# Patient Record
Sex: FEMALE | Race: WHITE | NOT HISPANIC OR LATINO | Employment: OTHER | URBAN - METROPOLITAN AREA
[De-identification: names, ages, dates, MRNs, and addresses within clinical notes are randomized per-mention and may not be internally consistent; named-entity substitution may affect disease eponyms.]

---

## 2017-02-24 ENCOUNTER — ALLSCRIPTS OFFICE VISIT (OUTPATIENT)
Dept: OTHER | Facility: OTHER | Age: 82
End: 2017-02-24

## 2017-03-07 ENCOUNTER — ALLSCRIPTS OFFICE VISIT (OUTPATIENT)
Dept: OTHER | Facility: OTHER | Age: 82
End: 2017-03-07

## 2017-03-07 DIAGNOSIS — E55.9 VITAMIN D DEFICIENCY: ICD-10-CM

## 2017-03-07 DIAGNOSIS — E05.90 THYROTOXICOSIS WITHOUT THYROID STORM: ICD-10-CM

## 2017-03-07 DIAGNOSIS — M48.00 SPINAL STENOSIS: ICD-10-CM

## 2017-03-07 DIAGNOSIS — E11.9 TYPE 2 DIABETES MELLITUS WITHOUT COMPLICATIONS (HCC): ICD-10-CM

## 2017-03-07 DIAGNOSIS — I10 ESSENTIAL (PRIMARY) HYPERTENSION: ICD-10-CM

## 2017-03-07 DIAGNOSIS — E78.5 HYPERLIPIDEMIA: ICD-10-CM

## 2017-03-28 ENCOUNTER — APPOINTMENT (OUTPATIENT)
Dept: LAB | Facility: CLINIC | Age: 82
End: 2017-03-28
Payer: MEDICARE

## 2017-03-28 DIAGNOSIS — E05.90 THYROTOXICOSIS WITHOUT THYROID STORM: ICD-10-CM

## 2017-03-28 DIAGNOSIS — M48.00 SPINAL STENOSIS: ICD-10-CM

## 2017-03-28 DIAGNOSIS — E11.9 TYPE 2 DIABETES MELLITUS WITHOUT COMPLICATIONS (HCC): ICD-10-CM

## 2017-03-28 DIAGNOSIS — E55.9 VITAMIN D DEFICIENCY: ICD-10-CM

## 2017-03-28 DIAGNOSIS — E78.5 HYPERLIPIDEMIA: ICD-10-CM

## 2017-03-28 DIAGNOSIS — I10 ESSENTIAL (PRIMARY) HYPERTENSION: ICD-10-CM

## 2017-03-28 LAB
25(OH)D3 SERPL-MCNC: 33.8 NG/ML (ref 30–100)
ALBUMIN SERPL BCP-MCNC: 3.8 G/DL (ref 3.5–5)
ALP SERPL-CCNC: 106 U/L (ref 46–116)
ALT SERPL W P-5'-P-CCNC: 27 U/L (ref 12–78)
ANION GAP SERPL CALCULATED.3IONS-SCNC: 7 MMOL/L (ref 4–13)
AST SERPL W P-5'-P-CCNC: 20 U/L (ref 5–45)
BASOPHILS # BLD AUTO: 0.03 THOUSANDS/ΜL (ref 0–0.1)
BASOPHILS NFR BLD AUTO: 0 % (ref 0–1)
BILIRUB SERPL-MCNC: 0.74 MG/DL (ref 0.2–1)
BUN SERPL-MCNC: 13 MG/DL (ref 5–25)
CALCIUM SERPL-MCNC: 9 MG/DL (ref 8.3–10.1)
CHLORIDE SERPL-SCNC: 103 MMOL/L (ref 100–108)
CHOLEST SERPL-MCNC: 203 MG/DL (ref 50–200)
CO2 SERPL-SCNC: 30 MMOL/L (ref 21–32)
CREAT SERPL-MCNC: 1.01 MG/DL (ref 0.6–1.3)
EOSINOPHIL # BLD AUTO: 0.17 THOUSAND/ΜL (ref 0–0.61)
EOSINOPHIL NFR BLD AUTO: 3 % (ref 0–6)
ERYTHROCYTE [DISTWIDTH] IN BLOOD BY AUTOMATED COUNT: 14.5 % (ref 11.6–15.1)
EST. AVERAGE GLUCOSE BLD GHB EST-MCNC: 131 MG/DL
GFR SERPL CREATININE-BSD FRML MDRD: 52.1 ML/MIN/1.73SQ M
GLUCOSE P FAST SERPL-MCNC: 125 MG/DL (ref 65–99)
HBA1C MFR BLD: 6.2 % (ref 4.2–6.3)
HCT VFR BLD AUTO: 42.4 % (ref 34.8–46.1)
HDLC SERPL-MCNC: 45 MG/DL (ref 40–60)
HGB BLD-MCNC: 14.3 G/DL (ref 11.5–15.4)
LDLC SERPL CALC-MCNC: 126 MG/DL (ref 0–100)
LYMPHOCYTES # BLD AUTO: 2.67 THOUSANDS/ΜL (ref 0.6–4.47)
LYMPHOCYTES NFR BLD AUTO: 39 % (ref 14–44)
MCH RBC QN AUTO: 29.4 PG (ref 26.8–34.3)
MCHC RBC AUTO-ENTMCNC: 33.7 G/DL (ref 31.4–37.4)
MCV RBC AUTO: 87 FL (ref 82–98)
MONOCYTES # BLD AUTO: 0.63 THOUSAND/ΜL (ref 0.17–1.22)
MONOCYTES NFR BLD AUTO: 9 % (ref 4–12)
NEUTROPHILS # BLD AUTO: 3.35 THOUSANDS/ΜL (ref 1.85–7.62)
NEUTS SEG NFR BLD AUTO: 49 % (ref 43–75)
NRBC BLD AUTO-RTO: 0 /100 WBCS
PLATELET # BLD AUTO: 351 THOUSANDS/UL (ref 149–390)
PMV BLD AUTO: 10.4 FL (ref 8.9–12.7)
POTASSIUM SERPL-SCNC: 3.8 MMOL/L (ref 3.5–5.3)
PROT SERPL-MCNC: 7.2 G/DL (ref 6.4–8.2)
RBC # BLD AUTO: 4.86 MILLION/UL (ref 3.81–5.12)
SODIUM SERPL-SCNC: 140 MMOL/L (ref 136–145)
T4 FREE SERPL-MCNC: 1.23 NG/DL (ref 0.76–1.46)
TRIGL SERPL-MCNC: 162 MG/DL
TSH SERPL DL<=0.05 MIU/L-ACNC: 0.67 UIU/ML (ref 0.36–3.74)
VIT B12 SERPL-MCNC: 595 PG/ML (ref 100–900)
WBC # BLD AUTO: 6.87 THOUSAND/UL (ref 4.31–10.16)

## 2017-03-28 PROCEDURE — 83036 HEMOGLOBIN GLYCOSYLATED A1C: CPT

## 2017-03-28 PROCEDURE — 84439 ASSAY OF FREE THYROXINE: CPT

## 2017-03-28 PROCEDURE — 84443 ASSAY THYROID STIM HORMONE: CPT

## 2017-03-28 PROCEDURE — 85025 COMPLETE CBC W/AUTO DIFF WBC: CPT

## 2017-03-28 PROCEDURE — 80053 COMPREHEN METABOLIC PANEL: CPT

## 2017-03-28 PROCEDURE — 82607 VITAMIN B-12: CPT

## 2017-03-28 PROCEDURE — 80061 LIPID PANEL: CPT

## 2017-03-28 PROCEDURE — 36415 COLL VENOUS BLD VENIPUNCTURE: CPT

## 2017-03-28 PROCEDURE — 82306 VITAMIN D 25 HYDROXY: CPT

## 2017-03-29 ENCOUNTER — GENERIC CONVERSION - ENCOUNTER (OUTPATIENT)
Dept: OTHER | Facility: OTHER | Age: 82
End: 2017-03-29

## 2017-06-07 ENCOUNTER — APPOINTMENT (OUTPATIENT)
Dept: LAB | Facility: CLINIC | Age: 82
End: 2017-06-07
Payer: MEDICARE

## 2017-06-07 ENCOUNTER — TRANSCRIBE ORDERS (OUTPATIENT)
Dept: LAB | Facility: CLINIC | Age: 82
End: 2017-06-07

## 2017-06-07 DIAGNOSIS — I70.213 ATHEROSCLEROSIS OF NATIVE ARTERY OF BOTH LOWER EXTREMITIES WITH INTERMITTENT CLAUDICATION (HCC): ICD-10-CM

## 2017-06-07 DIAGNOSIS — N83.201 BILATERAL OVARIAN CYSTS: ICD-10-CM

## 2017-06-07 DIAGNOSIS — N83.202 BILATERAL OVARIAN CYSTS: ICD-10-CM

## 2017-06-07 DIAGNOSIS — E06.3 CHRONIC LYMPHOCYTIC THYROIDITIS: ICD-10-CM

## 2017-06-07 DIAGNOSIS — E11.8 TYPE 2 DIABETES MELLITUS WITH COMPLICATION, UNSPECIFIED LONG TERM INSULIN USE STATUS: ICD-10-CM

## 2017-06-07 DIAGNOSIS — E05.90 PRETIBIAL MYXEDEMA: ICD-10-CM

## 2017-06-07 DIAGNOSIS — E05.00 TOXIC DIFFUSE GOITER WITHOUT MENTION OF THYROTOXIC CRISIS OR STORM: Primary | ICD-10-CM

## 2017-06-07 LAB
ALBUMIN SERPL BCP-MCNC: 3.7 G/DL (ref 3.5–5)
ALP SERPL-CCNC: 95 U/L (ref 46–116)
ALT SERPL W P-5'-P-CCNC: 26 U/L (ref 12–78)
ANION GAP SERPL CALCULATED.3IONS-SCNC: 7 MMOL/L (ref 4–13)
AST SERPL W P-5'-P-CCNC: 20 U/L (ref 5–45)
BACTERIA UR QL AUTO: ABNORMAL /HPF
BASOPHILS # BLD AUTO: 0.01 THOUSANDS/ΜL (ref 0–0.1)
BASOPHILS NFR BLD AUTO: 0 % (ref 0–1)
BILIRUB SERPL-MCNC: 0.79 MG/DL (ref 0.2–1)
BILIRUB UR QL STRIP: NEGATIVE
BUN SERPL-MCNC: 11 MG/DL (ref 5–25)
CALCIUM SERPL-MCNC: 9.3 MG/DL (ref 8.3–10.1)
CHLORIDE SERPL-SCNC: 103 MMOL/L (ref 100–108)
CLARITY UR: ABNORMAL
CO2 SERPL-SCNC: 28 MMOL/L (ref 21–32)
COLOR UR: YELLOW
CREAT SERPL-MCNC: 0.91 MG/DL (ref 0.6–1.3)
CREAT UR-MCNC: <13 MG/DL
EOSINOPHIL # BLD AUTO: 0.06 THOUSAND/ΜL (ref 0–0.61)
EOSINOPHIL NFR BLD AUTO: 1 % (ref 0–6)
ERYTHROCYTE [DISTWIDTH] IN BLOOD BY AUTOMATED COUNT: 13.4 % (ref 11.6–15.1)
EST. AVERAGE GLUCOSE BLD GHB EST-MCNC: 126 MG/DL
GFR SERPL CREATININE-BSD FRML MDRD: 58.8 ML/MIN/1.73SQ M
GLUCOSE P FAST SERPL-MCNC: 114 MG/DL (ref 65–99)
GLUCOSE UR STRIP-MCNC: NEGATIVE MG/DL
HBA1C MFR BLD: 6 % (ref 4.2–6.3)
HCT VFR BLD AUTO: 41.5 % (ref 34.8–46.1)
HGB BLD-MCNC: 13.8 G/DL (ref 11.5–15.4)
HGB UR QL STRIP.AUTO: NEGATIVE
HYALINE CASTS #/AREA URNS LPF: ABNORMAL /LPF
KETONES UR STRIP-MCNC: NEGATIVE MG/DL
LEUKOCYTE ESTERASE UR QL STRIP: ABNORMAL
LYMPHOCYTES # BLD AUTO: 2.66 THOUSANDS/ΜL (ref 0.6–4.47)
LYMPHOCYTES NFR BLD AUTO: 38 % (ref 14–44)
MAGNESIUM SERPL-MCNC: 2.4 MG/DL (ref 1.6–2.6)
MCH RBC QN AUTO: 29.3 PG (ref 26.8–34.3)
MCHC RBC AUTO-ENTMCNC: 33.3 G/DL (ref 31.4–37.4)
MCV RBC AUTO: 88 FL (ref 82–98)
MICROALBUMIN UR-MCNC: 27.7 MG/L (ref 0–20)
MICROALBUMIN/CREAT 24H UR: >213 MG/G CREATININE (ref 0–30)
MONOCYTES # BLD AUTO: 0.88 THOUSAND/ΜL (ref 0.17–1.22)
MONOCYTES NFR BLD AUTO: 13 % (ref 4–12)
NEUTROPHILS # BLD AUTO: 3.34 THOUSANDS/ΜL (ref 1.85–7.62)
NEUTS SEG NFR BLD AUTO: 48 % (ref 43–75)
NITRITE UR QL STRIP: NEGATIVE
NON-SQ EPI CELLS URNS QL MICRO: ABNORMAL /HPF
NRBC BLD AUTO-RTO: 0 /100 WBCS
PH UR STRIP.AUTO: 6 [PH] (ref 4.5–8)
PHOSPHATE SERPL-MCNC: 3.1 MG/DL (ref 2.3–4.1)
PLATELET # BLD AUTO: 387 THOUSANDS/UL (ref 149–390)
PMV BLD AUTO: 10.3 FL (ref 8.9–12.7)
POTASSIUM SERPL-SCNC: 3.6 MMOL/L (ref 3.5–5.3)
PROT SERPL-MCNC: 7.2 G/DL (ref 6.4–8.2)
PROT UR STRIP-MCNC: NEGATIVE MG/DL
RBC # BLD AUTO: 4.71 MILLION/UL (ref 3.81–5.12)
RBC #/AREA URNS AUTO: ABNORMAL /HPF
SODIUM SERPL-SCNC: 138 MMOL/L (ref 136–145)
SP GR UR STRIP.AUTO: 1.01 (ref 1–1.03)
T4 FREE SERPL-MCNC: 1.81 NG/DL (ref 0.76–1.46)
TSH SERPL DL<=0.05 MIU/L-ACNC: 0.01 UIU/ML (ref 0.36–3.74)
UROBILINOGEN UR QL STRIP.AUTO: 0.2 E.U./DL
WBC # BLD AUTO: 6.96 THOUSAND/UL (ref 4.31–10.16)
WBC #/AREA URNS AUTO: ABNORMAL /HPF

## 2017-06-07 PROCEDURE — 82570 ASSAY OF URINE CREATININE: CPT

## 2017-06-07 PROCEDURE — 82043 UR ALBUMIN QUANTITATIVE: CPT

## 2017-06-07 PROCEDURE — 85025 COMPLETE CBC W/AUTO DIFF WBC: CPT

## 2017-06-07 PROCEDURE — 81001 URINALYSIS AUTO W/SCOPE: CPT

## 2017-06-07 PROCEDURE — 80053 COMPREHEN METABOLIC PANEL: CPT

## 2017-06-07 PROCEDURE — 84100 ASSAY OF PHOSPHORUS: CPT

## 2017-06-07 PROCEDURE — 84439 ASSAY OF FREE THYROXINE: CPT

## 2017-06-07 PROCEDURE — 83735 ASSAY OF MAGNESIUM: CPT

## 2017-06-07 PROCEDURE — 36415 COLL VENOUS BLD VENIPUNCTURE: CPT

## 2017-06-07 PROCEDURE — 84443 ASSAY THYROID STIM HORMONE: CPT

## 2017-06-07 PROCEDURE — 83036 HEMOGLOBIN GLYCOSYLATED A1C: CPT

## 2017-07-03 ENCOUNTER — TRANSCRIBE ORDERS (OUTPATIENT)
Dept: LAB | Facility: CLINIC | Age: 82
End: 2017-07-03

## 2017-07-03 ENCOUNTER — APPOINTMENT (OUTPATIENT)
Dept: LAB | Facility: CLINIC | Age: 82
End: 2017-07-03
Payer: MEDICARE

## 2017-07-03 DIAGNOSIS — Z00.00 ROUTINE GENERAL MEDICAL EXAMINATION AT A HEALTH CARE FACILITY: Primary | ICD-10-CM

## 2017-07-03 LAB
ALBUMIN SERPL BCP-MCNC: 4 G/DL (ref 3.5–5)
ALP SERPL-CCNC: 96 U/L (ref 46–116)
ALT SERPL W P-5'-P-CCNC: 26 U/L (ref 12–78)
ANION GAP SERPL CALCULATED.3IONS-SCNC: 6 MMOL/L (ref 4–13)
AST SERPL W P-5'-P-CCNC: 26 U/L (ref 5–45)
BILIRUB SERPL-MCNC: 0.68 MG/DL (ref 0.2–1)
BUN SERPL-MCNC: 16 MG/DL (ref 5–25)
CALCIUM SERPL-MCNC: 9.6 MG/DL (ref 8.3–10.1)
CEA SERPL-MCNC: <0.5 NG/ML (ref 0–3)
CHLORIDE SERPL-SCNC: 103 MMOL/L (ref 100–108)
CO2 SERPL-SCNC: 29 MMOL/L (ref 21–32)
CORTIS SERPL-MCNC: 19.6 UG/DL
CREAT SERPL-MCNC: 0.98 MG/DL (ref 0.6–1.3)
CREAT UR-MCNC: 66.6 MG/DL
ERYTHROCYTE [DISTWIDTH] IN BLOOD BY AUTOMATED COUNT: 13 % (ref 11.6–15.1)
EST. AVERAGE GLUCOSE BLD GHB EST-MCNC: 123 MG/DL
GFR SERPL CREATININE-BSD FRML MDRD: 53.9 ML/MIN/1.73SQ M
GLUCOSE P FAST SERPL-MCNC: 121 MG/DL (ref 65–99)
HBA1C MFR BLD: 5.9 % (ref 4.2–6.3)
HCT VFR BLD AUTO: 41.8 % (ref 34.8–46.1)
HGB BLD-MCNC: 14.1 G/DL (ref 11.5–15.4)
MAGNESIUM SERPL-MCNC: 2.4 MG/DL (ref 1.6–2.6)
MCH RBC QN AUTO: 29.7 PG (ref 26.8–34.3)
MCHC RBC AUTO-ENTMCNC: 33.7 G/DL (ref 31.4–37.4)
MCV RBC AUTO: 88 FL (ref 82–98)
MICROALBUMIN UR-MCNC: <5 MG/L (ref 0–20)
MICROALBUMIN/CREAT 24H UR: <8 MG/G CREATININE (ref 0–30)
PHOSPHATE SERPL-MCNC: 3.6 MG/DL (ref 2.3–4.1)
PLATELET # BLD AUTO: 364 THOUSANDS/UL (ref 149–390)
PMV BLD AUTO: 10.9 FL (ref 8.9–12.7)
POTASSIUM SERPL-SCNC: 4.4 MMOL/L (ref 3.5–5.3)
PROT SERPL-MCNC: 7.2 G/DL (ref 6.4–8.2)
RBC # BLD AUTO: 4.75 MILLION/UL (ref 3.81–5.12)
SODIUM SERPL-SCNC: 138 MMOL/L (ref 136–145)
T3FREE SERPL-MCNC: 3.78 PG/ML (ref 2.3–4.2)
T4 FREE SERPL-MCNC: 1.57 NG/DL (ref 0.76–1.46)
TSH SERPL DL<=0.05 MIU/L-ACNC: 0.01 UIU/ML (ref 0.36–3.74)
WBC # BLD AUTO: 6.22 THOUSAND/UL (ref 4.31–10.16)

## 2017-07-03 PROCEDURE — 83835 ASSAY OF METANEPHRINES: CPT

## 2017-07-03 PROCEDURE — 83735 ASSAY OF MAGNESIUM: CPT

## 2017-07-03 PROCEDURE — 84439 ASSAY OF FREE THYROXINE: CPT

## 2017-07-03 PROCEDURE — 84432 ASSAY OF THYROGLOBULIN: CPT

## 2017-07-03 PROCEDURE — 82024 ASSAY OF ACTH: CPT

## 2017-07-03 PROCEDURE — 82378 CARCINOEMBRYONIC ANTIGEN: CPT

## 2017-07-03 PROCEDURE — 84100 ASSAY OF PHOSPHORUS: CPT

## 2017-07-03 PROCEDURE — 84443 ASSAY THYROID STIM HORMONE: CPT

## 2017-07-03 PROCEDURE — 82308 ASSAY OF CALCITONIN: CPT

## 2017-07-03 PROCEDURE — 84481 FREE ASSAY (FT-3): CPT

## 2017-07-03 PROCEDURE — 86800 THYROGLOBULIN ANTIBODY: CPT

## 2017-07-03 PROCEDURE — 82533 TOTAL CORTISOL: CPT

## 2017-07-03 PROCEDURE — 82570 ASSAY OF URINE CREATININE: CPT

## 2017-07-03 PROCEDURE — 82043 UR ALBUMIN QUANTITATIVE: CPT

## 2017-07-03 PROCEDURE — 83036 HEMOGLOBIN GLYCOSYLATED A1C: CPT

## 2017-07-03 PROCEDURE — 36415 COLL VENOUS BLD VENIPUNCTURE: CPT

## 2017-07-03 PROCEDURE — 80053 COMPREHEN METABOLIC PANEL: CPT

## 2017-07-03 PROCEDURE — 85027 COMPLETE CBC AUTOMATED: CPT

## 2017-07-05 ENCOUNTER — TRANSCRIBE ORDERS (OUTPATIENT)
Dept: LAB | Facility: CLINIC | Age: 82
End: 2017-07-05

## 2017-07-05 ENCOUNTER — HOSPITAL ENCOUNTER (OUTPATIENT)
Dept: ULTRASOUND IMAGING | Facility: HOSPITAL | Age: 82
Discharge: HOME | End: 2017-07-05
Attending: SPECIALIST
Payer: MEDICARE

## 2017-07-05 ENCOUNTER — HOSPITAL ENCOUNTER (OUTPATIENT)
Dept: NON INVASIVE DIAGNOSTICS | Facility: CLINIC | Age: 82
Discharge: HOME | End: 2017-07-05
Attending: SPECIALIST
Payer: MEDICARE

## 2017-07-05 DIAGNOSIS — I70.213 ATHEROSCLEROSIS OF NATIVE ARTERY OF BOTH LOWER EXTREMITIES WITH INTERMITTENT CLAUDICATION (HCC): ICD-10-CM

## 2017-07-05 DIAGNOSIS — N83.202 BILATERAL OVARIAN CYSTS: ICD-10-CM

## 2017-07-05 DIAGNOSIS — N83.201 BILATERAL OVARIAN CYSTS: ICD-10-CM

## 2017-07-05 LAB — ACTH PLAS-MCNC: 60.2 PG/ML (ref 7.2–63.3)

## 2017-07-05 PROCEDURE — 93925 LOWER EXTREMITY STUDY: CPT

## 2017-07-05 PROCEDURE — 76856 US EXAM PELVIC COMPLETE: CPT

## 2017-07-05 PROCEDURE — 93923 UPR/LXTR ART STDY 3+ LVLS: CPT

## 2017-07-06 LAB — CALCIT SERPL-MCNC: <2 PG/ML (ref 0–5)

## 2017-07-07 LAB
METANEPH FREE SERPL-MCNC: 33 PG/ML (ref 0–62)
NORMETANEPHRINE SERPL-MCNC: 129 PG/ML (ref 0–145)

## 2017-07-11 ENCOUNTER — ALLSCRIPTS OFFICE VISIT (OUTPATIENT)
Dept: OTHER | Facility: OTHER | Age: 82
End: 2017-07-11

## 2017-07-12 LAB
THYROGLOB AB SERPL-ACNC: 445.4 IU/ML (ref 0–0.9)
THYROGLOB SERPL-MCNC: 13 NG/ML

## 2017-09-08 ENCOUNTER — TRANSCRIBE ORDERS (OUTPATIENT)
Dept: LAB | Facility: CLINIC | Age: 82
End: 2017-09-08

## 2017-09-08 ENCOUNTER — APPOINTMENT (OUTPATIENT)
Dept: LAB | Facility: CLINIC | Age: 82
End: 2017-09-08
Payer: MEDICARE

## 2017-09-08 DIAGNOSIS — Z00.00 ROUTINE GENERAL MEDICAL EXAMINATION AT A HEALTH CARE FACILITY: Primary | ICD-10-CM

## 2017-09-08 LAB
ALBUMIN SERPL BCP-MCNC: 3.6 G/DL (ref 3.5–5)
ALP SERPL-CCNC: 110 U/L (ref 46–116)
ALT SERPL W P-5'-P-CCNC: 24 U/L (ref 12–78)
ANION GAP SERPL CALCULATED.3IONS-SCNC: 5 MMOL/L (ref 4–13)
AST SERPL W P-5'-P-CCNC: 17 U/L (ref 5–45)
BACTERIA UR QL AUTO: NORMAL /HPF
BASOPHILS # BLD AUTO: 0.02 THOUSANDS/ΜL (ref 0–0.1)
BASOPHILS NFR BLD AUTO: 0 % (ref 0–1)
BILIRUB SERPL-MCNC: 0.57 MG/DL (ref 0.2–1)
BILIRUB UR QL STRIP: NEGATIVE
BUN SERPL-MCNC: 14 MG/DL (ref 5–25)
CALCIUM SERPL-MCNC: 8.9 MG/DL (ref 8.3–10.1)
CHLORIDE SERPL-SCNC: 106 MMOL/L (ref 100–108)
CLARITY UR: CLEAR
CO2 SERPL-SCNC: 29 MMOL/L (ref 21–32)
COLOR UR: YELLOW
CREAT SERPL-MCNC: 0.94 MG/DL (ref 0.6–1.3)
CREAT UR-MCNC: <13 MG/DL
EOSINOPHIL # BLD AUTO: 0.06 THOUSAND/ΜL (ref 0–0.61)
EOSINOPHIL NFR BLD AUTO: 1 % (ref 0–6)
ERYTHROCYTE [DISTWIDTH] IN BLOOD BY AUTOMATED COUNT: 14 % (ref 11.6–15.1)
EST. AVERAGE GLUCOSE BLD GHB EST-MCNC: 123 MG/DL
GFR SERPL CREATININE-BSD FRML MDRD: 55 ML/MIN/1.73SQ M
GLUCOSE P FAST SERPL-MCNC: 115 MG/DL (ref 65–99)
GLUCOSE UR STRIP-MCNC: NEGATIVE MG/DL
HBA1C MFR BLD: 5.9 % (ref 4.2–6.3)
HCT VFR BLD AUTO: 41.1 % (ref 34.8–46.1)
HGB BLD-MCNC: 13.8 G/DL (ref 11.5–15.4)
HGB UR QL STRIP.AUTO: NEGATIVE
KETONES UR STRIP-MCNC: NEGATIVE MG/DL
LEUKOCYTE ESTERASE UR QL STRIP: ABNORMAL
LYMPHOCYTES # BLD AUTO: 2.31 THOUSANDS/ΜL (ref 0.6–4.47)
LYMPHOCYTES NFR BLD AUTO: 34 % (ref 14–44)
MAGNESIUM SERPL-MCNC: 2.4 MG/DL (ref 1.6–2.6)
MCH RBC QN AUTO: 29 PG (ref 26.8–34.3)
MCHC RBC AUTO-ENTMCNC: 33.6 G/DL (ref 31.4–37.4)
MCV RBC AUTO: 86 FL (ref 82–98)
MICROALBUMIN UR-MCNC: <5 MG/L (ref 0–20)
MONOCYTES # BLD AUTO: 0.64 THOUSAND/ΜL (ref 0.17–1.22)
MONOCYTES NFR BLD AUTO: 10 % (ref 4–12)
NEUTROPHILS # BLD AUTO: 3.7 THOUSANDS/ΜL (ref 1.85–7.62)
NEUTS SEG NFR BLD AUTO: 55 % (ref 43–75)
NITRITE UR QL STRIP: NEGATIVE
NON-SQ EPI CELLS URNS QL MICRO: NORMAL /HPF
NRBC BLD AUTO-RTO: 0 /100 WBCS
PH UR STRIP.AUTO: 6.5 [PH] (ref 4.5–8)
PHOSPHATE SERPL-MCNC: 2.9 MG/DL (ref 2.3–4.1)
PLATELET # BLD AUTO: 362 THOUSANDS/UL (ref 149–390)
PMV BLD AUTO: 10.3 FL (ref 8.9–12.7)
POTASSIUM SERPL-SCNC: 4.3 MMOL/L (ref 3.5–5.3)
PROT SERPL-MCNC: 7.4 G/DL (ref 6.4–8.2)
PROT UR STRIP-MCNC: NEGATIVE MG/DL
RBC # BLD AUTO: 4.76 MILLION/UL (ref 3.81–5.12)
RBC #/AREA URNS AUTO: NORMAL /HPF
SODIUM SERPL-SCNC: 140 MMOL/L (ref 136–145)
SP GR UR STRIP.AUTO: 1 (ref 1–1.03)
T3FREE SERPL-MCNC: 2.66 PG/ML (ref 2.3–4.2)
T4 FREE SERPL-MCNC: 1.06 NG/DL (ref 0.76–1.46)
TSH SERPL DL<=0.05 MIU/L-ACNC: 0.35 UIU/ML (ref 0.36–3.74)
UROBILINOGEN UR QL STRIP.AUTO: 0.2 E.U./DL
WBC # BLD AUTO: 6.74 THOUSAND/UL (ref 4.31–10.16)
WBC #/AREA URNS AUTO: NORMAL /HPF

## 2017-09-08 PROCEDURE — 85025 COMPLETE CBC W/AUTO DIFF WBC: CPT

## 2017-09-08 PROCEDURE — 82043 UR ALBUMIN QUANTITATIVE: CPT

## 2017-09-08 PROCEDURE — 81001 URINALYSIS AUTO W/SCOPE: CPT

## 2017-09-08 PROCEDURE — 83036 HEMOGLOBIN GLYCOSYLATED A1C: CPT

## 2017-09-08 PROCEDURE — 84439 ASSAY OF FREE THYROXINE: CPT

## 2017-09-08 PROCEDURE — 84100 ASSAY OF PHOSPHORUS: CPT

## 2017-09-08 PROCEDURE — 83735 ASSAY OF MAGNESIUM: CPT

## 2017-09-08 PROCEDURE — 82570 ASSAY OF URINE CREATININE: CPT

## 2017-09-08 PROCEDURE — 36415 COLL VENOUS BLD VENIPUNCTURE: CPT

## 2017-09-08 PROCEDURE — 80053 COMPREHEN METABOLIC PANEL: CPT

## 2017-09-08 PROCEDURE — 84443 ASSAY THYROID STIM HORMONE: CPT

## 2017-09-08 PROCEDURE — 84481 FREE ASSAY (FT-3): CPT

## 2017-11-14 ENCOUNTER — ALLSCRIPTS OFFICE VISIT (OUTPATIENT)
Dept: OTHER | Facility: OTHER | Age: 82
End: 2017-11-14

## 2017-11-15 NOTE — PROGRESS NOTES
Assessment    1  Muscle cramps (729 82) (R25 2)   2  Hypertension (401 9) (I10)   3  Irritable bowel syndrome (564 1) (K58 9)   4  Type 2 diabetes mellitus (250 00) (E11 9)    Plan  Degeneration of intervertebral disc of lumbar region    · OxyCODONE HCl - 5 MG Oral Tablet  Hyperlipidemia    · (1) LIPID PANEL, FASTING; Status:Active; Requested for:01Mar2018;   Hypertension    · AmLODIPine Besylate 5 MG Oral Tablet (Norvasc); TAKE 1 TABLET BY MOUTH ONCE DAILY  Spinal stenosis    · From  Oxycodone-Acetaminophen 5-325 MG Oral Tablet TAKE 1 TABLET Every 8 hoursPRN To Oxycodone-Acetaminophen 5-325 MG Oral Tablet (Percocet) TAKE 1 TABLET Twice dailyPRN  Type 2 diabetes mellitus    · *VB - Foot Exam; Status:Complete - Retrospective By Protocol Authorization;   Done: [id number]  Vitamin D deficiency    · (1) VITAMIN D 25-HYDROXY; Status:Active; Requested OOJ:[id number]; Discussion/Summary  Discussion Summary:   Leg cramps  She has taking electrolyte supplements, quinine water every other day  Instructed to massage both feet with bottle several times a day  She will try taking lipoic acid and use a homeopathic cream Spinal stenosis  She has tried amitriptyline in the past, does not want to try other medication  Refilled Percocet which she takes sparingly  PDMP reviewed  Hx of Trochanteric bursitis, ITB syndrome, bilateral s/p steroid injection  Asthma  Doing well, no symptoms  She uses Flovent p r n , takes Singulair daily  HTN  Monitor BP at home, compliant with amlodipine  Hyperlipidemia  Lipids improved, on supplements only  Hyperthyroid  Medication recently adjusted  F/u with endo  IBS, GERD  On daily PPI and Bentyl  Anxiety  Stable, minimal use of lorazepam Vitamin D deficiency  HM  Declined flu vaccine, no further screening  up in 4 months or prn  Counseling Documentation With Imm: The patient was counseled regarding diagnostic results,-- instructions for management,-- risk factor reductions,-- impressions  Chief Complaint  Chief Complaint Chronic Condition [facility]: Patient is here today for follow up of chronic conditions described in HPI  History of Present Illness  HPI: Ms Serina Avitia complains of frequent cramps on her feet  can become severe, disrupted sleep  She reported usually starts by the top of her foot, sometimes radiating to her leg  She has tried multiple over-the-counter remedies  She is currently eating a banana daily and drinks quinine water every other day  She has bought lipoic acid and a new homeopathic cream which she will try  She has minimal symptoms during the day   has chronic low back pain, occasional pain in both thighs  She takes Percocet sparingly  She took amitriptyline in the past, which did not really help with the pain  Leg Cramps (Brief): The patient is being seen for an initial evaluation of an existing diagnosis of leg cramps  Symptoms:  nocturnal leg cramps, but-- no exertional leg cramps-- and-- no leg cramps at rest  The patient is currently experiencing symptoms  No associated symptoms are reported  Current treatment includes quinine, mineral supplements, potassium supplements and vitamin B12  By report, there is fair symptom control  Irritable Bowel Syndrome (Follow-Up): The patient is being seen for follow-up of irritable bowel syndrome  The patient reports doing well  The patient is currently asymptomatic  Medications:  the patient is adherent to her medication regimen  Hypertension (Follow-Up): The patient states she has been stable with her blood pressure control since the last visit  Symptoms: The patient is currently asymptomatic  Home monitoring: The patient is not checking blood pressure at home  Medications: the patient is adherent with her medication regimen  Disease Management: the patient is not doing well with her blood pressure goals  Diabetes Type II (Follow-Up):  The patient states she has been stable with her Type II Diabetes control since the last visit  Comorbid Illnesses: hypertension-- and-- hyperlipidemia  Symptoms:  Medications: the patient is adherent with her medication regimen  Review of Systems  Complete-Female:  Constitutional: not feeling tired  Eyes: no eyesight problems  ENT: no nasal discharge  Cardiovascular: no chest pain,-- no palpitations-- and-- no lower extremity edema  Respiratory: no shortness of breath-- and-- no cough  Gastrointestinal: no abdominal pain-- and-- no constipation  Genitourinary: no dysuria  Musculoskeletal: arthralgias-- and-- joint stiffness, but-- as noted in HPI-- and-- no limb pain  Integumentary: no rashes  Neurological: no dizziness,-- no limb weakness-- and-- no difficulty walking  Psychiatric: no sleep disturbances  no feelings of weakness      Active Problems  1  Anxiety (300 00) (F41 9)   2  Asthma (493 90) (J45 909)   3  Degeneration of intervertebral disc of lumbar region (722 52) (M51 36)   4  Diverticulosis (562 10) (K57 90)   5  Elevated serum alkaline phosphatase level (790 5) (R74 8)   6  Encounter for screening mammogram for malignant neoplasm of breast (V76 12) (Z12 31)   7  Esophageal reflux (530 81) (K21 9)   8  Fatty liver (571 8) (K76 0)   9  History of allergy (V15 09) (Z88 9)   10  Hyperlipidemia (272 4) (E78 5)   11  Hypertension (401 9) (I10)   12  Hyperthyroidism (242 90) (E05 90)   13  Iliotibial band tendinitis of left side (728 89) (M76 32)   14  Iliotibial band tendinitis of right side (728 89) (M76 31)   15  Insomnia (780 52) (G47 00)   16  Irritable bowel syndrome (564 1) (K58 9)   17  Need for influenza vaccination (V04 81) (Z23)   18  Need for pneumococcal vaccination (V03 82) (Z23)   19  Ovarian cyst (620 2) (N83 20)   20  Postherpetic neuralgia (053 19) (B02 29)   21  Pre-syncope (780 2) (R55)   22  Screening for osteoporosis (V82 81) (Z13 820)   23  Skin rash (782 1) (R21)   24  Spinal stenosis (724 00) (M48 00)   25   Spondylolisthesis, grade 1 (738 4) (M43 10) 26  Transient ischemic attack (435 9) (G45 9)   27  Type 2 diabetes mellitus (250 00) (E11 9)   28  Vertigo (780 4) (R42)   29  Vitamin D deficiency (268 9) (E55 9)    Past Medical History  1  History of Asymptomatic Bacteriuria (791 9)   2  History of Bronchitis, chronic obstructive, with exacerbation (491 21) (J44 1)   3  History of Chronic interstitial cystitis (595 1) (N30 10)   4  History of Community acquired pneumonia (5) (J18 9)   5  History of Dysuria (788 1) (R30 0)   6  History of Fungal dermatitis (111 9) (B36 9)   7  History Of 6  Previous Pregnancies (V61 5)   8  History of abdominal pain (V13 89) (Z87 898)   9  History of diverticulitis of colon (V12 79) (Z87 19)   10  History of orthostatic hypotension (V12 59) (Z86 79)   11  History of urinary frequency (V13 09) (Z87 898)   12  History of Impacted cerumen of right ear (380 4) (H61 21)   13  History of Lightheadedness (780 4) (R42)   14  History of Lower abdominal pain (789 09) (R10 30)   15  History of Otitis externa of right ear (380 10) (H60 91)   16  History of Previous Pregnancies Resulted In 4  Living Children   17  History of Pruritus (698 9) (L29 9)   18  History of Trochanteric bursitis of both hips (726 5) (M70 61,M70 62)   19  History of Urinary symptom or sign (788 99) (R39 9)   20  History of Urinary tract infection (599 0) (N39 0)   21  History of Urinary Tract Infection (V13 02)   22  History of Vaginal candidiasis (112 1) (B37 3)   23  History of Vaginal candidiasis (112 1) (B37 3)  Active Problems And Past Medical History Reviewed: The active problems and past medical history were reviewed and updated today  Surgical History  1  History of Cholecystectomy   2  History of Hysterectomy   3  History of Partial Colectomy - Sigmoid    Family History  Mother    1  Denied: Family history of Alcoholism and drug addiction in family   2  Denied: Family history of Anxiety and depression   3   Family history of Coronary Artery Disease (V17 49)  Father    4  Denied: Family history of Alcoholism and drug addiction in family   5  Denied: Family history of Anxiety and depression   6  Family history of Ivet Of 2 Motor [address] On [address] (Not Motorcycle)  Child    7  Denied: Family history of Alcoholism and drug addiction in family   6  Denied: Family history of Anxiety and depression  Sibling    9  Denied: Family history of Alcoholism and drug addiction in family   8  Denied: Family history of Anxiety and depression    Social History     · Marital History -    · Never A Smoker   · Never Drank Alcohol   · Occupation: Retired  Social History Reviewed: The social history was reviewed and is unchanged  Current Meds   1  Albuterol Sulfate (2 5 MG/3ML) 0 083% Inhalation Nebulization Solution; USE 1 UNIT DOSE EVERY 4-6 HOURS AS NEEDED FOR WHEEZING ; Therapy: 04Apr2016 to (Last Rx:04Apr2016)  Requested for: 04Apr2016 Ordered   2  AmLODIPine Besylate 5 MG Oral Tablet; TAKE 1 TABLET BY MOUTH ONCE DAILY; Therapy: 57DBF8903 to (Evaluate:07Jan2018)  Requested for: 14ODH5060; Last Rx:40Tdr9955 Ordered   3  Dicyclomine HCl - 10 MG Oral Capsule; TAKE 1 CAPSULE EVERY 8 HOURS DAILY; Therapy: 17NOQ6863 to (Evaluate:06Bic8890)  Requested for: 53Aba4941; Last Rx:24Ybt9695 Ordered   4  Flovent  MCG/ACT Inhalation Aerosol; INHALE 1 PUFF TWICE DAILY; Therapy: 11Iop6054 to (Evaluate:30Mar2017)  Requested for: 35Ufg1945; Last Rx:04Apr2016 Ordered   5  Lidocaine 5 % External Patch; APPLY 1 PATCH TO THE AFFECTED AREA AND LEAVE IN PLACE FOR 12 HOURS, THEN REMOVE AND LEAVE OFF FOR 12 HOURS; Therapy: 64VQF8632 to (Evaluate:30Mar2017)  Requested for: 47XNK5216; Last Rx:08Ibm5205 Ordered   6  LORazepam 0 5 MG Oral Tablet; TAKE 1 TABLET DAILY AS NEEDED; Therapy: 21GQV3190 to (Evaluate:24Oct2016); Last Rx:86Szj6105 Ordered   7  Meclizine HCl - 25 MG Oral Tablet; TAKE 1 TABLET 3 TIMES DAILY AS NEEDED;  Therapy: 16Scq6033 to (Evaluate:43Bfe7417)  Requested for: 29ZNA9936; Last Rx:02Oct2017 Ordered   8  Metanx 3-90 314-2-35 MG Oral Capsule; take 1 capsule daily; Therapy: 27IYT6495 to (Evaluate:01Apr2016); Last Rx:01Hnc9802 Ordered   9  MethIMAzole 5 MG Oral Tablet; TAKE 1 TABLET ONCE DAILY; Therapy: 81USB2660 to (Evaluate:01Nov2015); Last Rx:98Trs5142 Ordered   10  Montelukast Sodium 10 MG Oral Tablet; Take 1 tablet daily; Therapy: 91DLN2609 to (Evaluate:07Jan2018)  Requested for: 83OSY1158; Last Rx:94Yxv9208  Ordered   11  Omeprazole 20 MG Oral Capsule Delayed Release; TAKE 1 CAPSULE DAILY EVERY MORNING  BEFORE BREAKFAST  Requested for: 84YJU8445; Last Rx:06Jun2017 Ordered   12  OneTouch Ultra Blue In Vitro Strip; USE AS DIRECTED; Therapy: 57CSM5107 to (Rudy Gaucher)  Requested for: 03Apr2014; Last Rx:03Apr2014  Ordered   13  OxyCODONE HCl - 5 MG Oral Tablet; Take one tablet PO twice a day prn pain; Therapy: 55FDV3480 to (Evaluate:19Oct2017); Last Rx:06Auo9120 Ordered   14  Triamcinolone Acetonide 0 1 % External Cream; APPLY SPARINGLY AND RUB IN WELL TO  AFFECTED AREA(S) TWICE DAILY PRN  NOT FOR FACE, BREAST OR GROIN;  Therapy: 80EXR2421 to (Last Rx:16Mar2017)  Requested for: 96LAY4158 Ordered   15  Ventolin  (90 Base) MCG/ACT Inhalation Aerosol Solution; INHALE 2 PUFFS EVERY 4 HOURS  AS NEEDED FOR COUGH AND WHEEZE;  Therapy: 97Vam4669 to (Last [place])  Requested for: 71Mec7177 Ordered  Medication List Reviewed: The medication list was reviewed and updated today  Allergies  1  Aleve CAPS   2  Bactrim TABS   3  Gabapentin TABS   4  Halcion TABS   5  Lisinopril TABS   6  Statins   7   Zetia TABS    Vitals  Vital Signs    Recorded: 79SDS5175 11:41AM Recorded: 45LHM8642 11:21AM   Temperature  97 9 F   Heart Rate  78   Respiration  18   Systolic 276 328   Diastolic 60 62   Height  5 ft    Weight  137 lb 8 oz   BMI Calculated  26 85   BSA Calculated  1 59   O2 Saturation  98       Physical Exam  Socks and shoes removed, Right Foot Findings: normal foot  The toes were normal  Socks and Shoes removed, Left Foot Findings: normal foot  The toes were normal   Monofilament Testing: normal tactile sensation with monofilament testing throughout both feet  Tactile sensation in the right foot (see image for location): number 1 was normal,-- number 4 was normal,-- number 5 was normal,-- number 6 was normal,-- number 2 was normal,-- number 3 was normal,-- number 7 was normal,-- number 8 was normal,-- number 9 was normal-- and-- number 10 was normal  Tactile sensation in the left foot (see image for location): number 1 was normal,-- number 4 was normal,-- number 5 was normal,-- number 6 was normal,-- number 2 was normal,-- number 3 was normal,-- number 7 was normal,-- number 8 was normal,-- number 9 was normal-- and-- number 10 was normal    Vascular: Pulses: 2+ in the dorsalis pedis  Pulses: 2+ in the dorsalis pedis  Assign Risk Category: 0: No loss of protective sensation, no deformity  No present risk  Constitutional  General appearance: No acute distress, well appearing and well nourished  appears healthy,-- comfortable-- and-- clothing appropriate  Head and Face  Head and face: Normal    Eyes  Pupils and irises: Equal, round, reactive to light  Ears, Nose, Mouth, and Throat  External inspection of ears and nose: Normal    Otoscopic examination: Tympanic membranes translucent with normal light reflex  Canals patent without erythema  Neck  Neck: Supple, symmetric, trachea midline, no masses  Pulmonary  Respiratory effort: No increased work of breathing or signs of respiratory distress  Auscultation of lungs: Clear to auscultation  Cardiovascular  Auscultation of heart: Normal rate and rhythm, normal S1 and S2, no murmurs  Examination of extremities for edema and/or varicosities: Normal    Abdomen  Abdomen: Non-tender, no masses  Musculoskeletal  Gait and station: Normal    Skin  Skin and subcutaneous tissue: Normal without rashes or lesions  Neurologic  Cortical function: Normal mental status  Psychiatric  Orientation to person, place, and time: Normal    Mood and affect: Normal        Results/Data  *VB - Foot Exam 26NCY5862 11:23AM Peter Lieberman     Test Name Result Flag Reference   Cathie Plunkett 85ZCS3026       PHQ-2 Adult Depression Screening 81YIX5522 11:21AM User, Nciky     Test Name Result Flag Reference   PHQ-2 Adult Depression Score 0       Over the last two weeks, how often have you been bothered by any of the following problems? Little interest or pleasure in doing things: Not at all - 0 Feeling down, depressed, or hopeless: Not at all - 0   PHQ-2 Adult Depression Screening Negative       (1) CBC/PLT/DIFF 30GBN9160 10:50AM EPIC, Provider   Test ordered by: Lynn Minor     Test Name Result Flag Reference   WBC COUNT 6 74 Thousand/uL  4 31-10 16   RBC COUNT 4 76 Million/uL  3 81-5 12   HEMOGLOBIN 13 8 g/dL  11 5-15 4   HEMATOCRIT 41 1 %  34 8-46  1   MCV 86 fL  82-98   MCH 29 0 pg  26 8-34 3   MCHC 33 6 g/dL  31 4-37 4   RDW 14 0 %  11 6-15 1   MPV 10 3 fL  8 9-12 7   PLATELET COUNT 877 Thousands/uL  149-390   nRBC AUTOMATED 0 /100 WBCs     NEUTROPHILS RELATIVE PERCENT 55 %  43-75   LYMPHOCYTES RELATIVE PERCENT 34 %  14-44   MONOCYTES RELATIVE PERCENT 10 %  4-12   EOSINOPHILS RELATIVE PERCENT 1 %  0-6   BASOPHILS RELATIVE PERCENT 0 %  0-1   NEUTROPHILS ABSOLUTE COUNT 3 70 Thousands/? ??L  1 85-7 62   LYMPHOCYTES ABSOLUTE COUNT 2 31 Thousands/? ??L  0 60-4 47   MONOCYTES ABSOLUTE COUNT 0 64 Thousand/? ??L  0 17-1 22   EOSINOPHILS ABSOLUTE COUNT 0 06 Thousand/? ??L  0 00-0 61   BASOPHILS ABSOLUTE COUNT 0 02 Thousands/? ??L  0 00-0 10   This is a patient instruction: This test is non-fasting  Please drink two glasses of water morning of bloodwork  (1) HEMOGLOBIN A1C 91VZB5196 10:50AM EPIC, Provider   Test ordered by: Lynn Minor     Test Name Result Flag Reference   HEMOGLOBIN A1C 5 9 %  4 2-6 3   EST  AVG   GLUCOSE 123 mg/dl       (1) MAGNESIUM 85Fhx6250 10:50AM [facility], Provider   Test ordered by: Jak Stillwater     Test Name Result Flag Reference   MAGNESIUM 2 4 mg/dL  1 6-2 6     (1) PHOSPHORUS 25ZIN0074 10:50AM [facility], Provider   Test ordered by: Jak Stillwater     Test Name Result Flag Reference   PHOSPHORUS 2 9 mg/dL  2 3-4 1     (1) COMPREHENSIVE METABOLIC PANEL 79PTC3885 87:67XM EPIC, Provider   Test ordered by: Niecy Derek Name Result Flag Reference   SODIUM 140 mmol/L  136-145   POTASSIUM 4 3 mmol/L  3 5-5 3   CHLORIDE 106 mmol/L  100-108   CARBON DIOXIDE 29 mmol/L  21-32   ANION GAP (CALC) 5 mmol/L  4-13   BLOOD UREA NITROGEN 14 mg/dL  5-25   CREATININE 0 94 mg/dL  0 60-1 30   Standardized to IDMS reference method   CALCIUM 8 9 mg/dL  8 3-10 1   BILI, TOTAL 0 57 mg/dL  0 20-1 00   ALK PHOSPHATAS 110 U/L     ALT (SGPT) 24 U/L  12-78   Specimen collection should occur prior to Sulfasalazine and/or Sulfapyridine administration due to the potential for falsely depressed results  AST(SGOT) 17 U/L  5-45   Specimen collection should occur prior to Sulfasalazine administration due to the potential for falsely depressed results  ALBUMIN 3 6 g/dL  3 5-5 0   TOTAL PROTEIN 7 4 g/dL  6 4-8 2   eGFR 55 ml/min/1 73sq m       [facility] Disease Education Program recommendations are as follows: GFR calculation is accurate only with a steady state creatinine Chronic Kidney disease less than 60 ml/min/1 73 sq  meters Kidney failure less than 15 ml/min/1 73 sq  meters  GLUCOSE FASTING 115 mg/dL H 65-99   Specimen collection should occur prior to Sulfasalazine administration due to the potential for falsely depressed results  Specimen collection should occur prior to Sulfapyridine administration due to the potential for falsely elevated results       (1) TSH 72ALQ2659 10:50AM [facility], Provider   Test ordered by: Jak Stillwater     Test Name Result Flag Reference   TSH 0 347 uIU/mL L 0 358-3 740     This is a patient instruction: This test is non-fasting  Please drink two glasses of water morning of bloodwork  Patients undergoing fluorescein dye angiography may retain small amounts of fluorescein in the body for 48-72 hours post procedure  Samples containing fluorescein can produce falsely depressed TSH values  If the patient had this procedure,a specimen should be resubmitted post fluorescein clearance       The recommended reference ranges for TSH during pregnancy are as follows: First trimester 0 1 to 2 5 uIU/mL Second trimester  0 2 to 3 0 uIU/mL Third trimester 0 3 to 3 0 uIU/m     (1) MICROALBUMIN CREATININE RATIO, RANDOM URINE 74Zic0772 10:50AM EPIC, Provider   Test ordered by: Guzman Olson     Test Name Result Flag Reference   MICROALBUMIN/ CREAT R   0-30   Unable to calculate mg/g creatinine   Unable to calculate   MICROALBUMIN,URINE <5 0 mg/L  0 0-20 0   CREATININE URINE <13 0 mg/dL       Future Appointments    Date/Time Provider Specialty Site   03/13/2018 11:30 AM Prabhu Muhammad MD Internal Medicine [address] [facility] INTERNAL MED       Signatures   Electronically signed by : Carolyn Crawford MD; Nov 14 2017 12:50PM EST                       (Author)

## 2018-01-10 NOTE — RESULT NOTES
Verified Results  (1) VITAMIN D 25-HYDROXY 79Twz3777 10:46AM Renita Cardenas    Order Number: [id number]     Test Name Result Flag Reference   VIT D 25-HYDROX 44 1 ng/mL  30 0-100 0   This assay is a certified procedure of the CDC Vitamin D Standardization Certification Program (VDSCP)     Deficiency <20ng/ml   Insufficiency 20-30ng/ml   Sufficient  ng/ml     *Patients undergoing fluorescein dye angiography may retain small amounts of fluorescein in the body for 48-72 hours post procedure  Samples containing fluorescein can produce falsely elevated Vitamin D values  If the patient had this procedure, a specimen should be resubmitted post fluorescein clearance

## 2018-01-10 NOTE — PROGRESS NOTES
Assessment    1  Iliotibial band tendinitis of left side (728 89) (M76 32)   2  Iliotibial band tendinitis of right side (728 89) (M76 31)   3  Vitamin D deficiency (268 9) (E55 9)    Plan  Iliotibial band tendinitis of left side, Iliotibial band tendinitis of right side    · Meloxicam 7 5 MG Oral Tablet; TAKE 1 TABLET DAILY AS NEEDED  Iliotibial band tendinitis of left side, Iliotibial band tendinitis of right side, Trochanteric  bursitis of both hips, Vitamin D deficiency    · Follow-up visit in 6 weeks Evaluation and Treatment  Follow-up  Status: Hold For -  Scheduling  Requested for: 83JDF7486  Vitamin D deficiency    · (1) VITAMIN D 25-HYDROXY; Status:Active; Requested for:14Yty9566;     Discussion/Summary    Reduce the meloxicam (Mobic) to once a day with breakfast  Have blood drawn in about four weeks  Chief Complaint  LBP consult from Dr Yunier Herbert  7/15 see all HPIs for further subjective info  History of Present Illness  81 yo female with Hx of "spinal Stenosis" per Dr Jasmin Chavez, declined surgery, had LESIs some years ago  Seen by Dr Yunier Herbert 2013 and trochanteric bursitis injected  Currently having radiating pain right > left from buttock to posterolateral leg to ankle  No N/T/W but 'cramps" in calves  No associated incontinence  Has nocturia  Doesn't take steps due to JOSEPH  Sx aggravated by walking and relieved with sitting    6/24 started Vit D, still with cramps in legs  Medrol did help   has had PT in past but does no HEP    7/15 meloxicam helpful   some pain upon weightbearing in am then improves with ambulation  Has follow up endocrinology in September      Review of Systems    Constitutional: gaining weight (Thyroid med adjusted by Dr Bettina Thurston  Eyes: No complaints of eyesight problems, no red eyes  ENT: no loss of hearing, no nosebleeds, no sore throat  Cardiovascular: JOSEPH and lower extremity edema     Respiratory: JOSEPH with steps and shortness of breath, but no compliants of shortness of breath, no wheezing, no cough  Gastrointestinal: no complaints of abdominal pain, no constipation, no nausea or diarrhea, no vomiting, no bloody stools  Genitourinary: as noted in HPI  Musculoskeletal: as noted in HPI  Integumentary: no complaints of skin rash or lesion, no itching or dry skin, no skin wounds  Neurological: no complaints of headache, no confusion, no numbness or tingling, no dizziness  Endocrine: on thyroid supplement, but as noted in HPI  Psychiatric: No suicidal thoughts, no anxiety, no feelings of depression  Active Problems    1  Anxiety (300 00) (F41 9)   2  Asthma (493 90) (J45 909)   3  Degeneration of intervertebral disc of lumbar region (722 52) (M51 36)   4  Diverticulosis (562 10) (K57 90)   5  Elevated serum alkaline phosphatase level (790 5) (R74 8)   6  Encounter for screening mammogram for malignant neoplasm of breast (V76 12)   (Z12 31)   7  Esophageal reflux (530 81) (K21 9)   8  Fatty liver (571 8) (K76 0)   9  History of allergy (V15 09) (Z88 9)   10  Hyperlipidemia (272 4) (E78 5)   11  Hypertension (401 9) (I10)   12  Hyperthyroidism (242 90) (E05 90)   13  Iliotibial band tendinitis of left side (728 89) (M76 32)   14  Iliotibial band tendinitis of right side (728 89) (M76 31)   15  Insomnia (780 52) (G47 00)   16  Intolerance to cold (780 99) (R68 89)   17  Irritable bowel syndrome (564 1) (K58 9)   18  Myalgia (729 1) (M79 1)   19  Need for pneumococcal vaccination (V03 82) (Z23)   20  Orthostatic hypotension (458 0) (I95 1)   21  Ovarian cyst (620 2) (N83 20)   22  Peripheral vertigo (386 10) (H81 399)   23  Pre-syncope (780 2) (R55)   24  Spinal stenosis (724 00) (M48 00)   25  Spondylolisthesis, grade 1 (738 4) (M43 10)   26  Transient ischemic attack (435 9) (G45 9)   27  Trochanteric bursitis of both hips (726 5) (M70 61,M70 62)   28  Type 2 diabetes mellitus (250 00) (E11 9)   29  Vertigo (780 4) (R42)   30   Vitamin D deficiency (268 9) (E55 9)    Past Medical History    1  History of Asymptomatic Bacteriuria (791 9)   2  History of Bronchitis, chronic obstructive, with exacerbation (491 21) (J44 1)   3  History of Chronic interstitial cystitis (595 1) (N30 10)   4  History of Community acquired pneumonia (5) (J18 9)   5  History of Dysuria (788 1) (R30 0)   6  History Of 6  Previous Pregnancies (V61 5)   7  History of abdominal pain (V13 89) (Z87 898)   8  History of diverticulitis of colon (V12 79) (Z87 19)   9  History of Lightheadedness (780 4) (R42)   10  History of Lower abdominal pain (789 09) (R10 30)   11  History of Otitis externa of right ear (380 10) (H60 91)   12  History of Previous Pregnancies Resulted In 4  Living Children   13  History of Pruritus (698 9) (L29 9)   14  History of Skin rash (782 1) (R21)   15  History of Urinary symptom or sign (788 99) (R39 9)   16  History of Urinary tract infection (599 0) (N39 0)   17  History of Urinary Tract Infection (V13 02)   18  History of Vaginal candidiasis (112 1) (B37 3)   19  History of Vaginal candidiasis (112 1) (B37 3)    The active problems and past medical history were reviewed and updated today  Surgical History    1  History of Cholecystectomy   2  History of Hysterectomy   3  History of Partial Colectomy - Sigmoid    The surgical history was reviewed and updated today  Family History  Mother    1  Family history of Coronary Artery Disease (V17 49)  Father    2  Family history of Ivet Of 2 Motor Veh[employer] On [address] (Not Motorcycle)    The family history was reviewed and updated today  Social History    · Marital History -    · Never A Smoker   · Never Drank Alcohol   · Occupation: Retired  The social history was reviewed and is unchanged  Current Meds   1  Albuterol Sulfate (2 5 MG/3ML) 0 083% Inhalation Nebulization Solution; USE 1 UNIT   DOSE EVERY 4-6 HOURS AS NEEDED FOR WHEEZING ;    Therapy: 04Apr2016 to (Last Rx:04Apr2016)  Requested for: 10GON8064 Ordered   2  AmLODIPine Besylate 5 MG Oral Tablet; TAKE 1 TABLET BY MOUTH ONCE DAILY; Therapy: 36BFH5908 to (Evaluate:01Jan2017)  Requested for: 18ISI3219; Last   Rx:67Voz0980 Ordered   3  Dicyclomine HCl - 10 MG Oral Capsule; TAKE 1 CAPSULE EVERY 8 HOURS DAILY; Therapy: 19ZDG1278 to (Jennifer Reddybush)  Requested for: 25CZX4369; Last   Rx:09Mar2016 Ordered   4  Ergocalciferol 46366 UNIT Oral Capsule; TAKE 1 CAPSULE WEEKLY; Therapy: 54JDL9966 to (Last Rx:14Jun2016)  Requested for: 28OEX7693 Ordered   5  Flovent  MCG/ACT Inhalation Aerosol; INHALE 1 PUFF TWICE DAILY; Therapy: 04Apr2016 to (Evaluate:30Mar2017)  Requested for: 04Apr2016; Last   Rx:04Apr2016 Ordered   6  LORazepam 0 5 MG Oral Tablet; TAKE 1 TABLET DAILY AS NEEDED; Therapy: 42ZNL9164 to (Evaluate:14Jun2015); Last Rx:16Mar2015 Ordered   7  Meclizine HCl - 25 MG Oral Tablet; TAKE 1 TABLET 3 TIMES DAILY AS NEEDED; Therapy: 18Apr2013 to (Blenda Grills)  Requested for: 30ZKV7266; Last   Rx:05Jun2015 Ordered   8  Meloxicam 7 5 MG Oral Tablet; TAKE 1 TABLET TWICE DAILY WITH FOOD; Therapy: 66HIK7242 to (Evaluate:24Jul2016)  Requested for: 29HLO7430; Last   Rx:24Jun2016 Ordered   9  Metanx 3-90 314-2-35 MG Oral Capsule; take 1 capsule daily; Therapy: 85ZZD7407 to (Evaluate:01Apr2016); Last Rx:35Cfn8308 Ordered   10  Methimazole 5 MG Oral Tablet; TAKE 1 TABLET ONCE DAILY; Therapy: 41WLL8137 to (Evaluate:01Nov2015); Last Rx:70Sgs4342 Ordered   11  MethylPREDNISolone 4 MG Oral Tablet Therapy Pack; Take according to directions; Therapy: 95LIB4329 to (Last Rx:10Jun2016)  Requested for: 37BWS3909 Ordered   12  Montelukast Sodium 10 MG Oral Tablet; Take 1 tablet daily; Therapy: 02QUR7731 to (Jennifer Reyez)  Requested for: 68CZI4037; Last    Rx:09Mar2016 Ordered   13  Nystatin 021483 UNIT/GM External Cream; APPLY  AND RUB  IN A THIN FILM TO    AFFECTED AREAS TWICE DAILY  (AM AND PM);     Therapy: 01ALE4873 to (Serchristie Lepe)  Requested for: 94RNW8666; Last    Rx:09Mar2016 Ordered   14  Omeprazole 20 MG Oral Capsule Delayed Release; TAKE 1 CAPSULE DAILY EVERY    MORNING BEFORE BREAKFAST  Requested for: 40DMK9874; Last Rx:09Mar2016    Ordered   15  OneTouch Ultra Blue In Vitro Strip; USE AS DIRECTED; Therapy: 75IHZ8964 to (Kyree Gene)  Requested for: 03Apr2014; Last    Rx:03Apr2014 Ordered   16  Oxycodone-Acetaminophen 5-325 MG Oral Tablet; TAKE 1 TABLET EVERY 8 HOURS AS    NEEDED; Therapy: 51XYQ8735 to (Evaluate:67Hya6199); Last Rx:39Rfy4804 Ordered   17  Ventolin  (90 Base) MCG/ACT Inhalation Aerosol Solution; INHALE 2 PUFFS    EVERY 4 HOURS AS NEEDED FOR COUGH AND WHEEZE;    Therapy: 24Fhw1444 to (Last Rx:52Bhp3088)  Requested for: 34Ing4636 Ordered    The medication list was reviewed and updated today  Allergies    1  Aleve CAPS   2  Bactrim TABS   3  Halcion TABS   4  Lisinopril TABS   5  Statins   6  Zetia TABS    Vitals  Signs [Data Includes: Current Encounter]   Recorded: [id number] 09:50AM   Heart Rate: 66  Respiration: 16  Systolic: 711, LUE, Sitting  Diastolic: 64, LUE, Sitting  Height: 5 ft   Weight: 150 lb 8 oz  BMI Calculated: 29 39  BSA Calculated: 1 65    Physical Exam    Constitutional   General appearance: Abnormal   overweight  Lumbar/Sacral Spine examination demonstrates Lumbosacral Spine:   Evaluation of Muscle Stretch Reflexes on the right side demonstrates 2/4 Hamstring Reflex, 2/4 Knee Jerk Reflex, 2/4 Ankle Jerk Reflex, negative Gomez Test right upper extremity and negative right ankle clonus  Evaluation of Muscle Stretch Reflexes on the left side demonstrates negative left ankle clonus, but 2/4 Hamstring Reflex, 2/4 Knee Jerk Reflex, 2/4 Ankle Jerk Reflex and negative Gomez Test left upper extremity  Special Tests: negative Straight Leg Raise on right and negative Straight Leg Raise on left        Results/Data    Diagnostic Review Vit D is chronically low, no Endocrinology notes in [employer]  Future Appointments    Date/Time Provider Specialty Site   07/19/2016 10:40 AM SHELLY Steele   Cardiology [facility]ARDIOLOG[facility]   07/26/2016 11:30 AM Carleen Muhammad MD Internal Medicine [facility] INTERNAL MED     Signatures   Electronically signed by : Shandra Lockwood DO; Jul 15 2016 10:07AM EST                       (Author)

## 2018-01-11 NOTE — PROGRESS NOTES
Assessment    1  Degeneration of intervertebral disc of lumbar region (722 52) (M51 36)   2  History of Trochanteric bursitis of both hips (726 5) (M70 61,M70 62)   3  Vitamin D deficiency (268 9) (E55 9)    Plan  Iliotibial band tendinitis of left side, Iliotibial band tendinitis of right side, Vitamin D  deficiency    · Follow-up PRN Evaluation and Treatment  Follow-up  Status: Complete  Done:  75Ajs1437    Discussion/Summary    Try to take 1000 to 1200 mg of calcium a day (two Tums) and 1000 units of Vit D a day for bone and muscle health  Chief Complaint  LBP consult from Dr Osmani Vásquez  7/15 see all HPIs for further subjective info  History of Present Illness  79 yo female with Hx of "spinal Stenosis" per Dr Felecia Membreno, declined surgery, had LESIs some years ago  Seen by Dr Osmani Vásquez 2013 and trochanteric bursitis injected  Currently having radiating pain right > left from buttock to posterolateral leg to ankle  No N/T/W but 'cramps" in calves  No associated incontinence  Has nocturia  Doesn't take steps due to JOSEPH  Sx aggravated by walking and relieved with sitting    6/24 started Vit D, still with cramps in legs  Medrol did help   has had PT in past but does no HEP    7/15 meloxicam helpful   some pain upon weightbearing in am then improves with ambulation  Has follow up endocrinology in September 8/26 injections helpful stil with AM stiffness  Has Rx for DEXA, takes two OTC ibuprofens rarely before bingo  Review of Systems    Constitutional: No fever, no chills, feels well, no tiredness, no recent weight gain or loss  Eyes: No complaints of eyesight problems, no red eyes  Cardiovascular: No complaints of chest pain, no palpitations, no leg claudication or lower extremity edema  Respiratory: no compliants of shortness of breath, no wheezing, no cough  Gastrointestinal: no complaints of abdominal pain, no constipation, no nausea or diarrhea, no vomiting, no bloody stools     Genitourinary: recent UTI  Musculoskeletal: as noted in HPI  Neurological: no complaints of headache, no confusion, no numbness or tingling, no dizziness  Endocrine: No complaints of muscle weakness, no feelings of weakness, no frequent urination, no excessive thirst      ROS reviewed  Active Problems    1  Anxiety (300 00) (F41 9)   2  Asthma (493 90) (J45 909)   3  Degeneration of intervertebral disc of lumbar region (722 52) (M51 36)   4  Diverticulosis (562 10) (K57 90)   5  Elevated serum alkaline phosphatase level (790 5) (R74 8)   6  Encounter for screening mammogram for malignant neoplasm of breast (V76 12)   (Z12 31)   7  Esophageal reflux (530 81) (K21 9)   8  Fatty liver (571 8) (K76 0)   9  History of allergy (V15 09) (Z88 9)   10  Hyperlipidemia (272 4) (E78 5)   11  Hypertension (401 9) (I10)   12  Hyperthyroidism (242 90) (E05 90)   13  Iliotibial band tendinitis of left side (728 89) (M76 32)   14  Iliotibial band tendinitis of right side (728 89) (M76 31)   15  Insomnia (780 52) (G47 00)   16  Intolerance to cold (780 99) (R68 89)   17  Irritable bowel syndrome (564 1) (K58 9)   18  Myalgia (729 1) (M79 1)   19  Need for influenza vaccination (V04 81) (Z23)   20  Need for pneumococcal vaccination (V03 82) (Z23)   21  Orthostatic hypotension (458 0) (I95 1)   22  Ovarian cyst (620 2) (N83 20)   23  Peripheral vertigo (386 10) (H81 399)   24  Pre-syncope (780 2) (R55)   25  Screening for osteoporosis (V82 81) (Z13 820)   26  Spinal stenosis (724 00) (M48 00)   27  Spondylolisthesis, grade 1 (738 4) (M43 10)   28  Transient ischemic attack (435 9) (G45 9)   29  Type 2 diabetes mellitus (250 00) (E11 9)   30  Urinary frequency (788 41) (R35 0)   31  Vertigo (780 4) (R42)   32  Vitamin D deficiency (268 9) (E55 9)    Past Medical History    1  History of Asymptomatic Bacteriuria (791 9)   2  History of Bronchitis, chronic obstructive, with exacerbation (491 21) (J44 1)   3   History of Chronic interstitial cystitis (595 1) (N30 10)   4  History of Community acquired pneumonia (5) (J18 9)   5  History of Dysuria (788 1) (R30 0)   6  History Of 6  Previous Pregnancies (V61 5)   7  History of abdominal pain (V13 89) (Z87 898)   8  History of diverticulitis of colon (V12 79) (Z87 19)   9  History of Lightheadedness (780 4) (R42)   10  History of Lower abdominal pain (789 09) (R10 30)   11  History of Otitis externa of right ear (380 10) (H60 91)   12  History of Previous Pregnancies Resulted In 4  Living Children   13  History of Pruritus (698 9) (L29 9)   14  History of Skin rash (782 1) (R21)   15  History of Trochanteric bursitis of both hips (726 5) (M70 61,M70 62)   16  History of Urinary symptom or sign (788 99) (R39 9)   17  History of Urinary tract infection (599 0) (N39 0)   18  History of Urinary Tract Infection (V13 02)   19  History of Vaginal candidiasis (112 1) (B37 3)   20  History of Vaginal candidiasis (112 1) (B37 3)    Surgical History    1  History of Cholecystectomy   2  History of Hysterectomy   3  History of Partial Colectomy - Sigmoid    The surgical history was reviewed and updated today  Family History  Mother    1  Family history of Coronary Artery Disease (V17 49)  Father    2  Family history of Ivet Of 2 [address] On [address] (Not Motorcycle)    Social History    · Marital History -    · Never A Smoker   · Never Drank Alcohol   · Occupation: Retired    Current Meds   1  Albuterol Sulfate (2 5 MG/3ML) 0 083% Inhalation Nebulization Solution; USE 1 UNIT   DOSE EVERY 4-6 HOURS AS NEEDED FOR WHEEZING ; Therapy: 67Rwp5765 to (Last Rx:11Wde4503)  Requested for: 03Dom5801 Ordered   2  AmLODIPine Besylate 5 MG Oral Tablet; TAKE 1 TABLET BY MOUTH ONCE DAILY; Therapy: 08LYA7142 to (Evaluate:01Jan2017)  Requested for: 65IPS6692; Last   Rx:22Afq5714 Ordered   3  Ciprofloxacin HCl - 500 MG Oral Tablet; Take 1 tablet twice daily;    Therapy: 13BKW9555 to (Evaluate:08Nzs2542) Requested for: 93Ywz3119; Last   Rx:27Kzt6224 Ordered   4  Dicyclomine HCl - 10 MG Oral Capsule; TAKE 1 CAPSULE EVERY 8 HOURS DAILY; Therapy: 70PEN0508 to (Kenia Garcia)  Requested for: 87TEN3956; Last   Rx:09Mar2016 Ordered   5  Ergocalciferol 95919 UNIT Oral Capsule; TAKE 1 CAPSULE WEEKLY; Therapy: 71BFR5098 to (Last Rx:14Jun2016)  Requested for: 84CMB4947 Ordered   6  Flovent  MCG/ACT Inhalation Aerosol; INHALE 1 PUFF TWICE DAILY; Therapy: 04Apr2016 to (Evaluate:30Mar2017)  Requested for: 11Ibe9395; Last   Rx:04Apr2016 Ordered   7  LORazepam 0 5 MG Oral Tablet; TAKE 1 TABLET DAILY AS NEEDED; Therapy: 24WBD5879 to (Evaluate:24Oct2016); Last Rx:54Uxs6784 Ordered   8  Meclizine HCl - 25 MG Oral Tablet; TAKE 1 TABLET 3 TIMES DAILY AS NEEDED; Therapy: 18Apr2013 to (Randee Gutierrez)  Requested for: 80SOZ5432; Last   Rx:05Jun2015 Ordered   9  Meloxicam 7 5 MG Oral Tablet; TAKE 1 TABLET DAILY AS NEEDED; Therapy: 64QYA8878 to (Evaluate:14Aug2016)  Requested for: 43QFD2422; Last   Rx:39Apz8071 Ordered   10  Meloxicam 7 5 MG Oral Tablet; TAKE 1 TABLET TWICE DAILY WITH FOOD; Therapy: 62HVL7318 to (Evaluate:24Jul2016)  Requested for: 59VRR2564; Last    Rx:24Jun2016 Ordered   11  Metanx 3-90 314-2-35 MG Oral Capsule; take 1 capsule daily; Therapy: 87UWP4211 to (Evaluate:01Apr2016); Last Rx:27Xsj7696 Ordered   12  Methimazole 5 MG Oral Tablet; TAKE 1 TABLET ONCE DAILY; Therapy: 93KED4667 to (Evaluate:01Nov2015); Last Rx:60Ptk8569 Ordered   13  Montelukast Sodium 10 MG Oral Tablet; Take 1 tablet daily; Therapy: 61JBR1604 to (Kenia Garcia)  Requested for: 17YCO4779; Last    Rx:09Mar2016 Ordered   14  Nystatin 758513 UNIT/GM External Cream; APPLY  AND RUB  IN A THIN FILM TO    AFFECTED AREAS TWICE DAILY  (AM AND PM); Therapy: 47BMS5209 to (Kenia Garcia)  Requested for: 49WRT6751; Last    Rx:09Mar2016 Ordered   15   Omeprazole 20 MG Oral Capsule Delayed Release; TAKE 1 CAPSULE DAILY EVERY    MORNING BEFORE BREAKFAST  Requested for: 19MCM8619; Last Rx:09Mar2016    Ordered   12  OneTouch Ultra Blue In Vitro Strip; USE AS DIRECTED; Therapy: 67LXJ4340 to (Roshni Barkercarmen)  Requested for: 03Apr2014; Last    Rx:03Apr2014 Ordered   17  Oxycodone-Acetaminophen 5-325 MG Oral Tablet; TAKE 1 TABLET EVERY 8 HOURS AS    NEEDED; Therapy: 99YHP5381 to (Evaluate:91Vot8527); Last Rx:03Aug2015 Ordered   18  Ventolin  (90 Base) MCG/ACT Inhalation Aerosol Solution; INHALE 2 PUFFS    EVERY 4 HOURS AS NEEDED FOR COUGH AND WHEEZE;    Therapy: 44Bbx5628 to (Last Rx:03Aug2015)  Requested for: 03Aug2015 Ordered    The medication list was reviewed and updated today  Allergies    1  Aleve CAPS   2  Bactrim TABS   3  Halcion TABS   4  Lisinopril TABS   5  Statins   6  Zetia TABS    Vitals  Signs   Recorded: 19WUN7771 69:83XG   Systolic: 439  Diastolic: 68  Heart Rate: 80  Weight: 149 lb   BMI Calculated: 29 1  BSA Calculated: 1 65    Physical Exam    Constitutional   General appearance: Well developed, well nourished, alert, in no distress, non-toxic and no overt pain behavior  Lumbar/Sacral Spine examination demonstrates Lumbosacral Spine:   Evaluation of Muscle Stretch Reflexes on the right side demonstrates 1/4 Hamstring Reflex, 1/4 Knee Jerk Reflex and 1/4 Ankle Jerk Reflex, but negative Gomez Test right upper extremity and negative right ankle clonus  Evaluation of Muscle Stretch Reflexes on the left side demonstrates 1/4 Hamstring Reflex, 1/4 Knee Jerk Reflex, 1/4 Ankle Jerk Reflex and negative left ankle clonus, but negative Gomez Test left upper extremity  Special Tests: negative Straight Leg Raise on right and negative Straight Leg Raise on left  Results/Data    Diagnostic Review Vit D is 44 1  Provider Comments    She is not taking any Vit D or Ca supplements        Future Appointments    Date/Time Provider Specialty Site   11/30/2016 10:30 AM Joshua Vazquez Arminda Freitas MD Internal Medicine [facility] INTERNAL MED     Signatures   Electronically signed by : Shandra Lockwood DO; Aug 26 2016 10:00AM EST                       (Author)

## 2018-01-11 NOTE — RESULT NOTES
Verified Results  (1) CK (CPK) 41KOM0887 10:12AM [employer]    Order Number: [id number]  TW Order Number: [id number] Order Number: [id number]     Test Name Result Flag Reference   CK (CPK) 51 U/L       (1) C-REACTIVE PROTEIN 66GBC4171 10:12AM [employer]   TW Order Number: [id number]  TW Order Number: [id number] Order Number: [id number]     Test Name Result Flag Reference   C-REACT PROTEIN <3 0 mg/L  <3 0     (1) SED RATE 50YOV9352 10:12AM [employer]    Order Number: [id number]     Test Name Result Flag Reference   SED RATE 10 mm/hour  0-20     (1) VITAMIN D 25-HYDROXY 74IRD0586 10:12AM [employer]    Order Number: [id number]  TW Order Number: [id number]     Test Name Result Flag Reference   VIT D 25-HYDROX 19 1 ng/mL L 30 0-100 0

## 2018-01-12 VITALS
BODY MASS INDEX: 26.92 KG/M2 | DIASTOLIC BLOOD PRESSURE: 60 MMHG | RESPIRATION RATE: 18 BRPM | WEIGHT: 137.13 LBS | OXYGEN SATURATION: 97 % | HEIGHT: 60 IN | HEART RATE: 72 BPM | SYSTOLIC BLOOD PRESSURE: 118 MMHG | TEMPERATURE: 98 F

## 2018-01-12 VITALS
WEIGHT: 144.25 LBS | HEIGHT: 60 IN | BODY MASS INDEX: 28.32 KG/M2 | HEART RATE: 88 BPM | DIASTOLIC BLOOD PRESSURE: 66 MMHG | OXYGEN SATURATION: 96 % | RESPIRATION RATE: 18 BRPM | SYSTOLIC BLOOD PRESSURE: 138 MMHG

## 2018-01-12 NOTE — PROGRESS NOTES
Assessment    1  Muscle cramps (729 82) (R25 2)   2  Hypertension (401 9) (I10)   3  Irritable bowel syndrome (564 1) (K58 9)   4  Type 2 diabetes mellitus (250 00) (E11 9)   5  Encounter for preventive health examination (V70 0) (Z00 00)   6  Hyperthyroidism (242 90) (E05 90)    Plan  Degeneration of intervertebral disc of lumbar region    · OxyCODONE HCl - 5 MG Oral Tablet  Hyperlipidemia    · (1) LIPID PANEL, FASTING; Status:Active; Requested for:01Mar2018;   Hypertension    · AmLODIPine Besylate 5 MG Oral Tablet (Norvasc); TAKE 1 TABLET BY MOUTH  ONCE DAILY  Spinal stenosis    · From  Oxycodone-Acetaminophen 5-325 MG Oral Tablet TAKE 1 TABLET Every  8 hours PRN To Oxycodone-Acetaminophen 5-325 MG Oral Tablet (Percocet) TAKE 1  TABLET Twice daily PRN  Type 2 diabetes mellitus    · *VB - Foot Exam; Status:Complete - Retrospective By Protocol Authorization;   Done:  08PLW3989 11:23AM  Vitamin D deficiency    · (1) VITAMIN D 25-HYDROXY; Status:Active; Requested SBQ:[id number]; Discussion/Summary  Impression: Subsequent Annual Wellness Visit, with preventive exam as well as age and risk appropriate counseling completed  Cardiovascular screening and counseling: screening is current  Diabetes screening and counseling: screening is current  Colorectal cancer screening and counseling: the patient declines screening  Breast cancer screening and counseling: the patient declines screening  Cervical cancer screening and counseling: screening not indicated  Osteoporosis screening and counseling: the patient declines screening and counseling was given on obtaining adequate amounts of calcium and vitamin D on a daily basis  Abdominal aortic aneurysm screening and counseling: screening not indicated  Glaucoma screening and counseling: screening is current   Immunizations: the patient declines the influenza vaccination, the lifetime pneumococcal vaccine has been completed, Zostavax vaccination status is unknown, Td vaccination status is unknown and Tdap vaccination status is unknown  Advance Directive Planning: not complete, she was encouraged to follow-up with me to discuss her questions and/or decisions  Advice and education were given regarding nutrition (non-diabetic)  She was referred to none  Patient Discussion: follow-up visit needed in 4 mos  History of Present Illness  The patient is being seen for the subsequent annual wellness visit  Medicare Screening and Risk Factors   Hospitalizations: no previous hospitalizations  Medicare Screening Tests Risk Questions   Osteoporosis risk assessment: , female gender and over 48years of age  Drug and Alcohol Use: The patient has never smoked cigarettes  The patient reports never drinking alcohol  She has never used illicit drugs  Diet and Physical Activity: Current diet includes well balanced meals and 1/2 cup cups of coffee per day  She exercises infrequently  Mood Disorder and Cognitive Impairment Screening: She denies feeling down, depressed, or hopeless over the past two weeks  She denies feeling little interest or pleasure in doing things over the past two weeks  Cognitive impairment screening: denies difficulty learning/retaining new information, denies difficulty handling complex tasks, denies difficulty with reasoning, denies difficulty with spatial ability and orientation, denies difficulty with language and denies difficulty with behavior  Functional Ability/Level of Safety: Hearing is normal bilaterally  She denies hearing difficulties  She does not use a hearing aid  The patient is currently able to do activities of daily living without limitations, able to do instrumental activities of daily living without limitations, able to participate in social activities without limitations and able to drive without limitations   Activities of daily living details: does not need help using the phone, no transportation help needed, does not need help shopping, no meal preparation help needed, does not need help doing housework, does not need help doing laundry, does not need help managing medications and does not need help managing money  Fall risk factors: The patient fell 0 times in the past 12 months  Home safety risk factors:  no unfamiliar surroundings, no loose rugs, no poor household lighting, no uneven floors, no household clutter, grab bars in the bathroom and handrails on the stairs  Advance Directives: Advance directives: durable power of  for health care directives, but no living will and no advance directives  end of life decisions were reviewed with the patient  Co-Managers and Medical Equipment/Suppliers: See Patient Care Team      Patient Care Team    Care Team Member Role Specialty Office Number   Kin Bryant MD Specialist Cardiology [phone]   Rosanna Forrest Attending Internal Medicine [phone]   Baljeet Loya DO Specialist Physiatry [phone]   Matthew Davis MD  Endocrinology [phone]     Review of Systems    Constitutional: not feeling tired  Eyes: no eyesight problems  ENT: no nasal discharge  Cardiovascular: no chest pain, no palpitations and no lower extremity edema  Respiratory: no shortness of breath and no cough  Gastrointestinal: no abdominal pain and no constipation  Genitourinary: no dysuria  Musculoskeletal: arthralgias and joint stiffness, but as noted in HPI and no limb pain  Integumentary: no rashes  Neurological: no dizziness, no limb weakness and no difficulty walking  Psychiatric: no sleep disturbances  no feelings of weakness      Active Problems    1  Anxiety (300 00) (F41 9)   2  Asthma (493 90) (J68 909)   3  Degeneration of intervertebral disc of lumbar region (722 52) (M51 36)   4  Diverticulosis (562 10) (K57 90)   5  Elevated serum alkaline phosphatase level (790 5) (R74 8)   6   Encounter for screening mammogram for malignant neoplasm of breast (V76 12)   (Z12 31)   7  Esophageal reflux (530 81) (K21 9)   8  Fatty liver (571 8) (K76 0)   9  History of allergy (V15 09) (Z88 9)   10  Hyperlipidemia (272 4) (E78 5)   11  Hypertension (401 9) (I10)   12  Hyperthyroidism (242 90) (E05 90)   13  Iliotibial band tendinitis of left side (728 89) (M76 32)   14  Iliotibial band tendinitis of right side (728 89) (M76 31)   15  Insomnia (780 52) (G47 00)   16  Irritable bowel syndrome (564 1) (K58 9)   17  Muscle cramps (729 82) (R25 2)   18  Need for influenza vaccination (V04 81) (Z23)   19  Ovarian cyst (620 2) (N83 20)   20  Postherpetic neuralgia (053 19) (B02 29)   21  Pre-syncope (780 2) (R55)   22  Spinal stenosis (724 00) (M48 00)   23  Spondylolisthesis, grade 1 (738 4) (M43 10)   24  Transient ischemic attack (435 9) (G45 9)   25  Type 2 diabetes mellitus (250 00) (E11 9)   26  Vertigo (780 4) (R42)   27  Vitamin D deficiency (268 9) (E55 9)    Past Medical History    1  History of Chronic interstitial cystitis (595 1) (N30 10)   2  History of Community acquired pneumonia (5) (J18 9)   3  History Of 6  Previous Pregnancies (V61 5)   4  History of diverticulitis of colon (V12 79) (Z87 19)   5  History of orthostatic hypotension (V12 59) (Z86 79)   6  History of Impacted cerumen of right ear (380 4) (H61 21)   7  History of Previous Pregnancies Resulted In 4  Living Children   8  History of Trochanteric bursitis of both hips (726 5) (M70 61,M70 62)   9  History of Vaginal candidiasis (112 1) (B37 3)    The active problems and past medical history were reviewed and updated today  Surgical History    1  History of Cholecystectomy   2  History of Hysterectomy   3  History of Partial Colectomy - Sigmoid    Family History  Mother    1  Denied: Family history of Alcoholism and drug addiction in family   2  Denied: Family history of Anxiety and depression   3  Family history of Coronary Artery Disease (V17 49)  Father    4   Denied: Family history of Alcoholism and drug addiction in family   5  Denied: Family history of Anxiety and depression   6  Family history of Ivet Of 2 Motor Vehic On [address] (Not Motorcycle)  Child    7  Denied: Family history of Alcoholism and drug addiction in family   6  Denied: Family history of Anxiety and depression  Sibling    9  Denied: Family history of Alcoholism and drug addiction in family   8  Denied: Family history of Anxiety and depression    The family history was reviewed and updated today  Social History    · Marital History -    · Never A Smoker   · Never Drank Alcohol   · Occupation: Retired  The social history was reviewed and is unchanged  Current Meds   1  Albuterol Sulfate (2 5 MG/3ML) 0 083% Inhalation Nebulization Solution; USE 1 UNIT   DOSE EVERY 4-6 HOURS AS NEEDED FOR WHEEZING ; Therapy: 04Apr2016 to (Last Rx:04Apr2016)  Requested for: 04Apr2016 Ordered   2  AmLODIPine Besylate 5 MG Oral Tablet; TAKE 1 TABLET BY MOUTH ONCE DAILY; Therapy: 66VPS9751 to (Evaluate:07Jan2018)  Requested for: 79XIW8582; Last   Rx:23Ouv6001 Ordered   3  Dicyclomine HCl - 10 MG Oral Capsule; TAKE 1 CAPSULE EVERY 8 HOURS DAILY; Therapy: 17UHX1531 to (Evaluate:11Nle3568)  Requested for: 30Zkr5738; Last   Rx:38Idc7220 Ordered   4  Flovent  MCG/ACT Inhalation Aerosol; INHALE 1 PUFF TWICE DAILY; Therapy: 04Apr2016 to (Evaluate:30Mar2017)  Requested for: 04Apr2016; Last   Rx:04Apr2016 Ordered   5  Lidocaine 5 % External Patch; APPLY 1 PATCH TO THE AFFECTED AREA AND LEAVE IN   PLACE FOR 12 HOURS, THEN REMOVE AND LEAVE OFF FOR 12 HOURS; Therapy: 48GYW5917 to (Evaluate:30Mar2017)  Requested for: 64SOM0480; Last   Rx:78Vhe8572 Ordered   6  LORazepam 0 5 MG Oral Tablet; TAKE 1 TABLET DAILY AS NEEDED; Therapy: 92DYU0898 to (Evaluate:24Oct2016); Last Rx:71Wyi5633 Ordered   7  Meclizine HCl - 25 MG Oral Tablet; TAKE 1 TABLET 3 TIMES DAILY AS NEEDED;    Therapy: 18Apr2013 to (Evaluate:44Dce4410)  Requested for: 24ZKI6851; Last   Rx:02Oct2017 Ordered   8  Metanx 3-90 314-2-35 MG Oral Capsule; take 1 capsule daily; Therapy: 82DNS7998 to (Evaluate:01Apr2016); Last Rx:71Buj0529 Ordered   9  MethIMAzole 5 MG Oral Tablet; TAKE 1 TABLET ONCE DAILY; Therapy: 30OKK4552 to (Evaluate:01Nov2015); Last Rx:26Qos7392 Ordered   10  Montelukast Sodium 10 MG Oral Tablet; Take 1 tablet daily; Therapy: 64ZIJ2986 to (Evaluate:07Jan2018)  Requested for: 74UQI0970; Last    Rx:53Crn0662 Ordered   11  Omeprazole 20 MG Oral Capsule Delayed Release; TAKE 1 CAPSULE DAILY EVERY    MORNING BEFORE BREAKFAST  Requested for: 58OJX1613; Last Rx:61Tal2085    Ordered   12  OneTouch Ultra Blue In Vitro Strip; USE AS DIRECTED; Therapy: 51TWD1211 to (Joya Suazo)  Requested for: 03Apr2014; Last    Rx:03Apr2014 Ordered   13  OxyCODONE HCl - 5 MG Oral Tablet; Take one tablet PO twice a day prn pain; Therapy: 23XDV7849 to (Evaluate:19Oct2017); Last Rx:08Fph0116 Ordered   14  Triamcinolone Acetonide 0 1 % External Cream; APPLY SPARINGLY AND RUB IN WELL    TO AFFECTED AREA(S) TWICE DAILY PRN  NOT FOR FACE, BREAST OR GROIN;    Therapy: 83RDI8639 to (Last Rx:16Mar2017)  Requested for: 10LDS1436 Ordered   15  Ventolin  (90 Base) MCG/ACT Inhalation Aerosol Solution; INHALE 2 PUFFS    EVERY 4 HOURS AS NEEDED FOR COUGH AND WHEEZE;    Therapy: 68Vae3061 to (Last Rx:82Zxt1885)  Requested for: 12Ybt8759 Ordered    The medication list was reviewed and updated today  Allergies    1  Aleve CAPS   2  Bactrim TABS   3  Gabapentin TABS   4  Halcion TABS   5  Lisinopril TABS   6  Statins   7  Zetia TABS    Immunizations  Influenza --- Wyn Raw: 18-Oct-2002;  Arturo Balloon: 75-Imn-0706Ftev Rajiv: 26-Jul-2016   PCV --- Series1: 16-Mar-2015   PPSV --- Stacey Raw: 22-Feb-1999; Arturo Balloon: 23-Sep-2011     Vitals  Signs   Recorded: 89CNP3530 95:90HU   Systolic: 529  Diastolic: 60  Recorded: 28OTH0674 11:21AM   Temperature: 97 9 F  Heart Rate: 78  Respiration: 18  Systolic: 520  Diastolic: 62  Height: 5 ft   Weight: 137 lb 8 oz  BMI Calculated: 26 85  BSA Calculated: 1 59  O2 Saturation: 98    Results/Data  *VB - Foot Exam 82GZW5189 11:23AM Helenmary Laraer     Test Name Result Flag Reference   Nathan Garcia [id number]       PHQ-2 Adult Depression Screening 27OTU1192 11:21AM User, Ahs     Test Name Result Flag Reference   PHQ-2 Adult Depression Score 0     Over the last two weeks, how often have you been bothered by any of the following problems?   Little interest or pleasure in doing things: Not at all - 0  Feeling down, depressed, or hopeless: Not at all - 0   PHQ-2 Adult Depression Screening Negative         Future Appointments    Date/Time Provider Specialty Site   03/13/2018 11:30 AM Sg Muhammad MD Internal Medicine [address] [facility] INTERNAL MED     Signatures   Electronically signed by : Branden Chavez MD; Nov 14 2017 12:54PM EST                       (Author)

## 2018-01-12 NOTE — RESULT NOTES
Verified Results  (1) LIPID PANEL, FASTING 00IMQ2624 10:16AM Tima Wynn    Order Number: [id number]     Test Name Result Flag Reference   CHOLESTEROL 203 mg/dL H    HDL,DIRECT 45 mg/dL  40-60   Specimen collection should occur prior to Metamizole administration due to the potential for falsely depressed results  LDL CHOLESTEROL CALCULATED 126 mg/dL H 0-100   - Patient Instructions: This is a fasting blood test  Water,black tea or black  coffee only after 9:00pm the night before test   Drink 2 glasses of water the morning of test     - Patient Instructions: This is a fasting blood test  Water,black tea or black  coffee only after 9:00pm the night before test Drink 2 glasses of water the morning of test - Patient Instructions: This bloodwork is non-fasting  Please drink two glasses of   water morning of bloodwork  Triglyceride:         Normal              <150 mg/dl       Borderline High    150-199 mg/dl       High               200-499 mg/dl       Very High          >499 mg/dl  Cholesterol:         Desirable        <200 mg/dl      Borderline High  200-239 mg/dl      High             >239 mg/dl  HDL Cholesterol:        High    >59 mg/dL      Low     <41 mg/dL  LDL CALCULATED:    This screening LDL is a calculated result  It does not have the accuracy of the Direct Measured LDL in the monitoring of patients with hyperlipidemia and/or statin therapy  Direct Measure LDL (DKK544) must be ordered separately in these patients  TRIGLYCERIDES 162 mg/dL H <=150   Specimen collection should occur prior to N-Acetylcysteine or Metamizole administration due to the potential for falsely depressed results       (1) COMPREHENSIVE METABOLIC PANEL 59JYX3730 11:32CW Nany Fitzgerald Order Number: [id number]     Test Name Result Flag Reference   SODIUM 140 mmol/L  136-145   POTASSIUM 3 8 mmol/L  3 5-5 3   CHLORIDE 103 mmol/L  100-108   CARBON DIOXIDE 30 mmol/L  21-32   ANION GAP (CALC) 7 mmol/L  4-13   BLOOD UREA NITROGEN 13 mg/dL  5-25   CREATININE 1 01 mg/dL  0 60-1 30   Standardized to IDMS reference method   CALCIUM 9 0 mg/dL  8 3-10 1   BILI, TOTAL 0 74 mg/dL  0 20-1 00   ALK PHOSPHATAS 106 U/L     ALT (SGPT) 27 U/L  12-78   AST(SGOT) 20 U/L  5-45   ALBUMIN 3 8 g/dL  3 5-5 0   TOTAL PROTEIN 7 2 g/dL  6 4-8 2   eGFR Non-African American 52 1 ml/min/1 73sq m     - Patient Instructions: This is a fasting blood test  Water,black tea or black  coffee only after 9:00pm the night before test Drink 2 glasses of water the morning of test - Patient Instructions: This bloodwork is non-fasting  Please drink two glasses of   water morning of bloodwork  National Kidney Disease Education Program recommendations are as follows:  GFR calculation is accurate only with a steady state creatinine  Chronic Kidney disease less than 60 ml/min/1 73 sq  meters  Kidney failure less than 15 ml/min/1 73 sq  meters  GLUCOSE FASTING 125 mg/dL H 65-99     (1) CBC/PLT/DIFF 17ZMD6125 10:16AM Mounaubaldoia Radha    Order Number: [id number]     Test Name Result Flag Reference   WBC COUNT 6 87 Thousand/uL  4 31-10 16   RBC COUNT 4 86 Million/uL  3 81-5 12   HEMOGLOBIN 14 3 g/dL  11 5-15 4   HEMATOCRIT 42 4 %  34 8-46  1   MCV 87 fL  82-98   MCH 29 4 pg  26 8-34 3   MCHC 33 7 g/dL  31 4-37 4   RDW 14 5 %  11 6-15 1   MPV 10 4 fL  8 9-12 7   PLATELET COUNT 999 Thousands/uL  149-390   nRBC AUTOMATED 0 /100 WBCs     NEUTROPHILS RELATIVE PERCENT 49 %  43-75   LYMPHOCYTES RELATIVE PERCENT 39 %  14-44   MONOCYTES RELATIVE PERCENT 9 %  4-12   EOSINOPHILS RELATIVE PERCENT 3 %  0-6   BASOPHILS RELATIVE PERCENT 0 %  0-1   NEUTROPHILS ABSOLUTE COUNT 3 35 Thousands/? ??L  1 85-7 62   LYMPHOCYTES ABSOLUTE COUNT 2 67 Thousands/? ??L  0 60-4 47   MONOCYTES ABSOLUTE COUNT 0 63 Thousand/? ??L  0 17-1 22   EOSINOPHILS ABSOLUTE COUNT 0 17 Thousand/? ??L  0 00-0 61   BASOPHILS ABSOLUTE COUNT 0 03 Thousands/? ??L  0 00-0 10   - Patient Instructions: This bloodwork is non-fasting  Please drink two glasses of water morning of bloodwork  - Patient Instructions: This bloodwork is non-fasting  Please drink two glasses of water morning of bloodwork  (1) T4, FREE 35FYR6915 10:16AM [email or web address]    Order Number: [id number]     Test Name Result Flag Reference   T4,FREE 1 23 ng/dL  0 76-1 46   - Patient Instructions: This is a fasting blood test  Water,black tea or black  coffee only after 9:00pm the night before test Drink 2 glasses of water the morning of test - Patient Instructions: This bloodwork is non-fasting  Please drink two glasses of   water morning of bloodwork  (1) TSH 52XCA1289 10:16AM [email or web address]    Order Number: [id number]     Test Name Result Flag Reference   TSH 0 668 uIU/mL  0 358-3 740   - Patient Instructions: This bloodwork is non-fasting  Please drink two glasses of water morning of bloodwork  - Patient Instructions: This is a fasting blood test  Water,black tea or black  coffee only after 9:00pm the night before test Drink 2 glasses of water the morning of test - Patient Instructions: This bloodwork is non-fasting  Please drink two glasses of   water morning of bloodwork  Patients undergoing fluorescein dye angiography may retain small amounts of fluorescein in the body for 48-72 hours post procedure  Samples containing fluorescein can produce falsely depressed TSH values  If the patient had this procedure,a specimen should be resubmitted post fluorescein clearance  The recommended reference ranges for TSH during pregnancy are as follows:  First trimester 0 1 to 2 5 uIU/mL  Second trimester  0 2 to 3 0 uIU/mL  Third trimester 0 3 to 3 0 uIU/m     (1) HEMOGLOBIN A1C 28Mar2017 10:16AM [email or web address]    Order Number: [id number]     Test Name Result Flag Reference   HEMOGLOBIN A1C 6 2 %  4 2-6 3   EST  AVG   GLUCOSE 131 mg/dl       (1) VITAMIN D 25-HYDROXY 33HBU2959 10:16AM Krishna Cherry Order Number: [id number]     Test Name Result Flag Reference   VIT D 25-HYDROX 33 8 ng/mL  30 0-100 0   This assay is a certified procedure of the CDC Vitamin D Standardization Certification Program (VDSCP)     Deficiency <20ng/ml   Insufficiency 20-30ng/ml   Sufficient  ng/ml     *Patients undergoing fluorescein dye angiography may retain small amounts of fluorescein in the body for 48-72 hours post procedure  Samples containing fluorescein can produce falsely elevated Vitamin D values  If the patient had this procedure, a specimen should be resubmitted post fluorescein clearance  (1) VITAMIN B12 28Mar2017 10:16AM Krishna Cherry Order Number: [id number]     Test Name Result Flag Reference   VITAMIN B12 595 pg/mL  100-900   - Patient Instructions: This is a fasting blood test  Water,black tea or black  coffee only after 9:00pm the night before test Drink 2 glasses of water the morning of test - Patient Instructions: This bloodwork is non-fasting  Please drink two glasses of   water morning of bloodwork

## 2018-01-13 NOTE — RESULT NOTES
Verified Results  (1) COMPREHENSIVE METABOLIC PANEL 94NEI0190 90:05TX Rayo Muhammad 56 Kidney Disease Education Program recommendations are as follows:  GFR calculation is accurate only with a steady state creatinine  Chronic Kidney disease less than 60 ml/min/1 73 sq  meters  Kidney failure less than 15 ml/min/1 73 sq  meters  Test Name Result Flag Reference   GLUCOSE,RANDM 148 mg/dL H    SODIUM 141 mmol/L  136-145   POTASSIUM 4 2 mmol/L  3 5-5 3   CHLORIDE 104 mmol/L  100-108   CARBON DIOXIDE 28 mmol/L  21-32   ANION GAP (CALC) 9 mmol/L  4-13   BLOOD UREA NITROGEN 12 mg/dL  5-25   CREATININE 1 15 mg/dL  0 60-1 30   Standardized to IDMS reference method   CALCIUM 8 6 mg/dL  8 3-10 1   BILI, TOTAL 0 52 mg/dL  0 20-1 00   ALK PHOSPHATAS 128 U/L H    ALT (SGPT) 28 U/L  12-78   AST(SGOT) 18 U/L  5-45   ALBUMIN 3 7 g/dL  3 5-5 0   TOTAL PROTEIN 7 1 g/dL  6 4-8 2   eGFR Non-African American 45 0 ml/min/1 73sq m       (1) LIPID PANEL, FASTING 82Bfa5018 10:19AM Maikel Miller   Triglyceride:         Normal              <150 mg/dl       Borderline High    150-199 mg/dl       High               200-499 mg/dl       Very High          >499 mg/dl  Cholesterol:         Desirable        <200 mg/dl      Borderline High  200-239 mg/dl      High             >239 mg/dl  HDL Cholesterol:        High    >59 mg/dL      Low     <41 mg/dL     Test Name Result Flag Reference   CHOLESTEROL 245 mg/dL H    HDL,DIRECT 47 mg/dL  40-60   LDL CHOLESTEROL CALCULATED 158 mg/dL H 0-100   TRIGLYCERIDES 201 mg/dL H <=150     (1) CBC/PLT/DIFF 61Gjd3384 10:19AM Maikel Miller     Test Name Result Flag Reference   WBC COUNT 6 64 Thousand/uL  4 31-10 16   RBC COUNT 4 68 Million/uL  3 81-5 12   HEMOGLOBIN 13 8 g/dL  11 5-15 4   HEMATOCRIT 41 4 %  34 8-46  1   MCV 89 fL  82-98   MCH 29 5 pg  26 8-34 3   MCHC 33 3 g/dL  31 4-37 4   RDW 14 1 %  11 6-15 1   MPV 9 8 fL  8 9-12 7   PLATELET COUNT 116 Thousands/uL 149-390   nRBC AUTOMATED 0 /100 WBCs     NEUTROPHILS RELATIVE PERCENT 55 %  43-75   LYMPHOCYTES RELATIVE PERCENT 34 %  14-44   MONOCYTES RELATIVE PERCENT 9 %  4-12   EOSINOPHILS RELATIVE PERCENT 1 %  0-6   BASOPHILS RELATIVE PERCENT 1 %  0-1   NEUTROPHILS ABSOLUTE COUNT 3 66 Thousands/µL  1 85-7 62   LYMPHOCYTES ABSOLUTE COUNT 2 28 Thousands/µL  0 60-4 47   MONOCYTES ABSOLUTE COUNT 0 60 Thousand/µL  0 17-1 22   EOSINOPHILS ABSOLUTE COUNT 0 05 Thousand/µL  0 00-0 61   BASOPHILS ABSOLUTE COUNT 0 03 Thousands/µL  0 00-0 10

## 2018-01-14 VITALS
WEIGHT: 137.5 LBS | HEIGHT: 60 IN | BODY MASS INDEX: 27 KG/M2 | TEMPERATURE: 97.9 F | RESPIRATION RATE: 18 BRPM | HEART RATE: 78 BPM | OXYGEN SATURATION: 98 % | DIASTOLIC BLOOD PRESSURE: 60 MMHG | SYSTOLIC BLOOD PRESSURE: 148 MMHG

## 2018-01-14 VITALS
WEIGHT: 142.38 LBS | DIASTOLIC BLOOD PRESSURE: 60 MMHG | BODY MASS INDEX: 27.95 KG/M2 | HEART RATE: 76 BPM | HEIGHT: 60 IN | SYSTOLIC BLOOD PRESSURE: 138 MMHG | RESPIRATION RATE: 18 BRPM | OXYGEN SATURATION: 97 %

## 2018-01-15 NOTE — MISCELLANEOUS
Message  s/w patient, advised Rx Vit D for level of 19 1   not having radicular Sx at this time only muscle ache   see orders       Plan  Myalgia, Vitamin D deficiency    · Ergocalciferol 33232 UNIT Oral Capsule; TAKE 1 CAPSULE WEEKLY    Signatures   Electronically signed by : Heidy Landa DO; Jun 14 2016  4:47PM EST                       (Author)

## 2018-01-16 NOTE — PROGRESS NOTES
Assessment    1  Trochanteric bursitis of both hips (726 5) (M70 61,M70 62)    Plan  Myalgia, Vitamin D deficiency    · (1) VITAMIN D 25-HYDROXY; Status:Active; Requested for:23Atk3397;   Trochanteric bursitis of both hips    · Joint Bursa Aspiration &/or Injection Major; Status:Complete;   Done: 90DCN7250 03:29PM    Discussion/Summary    Check blood work in August will call with results  Chief Complaint  LBP consult from Dr Wendy Trimble  7/15 see all HPIs for further subjective info  History of Present Illness  81 yo female with Hx of "spinal Stenosis" per Dr Evon Garcia, declined surgery, had LESIs some years ago  Seen by Dr Wendy Trimble 2013 and trochanteric bursitis injected  Currently having radiating pain right > left from buttock to posterolateral leg to ankle  No N/T/W but 'cramps" in calves  No associated incontinence  Has nocturia  Doesn't take steps due to JOSEPH  Sx aggravated by walking and relieved with sitting    6/24 started Vit D, still with cramps in legs  Medrol did help   has had PT in past but does no HEP    7/15 meloxicam helpful   some pain upon weightbearing in am then improves with ambulation  Has follow up endocrinology in September 7/28 called for hip injections today   increased bilateral lateral hip pain with WB  Also needs blood work Rx (?)      Review of Systems    Gastrointestinal: no complaints of abdominal pain, no constipation, no nausea or diarrhea, no vomiting, no bloody stools  Genitourinary: nocturia  Musculoskeletal: as noted in HPI  Active Problems    1  Anxiety (300 00) (F41 9)   2  Asthma (493 90) (J45 909)   3  Degeneration of intervertebral disc of lumbar region (722 52) (M51 36)   4  Diverticulosis (562 10) (K57 90)   5  Elevated serum alkaline phosphatase level (790 5) (R74 8)   6  Encounter for screening mammogram for malignant neoplasm of breast (V76 12)   (Z12 31)   7  Esophageal reflux (530 81) (K21 9)   8  Fatty liver (571 8) (K76 0)   9   History of allergy (V15 09) (Z88 9)   10  Hyperlipidemia (272 4) (E78 5)   11  Hypertension (401 9) (I10)   12  Hyperthyroidism (242 90) (E05 90)   13  Iliotibial band tendinitis of left side (728 89) (M76 32)   14  Iliotibial band tendinitis of right side (728 89) (M76 31)   15  Insomnia (780 52) (G47 00)   16  Intolerance to cold (780 99) (R68 89)   17  Irritable bowel syndrome (564 1) (K58 9)   18  Myalgia (729 1) (M79 1)   19  Need for influenza vaccination (V04 81) (Z23)   20  Need for pneumococcal vaccination (V03 82) (Z23)   21  Orthostatic hypotension (458 0) (I95 1)   22  Ovarian cyst (620 2) (N83 20)   23  Peripheral vertigo (386 10) (H81 399)   24  Pre-syncope (780 2) (R55)   25  Screening for osteoporosis (V82 81) (Z13 820)   26  Spinal stenosis (724 00) (M48 00)   27  Spondylolisthesis, grade 1 (738 4) (M43 10)   28  Transient ischemic attack (435 9) (G45 9)   29  Trochanteric bursitis of both hips (726 5) (M70 61,M70 62)   30  Type 2 diabetes mellitus (250 00) (E11 9)   31  Vertigo (780 4) (R42)   32  Vitamin D deficiency (268 9) (E55 9)    Past Medical History    1  History of Asymptomatic Bacteriuria (791 9)   2  History of Bronchitis, chronic obstructive, with exacerbation (491 21) (J44 1)   3  History of Chronic interstitial cystitis (595 1) (N30 10)   4  History of Community acquired pneumonia (5) (J18 9)   5  History of Dysuria (788 1) (R30 0)   6  History Of 6  Previous Pregnancies (V61 5)   7  History of abdominal pain (V13 89) (Z87 898)   8  History of diverticulitis of colon (V12 79) (Z87 19)   9  History of Lightheadedness (780 4) (R42)   10  History of Lower abdominal pain (789 09) (R10 30)   11  History of Otitis externa of right ear (380 10) (H60 91)   12  History of Previous Pregnancies Resulted In 4  Living Children   13  History of Pruritus (698 9) (L29 9)   14  History of Skin rash (782 1) (R21)   15  History of Urinary symptom or sign (788 99) (R39 9)   16   History of Urinary tract infection (599 0) (N39 0)   17  History of Urinary Tract Infection (V13 02)   18  History of Vaginal candidiasis (112 1) (B37 3)   19  History of Vaginal candidiasis (112 1) (B37 3)    The active problems and past medical history were reviewed and updated today  Surgical History    1  History of Cholecystectomy   2  History of Hysterectomy   3  History of Partial Colectomy - Sigmoid    Family History  Mother    1  Family history of Coronary Artery Disease (V17 49)  Father    2  Family history of Ivet Of 2 Motor Veh[employer] On [address] (Not Motorcycle)    Social History    · Marital History -    · Never A Smoker   · Never Drank Alcohol   · Occupation: Retired    Current Meds   1  Albuterol Sulfate (2 5 MG/3ML) 0 083% Inhalation Nebulization Solution; USE 1 UNIT   DOSE EVERY 4-6 HOURS AS NEEDED FOR WHEEZING ; Therapy: 04Apr2016 to (Last Rx:04Apr2016)  Requested for: 04Apr2016 Ordered   2  AmLODIPine Besylate 5 MG Oral Tablet; TAKE 1 TABLET BY MOUTH ONCE DAILY; Therapy: 94GUS1163 to (Evaluate:01Jan2017)  Requested for: 39ZYL8951; Last   Rx:88Uee2515 Ordered   3  Dicyclomine HCl - 10 MG Oral Capsule; TAKE 1 CAPSULE EVERY 8 HOURS DAILY; Therapy: 83QUR1870 to (Amie Eddy)  Requested for: 53MHZ8305; Last   Rx:09Mar2016 Ordered   4  Ergocalciferol 43308 UNIT Oral Capsule; TAKE 1 CAPSULE WEEKLY; Therapy: 19BXC7545 to (Last Rx:14Jun2016)  Requested for: 36LBE6676 Ordered   5  Flovent  MCG/ACT Inhalation Aerosol; INHALE 1 PUFF TWICE DAILY; Therapy: 04Apr2016 to (Evaluate:30Mar2017)  Requested for: 71Mnq3311; Last   Rx:04Apr2016 Ordered   6  LORazepam 0 5 MG Oral Tablet; TAKE 1 TABLET DAILY AS NEEDED; Therapy: 31JGL9564 to (Evaluate:24Oct2016); Last Rx:93Elc1139 Ordered   7  Meclizine HCl - 25 MG Oral Tablet; TAKE 1 TABLET 3 TIMES DAILY AS NEEDED; Therapy: 18Apr2013 to (Elijah Moran)  Requested for: 25NUC2464; Last   Rx:05Jun2015 Ordered   8   Meloxicam 7 5 MG Oral Tablet; TAKE 1 TABLET DAILY AS NEEDED; Therapy: 02DUF2710 to (Evaluate:99Fxv0265)  Requested for: 87AGR0510; Last   Rx:69Uli0209 Ordered   9  Meloxicam 7 5 MG Oral Tablet; TAKE 1 TABLET TWICE DAILY WITH FOOD; Therapy: 60GXO7606 to (Evaluate:10Omn6537)  Requested for: 86FZJ6004; Last   Rx:24Jun2016 Ordered   10  Metanx 3-90 314-2-35 MG Oral Capsule; take 1 capsule daily; Therapy: 92PND1872 to (Evaluate:01Apr2016); Last Rx:94Wuo2370 Ordered   11  Methimazole 5 MG Oral Tablet; TAKE 1 TABLET ONCE DAILY; Therapy: 01LNZ1320 to (Evaluate:01Nov2015); Last Rx:81Hwc0733 Ordered   12  Montelukast Sodium 10 MG Oral Tablet; Take 1 tablet daily; Therapy: 96CZZ6568 to (Jacki Baker)  Requested for: 16PRX2354; Last    Rx:09Mar2016 Ordered   13  Nystatin 774538 UNIT/GM External Cream; APPLY  AND RUB  IN A THIN FILM TO    AFFECTED AREAS TWICE DAILY  (AM AND PM); Therapy: 74DHF3998 to (Jacki Baker)  Requested for: 31GEA5975; Last    Rx:09Mar2016 Ordered   14  Omeprazole 20 MG Oral Capsule Delayed Release; TAKE 1 CAPSULE DAILY EVERY    MORNING BEFORE BREAKFAST  Requested for: 54IJE0881; Last Rx:09Mar2016    Ordered   15  OneTouch Ultra Blue In Vitro Strip; USE AS DIRECTED; Therapy: 23OUB0504 to (Aleja Hudson)  Requested for: 03Apr2014; Last    Rx:03Apr2014 Ordered   16  Oxycodone-Acetaminophen 5-325 MG Oral Tablet; TAKE 1 TABLET EVERY 8 HOURS AS    NEEDED; Therapy: 63UMD7752 to (Evaluate:88Yot8318); Last Rx:03Aug2015 Ordered   17  Ventolin  (90 Base) MCG/ACT Inhalation Aerosol Solution; INHALE 2 PUFFS    EVERY 4 HOURS AS NEEDED FOR COUGH AND WHEEZE;    Therapy: 85Ngq4604 to (Last Rx:22Eog5525)  Requested for: 78Xos1989 Ordered    The medication list was reviewed and updated today  Allergies    1  Aleve CAPS   2  Bactrim TABS   3  Halcion TABS   4  Lisinopril TABS   5  Statins   6   Zetia TABS    Vitals  Signs   Recorded: 37QJA3827 20:02TG   Systolic: 677, LUE, Sitting  Diastolic: 78, LUE, Sitting  Heart Rate: 72  Respiration: 16  Height: 5 ft   Weight: 150 lb 6 oz  BMI Calculated: 29 37  BSA Calculated: 1 65    Physical Exam    Constitutional   General appearance: Abnormal   uncomfortable and independent n ambulation and transfers  Lumbar/Sacral Spine examination demonstrates Lumbosacral Spine:  Procedure    Procedure:   Anesthesia: The wound was anesthetized with 3 ml of bupivacaine 0 25% without epinephrine  Procedure Note:   Level: trochanteric bursae  Epidural Needle: 1 5 in 27 gauge  Technique   Dressing:  an antiobiotic ointment was applied and a sterile dressing was placed  Patient Status:  the patient tolerated the procedure well  Complications:  there were no complications  Bilateral GTB injections with total 4 c 0 25 % bupivicaine and 40 mg depo medrol  Indication: pain and PPT bursal pain  Risks were discussed with the patient  Prior to the start of the procedure a time out was taken and the identity of the patient was confirmed via name and date of birth with the patient's family member  The correct site and the procedure to be performed were confirmed and the site marked as appropriate  The correct side was confirmed  The positioning of the patient was verified  The availability of the correct equipment was verified  Verbal consent review she has had same procedure in the past    Post-Procedure:   Patient Status: the patient tolerated the procedure well        Future Appointments    Date/Time Provider Specialty Site   08/26/2016 09:40 AM Ayden Gonzales DO [place] [address]   11/30/2016 10:30 AM Carleen Muhammad MD Internal Medicine [facility] INTERNAL MED     Signatures   Electronically signed by : Shandra Lockwood DO; Jul 28 2016  3:31PM EST                       (Author)

## 2018-01-18 NOTE — PROGRESS NOTES
Assessment   1  Iliotibial band tendinitis of left side (728 89) (M76 32)  2  Iliotibial band tendinitis of right side (728 89) (M76 31)  3  Vitamin D deficiency (268 9) (E55 9)    Plan  Iliotibial band tendinitis of left side, Iliotibial band tendinitis of right side, Vitamin D  deficiency    · Start: Meloxicam 7 5 MG Oral Tablet; TAKE 1 TABLET TWICE DAILY WITH FOOD  Vitamin D deficiency    · Follow-up visit in 3 weeks Evaluation and Treatment  Follow-up  Status: Hold For -  Scheduling  Requested for: 24Jun2016    Discussion/Summary    Start anti-inflammatory medicine  Follow up with Dr Christian Barrett, give his office a call today  Chief Complaint  LBP consult from Dr Brittany Echevarria  History of Present Illness  81 yo female with Hx of "spinal Stenosis" per Dr Sergio Rene, declined surgery, had LESIs some years ago  Seen by Dr Brittany Echevarria 2013 and trochanteric bursitis injected  Currently having radiating pain right > left from buttock to posterolateral leg to ankle  No N/T/W but 'cramps" in calves  No associated incontinence  Has nocturia  Doesn't take steps due to JOSEPH  Sx aggravated by walking and relieved with sitting    6/24 started Vit D, still with cramps in legs  Medrol did help   has had PT in past but does no HEP  Review of Systems    Constitutional: gaining weight (Thyroid med adjusted by Dr Christian Barrett  Eyes: No complaints of eyesight problems, no red eyes  ENT: no loss of hearing, no nosebleeds, no sore throat  Cardiovascular: JOSEPH and lower extremity edema  Respiratory: JOSEPH with steps and shortness of breath, but no compliants of shortness of breath, no wheezing, no cough  Gastrointestinal: no complaints of abdominal pain, no constipation, no nausea or diarrhea, no vomiting, no bloody stools  Genitourinary: as noted in HPI  Musculoskeletal: as noted in HPI  Integumentary: no complaints of skin rash or lesion, no itching or dry skin, no skin wounds     Neurological: no complaints of headache, no confusion, no numbness or tingling, no dizziness  Endocrine: as noted in HPI  Psychiatric: No suicidal thoughts, no anxiety, no feelings of depression  Active Problems   1  Anxiety (300 00) (F41 9)  2  Asthma (493 90) (J45 909)  3  Degeneration of intervertebral disc of lumbar region (722 52) (M51 36)  4  Diverticulosis (562 10) (K57 90)  5  Elevated serum alkaline phosphatase level (790 5) (R74 8)  6  Encounter for screening mammogram for malignant neoplasm of breast (V76 12)   (Z12 31)  7  Esophageal reflux (530 81) (K21 9)  8  Fatty liver (571 8) (K76 0)  9  History of allergy (V15 09) (Z88 9)  10  Hyperlipidemia (272 4) (E78 5)  11  Hypertension (401 9) (I10)  12  Hyperthyroidism (242 90) (E05 90)  13  Iliotibial band tendinitis of left side (728 89) (M76 32)  14  Iliotibial band tendinitis of right side (728 89) (M76 31)  15  Insomnia (780 52) (G47 00)  16  Intolerance to cold (780 99) (R68 89)  17  Irritable bowel syndrome (564 1) (K58 9)  18  Myalgia (729 1) (M79 1)  19  Need for pneumococcal vaccination (V03 82) (Z23)  20  Orthostatic hypotension (458 0) (I95 1)  21  Ovarian cyst (620 2) (N83 20)  22  Peripheral vertigo (386 10) (H81 399)  23  Pre-syncope (780 2) (R55)  24  Spinal stenosis (724 00) (M48 00)  25  Spondylolisthesis, grade 1 (738 4) (M43 10)  26  Transient ischemic attack (435 9) (G45 9)  27  Trochanteric bursitis of both hips (726 5) (M70 61,M70 62)  28  Type 2 diabetes mellitus (250 00) (E11 9)  29  Vertigo (780 4) (R42)  30  Vitamin D deficiency (268 9) (E55 9)    Past Medical History   1  History of Asymptomatic Bacteriuria (791 9)  2  History of Bronchitis, chronic obstructive, with exacerbation (491 21) (J44 1)  3  History of Chronic interstitial cystitis (595 1) (N30 10)  4  History of Community acquired pneumonia (5) (J18 9)  5  History of Dysuria (788 1) (R30 0)  6  History Of 6  Previous Pregnancies (V61 5)  7  History of abdominal pain (V13 89) (Z87 898)  8   History of diverticulitis of colon (V12 79) (Z87 19)  9  History of Lightheadedness (780 4) (R42)  10  History of Lower abdominal pain (789 09) (R10 30)  11  History of Otitis externa of right ear (380 10) (H60 91)  12  History of Previous Pregnancies Resulted In 4  Living Children  13  History of Pruritus (698 9) (L29 9)  14  History of Skin rash (782 1) (R21)  15  History of Urinary symptom or sign (788 99) (R39 9)  16  History of Urinary tract infection (599 0) (N39 0)  17  History of Urinary Tract Infection (V13 02)  18  History of Vaginal candidiasis (112 1) (B37 3)  19  History of Vaginal candidiasis (112 1) (B37 3)    The active problems and past medical history were reviewed and updated today  Surgical History   1  History of Cholecystectomy  2  History of Hysterectomy  3  History of Partial Colectomy - Sigmoid    The surgical history was reviewed and updated today  Family History  Mother   1  Family history of Coronary Artery Disease (V17 49)  Father   2  Family history of Ivet Of 2 [address] On [address] (Not Motorcycle)    The family history was reviewed and updated today  Social History    · Marital History -    · Never A Smoker   · Never Drank Alcohol   · Occupation: Retired  The social history was reviewed and is unchanged  Current Meds  1  Albuterol Sulfate (2 5 MG/3ML) 0 083% Inhalation Nebulization Solution; USE 1 UNIT   DOSE EVERY 4-6 HOURS AS NEEDED FOR WHEEZING ; Therapy: 04Apr2016 to (Last Rx:04Apr2016)  Requested for: 04Apr2016 Ordered  2  AmLODIPine Besylate 5 MG Oral Tablet (Norvasc); TAKE 1 TABLET BY MOUTH ONCE   DAILY; Therapy: 22KZL1843 to (Evaluate:10Zlc0298)  Requested for: 48TDT8595; Last   Rx:71Uob7439 Ordered  3  Dicyclomine HCl - 10 MG Oral Capsule (Bentyl); TAKE 1 CAPSULE EVERY 8 HOURS   DAILY; Therapy: 83ITO1354 to (Johnathon Obrien)  Requested for: 29IHX1124; Last   Rx:09Mar2016 Ordered  4   Ergocalciferol 93120 UNIT Oral Capsule; TAKE 1 CAPSULE WEEKLY; Therapy: 94QXJ1943 to (Last Rx:14Jun2016)  Requested for: 68UEI4895 Ordered  5  Flovent  MCG/ACT Inhalation Aerosol; INHALE 1 PUFF TWICE DAILY; Therapy: 04Apr2016 to (Evaluate:30Mar2017)  Requested for: 04Apr2016; Last   Rx:04Apr2016 Ordered  6  LORazepam 0 5 MG Oral Tablet; TAKE 1 TABLET DAILY AS NEEDED; Therapy: 93TTY2188 to (Evaluate:14Jun2015); Last Rx:16Mar2015 Ordered  7  Meclizine HCl - 25 MG Oral Tablet; TAKE 1 TABLET 3 TIMES DAILY AS NEEDED; Therapy: 18Apr2013 to (David Sterling)  Requested for: 00JGB6386; Last   Rx:05Jun2015 Ordered  8  Metanx 3-90 314-2-35 MG Oral Capsule; take 1 capsule daily; Therapy: 67GCN2817 to (Evaluate:01Apr2016); Last Rx:41Gec7178 Ordered  9  Methimazole 5 MG Oral Tablet (Tapazole); TAKE 1 TABLET ONCE DAILY; Therapy: 62FJK3205 to (Evaluate:01Nov2015); Last Rx:73Dom1694 Ordered  10  MethylPREDNISolone 4 MG Oral Tablet Therapy Pack; Take according to directions; Therapy: 24CPD5776 to (Last Rx:10Jun2016)  Requested for: 61EHB9079 Ordered  11  Montelukast Sodium 10 MG Oral Tablet; Take 1 tablet daily; Therapy: 45CZP7113 to (Venancio Elliott)  Requested for: 48ZTJ8595; Last    Rx:09Mar2016 Ordered  12  Nystatin 217080 UNIT/GM External Cream; APPLY  AND RUB  IN A THIN FILM TO    AFFECTED AREAS TWICE DAILY  (AM AND PM); Therapy: 24ANQ8904 to (Venancio Elliott)  Requested for: 49CIN0957; Last    Rx:09Mar2016 Ordered  13  Omeprazole 20 MG Oral Capsule Delayed Release; TAKE 1 CAPSULE DAILY EVERY    MORNING BEFORE BREAKFAST  Requested for: 69HQQ6084; Last Rx:09Mar2016    Ordered  14  OneTouch Ultra Blue In Vitro Strip; USE AS DIRECTED; Therapy: 69JRB0119 to (Lizeth Evans)  Requested for: 03Apr2014; Last    Rx:03Apr2014 Ordered  15  Oxycodone-Acetaminophen 5-325 MG Oral Tablet (Percocet); TAKE 1 TABLET EVERY 8    HOURS AS NEEDED; Therapy: 77GDR0235 to (Evaluate:02Sep2015); Last Rx:03Aug2015 Ordered  16   Ventolin  (90 Base) MCG/ACT Inhalation Aerosol Solution; INHALE 2 PUFFS    EVERY 4 HOURS AS NEEDED FOR COUGH AND WHEEZE;    Therapy: 94Spp2606 to (Last [facility])  Requested for: 14Yax0676 Ordered    The medication list was reviewed and updated today  Allergies   1  Aleve CAPS  2  Bactrim TABS  3  Halcion TABS  4  Lisinopril TABS  5  Statins  6  Zetia TABS    Vitals  Signs [Data Includes: Current Encounter]   Recorded: 00QLU9120 09:10AM   Heart Rate: 80  Systolic: 333  Diastolic: 62  Height: 5 ft   Weight: 149 lb   BMI Calculated: 29 1  BSA Calculated: 1 64    Physical Exam    Constitutional   General appearance: Well developed, well nourished, alert, in no distress, non-toxic and no overt pain behavior  Lumbar/Sacral Spine examination demonstrates Lumbosacral Spine:   ROM Lumbosacral Spine: marked TTP in both ITBs  Evaluation of Muscle Stretch Reflexes on the right side demonstrates 1/4 Hamstring Reflex, 1/4 Knee Jerk Reflex and 1/4 Ankle Jerk Reflex, but negative Gomez Test right upper extremity and negative right ankle clonus  Evaluation of Muscle Stretch Reflexes on the left side demonstrates 1/4 Hamstring Reflex, 1/4 Knee Jerk Reflex, 1/4 Ankle Jerk Reflex and negative left ankle clonus, but negative Gomez Test left upper extremity  Special Tests: negative Straight Leg Raise on right and negative Straight Leg Raise on left  Results/Data    Diagnostic Review TSH 6 93  VitD 19 0  Provider Comments    Pt is NOT euthyroid which can contribute to soft tissue pain as does Vit D deficiency  Future Appointments    Date/Time Provider Specialty Site   06/30/2016 10:00 AM SHELLY John Aas  Cardiology [facility] CARDIOLOGY [facility]   07/26/2016 11:30 AM Siria Muhammad MD Internal Medicine [facility] INTERNAL MED   07/14/2016 08:50 AM SHELLY Roper   Orthopedic Surgery [facility]     Signatures   Electronically signed by : Kentrell Cordon DO; Jun 24 2016  9:32AM EST                       (Author)    Electronically signed by : Vee Hampton DO; Jun 24 2016  9:36AM EST                       (Author)    Electronically signed by : Vee Hampton DO; Jun 24 2016  9:37AM EST                       (Author)

## 2018-01-23 NOTE — CONSULTS
Assessment   1  Vitamin D deficiency (268 9) (E55 9)  2  Degeneration of intervertebral disc of lumbar region (722 52) (M51 36)  3  Spondylolisthesis, grade 1 (738 4) (M43 10)  4  Iliotibial band tendinitis of right side (728 89) (M76 31)  5  Iliotibial band tendinitis of left side (728 89) (M76 32)  6  Trochanteric bursitis of both hips (726 5) (M70 61,M70 62)    Plan  Iliotibial band tendinitis of left side, Iliotibial band tendinitis of right side,  Spondylolisthesis, grade 1, Trochanteric bursitis of both hips    · Start: MethylPREDNISolone 4 MG Oral Tablet Therapy Pack; Take according to directions  Spondylolisthesis, grade 1    · (1) CK (CPK); Status:Active; Requested ZCE:[id number];    · (1) C-REACTIVE PROTEIN; Status:Active; Requested BJM:[id number];    · (1) SED RATE; Status:Active; Requested PGD:[id number];    · Follow-up visit in 2 weeks Evaluation and Treatment  Follow-up  Status: Hold For -  Scheduling  Requested for: 10Jun2016  Spondylolisthesis, grade 1, Vitamin D deficiency    · (1) VITAMIN D 25-HYDROXY; Status:Active; Requested [id number]:[id number];     Discussion/Summary  Impression:  low back pain1  ,  spondylolisthesis1  and  signs today of hip tendonitis and bursitis1  1   The diagnostic plan includes  Blood tests1  1   Medication changes are as documented in orders1  Patient discussion:  discussed with the patient1  ,  discussed with the patient's family1  1         1 Amended By: Liliam Lozano; Roly 10 2016 9:40 AM EST    Chief Complaint  LBP consult from Dr Dallin Rosas  History of Present Illness  79 yo female with Hx of "spinal Stenosis" per Dr Kay Oro, declined surgery, had ESIs some years ago  Seen by Dr Dallin Rosas 2013 and trochanteric bursitis injected  Currently having radiating pain right > left from buttock to posterolateral leg to ankle  No N/T/W but 'cramps" in calves  No associated incontinence  Has nocturia  Doesn't take steps due to JOSEPH   Sx aggravated by walking and relieved with sitting  Review of Systems    Constitutional: gaining weight (Thyroid med adjusted by Dr Susy Selby  Eyes: No complaints of eyesight problems, no red eyes  ENT: no loss of hearing, no nosebleeds, no sore throat  Cardiovascular: JOSEPH and lower extremity edema  Respiratory: JOSEPH with steps and shortness of breath, but no compliants of shortness of breath, no wheezing, no cough  Gastrointestinal: no complaints of abdominal pain, no constipation, no nausea or diarrhea, no vomiting, no bloody stools  Genitourinary: as noted in HPI  Musculoskeletal: as noted in HPI  Integumentary: no complaints of skin rash or lesion, no itching or dry skin, no skin wounds  Neurological: no complaints of headache, no confusion, no numbness or tingling, no dizziness  Endocrine: as noted in HPI  Psychiatric: No suicidal thoughts, no anxiety, no feelings of depression  Active Problems   1  Anxiety (300 00) (F41 9)  2  Asthma (493 90) (J45 909)  3  Diverticulosis (562 10) (K57 90)  4  Elevated serum alkaline phosphatase level (790 5) (R74 8)  5  Encounter for screening mammogram for malignant neoplasm of breast (V76 12)   (Z12 31)  6  Esophageal reflux (530 81) (K21 9)  7  Fatty liver (571 8) (K76 0)  8  History of allergy (V15 09) (Z88 9)  9  Hyperlipidemia (272 4) (E78 5)  10  Hypertension (401 9) (I10)  11  Hyperthyroidism (242 90) (E05 90)  12  Insomnia (780 52) (G47 00)  13  Intolerance to cold (780 99) (R68 89)  14  Irritable bowel syndrome (564 1) (K58 9)  15  Need for pneumococcal vaccination (V03 82) (Z23)  16  Orthostatic hypotension (458 0) (I95 1)  17  Ovarian cyst (620 2) (N83 20)  18  Peripheral vertigo (386 10) (H81 399)  19  Pre-syncope (780 2) (R55)  20  Spinal stenosis (724 00) (M48 00)  21  Transient ischemic attack (435 9) (G45 9)  22  Type 2 diabetes mellitus (250 00) (E11 9)  23  Vertigo (780 4) (R42)  24  Vitamin D deficiency (268 9) (E55 9)    Past Medical History   1   History of Asymptomatic Bacteriuria (791 9)  2  History of Bronchitis, chronic obstructive, with exacerbation (491 21) (J44 1)  3  History of Chronic interstitial cystitis (595 1) (N30 10)  4  History of Community acquired pneumonia (5) (J18 9)  5  History of Dysuria (788 1) (R30 0)  6  History Of 6  Previous Pregnancies (V61 5)  7  History of abdominal pain (V13 89) (Z87 898)  8  History of diverticulitis of colon (V12 79) (Z87 19)  9  History of Lightheadedness (780 4) (R42)  10  History of Lower abdominal pain (789 09) (R10 30)  11  History of Otitis externa of right ear (380 10) (H60 91)  12  History of Previous Pregnancies Resulted In 4  Living Children  13  History of Pruritus (698 9) (L29 9)  14  History of Skin rash (782 1) (R21)  15  History of Urinary symptom or sign (788 99) (R39 9)  16  History of Urinary tract infection (599 0) (N39 0)  17  History of Urinary Tract Infection (V13 02)  18  History of Vaginal candidiasis (112 1) (B37 3)  19  History of Vaginal candidiasis (112 1) (B37 3)    The active problems and past medical history were reviewed and updated today  Surgical History   1  History of Cholecystectomy  2  History of Hysterectomy  3  History of Partial Colectomy - Sigmoid    The surgical history was reviewed and updated today  Family History  Mother   1  Family history of Coronary Artery Disease (V17 49)  Father   2  Family history of Ivet Of 2 Motor Vehic On [address] [facility] [address] (Not Motorcycle)    The family history was reviewed and updated today  Social History    · Marital History -    · Never A Smoker   · Never Drank Alcohol   · Occupation: Retired  The social history was reviewed and is unchanged  Current Meds  1  Albuterol Sulfate (2 5 MG/3ML) 0 083% Inhalation Nebulization Solution; USE 1 UNIT   DOSE EVERY 4-6 HOURS AS NEEDED FOR WHEEZING ; Therapy: 04Apr2016 to (Last Rx:04Apr2016)  Requested for: 04Apr2016 Ordered  2   AmLODIPine Besylate 5 MG Oral Tablet (Norvasc); TAKE 1 TABLET BY MOUTH ONCE   DAILY; Therapy: 66MFO7712 to (Evaluate:56Ylc8819)  Requested for: 90QYJ8753; Last   Rx:25Aye5532 Ordered  3  Dicyclomine HCl - 10 MG Oral Capsule (Bentyl); TAKE 1 CAPSULE EVERY 8 HOURS   DAILY; Therapy: 27JYH4508 to (Adrian Cobia)  Requested for: 55YYX8130; Last   Rx:09Mar2016 Ordered  4  Flovent  MCG/ACT Inhalation Aerosol; INHALE 1 PUFF TWICE DAILY; Therapy: 55Vsh6130 to (Evaluate:30Mar2017)  Requested for: 04Apr2016; Last   Rx:04Apr2016 Ordered  5  LORazepam 0 5 MG Oral Tablet; TAKE 1 TABLET DAILY AS NEEDED; Therapy: 88HDW7082 to (Evaluate:14Jun2015); Last Rx:16Mar2015 Ordered  6  Meclizine HCl - 25 MG Oral Tablet; TAKE 1 TABLET 3 TIMES DAILY AS NEEDED; Therapy: 18Zrj4328 to (Toño Elder)  Requested for: 39WHJ7119; Last   Rx:05Jun2015 Ordered  7  Metanx 3-90 314-2-35 MG Oral Capsule; take 1 capsule daily; Therapy: 93KDC8891 to (Evaluate:01Apr2016); Last Rx:88Qqi8433 Ordered  8  Methimazole 5 MG Oral Tablet (Tapazole); TAKE 1 TABLET ONCE DAILY; Therapy: 81PBS9444 to (Evaluate:01Nov2015); Last Rx:99Uzq2343 Ordered  9  Montelukast Sodium 10 MG Oral Tablet; Take 1 tablet daily; Therapy: 59BPS9677 to (Adrian Cobia)  Requested for: 41AZV4647; Last   Rx:09Mar2016 Ordered  10  Nystatin 169005 UNIT/GM External Cream; APPLY  AND RUB  IN A THIN FILM TO    AFFECTED AREAS TWICE DAILY  (AM AND PM); Therapy: 85XCY7505 to (Adrian Cobia)  Requested for: 35CHW8277; Last    Rx:09Mar2016 Ordered  11  Omeprazole 20 MG Oral Capsule Delayed Release; TAKE 1 CAPSULE DAILY EVERY    MORNING BEFORE BREAKFAST  Requested for: 12TFJ2065; Last Rx:09Mar2016    Ordered  12  OneTouch Ultra Blue In Vitro Strip; USE AS DIRECTED; Therapy: 23KTD8025 to (Shefali Choudhury)  Requested for: 03Apr2014; Last    Rx:03Apr2014 Ordered  13  Oxycodone-Acetaminophen 5-325 MG Oral Tablet (Percocet); TAKE 1 TABLET EVERY 8    HOURS AS NEEDED;     Therapy: 41EED2740 to (Evaluate:42Lmw0790); Last Rx:38Kgu1389 Ordered  14  Ventolin  (90 Base) MCG/ACT Inhalation Aerosol Solution; INHALE 2 PUFFS    EVERY 4 HOURS AS NEEDED FOR COUGH AND WHEEZE;    Therapy: 07Zbp7747 to (Last Rx:31Hwb0827)  Requested for: 91Eki6442 Ordered    Allergies   1  Aleve CAPS  2  Bactrim TABS  3  Halcion TABS  4  Lisinopril TABS  5  Statins  6  Zetia TABS    Vitals  Signs [Data Includes: Current Encounter]   Recorded: 10Jun2016 09:00AM   Heart Rate: 66  Systolic: 905  Diastolic: 60  Weight: 109 lb     Physical Exam  Ulises's Neg bilat, very tight hip flexors and HS, ++ TTP both GTB and entire ITB length, SLR negative  Constitutional - General appearance: Abnormal  overweight and No acute distress  Musculoskeletal - 1   Gait and station: Abnormal1   Gait evaluation demonstrated1  antalgia on the right1   Digits and nails: Abnormal  Examination of the extremities revealed Heberden's nodes DIPs  Cardiovascular - Examination of extremities for edema and/or varicosities: Normal    Neurologic - Cranial nerves: Normal  Reflexes: Abnormal  Deep tendon reflexes: 1+ right biceps, 1+ left biceps, 1+ right triceps, 1+ left triceps, 1+ right brachioradialis, 1+ left brachioradialis, 1+ right patella, 1+ left patella, 1+ right ankle jerk, 0 left ankle jerk and Gomez's neg  no ankle clonus on the right and no ankle clonus on the left  Sensation: Normal  Lower extremity peripheral neuro exam: Normal  Neuromuscular strength examination findings: able to stand on toes and heels but painful  Psychiatric - Orientation to person, place, and time: Normal  Mood and affect: Normal    Eyes   Pupils and irises: Normal         1 Amended By: Andie Score; Roly 10 2016 9:44 AM EST    Results/Data  I personally reviewed the films/images/results in the office today  My interpretation follows  X-ray Review Grade 1 anterolisthesis of L-4/5, DDD at L-3/4  Hips unremarkable  MRI Review None in system old studies at open unit     Diagnostic Review TSH 6 18 in April, A1C elevated  Vit D 25 0 10/15, 19 2 3/15  Provider Comments    Pt very averse to any PTx despite discussion it is indicated  Future Appointments    Date/Time Provider Specialty Site   06/30/2016 10:00 AM SHELLY Schwartz  Cardiology [facility] CARDIOLOG[facility]   07/26/2016 11:30 AM Audrey Muhammad MD Internal Medicine [facility] INTERNAL MED   07/14/2016 08:50 AM SHELLY Morton   Orthopedic Surgery [facility]     Signatures   Electronically signed by : Kathrene Prader, DO; Roly 10 2016  9:39AM EST                       (Author)    Electronically signed by : Kathrene Prader, DO; Roly 10 2016  9:40AM EST                       (Author)    Electronically signed by : Kathrene Prader, DO; Roly 10 2016  9:44AM EST                       (Author)

## 2018-02-19 ENCOUNTER — TRANSCRIBE ORDERS (OUTPATIENT)
Dept: LAB | Facility: CLINIC | Age: 83
End: 2018-02-19

## 2018-02-19 ENCOUNTER — APPOINTMENT (OUTPATIENT)
Dept: LAB | Facility: CLINIC | Age: 83
End: 2018-02-19
Payer: MEDICARE

## 2018-02-19 DIAGNOSIS — E55.9 VITAMIN D DEFICIENCY: ICD-10-CM

## 2018-02-19 DIAGNOSIS — E08.00 DIABETES MELLITUS DUE TO UNDERLYING CONDITION WITH HYPEROSMOLARITY WITHOUT COMA, WITHOUT LONG-TERM CURRENT USE OF INSULIN (HCC): ICD-10-CM

## 2018-02-19 DIAGNOSIS — E06.3 CHRONIC LYMPHOCYTIC THYROIDITIS: ICD-10-CM

## 2018-02-19 DIAGNOSIS — E05.90 PRETIBIAL MYXEDEMA: ICD-10-CM

## 2018-02-19 DIAGNOSIS — E78.5 HYPERLIPIDEMIA: ICD-10-CM

## 2018-02-19 DIAGNOSIS — E05.00 BASEDOW'S DISEASE: ICD-10-CM

## 2018-02-19 DIAGNOSIS — E05.00 BASEDOW'S DISEASE: Primary | ICD-10-CM

## 2018-02-19 LAB
25(OH)D3 SERPL-MCNC: 33.9 NG/ML (ref 30–100)
ALBUMIN SERPL BCP-MCNC: 3.8 G/DL (ref 3.5–5)
ALP SERPL-CCNC: 116 U/L (ref 46–116)
ALT SERPL W P-5'-P-CCNC: 24 U/L (ref 12–78)
ANION GAP SERPL CALCULATED.3IONS-SCNC: 6 MMOL/L (ref 4–13)
AST SERPL W P-5'-P-CCNC: 20 U/L (ref 5–45)
BASOPHILS # BLD AUTO: 0.06 THOUSANDS/ΜL (ref 0–0.1)
BASOPHILS NFR BLD AUTO: 1 % (ref 0–1)
BILIRUB SERPL-MCNC: 0.79 MG/DL (ref 0.2–1)
BUN SERPL-MCNC: 15 MG/DL (ref 5–25)
CALCIUM SERPL-MCNC: 9.1 MG/DL (ref 8.3–10.1)
CHLORIDE SERPL-SCNC: 102 MMOL/L (ref 100–108)
CHOLEST SERPL-MCNC: 227 MG/DL (ref 50–200)
CO2 SERPL-SCNC: 31 MMOL/L (ref 21–32)
CREAT SERPL-MCNC: 1.1 MG/DL (ref 0.6–1.3)
EOSINOPHIL # BLD AUTO: 0.11 THOUSAND/ΜL (ref 0–0.61)
EOSINOPHIL NFR BLD AUTO: 2 % (ref 0–6)
ERYTHROCYTE [DISTWIDTH] IN BLOOD BY AUTOMATED COUNT: 14 % (ref 11.6–15.1)
EST. AVERAGE GLUCOSE BLD GHB EST-MCNC: 123 MG/DL
GFR SERPL CREATININE-BSD FRML MDRD: 46 ML/MIN/1.73SQ M
GLUCOSE P FAST SERPL-MCNC: 134 MG/DL (ref 65–99)
HBA1C MFR BLD: 5.9 % (ref 4.2–6.3)
HCT VFR BLD AUTO: 41.2 % (ref 34.8–46.1)
HDLC SERPL-MCNC: 52 MG/DL (ref 40–60)
HGB BLD-MCNC: 14 G/DL (ref 11.5–15.4)
LDLC SERPL CALC-MCNC: 137 MG/DL (ref 0–100)
LYMPHOCYTES # BLD AUTO: 2.29 THOUSANDS/ΜL (ref 0.6–4.47)
LYMPHOCYTES NFR BLD AUTO: 33 % (ref 14–44)
MAGNESIUM SERPL-MCNC: 2.2 MG/DL (ref 1.6–2.6)
MCH RBC QN AUTO: 29.8 PG (ref 26.8–34.3)
MCHC RBC AUTO-ENTMCNC: 34 G/DL (ref 31.4–37.4)
MCV RBC AUTO: 88 FL (ref 82–98)
MONOCYTES # BLD AUTO: 0.77 THOUSAND/ΜL (ref 0.17–1.22)
MONOCYTES NFR BLD AUTO: 11 % (ref 4–12)
NEUTROPHILS # BLD AUTO: 3.71 THOUSANDS/ΜL (ref 1.85–7.62)
NEUTS SEG NFR BLD AUTO: 53 % (ref 43–75)
NRBC BLD AUTO-RTO: 0 /100 WBCS
PHOSPHATE SERPL-MCNC: 3 MG/DL (ref 2.3–4.1)
PLATELET # BLD AUTO: 364 THOUSANDS/UL (ref 149–390)
PMV BLD AUTO: 10.2 FL (ref 8.9–12.7)
POTASSIUM SERPL-SCNC: 4.2 MMOL/L (ref 3.5–5.3)
PROT SERPL-MCNC: 7.6 G/DL (ref 6.4–8.2)
RBC # BLD AUTO: 4.7 MILLION/UL (ref 3.81–5.12)
SODIUM SERPL-SCNC: 139 MMOL/L (ref 136–145)
T4 FREE SERPL-MCNC: 0.8 NG/DL (ref 0.76–1.46)
TRIGL SERPL-MCNC: 192 MG/DL
TSH SERPL DL<=0.05 MIU/L-ACNC: 9.74 UIU/ML (ref 0.36–3.74)
WBC # BLD AUTO: 6.96 THOUSAND/UL (ref 4.31–10.16)

## 2018-02-19 PROCEDURE — 83735 ASSAY OF MAGNESIUM: CPT

## 2018-02-19 PROCEDURE — 83036 HEMOGLOBIN GLYCOSYLATED A1C: CPT

## 2018-02-19 PROCEDURE — 80061 LIPID PANEL: CPT

## 2018-02-19 PROCEDURE — 36415 COLL VENOUS BLD VENIPUNCTURE: CPT

## 2018-02-19 PROCEDURE — 82306 VITAMIN D 25 HYDROXY: CPT

## 2018-02-19 PROCEDURE — 85025 COMPLETE CBC W/AUTO DIFF WBC: CPT

## 2018-02-19 PROCEDURE — 84443 ASSAY THYROID STIM HORMONE: CPT

## 2018-02-19 PROCEDURE — 84100 ASSAY OF PHOSPHORUS: CPT

## 2018-02-19 PROCEDURE — 80053 COMPREHEN METABOLIC PANEL: CPT

## 2018-02-19 PROCEDURE — 84439 ASSAY OF FREE THYROXINE: CPT

## 2018-03-01 DIAGNOSIS — E78.5 HYPERLIPIDEMIA: ICD-10-CM

## 2018-03-01 DIAGNOSIS — E55.9 VITAMIN D DEFICIENCY: ICD-10-CM

## 2018-03-10 PROBLEM — B02.29 POSTHERPETIC NEURALGIA: Status: ACTIVE | Noted: 2017-03-07

## 2018-03-13 ENCOUNTER — OFFICE VISIT (OUTPATIENT)
Dept: INTERNAL MEDICINE CLINIC | Facility: CLINIC | Age: 83
End: 2018-03-13
Payer: MEDICARE

## 2018-03-13 VITALS
BODY MASS INDEX: 26.9 KG/M2 | WEIGHT: 137 LBS | HEART RATE: 81 BPM | HEIGHT: 60 IN | OXYGEN SATURATION: 98 % | DIASTOLIC BLOOD PRESSURE: 64 MMHG | SYSTOLIC BLOOD PRESSURE: 132 MMHG | TEMPERATURE: 98.1 F | RESPIRATION RATE: 18 BRPM

## 2018-03-13 DIAGNOSIS — K21.9 GASTROESOPHAGEAL REFLUX DISEASE, ESOPHAGITIS PRESENCE NOT SPECIFIED: Primary | ICD-10-CM

## 2018-03-13 DIAGNOSIS — Z71.9 HEALTH COUNSELING: ICD-10-CM

## 2018-03-13 DIAGNOSIS — N18.30 CKD (CHRONIC KIDNEY DISEASE), STAGE III (HCC): ICD-10-CM

## 2018-03-13 DIAGNOSIS — E05.90 HYPERTHYROIDISM: ICD-10-CM

## 2018-03-13 DIAGNOSIS — M51.36 DEGENERATION OF INTERVERTEBRAL DISC OF LUMBAR REGION: ICD-10-CM

## 2018-03-13 DIAGNOSIS — F41.9 ANXIETY: ICD-10-CM

## 2018-03-13 DIAGNOSIS — J45.20 MILD INTERMITTENT ASTHMA WITHOUT COMPLICATION: ICD-10-CM

## 2018-03-13 DIAGNOSIS — E78.49 OTHER HYPERLIPIDEMIA: ICD-10-CM

## 2018-03-13 PROBLEM — R25.2 MUSCLE CRAMPS: Status: ACTIVE | Noted: 2017-11-14

## 2018-03-13 PROCEDURE — 99214 OFFICE O/P EST MOD 30 MIN: CPT | Performed by: INTERNAL MEDICINE

## 2018-03-13 RX ORDER — LORAZEPAM 0.5 MG/1
TABLET ORAL
Qty: 30 TABLET | Refills: 0 | Status: SHIPPED | OUTPATIENT
Start: 2018-03-13 | End: 2018-10-12 | Stop reason: SDUPTHER

## 2018-03-13 RX ORDER — FLUTICASONE PROPIONATE 110 UG/1
1 AEROSOL, METERED RESPIRATORY (INHALATION) 2 TIMES DAILY
COMMUNITY
Start: 2016-04-04 | End: 2020-09-01 | Stop reason: ALTCHOICE

## 2018-03-13 RX ORDER — VITAMIN B COMPLEX
1 CAPSULE ORAL DAILY
COMMUNITY

## 2018-03-13 RX ORDER — OMEPRAZOLE 20 MG/1
20 CAPSULE, DELAYED RELEASE ORAL
Refills: 1 | COMMUNITY
Start: 2018-01-05 | End: 2018-07-16 | Stop reason: SDUPTHER

## 2018-03-13 RX ORDER — LORAZEPAM 0.5 MG/1
1 TABLET ORAL DAILY PRN
COMMUNITY
Start: 2013-11-22 | End: 2018-03-13 | Stop reason: SDUPTHER

## 2018-03-13 RX ORDER — CHOLECALCIFEROL (VITAMIN D3) 1250 MCG
50000 CAPSULE ORAL WEEKLY
COMMUNITY
End: 2019-02-18 | Stop reason: ALTCHOICE

## 2018-03-13 NOTE — PROGRESS NOTES
Assessment/Plan:    Hyperthyroidism  Follow up with Endo as scheduled later this month  On half tablet of methimazole daily  Asthma  Instructed to use albuterol inhaler as needed  If using this daily, recommend to start using Flovent daily  On daily Singulair  Degeneration of intervertebral disc of lumbar region  Instructed to take Tylenol twice a day regularly, she takes half tablet of oxycodone as needed for severe pain, usually at night  Discussed treatment options, she is interested in epidural injections; however, reluctant to do an MRI due to claustrophobia  Explained there is open MRI available in the area  CKD (chronic kidney disease), stage III  Instructed to increase daily water intake  Repeat BMP in a few weeks  Hyperlipidemia  Lipids worse, watch diet  Muscle cramps  Suspect symptoms mainly due to spinal stenosis  Hypertension  BP stable on amlodipine  Esophageal reflux  On daily PPI, Bentyl as needed  Diagnoses and all orders for this visit:    Gastroesophageal reflux disease, esophagitis presence not specified    Mild intermittent asthma without complication    Hyperthyroidism    Degeneration of intervertebral disc of lumbar region  -     Ambulatory referral to Pain Management; Future    Other hyperlipidemia    CKD (chronic kidney disease), stage III  -     Basic metabolic panel; Future    Anxiety  -     LORazepam (ATIVAN) 0 5 mg tablet; Take 1/2 tablet to 1 tablet PO daily PRN    Other orders  -     Discontinue: LORazepam (ATIVAN) 0 5 mg tablet; Take 1 tablet by mouth daily as needed  -     omeprazole (PriLOSEC) 20 mg delayed release capsule; Take 20 mg by mouth daily before breakfast  -     b complex vitamins capsule; Take 1 capsule by mouth daily  -     Cholecalciferol (VITAMIN D3) 20930 units CAPS; Take 50,000 Units by mouth once a week  -     fluticasone (FLOVENT HFA) 110 MCG/ACT inhaler;  Inhale 1 puff 2 (two) times a day          Subjective:      Patient ID: Yumiko Pearce is a 80 y o  female  Ms  [place] mainly complains of back and bilateral leg pain  She reports pain gradually worse the past few months  She is limited which she can do, does not like to take stairs due to increased pain  Pain mostly on both thighs, she continues to experience bilateral leg cramps intermittently and at night  She takes Tylenol as needed, takes half a tablet of oxycodone for severe pain only  She does not sleep well at night due to pain and discomfort  She received injections for tendinitis in both hips, wondering if she should do this again  She has been using her rescue inhaler a few times a week  She has Flovent at home which she does not use daily  Denies any wheezing or nocturnal symptoms  She denies any chest pain, palpitations or tremors  She rarely takes lorazepam for anxiety  The following portions of the patient's history were reviewed and updated as appropriate: allergies, current medications, past medical history, past social history and problem list     Review of Systems   Constitutional: Negative for appetite change and fatigue  HENT: Negative for congestion, ear pain and postnasal drip  Eyes: Negative for visual disturbance  Respiratory: Negative for cough and shortness of breath  Cardiovascular: Negative for chest pain and leg swelling  Gastrointestinal: Negative for abdominal pain, constipation and diarrhea  Genitourinary: Negative for dysuria, frequency and urgency  Musculoskeletal: Positive for arthralgias, back pain and gait problem  Negative for joint swelling and myalgias  Skin: Negative for rash and wound  Neurological: Negative for dizziness, weakness, numbness and headaches  Hematological: Does not bruise/bleed easily  Psychiatric/Behavioral: Positive for sleep disturbance  Negative for confusion  The patient is not nervous/anxious            Objective:      /64   Pulse 81   Temp 98 1 °F (36 7 °C)   Resp 18 Ht 5' (1 524 m)   Wt 62 1 kg (137 lb)   SpO2 98%   BMI 26 76 kg/m²          Physical Exam   Constitutional: She is oriented to person, place, and time  She appears well-developed and well-nourished  HENT:   Head: Normocephalic and atraumatic  Nose: Nose normal    Eyes: Conjunctivae are normal  Pupils are equal, round, and reactive to light  Neck: Neck supple  Cardiovascular: Normal rate, regular rhythm and normal heart sounds  No edema  Pulmonary/Chest: Effort normal and breath sounds normal  She has no wheezes  She has no rales  Abdominal: Soft  Bowel sounds are normal    Musculoskeletal:        Lumbar back: She exhibits spasm  She exhibits no tenderness  MMT 4/5 both LE   Neurological: She is alert and oriented to person, place, and time  Skin: Skin is warm  No rash noted  Psychiatric: She has a normal mood and affect  Her behavior is normal    Nursing note and vitals reviewed

## 2018-03-13 NOTE — ASSESSMENT & PLAN NOTE
Instructed to take Tylenol twice a day regularly, she takes half tablet of oxycodone as needed for severe pain, usually at night  Discussed treatment options, she is interested in epidural injections; however, reluctant to do an MRI due to claustrophobia  Explained there is open MRI available in the area

## 2018-03-13 NOTE — ASSESSMENT & PLAN NOTE
Instructed to use albuterol inhaler as needed  If using this daily, recommend to start using Flovent daily  On daily Singulair

## 2018-05-09 DIAGNOSIS — I10 ESSENTIAL HYPERTENSION: Primary | ICD-10-CM

## 2018-05-09 DIAGNOSIS — K58.9 IRRITABLE BOWEL SYNDROME, UNSPECIFIED TYPE: ICD-10-CM

## 2018-05-09 RX ORDER — AMLODIPINE BESYLATE 5 MG/1
5 TABLET ORAL DAILY
Qty: 90 TABLET | Refills: 0 | Status: SHIPPED | OUTPATIENT
Start: 2018-05-09 | End: 2018-08-17 | Stop reason: SDUPTHER

## 2018-05-09 RX ORDER — DICYCLOMINE HYDROCHLORIDE 10 MG/1
10 CAPSULE ORAL 3 TIMES DAILY PRN
Qty: 180 CAPSULE | Refills: 0 | Status: SHIPPED | OUTPATIENT
Start: 2018-05-09 | End: 2018-07-16 | Stop reason: SDUPTHER

## 2018-06-22 ENCOUNTER — APPOINTMENT (OUTPATIENT)
Dept: LAB | Facility: CLINIC | Age: 83
End: 2018-06-22
Payer: MEDICARE

## 2018-06-22 ENCOUNTER — TRANSCRIBE ORDERS (OUTPATIENT)
Dept: LAB | Facility: CLINIC | Age: 83
End: 2018-06-22

## 2018-06-22 DIAGNOSIS — E06.3 CHRONIC LYMPHOCYTIC THYROIDITIS: ICD-10-CM

## 2018-06-22 DIAGNOSIS — E13.9 DIABETES MELLITUS OF OTHER TYPE WITHOUT COMPLICATION, UNSPECIFIED WHETHER LONG TERM INSULIN USE (HCC): ICD-10-CM

## 2018-06-22 DIAGNOSIS — E05.90 PRETIBIAL MYXEDEMA: Primary | ICD-10-CM

## 2018-06-22 DIAGNOSIS — E05.00 BASEDOW'S DISEASE: ICD-10-CM

## 2018-06-22 LAB
ALBUMIN SERPL BCP-MCNC: 3.9 G/DL (ref 3.5–5)
ALP SERPL-CCNC: 105 U/L (ref 46–116)
ALT SERPL W P-5'-P-CCNC: 26 U/L (ref 12–78)
ANION GAP SERPL CALCULATED.3IONS-SCNC: 5 MMOL/L (ref 4–13)
AST SERPL W P-5'-P-CCNC: 20 U/L (ref 5–45)
BASOPHILS # BLD AUTO: 0.05 THOUSANDS/ΜL (ref 0–0.1)
BASOPHILS NFR BLD AUTO: 1 % (ref 0–1)
BILIRUB SERPL-MCNC: 0.66 MG/DL (ref 0.2–1)
BUN SERPL-MCNC: 12 MG/DL (ref 5–25)
CALCIUM SERPL-MCNC: 9 MG/DL (ref 8.3–10.1)
CHLORIDE SERPL-SCNC: 103 MMOL/L (ref 100–108)
CO2 SERPL-SCNC: 32 MMOL/L (ref 21–32)
CREAT SERPL-MCNC: 1.05 MG/DL (ref 0.6–1.3)
EOSINOPHIL # BLD AUTO: 0.08 THOUSAND/ΜL (ref 0–0.61)
EOSINOPHIL NFR BLD AUTO: 1 % (ref 0–6)
ERYTHROCYTE [DISTWIDTH] IN BLOOD BY AUTOMATED COUNT: 13.2 % (ref 11.6–15.1)
EST. AVERAGE GLUCOSE BLD GHB EST-MCNC: 126 MG/DL
GFR SERPL CREATININE-BSD FRML MDRD: 48 ML/MIN/1.73SQ M
GLUCOSE P FAST SERPL-MCNC: 111 MG/DL (ref 65–99)
HBA1C MFR BLD: 6 % (ref 4.2–6.3)
HCT VFR BLD AUTO: 42.9 % (ref 34.8–46.1)
HGB BLD-MCNC: 13.7 G/DL (ref 11.5–15.4)
IMM GRANULOCYTES # BLD AUTO: 0.01 THOUSAND/UL (ref 0–0.2)
IMM GRANULOCYTES NFR BLD AUTO: 0 % (ref 0–2)
LYMPHOCYTES # BLD AUTO: 2.48 THOUSANDS/ΜL (ref 0.6–4.47)
LYMPHOCYTES NFR BLD AUTO: 37 % (ref 14–44)
MAGNESIUM SERPL-MCNC: 2.2 MG/DL (ref 1.6–2.6)
MCH RBC QN AUTO: 29.5 PG (ref 26.8–34.3)
MCHC RBC AUTO-ENTMCNC: 31.9 G/DL (ref 31.4–37.4)
MCV RBC AUTO: 92 FL (ref 82–98)
MONOCYTES # BLD AUTO: 0.71 THOUSAND/ΜL (ref 0.17–1.22)
MONOCYTES NFR BLD AUTO: 11 % (ref 4–12)
NEUTROPHILS # BLD AUTO: 3.35 THOUSANDS/ΜL (ref 1.85–7.62)
NEUTS SEG NFR BLD AUTO: 50 % (ref 43–75)
NRBC BLD AUTO-RTO: 0 /100 WBCS
PHOSPHATE SERPL-MCNC: 3.5 MG/DL (ref 2.3–4.1)
PLATELET # BLD AUTO: 361 THOUSANDS/UL (ref 149–390)
PMV BLD AUTO: 10.1 FL (ref 8.9–12.7)
POTASSIUM SERPL-SCNC: 4.4 MMOL/L (ref 3.5–5.3)
PROT SERPL-MCNC: 7.6 G/DL (ref 6.4–8.2)
RBC # BLD AUTO: 4.65 MILLION/UL (ref 3.81–5.12)
SODIUM SERPL-SCNC: 140 MMOL/L (ref 136–145)
T4 FREE SERPL-MCNC: 0.98 NG/DL (ref 0.76–1.46)
TSH SERPL DL<=0.05 MIU/L-ACNC: 4.3 UIU/ML (ref 0.36–3.74)
WBC # BLD AUTO: 6.68 THOUSAND/UL (ref 4.31–10.16)

## 2018-06-22 PROCEDURE — 83036 HEMOGLOBIN GLYCOSYLATED A1C: CPT

## 2018-06-22 PROCEDURE — 80053 COMPREHEN METABOLIC PANEL: CPT

## 2018-06-22 PROCEDURE — 85025 COMPLETE CBC W/AUTO DIFF WBC: CPT

## 2018-06-22 PROCEDURE — 83735 ASSAY OF MAGNESIUM: CPT

## 2018-06-22 PROCEDURE — 84443 ASSAY THYROID STIM HORMONE: CPT

## 2018-06-22 PROCEDURE — 84439 ASSAY OF FREE THYROXINE: CPT

## 2018-06-22 PROCEDURE — 84100 ASSAY OF PHOSPHORUS: CPT

## 2018-06-22 PROCEDURE — 36415 COLL VENOUS BLD VENIPUNCTURE: CPT

## 2018-06-26 ENCOUNTER — APPOINTMENT (EMERGENCY)
Dept: CT IMAGING | Facility: HOSPITAL | Age: 83
End: 2018-06-26
Payer: MEDICARE

## 2018-06-26 ENCOUNTER — HOSPITAL ENCOUNTER (EMERGENCY)
Facility: HOSPITAL | Age: 83
Discharge: HOME | End: 2018-06-26
Attending: EMERGENCY MEDICINE | Admitting: EMERGENCY MEDICINE
Payer: MEDICARE

## 2018-06-26 VITALS
SYSTOLIC BLOOD PRESSURE: 173 MMHG | HEART RATE: 62 BPM | RESPIRATION RATE: 18 BRPM | TEMPERATURE: 98.2 F | OXYGEN SATURATION: 97 % | DIASTOLIC BLOOD PRESSURE: 74 MMHG

## 2018-06-26 DIAGNOSIS — K58.0 IRRITABLE BOWEL SYNDROME WITH DIARRHEA: Primary | ICD-10-CM

## 2018-06-26 DIAGNOSIS — R10.31 RIGHT LOWER QUADRANT ABDOMINAL PAIN: ICD-10-CM

## 2018-06-26 LAB
ALBUMIN SERPL BCP-MCNC: 4 G/DL (ref 3.5–5)
ALP SERPL-CCNC: 114 U/L (ref 46–116)
ALT SERPL W P-5'-P-CCNC: 32 U/L (ref 12–78)
ANION GAP SERPL CALCULATED.3IONS-SCNC: 11 MMOL/L (ref 4–13)
AST SERPL W P-5'-P-CCNC: 29 U/L (ref 5–45)
BACTERIA UR QL AUTO: ABNORMAL /HPF
BASOPHILS # BLD AUTO: 0.03 THOUSANDS/ΜL (ref 0–0.1)
BASOPHILS NFR BLD AUTO: 0 % (ref 0–1)
BILIRUB SERPL-MCNC: 0.6 MG/DL (ref 0.2–1)
BILIRUB UR QL STRIP: NEGATIVE
BUN SERPL-MCNC: 16 MG/DL (ref 5–25)
CALCIUM SERPL-MCNC: 9.5 MG/DL (ref 8.3–10.1)
CHLORIDE SERPL-SCNC: 101 MMOL/L (ref 100–108)
CLARITY UR: CLEAR
CO2 SERPL-SCNC: 27 MMOL/L (ref 21–32)
COLOR UR: YELLOW
COLOR, POC: YELLOW
CREAT SERPL-MCNC: 1.04 MG/DL (ref 0.6–1.3)
EOSINOPHIL # BLD AUTO: 0.04 THOUSAND/ΜL (ref 0–0.61)
EOSINOPHIL NFR BLD AUTO: 1 % (ref 0–6)
ERYTHROCYTE [DISTWIDTH] IN BLOOD BY AUTOMATED COUNT: 13.6 % (ref 11.6–15.1)
GFR SERPL CREATININE-BSD FRML MDRD: 49 ML/MIN/1.73SQ M
GLUCOSE SERPL-MCNC: 129 MG/DL (ref 65–140)
GLUCOSE UR STRIP-MCNC: NEGATIVE MG/DL
HCT VFR BLD AUTO: 42.3 % (ref 34.8–46.1)
HGB BLD-MCNC: 14 G/DL (ref 11.5–15.4)
HGB UR QL STRIP.AUTO: NEGATIVE
KETONES UR STRIP-MCNC: NEGATIVE MG/DL
LACTATE SERPL-SCNC: 1.8 MMOL/L (ref 0.5–2)
LEUKOCYTE ESTERASE UR QL STRIP: ABNORMAL
LYMPHOCYTES # BLD AUTO: 2.1 THOUSANDS/ΜL (ref 0.6–4.47)
LYMPHOCYTES NFR BLD AUTO: 29 % (ref 14–44)
MCH RBC QN AUTO: 29.9 PG (ref 26.8–34.3)
MCHC RBC AUTO-ENTMCNC: 33.1 G/DL (ref 31.4–37.4)
MCV RBC AUTO: 90 FL (ref 82–98)
MONOCYTES # BLD AUTO: 0.66 THOUSAND/ΜL (ref 0.17–1.22)
MONOCYTES NFR BLD AUTO: 9 % (ref 4–12)
NEUTROPHILS # BLD AUTO: 4.4 THOUSANDS/ΜL (ref 1.85–7.62)
NEUTS SEG NFR BLD AUTO: 61 % (ref 43–75)
NITRITE UR QL STRIP: NEGATIVE
NON-SQ EPI CELLS URNS QL MICRO: ABNORMAL /HPF
PH UR STRIP.AUTO: 7.5 [PH] (ref 4.5–8)
PLATELET # BLD AUTO: 350 THOUSANDS/UL (ref 149–390)
PMV BLD AUTO: 9.9 FL (ref 8.9–12.7)
POTASSIUM SERPL-SCNC: 4.4 MMOL/L (ref 3.5–5.3)
PROT SERPL-MCNC: 7.7 G/DL (ref 6.4–8.2)
PROT UR STRIP-MCNC: NEGATIVE MG/DL
RBC # BLD AUTO: 4.68 MILLION/UL (ref 3.81–5.12)
RBC #/AREA URNS AUTO: ABNORMAL /HPF
SODIUM SERPL-SCNC: 139 MMOL/L (ref 136–145)
SP GR UR STRIP.AUTO: 1.01 (ref 1–1.03)
UROBILINOGEN UR QL STRIP.AUTO: 0.2 E.U./DL
WBC # BLD AUTO: 7.23 THOUSAND/UL (ref 4.31–10.16)
WBC #/AREA URNS AUTO: ABNORMAL /HPF

## 2018-06-26 PROCEDURE — 81001 URINALYSIS AUTO W/SCOPE: CPT

## 2018-06-26 PROCEDURE — 85025 COMPLETE CBC W/AUTO DIFF WBC: CPT | Performed by: EMERGENCY MEDICINE

## 2018-06-26 PROCEDURE — 96374 THER/PROPH/DIAG INJ IV PUSH: CPT

## 2018-06-26 PROCEDURE — 96361 HYDRATE IV INFUSION ADD-ON: CPT

## 2018-06-26 PROCEDURE — 83605 ASSAY OF LACTIC ACID: CPT | Performed by: EMERGENCY MEDICINE

## 2018-06-26 PROCEDURE — 99284 EMERGENCY DEPT VISIT MOD MDM: CPT

## 2018-06-26 PROCEDURE — 74177 CT ABD & PELVIS W/CONTRAST: CPT

## 2018-06-26 PROCEDURE — 36415 COLL VENOUS BLD VENIPUNCTURE: CPT | Performed by: EMERGENCY MEDICINE

## 2018-06-26 PROCEDURE — 80053 COMPREHEN METABOLIC PANEL: CPT | Performed by: EMERGENCY MEDICINE

## 2018-06-26 RX ORDER — DICYCLOMINE HCL 20 MG
20 TABLET ORAL 2 TIMES DAILY
Qty: 20 TABLET | Refills: 0 | Status: SHIPPED | OUTPATIENT
Start: 2018-06-26 | End: 2018-07-16 | Stop reason: SDUPTHER

## 2018-06-26 RX ORDER — ONDANSETRON 2 MG/ML
4 INJECTION INTRAMUSCULAR; INTRAVENOUS ONCE
Status: COMPLETED | OUTPATIENT
Start: 2018-06-26 | End: 2018-06-26

## 2018-06-26 RX ADMIN — IOHEXOL 100 ML: 350 INJECTION, SOLUTION INTRAVENOUS at 10:54

## 2018-06-26 RX ADMIN — ONDANSETRON 4 MG: 2 INJECTION INTRAMUSCULAR; INTRAVENOUS at 10:16

## 2018-06-26 RX ADMIN — SODIUM CHLORIDE 1000 ML: 0.9 INJECTION, SOLUTION INTRAVENOUS at 10:15

## 2018-06-26 NOTE — ED PROVIDER NOTES
History  Chief Complaint   Patient presents with    Abdominal Pain     Low abdominal pain x 1 week, nausea this am   Denies urinary symptoms  Reports bilateral leg pain for months  54-year-old female presents with chief complaint of lower abdominal pain starting in her right lower quadrant radiating across the suprapubic region  Patient reports the pain has been present for 1 week but has progressively worsened especially over last 2 days  This morning patient also had some nausea and lightheadedness  Patient's past medical history significant for diverticulitis requiring colon resection (by Dr Ivone Mcneill) in the remote past   No fevers but patient does report some chills  Patient has nausea but no vomiting or diarrhea  No recent travel or unusual foods  Patient has additional past medical history significant for non-insulin-dependent diabetes, hypertension and dyslipidemia  History provided by:  Patient   used: No    Abdominal Pain   Pain location:  RLQ  Pain quality: aching and cramping    Pain radiates to:  Suprapubic region  Pain severity:  Moderate  Onset quality:  Gradual  Duration:  1 week  Timing:  Constant  Progression:  Worsening  Chronicity:  New  Context: not diet changes, not previous surgeries, not recent illness, not sick contacts, not suspicious food intake and not trauma    Relieved by:  Nothing  Worsened by:  Nothing  Ineffective treatments:  None tried  Associated symptoms: chills and nausea    Associated symptoms: no chest pain, no constipation, no diarrhea, no dysuria, no fatigue, no fever, no shortness of breath and no vomiting    Risk factors: being elderly        Prior to Admission Medications   Prescriptions Last Dose Informant Patient Reported? Taking?    Albuterol Sulfate (VENTOLIN HFA IN)  Self Yes Yes   Sig: Inhale daily as needed   Cholecalciferol (VITAMIN D3) 27924 units CAPS  Self Yes Yes   Sig: Take 50,000 Units by mouth once a week   LORazepam (ATIVAN) 0 5 mg tablet  Self No Yes   Sig: Take 1/2 tablet to 1 tablet PO daily PRN   MECLIZINE HCL PO  Self Yes Yes   Sig: Take 25 mg by mouth 3 (three) times a day   amLODIPine (NORVASC) 5 mg tablet  Self No Yes   Sig: Take 1 tablet (5 mg total) by mouth daily   b complex vitamins capsule  Self Yes Yes   Sig: Take 1 capsule by mouth daily   dicyclomine (BENTYL) 10 mg capsule  Self No Yes   Sig: Take 1 capsule (10 mg total) by mouth 3 (three) times a day as needed (cramping)   fluticasone (FLOVENT HFA) 110 MCG/ACT inhaler  Self Yes Yes   Sig: Inhale 1 puff 2 (two) times a day   methimazole (TAPAZOLE) 5 mg tablet  Self Yes Yes   Sig: Take 5 mg by mouth 3 (three) times a day   montelukast (SINGULAIR) 10 mg tablet  Self Yes Yes   Sig: Take 10 mg by mouth daily at bedtime   omeprazole (PriLOSEC) 20 mg delayed release capsule  Self Yes Yes   Sig: Take 20 mg by mouth daily before breakfast   oxyCODONE-acetaminophen (PERCOCET) 5-325 mg per tablet  Self Yes Yes   Sig: Take 1 tablet by mouth every 4 (four) hours as needed for moderate pain      Facility-Administered Medications: None       Past Medical History:   Diagnosis Date    Asthma     Chronic interstitial cystitis     Community acquired pneumonia     last assessed 10/4/13    Diabetes mellitus ([facility] [address] )     Disease of thyroid gland     Diverticulitis     GERD (gastroesophageal reflux disease)     Hyperlipidemia     Hypertension        Past Surgical History:   Procedure Laterality Date    CHOLECYSTECTOMY      COLON SURGERY      partial colectomy - sigmoid, diverticulitis    EYE SURGERY      HYSTERECTOMY      ovaries remain       Family History   Problem Relation Age of Onset    Coronary artery disease Mother     Other Father         MVA    Alcohol abuse Neg Hx     Depression Neg Hx     Drug abuse Neg Hx     Substance Abuse Neg Hx      I have reviewed and agree with the history as documented      Social History   Substance Use Topics    Smoking status: Never Smoker    Smokeless tobacco: Never Used    Alcohol use No        Review of Systems   Constitutional: Positive for chills  Negative for diaphoresis, fatigue and fever  Respiratory: Negative for shortness of breath  Cardiovascular: Negative for chest pain and palpitations  Gastrointestinal: Positive for abdominal pain and nausea  Negative for constipation, diarrhea and vomiting  Genitourinary: Positive for frequency  Negative for dysuria  Skin: Negative for rash  All other systems reviewed and are negative  Physical Exam  Physical Exam   Constitutional: She is oriented to person, place, and time  She appears well-developed and well-nourished  She appears distressed (mild)  HENT:   Head: Normocephalic and atraumatic  Eyes: EOM are normal  Pupils are equal, round, and reactive to light  Neck: Normal range of motion  No JVD present  Cardiovascular: Normal rate, regular rhythm, normal heart sounds and intact distal pulses  Exam reveals no gallop and no friction rub  No murmur heard  Pulmonary/Chest: Effort normal and breath sounds normal  No respiratory distress  She has no wheezes  She has no rales  She exhibits no tenderness  Abdominal: Soft  She exhibits no distension and no mass  There is tenderness (rlq and suprapubic region)  There is no guarding  Musculoskeletal: Normal range of motion  She exhibits no tenderness  Neurological: She is alert and oriented to person, place, and time  Skin: Skin is warm and dry  Psychiatric: She has a normal mood and affect  Her behavior is normal  Judgment and thought content normal    Nursing note and vitals reviewed        Vital Signs  ED Triage Vitals [06/26/18 0958]   Temperature Pulse Respirations Blood Pressure SpO2   98 2 °F (36 8 °C) 67 20 159/70 98 %      Temp Source Heart Rate Source Patient Position - Orthostatic VS BP Location FiO2 (%)   Oral Monitor -- Right arm --      Pain Score       6           Vitals:    06/26/18 8822 06/26/18 1130   BP: 159/70 (!) 173/74   Pulse: 67 62       Visual Acuity      ED Medications  Medications   sodium chloride 0 9 % bolus 1,000 mL (1,000 mL Intravenous New Bag 6/26/18 1015)   ondansetron (ZOFRAN) injection 4 mg (4 mg Intravenous Given 6/26/18 1016)   iohexol (OMNIPAQUE) 350 MG/ML injection (MULTI-DOSE) 100 mL (100 mL Intravenous Given 6/26/18 1054)       Diagnostic Studies  Results Reviewed     Procedure Component Value Units Date/Time    Urine Microscopic [[id number]]  (Abnormal) Collected:  06/26/18 1129    Lab Status:  Final result Specimen:  Urine from Urine, Clean Catch Updated:  06/26/18 1144     RBC, UA None Seen /hpf      WBC, UA 0-1 (A) /hpf      Epithelial Cells Occasional /hpf      Bacteria, UA None Seen /hpf     POCT urinalysis dipstick [[id number]]  (Normal) Resulted:  06/26/18 1125    Lab Status:  Final result Specimen:  Urine Updated:  06/26/18 1126     Color, UA yellow    ED Urine Macroscopic [[id number]]  (Abnormal) Collected:  06/26/18 1129    Lab Status:  Final result Specimen:  Urine Updated:  06/26/18 1124     Color, UA Yellow     Clarity, UA Clear     pH, UA 7 5     Leukocytes, UA Trace (A)     Nitrite, UA Negative     Protein, UA Negative mg/dl      Glucose, UA Negative mg/dl      Ketones, UA Negative mg/dl      Urobilinogen, UA 0 2 E U /dl      Bilirubin, UA Negative     Blood, UA Negative     Specific Gravity, UA 1 010    Narrative:       CLINITEK RESULT    Lactic acid, plasma [[id number]]  (Normal) Collected:  06/26/18 1014    Lab Status:  Final result Specimen:  Blood from Arm, Right Updated:  06/26/18 1042     LACTIC ACID 1 8 mmol/L     Narrative:         Result may be elevated if tourniquet was used during collection      Comprehensive metabolic panel [[id number]] Collected:  06/26/18 1014    Lab Status:  Final result Specimen:  Blood from Arm, Right Updated:  06/26/18 1041     Sodium 139 mmol/L      Potassium 4 4 mmol/L      Chloride 101 mmol/L      CO2 27 mmol/L      Anion Gap 11 mmol/L      BUN 16 mg/dL      Creatinine 1 04 mg/dL      Glucose 129 mg/dL      Calcium 9 5 mg/dL      AST 29 U/L      ALT 32 U/L      Alkaline Phosphatase 114 U/L      Total Protein 7 7 g/dL      Albumin 4 0 g/dL      Total Bilirubin 0 60 mg/dL      eGFR 49 ml/min/1 73sq m     Narrative:         National Kidney Disease Education Program recommendations are as follows:  GFR calculation is accurate only with a steady state creatinine  Chronic Kidney disease less than 60 ml/min/1 73 sq  meters  Kidney failure less than 15 ml/min/1 73 sq  meters  CBC and differential [[id number]]  (Normal) Collected:  06/26/18 1014    Lab Status:  Final result Specimen:  Blood from Arm, Right Updated:  06/26/18 1026     WBC 7 23 Thousand/uL      RBC 4 68 Million/uL      Hemoglobin 14 0 g/dL      Hematocrit 42 3 %      MCV 90 fL      MCH 29 9 pg      MCHC 33 1 g/dL      RDW 13 6 %      MPV 9 9 fL      Platelets 313 Thousands/uL      Neutrophils Relative 61 %      Lymphocytes Relative 29 %      Monocytes Relative 9 %      Eosinophils Relative 1 %      Basophils Relative 0 %      Neutrophils Absolute 4 40 Thousands/µL      Lymphocytes Absolute 2 10 Thousands/µL      Monocytes Absolute 0 66 Thousand/µL      Eosinophils Absolute 0 04 Thousand/µL      Basophils Absolute 0 03 Thousands/µL                  CT abdomen pelvis with contrast   Final Result by Frank Mckoy MD (06/26 1129)         1  No acute abnormality within the abdomen or pelvis  2   Stable left adnexal cystic lesion without significant change dating back to 3/7/2008              Workstation performed: [id number]                    Procedures  Procedures       Phone Contacts  ED Phone Contact    ED Course                               MDM  Number of Diagnoses or Management Options  Irritable bowel syndrome with diarrhea: new and requires workup  Right lower quadrant abdominal pain: new and requires workup  Diagnosis management comments: Background: 80 y o  female presents with chief complaint of lower abdominal pain  Differential: appendicitis, diverticulitis, mesenteric adenitis, ovarian cyst, cystitis, pyelonephritis, ureterolithiasis, constipation, colitis, gastric ulcer, mesenteric ischemia, perforated viscous, non specific abdominal pain    Plan: cbc, cmp, lactic acid, urinalysis, ct scan, symptom control          Amount and/or Complexity of Data Reviewed  Clinical lab tests: ordered and reviewed  Tests in the radiology section of CPT®: ordered and reviewed    Risk of Complications, Morbidity, and/or Mortality  Presenting problems: high  Diagnostic procedures: high  Management options: high    Patient Progress  Patient progress: stable    CritCare Time    Disposition  Final diagnoses:   Irritable bowel syndrome with diarrhea - acute suspected   Right lower quadrant abdominal pain     Time reflects when diagnosis was documented in both MDM as applicable and the Disposition within this note     Time User Action Codes Description Comment    6/26/2018 12:05 PM Haroon RODRIGUEZ Add [K58 0] Irritable bowel syndrome with diarrhea     6/26/2018 12:05 PM Haroon RODRIGUEZ Modify [K58 0] Irritable bowel syndrome with diarrhea acute suspected    6/26/2018 12:05 PM Haroon RODRIGUEZ Add [R10 31] Right lower quadrant abdominal pain       ED Disposition     ED Disposition Condition Comment    Discharge  [facility] discharge to home/self care      Condition at discharge: Good        Follow-up Information     Follow up With Specialties Details Why Contact Info    Agustin Sweet MD Colon and Rectal Surgery Schedule an appointment as soon as possible for a visit As needed [address] [place] [id number]  [phone]            Patient's Medications   Discharge Prescriptions    DICYCLOMINE (BENTYL) 20 MG TABLET    Take 1 tablet (20 mg total) by mouth 2 (two) times a day       Start Date: 6/26/2018 End Date: --       Order Dose: 20 mg       Quantity: 20 tablet    Refills: 0     No discharge procedures on file      ED Provider  Electronically Signed by           Teto Perera MD  06/26/18 9443

## 2018-06-26 NOTE — DISCHARGE INSTRUCTIONS
Abdominal Pain   WHAT YOU NEED TO KNOW:   Abdominal pain can be dull, achy, or sharp  You may have pain in one area of your abdomen, or in your entire abdomen  Your pain may be caused by a condition such as constipation, food sensitivity or poisoning, infection, or a blockage  Abdominal pain can also be from a hernia, appendicitis, or an ulcer  Liver, gallbladder, or kidney conditions can also cause abdominal pain  The cause of your abdominal pain may be unknown  DISCHARGE INSTRUCTIONS:   Return to the emergency department if:   · You have new chest pain or shortness of breath  · You have pulsing pain in your upper abdomen or lower back that suddenly becomes constant  · Your pain is in the right lower abdominal area and worsens with movement  · You have a fever over 100 4°F (38°C) or shaking chills  · You are vomiting and cannot keep food or liquids down  · Your pain does not improve or gets worse over the next 8 to 12 hours  · You see blood in your vomit or bowel movements, or they look black and tarry  · Your skin or the whites of your eyes turn yellow  · You are a woman and have a large amount of vaginal bleeding that is not your monthly period  Contact your healthcare provider if:   · You have pain in your lower back  · You are a man and have pain in your testicles  · You have pain when you urinate  · You have questions or concerns about your condition or care  Follow up with your healthcare provider within 24 hours or as directed:  Write down your questions so you remember to ask them during your visits  Medicines:   · Medicines  may be given to calm your stomach and prevent vomiting or to decrease pain  Ask how to take pain medicine safely  · Take your medicine as directed  Contact your healthcare provider if you think your medicine is not helping or if you have side effects  Tell him of her if you are allergic to any medicine   Keep a list of the medicines, vitamins, and herbs you take  Include the amounts, and when and why you take them  Bring the list or the pill bottles to follow-up visits  Carry your medicine list with you in case of an emergency  © 2017 [employer]  Information is for End User's use only and may not be sold, redistributed or otherwise used for commercial purposes  All illustrations and images included in CareNotes® are the copyrighted property of [employer] M [employer]  or [employer]  The above information is an  only  It is not intended as medical advice for individual conditions or treatments  Talk to your doctor, nurse or pharmacist before following any medical regimen to see if it is safe and effective for you  Irritable Bowel Syndrome   WHAT YOU SHOULD KNOW:   Irritable bowel syndrome, or IBS, is a condition that prevents food from moving through your intestines normally  The food may move through too slowly or too quickly  This causes bloating, increased gas, constipation, or diarrhea  CARE AGREEMENT:   You have the right to help plan your care  Learn about your health condition and how it may be treated  Discuss treatment options with your caregivers to decide what care you want to receive  You always have the right to refuse treatment  RISKS:   Without treatment, IBS can interfere with work, personal relationships, and your daily activities  At times, you may feel discouraged or depressed  You can develop hemorrhoids if you strain during a bowel movement  Severe diarrhea can cause dehydration, which may be life-threatening  WHILE YOU ARE HERE:   Informed consent  is a legal document that explains the tests, treatments, or procedures that you may need  Informed consent means you understand what will be done and can make decisions about what you want  You give your permission when you sign the consent form  You can have someone sign this form for you if you are not able to sign it   You have the right to understand your medical care in words you know  Before you sign the consent form, understand the risks and benefits of what will be done  Make sure all your questions are answered  Rest:  You may need to rest in bed  You may be allowed out of bed once you are feeling better  If you are not allowed out of bed, you can still exercise your legs in bed  Do this by lifting one leg off the bed and drawing big circles with your toes  Then do it with the other leg  Call your healthcare provider before getting up  If you ever feel weak or dizzy, sit or lie down right away  Intake and output:  Caregivers will keep track of the amount of liquid you are getting  They also may need to know how much you are urinating  Ask how much liquid you should drink each day  Ask caregivers if they need to measure or collect your urine  Medicines:   · Diarrhea medicine: This medicine is given to decrease the amount of diarrhea you are having  Some of these medicines coat the intestine and make bowel movements less watery  Other medicines work by slowing down how fast the intestines move food through  · Muscle relaxers: This medicine decreases abdominal pain and muscle spasms  · Laxatives: This medicine helps treat constipation by moving food and liquids out of your stomach faster  · Stool softeners: This medicine softens bowel movements to prevent straining  Tests:   · Blood tests: You may need blood taken to give caregivers information about how your body is working  The blood may be taken from your hand, arm, or IV  · CT scan: This is also called a CAT scan  An x-ray machine uses a computer to take pictures of your abdomen  Healthcare providers check for problems and abnormal changes  You may be given dye in your IV to help your healthcare providers see the images better  Tell the healthcare provider if you are allergic to shellfish or iodine  You may also be allergic to the dye       · Barium enema: A barium enema is an x-ray of the colon  A tube is put into your anus, and a liquid called barium is put through the tube  Barium is used so that caregivers can see your colon better on the x-ray film  · Colonoscopy or sigmoidoscopy:  A tube with a light on the end will be put into your anus, and then moved forward into your intestine  A sigmoidoscopy looks at the lower part of your intestine  A colonoscopy looks at your entire intestine  © 2014 [address] [employer] is for End User's use only and may not be sold, redistributed or otherwise used for commercial purposes  All illustrations and images included in CareNotes® are the copyrighted property of A D A M , Inc  or Jaun Harris  The above information is an  only  It is not intended as medical advice for individual conditions or treatments  Talk to your doctor, nurse or pharmacist before following any medical regimen to see if it is safe and effective for you

## 2018-06-26 NOTE — ED NOTES
Call bell within reach, bed low position     [facility]  06/26/18 1002

## 2018-06-26 NOTE — ED NOTES
Patient aware she is awaiting test results, call bell within reach, bed low position     Early Emilee, [address]  06/26/18 2153

## 2018-06-26 NOTE — ED NOTES
Denies nausea at present, IVF infusing well, call bell within reach, patient provided additional blankets     Quentin Anaya RN  06/26/18 2546

## 2018-06-28 ENCOUNTER — TELEPHONE (OUTPATIENT)
Dept: INTERNAL MEDICINE CLINIC | Facility: CLINIC | Age: 83
End: 2018-06-28

## 2018-06-28 NOTE — TELEPHONE ENCOUNTER
Ask how abdomen is, was at the ER recently  Can keep appt in 2 weeks with me or schedule something sooner if she wants

## 2018-06-29 NOTE — TELEPHONE ENCOUNTER
Can take 1/2 tablet of oxycodone at night only, as discussed at last visit  Can do MRI also, let me know if you change your mind

## 2018-06-29 NOTE — TELEPHONE ENCOUNTER
Will try taking 1/2 tab of Oxy and see how she feels  Pt is claustrophobic, so prefers to be put to sleep or have another means of staying calm before MRI

## 2018-06-29 NOTE — TELEPHONE ENCOUNTER
Abdomen is fine  Heating pad all night for lower back  Pain goes from lower back to L buttocks and down L thigh  10/10 rating all night  Has not taken any meds for it - does not want to take any meds

## 2018-07-05 DIAGNOSIS — J45.30 MILD PERSISTENT ASTHMA WITHOUT COMPLICATION: Primary | ICD-10-CM

## 2018-07-05 RX ORDER — MONTELUKAST SODIUM 10 MG/1
10 TABLET ORAL
Qty: 90 TABLET | Refills: 1 | Status: SHIPPED | OUTPATIENT
Start: 2018-07-05 | End: 2018-07-09 | Stop reason: SDUPTHER

## 2018-07-09 DIAGNOSIS — J45.30 MILD PERSISTENT ASTHMA WITHOUT COMPLICATION: ICD-10-CM

## 2018-07-09 RX ORDER — MONTELUKAST SODIUM 10 MG/1
10 TABLET ORAL
Qty: 90 TABLET | Refills: 0 | Status: SHIPPED | OUTPATIENT
Start: 2018-07-09 | End: 2019-08-06 | Stop reason: SDUPTHER

## 2018-07-16 ENCOUNTER — OFFICE VISIT (OUTPATIENT)
Dept: INTERNAL MEDICINE CLINIC | Facility: CLINIC | Age: 83
End: 2018-07-16
Payer: MEDICARE

## 2018-07-16 VITALS
WEIGHT: 138 LBS | RESPIRATION RATE: 18 BRPM | BODY MASS INDEX: 27.09 KG/M2 | HEIGHT: 60 IN | SYSTOLIC BLOOD PRESSURE: 134 MMHG | HEART RATE: 68 BPM | TEMPERATURE: 97.7 F | DIASTOLIC BLOOD PRESSURE: 76 MMHG | OXYGEN SATURATION: 97 %

## 2018-07-16 DIAGNOSIS — N18.30 CKD (CHRONIC KIDNEY DISEASE), STAGE III (HCC): ICD-10-CM

## 2018-07-16 DIAGNOSIS — K58.0 IRRITABLE BOWEL SYNDROME WITH DIARRHEA: ICD-10-CM

## 2018-07-16 DIAGNOSIS — M51.36 DEGENERATION OF INTERVERTEBRAL DISC OF LUMBAR REGION: Primary | ICD-10-CM

## 2018-07-16 DIAGNOSIS — R25.2 MUSCLE CRAMPS: ICD-10-CM

## 2018-07-16 DIAGNOSIS — R42 VERTIGO: ICD-10-CM

## 2018-07-16 DIAGNOSIS — K58.9 IRRITABLE BOWEL SYNDROME, UNSPECIFIED TYPE: ICD-10-CM

## 2018-07-16 DIAGNOSIS — E05.90 HYPERTHYROIDISM: ICD-10-CM

## 2018-07-16 DIAGNOSIS — K21.9 GASTROESOPHAGEAL REFLUX DISEASE, ESOPHAGITIS PRESENCE NOT SPECIFIED: ICD-10-CM

## 2018-07-16 PROBLEM — B02.29 POSTHERPETIC NEURALGIA: Status: RESOLVED | Noted: 2017-03-07 | Resolved: 2018-07-16

## 2018-07-16 PROBLEM — M65.30 TRIGGER FINGER OF RIGHT HAND: Status: ACTIVE | Noted: 2018-07-16

## 2018-07-16 PROCEDURE — 99214 OFFICE O/P EST MOD 30 MIN: CPT | Performed by: INTERNAL MEDICINE

## 2018-07-16 RX ORDER — DICYCLOMINE HCL 20 MG
20 TABLET ORAL 2 TIMES DAILY
Qty: 20 TABLET | Refills: 0 | Status: CANCELLED | OUTPATIENT
Start: 2018-07-16

## 2018-07-16 RX ORDER — DICYCLOMINE HYDROCHLORIDE 10 MG/1
10 CAPSULE ORAL 3 TIMES DAILY PRN
Qty: 90 CAPSULE | Refills: 0 | Status: SHIPPED | OUTPATIENT
Start: 2018-07-16 | End: 2018-11-21 | Stop reason: SDUPTHER

## 2018-07-16 RX ORDER — OMEPRAZOLE 20 MG/1
20 CAPSULE, DELAYED RELEASE ORAL
Qty: 90 CAPSULE | Refills: 1 | Status: SHIPPED | OUTPATIENT
Start: 2018-07-16 | End: 2019-01-28 | Stop reason: SDUPTHER

## 2018-07-16 RX ORDER — MECLIZINE HYDROCHLORIDE 25 MG/1
25 TABLET ORAL 3 TIMES DAILY PRN
Qty: 30 TABLET | Refills: 0 | Status: SHIPPED | OUTPATIENT
Start: 2018-07-16 | End: 2018-08-17 | Stop reason: SDUPTHER

## 2018-07-16 RX ORDER — OXYCODONE HYDROCHLORIDE AND ACETAMINOPHEN 5; 325 MG/1; MG/1
1 TABLET ORAL 2 TIMES DAILY PRN
Qty: 60 TABLET | Refills: 0 | Status: SHIPPED | OUTPATIENT
Start: 2018-07-16 | End: 2018-11-21 | Stop reason: SDUPTHER

## 2018-07-16 NOTE — ASSESSMENT & PLAN NOTE
Persistent pain, encouraged to take Tylenol regularly  Offered to give diazepam prior to MRI, she will think about this  Recommend Cymbalta or Lyrica to help with pain, she tried amitriptyline and gabapentin in the past with no relief  PDMP reviewed, takes Percocet for severe pain only

## 2018-07-16 NOTE — PATIENT INSTRUCTIONS
Consider back MRI, can give Valium to help with anxiety  Can try Cymbalta or Lyrica for back pain  Use petroleum jelly if with vaginal dryness/itching

## 2018-07-16 NOTE — PROGRESS NOTES
Assessment/Plan:    Degeneration of intervertebral disc of lumbar region  Persistent pain, encouraged to take Tylenol regularly  Offered to give diazepam prior to MRI, she will think about this  Recommend Cymbalta or Lyrica to help with pain, she tried amitriptyline and gabapentin in the past with no relief  PDMP reviewed, takes Percocet for severe pain only  CKD (chronic kidney disease), stage III  Stable renal function  Avoid NSAIDs  Mild intermittent asthma without complication  No recent symptoms, not using any inhalers  Irritable bowel syndrome  Stable, alternating diarrhea and constipation  Takes dicyclomine as needed  Hyperthyroidism  Followed by Endo  She has occasional pretibial swelling, on 1/2 tablet of methimazole once a week  Essential hypertension  BP controlled on amlodipine  Esophageal reflux  On daily PPI  Hyperlipidemia  Not on medication  Trigger finger of right hand  Declined ortho referral at this time  Diagnoses and all orders for this visit:    Degeneration of intervertebral disc of lumbar region  -     oxyCODONE-acetaminophen (PERCOCET) 5-325 mg per tablet; Take 1 tablet by mouth 2 (two) times a day as needed for moderate pain Max Daily Amount: 2 tablets    Irritable bowel syndrome with diarrhea    CKD (chronic kidney disease), stage III  -     Basic metabolic panel; Future    Hyperthyroidism    Muscle cramps    Vertigo  -     meclizine (ANTIVERT) 25 mg tablet; Take 1 tablet (25 mg total) by mouth 3 (three) times a day as needed for dizziness    Gastroesophageal reflux disease, esophagitis presence not specified  -     omeprazole (PriLOSEC) 20 mg delayed release capsule; Take 1 capsule (20 mg total) by mouth daily before breakfast    Irritable bowel syndrome, unspecified type  -     dicyclomine (BENTYL) 10 mg capsule;  Take 1 capsule (10 mg total) by mouth 3 (three) times a day as needed (cramping)    Other orders  -     Cancel: dicyclomine (BENTYL) 20 mg tablet; Take 1 tablet (20 mg total) by mouth 2 (two) times a day      Follow up in 6 months or as needed  Subjective:      Patient ID: Michael Merino is a 80 y o  female  Ms Surya Wade complains of frequent low back pain  She has frequent bilateral leg pain as well  Pain occurs daily, with sometimes disrupt sleep  She would take half a tablet of Percocet as needed for severe pain  She tried taking Tylenol, afraid to take too much, has not been taking this regularly  She continues to experience intermittent muscle spasms in both legs  She reports that her calves would swell up sometimes, no swelling in the ankles or feet  She has tried amitriptyline gabapentin in the past, recalls that it did not really help her pain  She reports that she was at the ER few weeks ago for abdominal pain  CT scan was normal, symptoms has since resolved  She reports intermittent diarrhea after meals which would last for a few days followed by a few days of constipation  She complains of frequent hand pain  She reports that certain fingers of her right hand would lock sometimes, has difficulty opening the mouth  She also requests for nystatin powder for itching in her genital region  Denies any rash  The following portions of the patient's history were reviewed and updated as appropriate: allergies, current medications, past medical history, past social history and problem list     Review of Systems   Constitutional: Negative for appetite change and fatigue  HENT: Negative for congestion  Eyes: Negative for visual disturbance  Respiratory: Negative for cough, shortness of breath and wheezing  Cardiovascular: Negative for chest pain and leg swelling  Gastrointestinal: Negative for abdominal pain, constipation and diarrhea  Genitourinary: Negative for dysuria, frequency and urgency  Musculoskeletal: Positive for arthralgias and back pain  Negative for myalgias  Skin: Negative for rash and wound  Neurological: Negative for dizziness, numbness and headaches  Hematological: Does not bruise/bleed easily  Psychiatric/Behavioral: Negative for confusion  The patient is not nervous/anxious  Objective:      /76   Pulse 68   Temp 97 7 °F (36 5 °C)   Resp 18   Ht 5' (1 524 m)   Wt 62 6 kg (138 lb)   SpO2 97%   BMI 26 95 kg/m²          Physical Exam   Constitutional: She is oriented to person, place, and time  She appears well-developed and well-nourished  HENT:   Head: Normocephalic and atraumatic  Nose: Nose normal    Eyes: Conjunctivae are normal  Pupils are equal, round, and reactive to light  Neck: Neck supple  Cardiovascular: Normal rate, regular rhythm and normal heart sounds  No edema  Pulmonary/Chest: Effort normal and breath sounds normal  She has no wheezes  She has no rales  Abdominal: Soft  Bowel sounds are normal    Musculoskeletal:        Lumbar back: She exhibits spasm  She exhibits no tenderness and no pain  Right hand: Normal  She exhibits no deformity  Neurological: She is alert and oriented to person, place, and time  Skin: Skin is warm  No rash noted  Psychiatric: She has a normal mood and affect  Her behavior is normal    Nursing note and vitals reviewed  Lab results reviewed with patient

## 2018-08-17 DIAGNOSIS — R42 VERTIGO: ICD-10-CM

## 2018-08-17 DIAGNOSIS — I10 ESSENTIAL HYPERTENSION: ICD-10-CM

## 2018-08-17 RX ORDER — MECLIZINE HYDROCHLORIDE 25 MG/1
25 TABLET ORAL 3 TIMES DAILY PRN
Qty: 30 TABLET | Refills: 0 | Status: SHIPPED | OUTPATIENT
Start: 2018-08-17 | End: 2018-09-10 | Stop reason: SDUPTHER

## 2018-08-17 RX ORDER — AMLODIPINE BESYLATE 5 MG/1
5 TABLET ORAL DAILY
Qty: 90 TABLET | Refills: 1 | Status: SHIPPED | OUTPATIENT
Start: 2018-08-17 | End: 2018-11-21 | Stop reason: SDUPTHER

## 2018-09-10 DIAGNOSIS — R42 VERTIGO: ICD-10-CM

## 2018-09-10 RX ORDER — MECLIZINE HYDROCHLORIDE 25 MG/1
25 TABLET ORAL 3 TIMES DAILY PRN
Qty: 90 TABLET | Refills: 0 | Status: SHIPPED | OUTPATIENT
Start: 2018-09-10 | End: 2018-10-22 | Stop reason: SDUPTHER

## 2018-09-18 ENCOUNTER — APPOINTMENT (OUTPATIENT)
Dept: LAB | Facility: CLINIC | Age: 83
End: 2018-09-18
Payer: MEDICARE

## 2018-09-18 ENCOUNTER — TRANSCRIBE ORDERS (OUTPATIENT)
Dept: LAB | Facility: CLINIC | Age: 83
End: 2018-09-18

## 2018-09-18 DIAGNOSIS — E05.90 PRETIBIAL MYXEDEMA: ICD-10-CM

## 2018-09-18 DIAGNOSIS — E05.00 BASEDOW'S DISEASE: Primary | ICD-10-CM

## 2018-09-18 LAB
ALBUMIN SERPL BCP-MCNC: 3.8 G/DL (ref 3.5–5)
ALP SERPL-CCNC: 96 U/L (ref 46–116)
ALT SERPL W P-5'-P-CCNC: 24 U/L (ref 12–78)
ANION GAP SERPL CALCULATED.3IONS-SCNC: 6 MMOL/L (ref 4–13)
AST SERPL W P-5'-P-CCNC: 21 U/L (ref 5–45)
BASOPHILS # BLD AUTO: 0.03 THOUSANDS/ΜL (ref 0–0.1)
BASOPHILS NFR BLD AUTO: 1 % (ref 0–1)
BILIRUB SERPL-MCNC: 0.84 MG/DL (ref 0.2–1)
BUN SERPL-MCNC: 14 MG/DL (ref 5–25)
CALCIUM SERPL-MCNC: 9.3 MG/DL (ref 8.3–10.1)
CHLORIDE SERPL-SCNC: 104 MMOL/L (ref 100–108)
CO2 SERPL-SCNC: 28 MMOL/L (ref 21–32)
CREAT SERPL-MCNC: 1.05 MG/DL (ref 0.6–1.3)
EOSINOPHIL # BLD AUTO: 0.05 THOUSAND/ΜL (ref 0–0.61)
EOSINOPHIL NFR BLD AUTO: 1 % (ref 0–6)
ERYTHROCYTE [DISTWIDTH] IN BLOOD BY AUTOMATED COUNT: 13.5 % (ref 11.6–15.1)
GFR SERPL CREATININE-BSD FRML MDRD: 48 ML/MIN/1.73SQ M
GLUCOSE P FAST SERPL-MCNC: 117 MG/DL (ref 65–99)
HCT VFR BLD AUTO: 42.2 % (ref 34.8–46.1)
HGB BLD-MCNC: 13.6 G/DL (ref 11.5–15.4)
IMM GRANULOCYTES # BLD AUTO: 0.01 THOUSAND/UL (ref 0–0.2)
IMM GRANULOCYTES NFR BLD AUTO: 0 % (ref 0–2)
LYMPHOCYTES # BLD AUTO: 2.49 THOUSANDS/ΜL (ref 0.6–4.47)
LYMPHOCYTES NFR BLD AUTO: 40 % (ref 14–44)
MCH RBC QN AUTO: 29.6 PG (ref 26.8–34.3)
MCHC RBC AUTO-ENTMCNC: 32.2 G/DL (ref 31.4–37.4)
MCV RBC AUTO: 92 FL (ref 82–98)
MONOCYTES # BLD AUTO: 0.62 THOUSAND/ΜL (ref 0.17–1.22)
MONOCYTES NFR BLD AUTO: 10 % (ref 4–12)
NEUTROPHILS # BLD AUTO: 3.03 THOUSANDS/ΜL (ref 1.85–7.62)
NEUTS SEG NFR BLD AUTO: 48 % (ref 43–75)
NRBC BLD AUTO-RTO: 0 /100 WBCS
PLATELET # BLD AUTO: 351 THOUSANDS/UL (ref 149–390)
PMV BLD AUTO: 10.1 FL (ref 8.9–12.7)
POTASSIUM SERPL-SCNC: 4.3 MMOL/L (ref 3.5–5.3)
PROT SERPL-MCNC: 7.9 G/DL (ref 6.4–8.2)
RBC # BLD AUTO: 4.6 MILLION/UL (ref 3.81–5.12)
SODIUM SERPL-SCNC: 138 MMOL/L (ref 136–145)
T4 FREE SERPL-MCNC: 1.06 NG/DL (ref 0.76–1.46)
TSH SERPL DL<=0.05 MIU/L-ACNC: 1.9 UIU/ML (ref 0.36–3.74)
WBC # BLD AUTO: 6.23 THOUSAND/UL (ref 4.31–10.16)

## 2018-09-18 PROCEDURE — 84439 ASSAY OF FREE THYROXINE: CPT

## 2018-09-18 PROCEDURE — 84443 ASSAY THYROID STIM HORMONE: CPT

## 2018-09-18 PROCEDURE — 36415 COLL VENOUS BLD VENIPUNCTURE: CPT

## 2018-09-18 PROCEDURE — 80053 COMPREHEN METABOLIC PANEL: CPT

## 2018-09-18 PROCEDURE — 85025 COMPLETE CBC W/AUTO DIFF WBC: CPT

## 2018-09-25 ENCOUNTER — TRANSCRIBE ORDERS (OUTPATIENT)
Dept: ADMINISTRATIVE | Facility: HOSPITAL | Age: 83
End: 2018-09-25

## 2018-09-25 DIAGNOSIS — M79.604 PAIN IN BOTH LOWER EXTREMITIES: Primary | ICD-10-CM

## 2018-09-25 DIAGNOSIS — M79.605 PAIN IN BOTH LOWER EXTREMITIES: Primary | ICD-10-CM

## 2018-10-12 DIAGNOSIS — K21.9 GASTROESOPHAGEAL REFLUX DISEASE, ESOPHAGITIS PRESENCE NOT SPECIFIED: ICD-10-CM

## 2018-10-12 DIAGNOSIS — F41.9 ANXIETY: ICD-10-CM

## 2018-10-12 RX ORDER — LORAZEPAM 0.5 MG/1
TABLET ORAL
Qty: 15 TABLET | Refills: 0 | Status: SHIPPED | OUTPATIENT
Start: 2018-10-12 | End: 2018-11-21 | Stop reason: SDUPTHER

## 2018-10-22 DIAGNOSIS — R42 VERTIGO: ICD-10-CM

## 2018-10-22 RX ORDER — MECLIZINE HYDROCHLORIDE 25 MG/1
25 TABLET ORAL 3 TIMES DAILY PRN
Qty: 90 TABLET | Refills: 0 | Status: SHIPPED | OUTPATIENT
Start: 2018-10-22 | End: 2019-01-28 | Stop reason: SDUPTHER

## 2018-10-25 ENCOUNTER — HOSPITAL ENCOUNTER (OUTPATIENT)
Dept: NEUROLOGY | Facility: AMBULATORY SURGERY CENTER | Age: 83
Discharge: HOME | End: 2018-10-25
Payer: MEDICARE

## 2018-10-25 DIAGNOSIS — M79.604 PAIN IN BOTH LOWER EXTREMITIES: ICD-10-CM

## 2018-10-25 DIAGNOSIS — M79.605 PAIN IN BOTH LOWER EXTREMITIES: ICD-10-CM

## 2018-10-25 PROCEDURE — 95886 MUSC TEST DONE W/N TEST COMP: CPT | Performed by: PSYCHIATRY & NEUROLOGY

## 2018-10-25 PROCEDURE — 95910 NRV CNDJ TEST 7-8 STUDIES: CPT | Performed by: PSYCHIATRY & NEUROLOGY

## 2018-11-05 ENCOUNTER — APPOINTMENT (OUTPATIENT)
Dept: LAB | Facility: CLINIC | Age: 83
End: 2018-11-05
Payer: MEDICARE

## 2018-11-05 DIAGNOSIS — N18.30 CKD (CHRONIC KIDNEY DISEASE), STAGE III (HCC): ICD-10-CM

## 2018-11-05 LAB
ANION GAP SERPL CALCULATED.3IONS-SCNC: 7 MMOL/L (ref 4–13)
BUN SERPL-MCNC: 14 MG/DL (ref 5–25)
CALCIUM SERPL-MCNC: 9.3 MG/DL (ref 8.3–10.1)
CHLORIDE SERPL-SCNC: 102 MMOL/L (ref 100–108)
CO2 SERPL-SCNC: 27 MMOL/L (ref 21–32)
CREAT SERPL-MCNC: 1.15 MG/DL (ref 0.6–1.3)
GFR SERPL CREATININE-BSD FRML MDRD: 43 ML/MIN/1.73SQ M
GLUCOSE P FAST SERPL-MCNC: 123 MG/DL (ref 65–99)
POTASSIUM SERPL-SCNC: 3.9 MMOL/L (ref 3.5–5.3)
SODIUM SERPL-SCNC: 136 MMOL/L (ref 136–145)

## 2018-11-05 PROCEDURE — 36415 COLL VENOUS BLD VENIPUNCTURE: CPT

## 2018-11-05 PROCEDURE — 80048 BASIC METABOLIC PNL TOTAL CA: CPT

## 2018-11-08 ENCOUNTER — TELEPHONE (OUTPATIENT)
Dept: INTERNAL MEDICINE CLINIC | Facility: CLINIC | Age: 83
End: 2018-11-08

## 2018-11-08 NOTE — TELEPHONE ENCOUNTER
----- Message from Delilah Hayes MD sent at 11/8/2018  8:07 AM EST -----  Labs ok, kidney function stable

## 2018-11-21 ENCOUNTER — OFFICE VISIT (OUTPATIENT)
Dept: INTERNAL MEDICINE CLINIC | Facility: CLINIC | Age: 83
End: 2018-11-21
Payer: MEDICARE

## 2018-11-21 VITALS
BODY MASS INDEX: 28.07 KG/M2 | HEART RATE: 66 BPM | DIASTOLIC BLOOD PRESSURE: 68 MMHG | TEMPERATURE: 98 F | RESPIRATION RATE: 18 BRPM | WEIGHT: 143 LBS | OXYGEN SATURATION: 97 % | SYSTOLIC BLOOD PRESSURE: 122 MMHG | HEIGHT: 60 IN

## 2018-11-21 DIAGNOSIS — M51.36 DEGENERATION OF INTERVERTEBRAL DISC OF LUMBAR REGION: ICD-10-CM

## 2018-11-21 DIAGNOSIS — J45.20 MILD INTERMITTENT ASTHMA WITHOUT COMPLICATION: ICD-10-CM

## 2018-11-21 DIAGNOSIS — N18.30 CKD (CHRONIC KIDNEY DISEASE), STAGE III (HCC): Primary | ICD-10-CM

## 2018-11-21 DIAGNOSIS — K58.9 IRRITABLE BOWEL SYNDROME, UNSPECIFIED TYPE: ICD-10-CM

## 2018-11-21 DIAGNOSIS — Z00.00 HEALTH MAINTENANCE EXAMINATION: ICD-10-CM

## 2018-11-21 DIAGNOSIS — F41.9 ANXIETY: ICD-10-CM

## 2018-11-21 DIAGNOSIS — E05.90 HYPERTHYROIDISM: ICD-10-CM

## 2018-11-21 DIAGNOSIS — I10 ESSENTIAL HYPERTENSION: ICD-10-CM

## 2018-11-21 DIAGNOSIS — R42 VERTIGO: ICD-10-CM

## 2018-11-21 PROCEDURE — 99214 OFFICE O/P EST MOD 30 MIN: CPT | Performed by: INTERNAL MEDICINE

## 2018-11-21 PROCEDURE — G0439 PPPS, SUBSEQ VISIT: HCPCS | Performed by: INTERNAL MEDICINE

## 2018-11-21 RX ORDER — LORAZEPAM 0.5 MG/1
TABLET ORAL
Qty: 30 TABLET | Refills: 0 | Status: SHIPPED | OUTPATIENT
Start: 2018-11-21 | End: 2019-05-01 | Stop reason: SDUPTHER

## 2018-11-21 RX ORDER — DICYCLOMINE HYDROCHLORIDE 10 MG/1
10 CAPSULE ORAL 3 TIMES DAILY PRN
Qty: 90 CAPSULE | Refills: 5 | Status: SHIPPED | OUTPATIENT
Start: 2018-11-21 | End: 2019-07-28 | Stop reason: SDUPTHER

## 2018-11-21 RX ORDER — AMLODIPINE BESYLATE 5 MG/1
5 TABLET ORAL DAILY
Qty: 90 TABLET | Refills: 1 | Status: SHIPPED | OUTPATIENT
Start: 2018-11-21 | End: 2019-08-08 | Stop reason: SDUPTHER

## 2018-11-21 RX ORDER — OXYCODONE HYDROCHLORIDE AND ACETAMINOPHEN 5; 325 MG/1; MG/1
1 TABLET ORAL 2 TIMES DAILY PRN
Qty: 60 TABLET | Refills: 0 | Status: SHIPPED | OUTPATIENT
Start: 2018-11-21 | End: 2019-01-28 | Stop reason: SDUPTHER

## 2018-11-21 RX ORDER — DULOXETIN HYDROCHLORIDE 30 MG/1
30 CAPSULE, DELAYED RELEASE ORAL DAILY
Qty: 30 CAPSULE | Refills: 1 | Status: SHIPPED | OUTPATIENT
Start: 2018-11-21 | End: 2019-02-18 | Stop reason: ALTCHOICE

## 2018-11-21 NOTE — PROGRESS NOTES
Assessment/Plan:    Degeneration of intervertebral disc of lumbar region  Reviewed EMG with patient, consistent with radiculopathy  She is willing to try Cymbalta, see pain and spine  PDMP reviewed, takes Percocet for severe pain only  She is reluctant to undergo an MRI, has had epidural injections in the past     Anxiety  Takes lorazepam at night, able to take 1/2 tablet only  Essential hypertension  BP stable on amlodipine  CKD (chronic kidney disease), stage III  Stable  Irritable bowel syndrome  Instructed to take fiber supplements regularly  Takes dicyclomine tid  Hyperthyroidism  Followed by Endo  On methimazole 1/2 tab weekly  Vertigo  Instructed to take meclizine daily or as needed only  Mild intermittent asthma without complication  Stable, uses rescue inhaler a few times a week only  On Singulair  Esophageal reflux  On daily PPI  Diagnoses and all orders for this visit:    CKD (chronic kidney disease), stage III (HCC)    Anxiety  -     LORazepam (ATIVAN) 0 5 mg tablet; Take 1/2 tablet to 1 tablet PO daily PRN    Degeneration of intervertebral disc of lumbar region  -     oxyCODONE-acetaminophen (PERCOCET) 5-325 mg per tablet; Take 1 tablet by mouth 2 (two) times a day as needed for moderate pain Earliest Fill Date: 11/21/18 Max Daily Amount: 2 tablets  -     DULoxetine (CYMBALTA) 30 mg delayed release capsule; Take 1 capsule (30 mg total) by mouth daily    Irritable bowel syndrome, unspecified type  -     dicyclomine (BENTYL) 10 mg capsule; Take 1 capsule (10 mg total) by mouth 3 (three) times a day as needed (cramping)    Essential hypertension  -     amLODIPine (NORVASC) 5 mg tablet; Take 1 tablet (5 mg total) by mouth daily    Mild intermittent asthma without complication    Hyperthyroidism    Vertigo    Health maintenance examination  Comments:  Declined flu vaccine  No further screening  Follow up in 3 months or as needed        Subjective:      Patient ID: Christian Serratoak Kita Doss is a 80 y o  female  Ms Romy Strong complains of bilateral thigh pain  Pain is frequent, occasionally radiating down both the legs  She also experiences frequent muscle cramps  She has occasional low back pain  She has had back injections several years ago  She had an EMG job at about a month ago, was not told of the results yet  She recalls trying gabapentin, amitriptyline and Lyrica in the past   Either did not work or cost excessive drowsiness  She is unsure if she would like to do back injections again, reluctant to undergo an MRI  She reports muscle cramps last night was severe  She uses her inhaler once in a while, few times a week at a time  She experiences constipation or loose stools  She takes dicyclomine 3 times a day regularly  She tried taking a fiber supplement which caused too much diarrhea  She does not take this regularly  Appetite is good, denies any blood or mucus in the stool  She continues to take meclizine twice a day for her vertigo  She complains that the pharmacy only gave her for 15 tablets of lorazepam last month  She has been taking half tablet at a time, sometimes every other day  The following portions of the patient's history were reviewed and updated as appropriate: allergies, current medications, past medical history, past social history and problem list     Review of Systems   Constitutional: Negative for appetite change and fatigue  HENT: Negative for congestion  Eyes: Negative for visual disturbance  Respiratory: Negative for cough and shortness of breath  Cardiovascular: Negative for chest pain and leg swelling  Gastrointestinal: Negative for abdominal pain, constipation and diarrhea  Genitourinary: Negative for difficulty urinating, dysuria, frequency and urgency  Musculoskeletal: Positive for arthralgias and back pain  Negative for myalgias  Skin: Negative for rash and wound  Neurological: Positive for weakness and numbness  Negative for dizziness and headaches  Hematological: Does not bruise/bleed easily  Psychiatric/Behavioral: Negative for confusion  The patient is not nervous/anxious  Objective:      /68   Pulse 66   Temp 98 °F (36 7 °C)   Resp 18   Ht 5' (1 524 m)   Wt 64 9 kg (143 lb)   SpO2 97%   BMI 27 93 kg/m²          Physical Exam   Constitutional: She is oriented to person, place, and time  She appears well-developed and well-nourished  HENT:   Head: Normocephalic and atraumatic  Nose: Nose normal    Eyes: Pupils are equal, round, and reactive to light  Conjunctivae are normal    Neck: Neck supple  Cardiovascular: Normal rate, regular rhythm and normal heart sounds  No edema  Pulmonary/Chest: Effort normal and breath sounds normal  She has no wheezes  She has no rales  Abdominal: Soft  Bowel sounds are normal    Musculoskeletal:        Lumbar back: She exhibits no pain and no spasm  Slightly stooped posture   Neurological: She is alert and oriented to person, place, and time  Skin: Skin is warm  No rash noted  Psychiatric: She has a normal mood and affect  Her behavior is normal    Nursing note and vitals reviewed  Lab results and EMG reviewed with patient

## 2018-11-21 NOTE — ASSESSMENT & PLAN NOTE
Reviewed EMG with patient, consistent with radiculopathy  She is willing to try Cymbalta, see pain and spine  PDMP reviewed, takes Percocet for severe pain only    She is reluctant to undergo an MRI, has had epidural injections in the past

## 2018-11-21 NOTE — PROGRESS NOTES
Assessment and Plan:    Problem List Items Addressed This Visit        Digestive    Irritable bowel syndrome     Instructed to take fiber supplements regularly  Takes dicyclomine tid  Relevant Medications    dicyclomine (BENTYL) 10 mg capsule       Endocrine    Hyperthyroidism     Followed by Endo  On methimazole 1/2 tab weekly  Respiratory    Mild intermittent asthma without complication     Stable, uses rescue inhaler a few times a week only  On Singulair  Cardiovascular and Mediastinum    Essential hypertension     BP stable on amlodipine  Relevant Medications    amLODIPine (NORVASC) 5 mg tablet       Musculoskeletal and Integument    Degeneration of intervertebral disc of lumbar region     Reviewed EMG with patient, consistent with radiculopathy  She is willing to try Cymbalta, see pain and spine  PDMP reviewed, takes Percocet for severe pain only  She is reluctant to undergo an MRI, has had epidural injections in the past          Relevant Medications    oxyCODONE-acetaminophen (PERCOCET) 5-325 mg per tablet    DULoxetine (CYMBALTA) 30 mg delayed release capsule       Genitourinary    CKD (chronic kidney disease), stage III ([facility] [address] ) - Primary     Stable  Other    Anxiety     Takes lorazepam at night, able to take 1/2 tablet only  Relevant Medications    LORazepam (ATIVAN) 0 5 mg tablet    Vertigo     Instructed to take meclizine daily or as needed only  Other Visit Diagnoses     Health maintenance examination        Declined flu vaccine  No further screening  Health Maintenance Due   Topic Date Due    DM Eye Exam  07/07/1941    DTaP,Tdap,and Td Vaccines (1 - Tdap) 07/07/1952    Fall Risk  07/07/1996    Urinary Incontinence Screening  07/07/1996    URINE MICROALBUMIN  09/08/2018    Diabetic Foot Exam  11/14/2018         HPI:  Anusha Higuera is a 80 y o  female here for her Subsequent Wellness Visit      Patient Active Problem List Diagnosis    Anxiety    Mild intermittent asthma without complication    Degeneration of intervertebral disc of lumbar region    Diverticulosis    Elevated serum alkaline phosphatase level    Esophageal reflux    Fatty liver    Hyperlipidemia    Essential hypertension    Hyperthyroidism    Insomnia    Irritable bowel syndrome    Pre-syncope    Spinal stenosis    Spondylolisthesis, grade 1    Transient ischemic attack    Type 2 diabetes mellitus (HCC)    Vertigo    Vitamin D deficiency    Muscle cramps    Iliotibial band tendinitis of left side    Iliotibial band tendinitis of right side    Ovarian cyst    CKD (chronic kidney disease), stage III (HCC)    Trigger finger of right hand    Pain in both lower extremities    Radiculopathy, lumbar region     Past Medical History:   Diagnosis Date    Asthma     Chronic interstitial cystitis     Community acquired pneumonia     last assessed 10/4/13    Diabetes mellitus ([facility] [address] )     Disease of thyroid gland     Diverticulitis     GERD (gastroesophageal reflux disease)     Hyperlipidemia     Hypertension      Past Surgical History:   Procedure Laterality Date    CHOLECYSTECTOMY      COLON SURGERY      partial colectomy - sigmoid, diverticulitis    EYE SURGERY      HYSTERECTOMY      ovaries remain     Family History   Problem Relation Age of Onset    Coronary artery disease Mother     Other Father         MVA    Alcohol abuse Neg Hx     Depression Neg Hx     Drug abuse Neg Hx     Substance Abuse Neg Hx      History   Smoking Status    Never Smoker   Smokeless Tobacco    Never Used     History   Alcohol Use No      History   Drug Use No       Current Outpatient Prescriptions   Medication Sig Dispense Refill    Albuterol Sulfate (VENTOLIN HFA IN) Inhale daily as needed      amLODIPine (NORVASC) 5 mg tablet Take 1 tablet (5 mg total) by mouth daily 90 tablet 1    b complex vitamins capsule Take 1 capsule by mouth daily      Cholecalciferol (VITAMIN D3) 62513 units CAPS Take 50,000 Units by mouth once a week      dicyclomine (BENTYL) 10 mg capsule Take 1 capsule (10 mg total) by mouth 3 (three) times a day as needed (cramping) 90 capsule 5    fluticasone (FLOVENT HFA) 110 MCG/ACT inhaler Inhale 1 puff 2 (two) times a day      LORazepam (ATIVAN) 0 5 mg tablet Take 1/2 tablet to 1 tablet PO daily PRN 30 tablet 0    meclizine (ANTIVERT) 25 mg tablet Take 1 tablet (25 mg total) by mouth 3 (three) times a day as needed for dizziness 90 tablet 0    methimazole (TAPAZOLE) 5 mg tablet Take 5 mg by mouth 3 (three) times a day      montelukast (SINGULAIR) 10 mg tablet Take 1 tablet (10 mg total) by mouth daily at bedtime 90 tablet 0    omeprazole (PriLOSEC) 20 mg delayed release capsule Take 1 capsule (20 mg total) by mouth daily before breakfast 90 capsule 1    oxyCODONE-acetaminophen (PERCOCET) 5-325 mg per tablet Take 1 tablet by mouth 2 (two) times a day as needed for moderate pain Earliest Fill Date: 11/21/18 Max Daily Amount: 2 tablets 60 tablet 0    DULoxetine (CYMBALTA) 30 mg delayed release capsule Take 1 capsule (30 mg total) by mouth daily 30 capsule 1     No current facility-administered medications for this visit        Allergies   Allergen Reactions    Bactrim [Sulfamethoxazole-Trimethoprim]     Ezetimibe     Gabapentin     Lisinopril     Naproxen Nausea Only    Statins     Triazolam Other (See Comments)     confusion     Immunization History   Administered Date(s) Administered    H1N1, All Formulations 10/18/2002, 12/07/2006    Influenza Quadrivalent Preservative Free 3 years and older IM 07/26/2016    Pneumococcal Conjugate 13-Valent 03/16/2015    Pneumococcal Polysaccharide PPV23 02/22/1999, 09/23/2011       Patient Care Team:  Rick Muhammad MD as PCP - MD Juan F Palomo MD Eddie Albert, MD    Medicare Screening Tests and Risk Assessments:  Last Medicare Wellness visit information reviewed, patient interviewed and updates made to the record today  Health Risk Assessment:  Patient rates overall health as good  Patient feels that their physical health rating is Same  Eyesight was rated as Same  Hearing was rated as Same  Patient feels that their emotional and mental health rating is Same  Pain experienced by patient in the last 7 days has been A lot  Patient's pain rating has been 6/10  Patient states that she has experienced no weight loss or gain in last 6 months  Emotional/Mental Health:  Patient has been feeling nervous/anxious  PHQ-9 Depression Screening:    Frequency of the following problems over the past two weeks:      1  Little interest or pleasure in doing things: 0 - not at all      2  Feeling down, depressed, or hopeless: 0 - not at all  PHQ-2 Score: 0          Broken Bones/Falls: Fall Risk Assessment:    In the past year, patient has experienced: No history of falling in past year          Bladder/Bowel:  Patient has leaked urine accidently in the last six months  Patient reports no loss of bowel control  Immunizations:  Patient has not had a flu vaccination within the last year  Patient has received a pneumonia shot  Patient has not received a shingles shot  Patient has not received tetanus/diphtheria shot  Home Safety:  Patient does not have trouble with stairs inside or outside of their home  Patient currently reports that there are no safety hazards present in home, working smoke alarms, no working carbon monoxide detectors  Preventative Screenings:   No breast cancer screening performed, no colon cancer screen completed, no cholesterol screen completed, glaucoma eye exam completed,     Nutrition:  Current diet: Regular and No Added Salt with servings of the following:    Medications:  Patient is currently taking over-the-counter supplements  Patient is able to manage medications      Lifestyle Choices:  Patient reports no tobacco use  Patient has not smoked or used tobacco in the past   Patient reports no alcohol use  Patient drives a vehicle  Patient wears seat belt  Activities of Daily Living:  Can get out of bed by his or her self, able to dress self, able to make own meals, able to do own shopping, able to bathe self, can do own laundry/housekeeping, can manage own money, pay bills and track expenses    Previous Hospitalizations:  No hospitalization or ED visit in past 12 months        Advanced Directives:  Patient has decided on a power of   Patient has spoken to designated power of   Patient has completed advanced directive  Preventative Screening/Counseling:      Cardiovascular:      General: Screening Current          Diabetes:      General: Screening Current          Colorectal Cancer:      General: Screening Not Indicated          Breast Cancer:      General: Screening Not Indicated          Cervical Cancer:      General: Screening Not Indicated          Osteoporosis:      General: Patient Declines      Counseling: Regular Weightbearing Exercise          AAA:      General: Screening Not Indicated          Glaucoma:      General: Screening Current          HIV:      General: Screening Not Indicated          Hepatitis C:      General: Screening Not Indicated        Advanced Directives:   Patient has living will for healthcare, End of life assessment reviewed with patient  Immunizations:      Influenza: Patient Declines      Pneumococcal: Lifetime Vaccine Completed      Zostavax: Vaccine Status Unknown      TD: Vaccine Status Unknown      TDAP: Vaccine Status Unknown      Other Preventative Counseling (Non-Medicare): Fall Prevention and Nutrition Counseling      Referrals:  Referral(s) to: Pain Management      Review of Systems   Constitutional: Negative for appetite change and fatigue  HENT: Negative for congestion  Eyes: Negative for visual disturbance     Respiratory: Negative for cough and shortness of breath  Cardiovascular: Negative for chest pain and leg swelling  Gastrointestinal: Negative for abdominal pain, constipation and diarrhea  Genitourinary: Negative for difficulty urinating, dysuria, frequency and urgency  Musculoskeletal: Positive for arthralgias and back pain  Negative for myalgias  Skin: Negative for rash and wound  Neurological: Positive for weakness and numbness  Negative for dizziness and headaches  Hematological: Does not bruise/bleed easily  Psychiatric/Behavioral: Negative for confusion  The patient is not nervous/anxious            Objective:      /68   Pulse 66   Temp 98 °F (36 7 °C)   Resp 18   Ht 5' (1 524 m)   Wt 64 9 kg (143 lb)   SpO2 97%   BMI 27 93 kg/m²

## 2019-01-28 DIAGNOSIS — R42 VERTIGO: ICD-10-CM

## 2019-01-28 DIAGNOSIS — M51.36 DEGENERATION OF INTERVERTEBRAL DISC OF LUMBAR REGION: ICD-10-CM

## 2019-01-28 DIAGNOSIS — K21.9 GASTROESOPHAGEAL REFLUX DISEASE, ESOPHAGITIS PRESENCE NOT SPECIFIED: ICD-10-CM

## 2019-01-28 RX ORDER — MECLIZINE HYDROCHLORIDE 25 MG/1
25 TABLET ORAL 3 TIMES DAILY PRN
Qty: 90 TABLET | Refills: 0 | Status: SHIPPED | OUTPATIENT
Start: 2019-01-28 | End: 2019-03-28 | Stop reason: SDUPTHER

## 2019-01-28 RX ORDER — OMEPRAZOLE 20 MG/1
20 CAPSULE, DELAYED RELEASE ORAL
Qty: 90 CAPSULE | Refills: 1 | Status: SHIPPED | OUTPATIENT
Start: 2019-01-28 | End: 2019-05-01 | Stop reason: SDUPTHER

## 2019-01-28 RX ORDER — OXYCODONE HYDROCHLORIDE AND ACETAMINOPHEN 5; 325 MG/1; MG/1
1 TABLET ORAL 2 TIMES DAILY PRN
Qty: 60 TABLET | Refills: 0 | Status: SHIPPED | OUTPATIENT
Start: 2019-01-28 | End: 2019-05-01 | Stop reason: SDUPTHER

## 2019-02-12 PROBLEM — I12.9 TYPE 2 DIABETES MELLITUS WITH STAGE 3 CHRONIC KIDNEY DISEASE AND HYPERTENSION (HCC): Status: ACTIVE | Noted: 2019-02-12

## 2019-02-12 PROBLEM — N18.30 TYPE 2 DIABETES MELLITUS WITH STAGE 3 CHRONIC KIDNEY DISEASE AND HYPERTENSION (HCC): Status: ACTIVE | Noted: 2019-02-12

## 2019-02-12 PROBLEM — E11.22 TYPE 2 DIABETES MELLITUS WITH STAGE 3 CHRONIC KIDNEY DISEASE AND HYPERTENSION (HCC): Status: ACTIVE | Noted: 2019-02-12

## 2019-02-18 ENCOUNTER — OFFICE VISIT (OUTPATIENT)
Dept: INTERNAL MEDICINE CLINIC | Facility: CLINIC | Age: 84
End: 2019-02-18
Payer: MEDICARE

## 2019-02-18 VITALS
BODY MASS INDEX: 27.8 KG/M2 | HEART RATE: 74 BPM | RESPIRATION RATE: 18 BRPM | DIASTOLIC BLOOD PRESSURE: 72 MMHG | OXYGEN SATURATION: 96 % | HEIGHT: 60 IN | SYSTOLIC BLOOD PRESSURE: 128 MMHG | TEMPERATURE: 97 F | WEIGHT: 141.6 LBS

## 2019-02-18 DIAGNOSIS — M79.89 LEFT LEG SWELLING: ICD-10-CM

## 2019-02-18 DIAGNOSIS — I10 ESSENTIAL HYPERTENSION: ICD-10-CM

## 2019-02-18 DIAGNOSIS — M51.36 DEGENERATION OF INTERVERTEBRAL DISC OF LUMBAR REGION: Primary | ICD-10-CM

## 2019-02-18 DIAGNOSIS — N18.30 CKD (CHRONIC KIDNEY DISEASE), STAGE III (HCC): ICD-10-CM

## 2019-02-18 DIAGNOSIS — E11.22 TYPE 2 DIABETES MELLITUS WITH STAGE 3 CHRONIC KIDNEY DISEASE AND HYPERTENSION (HCC): ICD-10-CM

## 2019-02-18 DIAGNOSIS — G47.09 OTHER INSOMNIA: ICD-10-CM

## 2019-02-18 DIAGNOSIS — E05.90 HYPERTHYROIDISM: ICD-10-CM

## 2019-02-18 DIAGNOSIS — J45.20 MILD INTERMITTENT ASTHMA WITHOUT COMPLICATION: ICD-10-CM

## 2019-02-18 DIAGNOSIS — R41.3 MEMORY LOSS: ICD-10-CM

## 2019-02-18 DIAGNOSIS — Z79.899 ENCOUNTER FOR LONG-TERM CURRENT USE OF MEDICATION: ICD-10-CM

## 2019-02-18 DIAGNOSIS — I12.9 TYPE 2 DIABETES MELLITUS WITH STAGE 3 CHRONIC KIDNEY DISEASE AND HYPERTENSION (HCC): ICD-10-CM

## 2019-02-18 DIAGNOSIS — I73.9 PAD (PERIPHERAL ARTERY DISEASE) (HCC): ICD-10-CM

## 2019-02-18 DIAGNOSIS — K21.9 GASTROESOPHAGEAL REFLUX DISEASE, ESOPHAGITIS PRESENCE NOT SPECIFIED: ICD-10-CM

## 2019-02-18 DIAGNOSIS — N18.30 TYPE 2 DIABETES MELLITUS WITH STAGE 3 CHRONIC KIDNEY DISEASE AND HYPERTENSION (HCC): ICD-10-CM

## 2019-02-18 DIAGNOSIS — E55.9 VITAMIN D DEFICIENCY: ICD-10-CM

## 2019-02-18 PROCEDURE — 99214 OFFICE O/P EST MOD 30 MIN: CPT | Performed by: INTERNAL MEDICINE

## 2019-02-18 NOTE — ASSESSMENT & PLAN NOTE
Stopped Cymbalta due to side effects, not taking Tylenol every morning  May try lidocaine ointment prn, instructed to do ROM exercises

## 2019-02-18 NOTE — PROGRESS NOTES
Assessment/Plan:    Degeneration of intervertebral disc of lumbar region  Stopped Cymbalta due to side effects, not taking Tylenol every morning  May try lidocaine ointment prn, instructed to do ROM exercises  PAD (peripheral artery disease) ([facility])  Atrophy R leg more prominent, recheck ultrasound  Encouraged to walk daily  Memory loss  Suspect [facility]CI, check [facility] [place] [facility] next visit  Essential hypertension  BP stable on amlodipine  Mild intermittent asthma without complication  Not using any inhalers  May take Singulair every other day  Esophageal reflux  Takes PPI daily  Insomnia  Takes lorazepam qHS, prn     CKD (chronic kidney disease), stage III  No NSAIDs  Hyperthyroidism  Takes methimazole 1/2 tablet weekly  Diagnoses and all orders for this visit:    Degeneration of intervertebral disc of lumbar region    Hyperthyroidism  -     TSH, 3rd generation  -     T4, free    Essential hypertension  -     CBC and differential  -     Comprehensive metabolic panel    Left leg swelling  -     VAS lower limb venous duplex study, unilateral/limited; Future    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 ([facility])  -     Comprehensive metabolic panel  -     Hemoglobin A1C    PAD (peripheral artery disease) ([facility])  -     VAS lower limb arterial duplex, complete bilateral; Future    Vitamin D deficiency  -     Vitamin D 25 hydroxy    Encounter for long-term current use of medication  -     Vitamin B12    Memory loss    Mild intermittent asthma without complication    Gastroesophageal reflux disease, esophagitis presence not specified    Other insomnia    CKD (chronic kidney disease), stage III ([address] )      Follow up in 3 months or as needed  Subjective:      Patient ID: Alka Garner is a 80 y o  female  Ms Joe Merrill complains of frequent bilateral leg pain  She reports that her leg has not felt the same since the EMG study  She feels her right leg is weaker and smaller    She experiences intermittent pain on the right side of her leg  She experiences frequent low back pain, has been taking Tylenol 2 tablets every morning  She would take an additional one in the evening to help her sleep at night  She reports intermittent left leg swelling, feels it is sore and more swollen than the right  She denies any redness or pain  She also complains of memory issues  She feels she is forgetting things, has a difficult time remembering certain names or objects  She continues to drive, lives independently  She has not gotten lost or has had any auto accidents  She drives during the day and only to familiar places  She saw her Endo recently, takes 1/2 tablet of Tapazole once a week  The following portions of the patient's history were reviewed and updated as appropriate: allergies, current medications, past medical history, past social history and problem list     Review of Systems   Constitutional: Negative for appetite change and fatigue  Eyes: Negative for visual disturbance  Respiratory: Negative for cough and shortness of breath  Cardiovascular: Negative for chest pain and leg swelling  Gastrointestinal: Negative for abdominal pain, constipation and diarrhea  Genitourinary: Negative for dysuria and frequency  Musculoskeletal: Positive for back pain and gait problem  Negative for arthralgias and myalgias  Skin: Negative for rash and wound  Neurological: Positive for weakness  Negative for dizziness, syncope, numbness and headaches  Hematological: Does not bruise/bleed easily  Psychiatric/Behavioral: Positive for sleep disturbance  Negative for behavioral problems, confusion and decreased concentration  The patient is not nervous/anxious            Objective:      /72   Pulse 74   Temp (!) 97 °F (36 1 °C)   Resp 18   Ht 5' (1 524 m)   Wt 64 2 kg (141 lb 9 6 oz)   SpO2 96%   BMI 27 65 kg/m²          Physical Exam   Constitutional: She is oriented to person, place, and time  She appears well-developed and well-nourished  HENT:   Head: Normocephalic and atraumatic  Nose: Nose normal    Eyes: Pupils are equal, round, and reactive to light  Conjunctivae are normal    Neck: Neck supple  Cardiovascular: Normal rate, regular rhythm and normal heart sounds  Mild left leg swelling, no erythema, tenderness  Pulmonary/Chest: Effort normal and breath sounds normal  She has no wheezes  She has no rales  Abdominal: Soft  Bowel sounds are normal    Musculoskeletal: She exhibits no edema  Right knee: Normal         Left knee: Normal         Right ankle: Normal         Left ankle: Normal         Thoracic back: She exhibits spasm  She exhibits no pain  Lumbar back: She exhibits spasm  She exhibits no tenderness and no pain  Neurological: She is alert and oriented to person, place, and time  She displays atrophy  No cranial nerve deficit  Atrophy of right TA   Skin: Skin is warm  No rash noted  Psychiatric: She has a normal mood and affect  Her behavior is normal  Her mood appears not anxious  She does not exhibit a depressed mood  Nursing note and vitals reviewed  Lab &/or imaging results reviewed with patient

## 2019-02-18 NOTE — PATIENT INSTRUCTIONS
Use lidocaine ointment 1 to 2 times a day as needed  May take Tylenol (acetaminophen) 500 mg, take 2 tablets up to 3 times a day as needed only

## 2019-02-27 ENCOUNTER — APPOINTMENT (OUTPATIENT)
Dept: LAB | Facility: CLINIC | Age: 84
End: 2019-02-27
Payer: MEDICARE

## 2019-02-27 LAB
25(OH)D3 SERPL-MCNC: 43.5 NG/ML (ref 30–100)
ALBUMIN SERPL BCP-MCNC: 4.1 G/DL (ref 3.5–5)
ALP SERPL-CCNC: 96 U/L (ref 46–116)
ALT SERPL W P-5'-P-CCNC: 29 U/L (ref 12–78)
ANION GAP SERPL CALCULATED.3IONS-SCNC: 6 MMOL/L (ref 4–13)
AST SERPL W P-5'-P-CCNC: 33 U/L (ref 5–45)
BASOPHILS # BLD AUTO: 0.04 THOUSANDS/ΜL (ref 0–0.1)
BASOPHILS NFR BLD AUTO: 1 % (ref 0–1)
BILIRUB SERPL-MCNC: 0.74 MG/DL (ref 0.2–1)
BUN SERPL-MCNC: 16 MG/DL (ref 5–25)
CALCIUM SERPL-MCNC: 9.8 MG/DL (ref 8.3–10.1)
CHLORIDE SERPL-SCNC: 100 MMOL/L (ref 100–108)
CO2 SERPL-SCNC: 29 MMOL/L (ref 21–32)
CREAT SERPL-MCNC: 1.15 MG/DL (ref 0.6–1.3)
EOSINOPHIL # BLD AUTO: 0.06 THOUSAND/ΜL (ref 0–0.61)
EOSINOPHIL NFR BLD AUTO: 1 % (ref 0–6)
ERYTHROCYTE [DISTWIDTH] IN BLOOD BY AUTOMATED COUNT: 13.5 % (ref 11.6–15.1)
EST. AVERAGE GLUCOSE BLD GHB EST-MCNC: 117 MG/DL
GFR SERPL CREATININE-BSD FRML MDRD: 43 ML/MIN/1.73SQ M
GLUCOSE P FAST SERPL-MCNC: 121 MG/DL (ref 65–99)
HBA1C MFR BLD: 5.7 % (ref 4.2–6.3)
HCT VFR BLD AUTO: 45.3 % (ref 34.8–46.1)
HGB BLD-MCNC: 14.2 G/DL (ref 11.5–15.4)
IMM GRANULOCYTES # BLD AUTO: 0 THOUSAND/UL (ref 0–0.2)
IMM GRANULOCYTES NFR BLD AUTO: 0 % (ref 0–2)
LYMPHOCYTES # BLD AUTO: 2.44 THOUSANDS/ΜL (ref 0.6–4.47)
LYMPHOCYTES NFR BLD AUTO: 37 % (ref 14–44)
MCH RBC QN AUTO: 29.6 PG (ref 26.8–34.3)
MCHC RBC AUTO-ENTMCNC: 31.3 G/DL (ref 31.4–37.4)
MCV RBC AUTO: 94 FL (ref 82–98)
MONOCYTES # BLD AUTO: 0.65 THOUSAND/ΜL (ref 0.17–1.22)
MONOCYTES NFR BLD AUTO: 10 % (ref 4–12)
NEUTROPHILS # BLD AUTO: 3.36 THOUSANDS/ΜL (ref 1.85–7.62)
NEUTS SEG NFR BLD AUTO: 51 % (ref 43–75)
NRBC BLD AUTO-RTO: 0 /100 WBCS
PLATELET # BLD AUTO: 337 THOUSANDS/UL (ref 149–390)
PMV BLD AUTO: 10.1 FL (ref 8.9–12.7)
POTASSIUM SERPL-SCNC: 4.6 MMOL/L (ref 3.5–5.3)
PROT SERPL-MCNC: 7.9 G/DL (ref 6.4–8.2)
RBC # BLD AUTO: 4.8 MILLION/UL (ref 3.81–5.12)
SODIUM SERPL-SCNC: 135 MMOL/L (ref 136–145)
T4 FREE SERPL-MCNC: 0.99 NG/DL (ref 0.76–1.46)
TSH SERPL DL<=0.05 MIU/L-ACNC: 4 UIU/ML (ref 0.36–3.74)
VIT B12 SERPL-MCNC: 611 PG/ML (ref 100–900)
WBC # BLD AUTO: 6.55 THOUSAND/UL (ref 4.31–10.16)

## 2019-02-27 PROCEDURE — 82306 VITAMIN D 25 HYDROXY: CPT | Performed by: INTERNAL MEDICINE

## 2019-02-27 PROCEDURE — 84443 ASSAY THYROID STIM HORMONE: CPT | Performed by: INTERNAL MEDICINE

## 2019-02-27 PROCEDURE — 82607 VITAMIN B-12: CPT | Performed by: INTERNAL MEDICINE

## 2019-02-27 PROCEDURE — 80053 COMPREHEN METABOLIC PANEL: CPT | Performed by: INTERNAL MEDICINE

## 2019-02-27 PROCEDURE — 36415 COLL VENOUS BLD VENIPUNCTURE: CPT | Performed by: INTERNAL MEDICINE

## 2019-02-27 PROCEDURE — 83036 HEMOGLOBIN GLYCOSYLATED A1C: CPT | Performed by: INTERNAL MEDICINE

## 2019-02-27 PROCEDURE — 84439 ASSAY OF FREE THYROXINE: CPT | Performed by: INTERNAL MEDICINE

## 2019-02-27 PROCEDURE — 85025 COMPLETE CBC W/AUTO DIFF WBC: CPT | Performed by: INTERNAL MEDICINE

## 2019-03-04 ENCOUNTER — TELEPHONE (OUTPATIENT)
Dept: INTERNAL MEDICINE CLINIC | Facility: CLINIC | Age: 84
End: 2019-03-04

## 2019-03-04 NOTE — TELEPHONE ENCOUNTER
Patient notified      Patient is currently taking methimazole 5mg 1/2 tablet every Friday prescribed by Dr Mancini

## 2019-03-04 NOTE — TELEPHONE ENCOUNTER
Thyroid test a bit abnormal, if feeling more tired, constipated etc, we can start medication  Let me know    Kidney function stable  rest of labs ok

## 2019-03-05 ENCOUNTER — HOSPITAL ENCOUNTER (OUTPATIENT)
Dept: NON INVASIVE DIAGNOSTICS | Facility: CLINIC | Age: 84
Discharge: HOME | End: 2019-03-05
Payer: MEDICARE

## 2019-03-05 ENCOUNTER — TELEPHONE (OUTPATIENT)
Dept: INTERNAL MEDICINE CLINIC | Facility: CLINIC | Age: 84
End: 2019-03-05

## 2019-03-05 DIAGNOSIS — I73.9 PAD (PERIPHERAL ARTERY DISEASE) (HCC): ICD-10-CM

## 2019-03-05 DIAGNOSIS — M79.89 LEFT LEG SWELLING: ICD-10-CM

## 2019-03-05 PROCEDURE — 93922 UPR/L XTREMITY ART 2 LEVELS: CPT | Performed by: SURGERY

## 2019-03-05 PROCEDURE — 93971 EXTREMITY STUDY: CPT

## 2019-03-05 PROCEDURE — 93971 EXTREMITY STUDY: CPT | Performed by: SURGERY

## 2019-03-05 PROCEDURE — 93925 LOWER EXTREMITY STUDY: CPT | Performed by: SURGERY

## 2019-03-05 PROCEDURE — 93925 LOWER EXTREMITY STUDY: CPT

## 2019-03-05 PROCEDURE — 93923 UPR/LXTR ART STDY 3+ LVLS: CPT

## 2019-03-06 NOTE — TELEPHONE ENCOUNTER
Ultrasound did not show any clot in the legs  It did show a new blockage in one of the small arteries in the left leg  Rest of ultrasound is normal    Recommend to repeat ultrasound in 1 year  I can refer you to vascular if you'd like

## 2019-03-07 ENCOUNTER — TELEPHONE (OUTPATIENT)
Dept: INTERNAL MEDICINE CLINIC | Facility: CLINIC | Age: 84
End: 2019-03-07

## 2019-03-07 DIAGNOSIS — I73.9 PAD (PERIPHERAL ARTERY DISEASE) (HCC): Primary | ICD-10-CM

## 2019-03-07 NOTE — TELEPHONE ENCOUNTER
PT  CHANGED HER MIND ABOUT SEEING A VASCULAR DOCTOR  CAN YOU PUT IN A REFERRAL AND WE CAN CALL HER WITH THE NAME AND NUMBER

## 2019-03-15 NOTE — PROGRESS NOTES
Assessment/Plan:    79 y/o F with HTN, HLD, DM II, CKD III, and spinal stenosis, seen for evaluation of PAD  Atherosclerosis 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 (HCC)  -LEAD results- mild diffuse disease BLE, L PT occlusion, normal ABIs/GTP, R DEVONTE 1 07/126/95 and L DEVONTE 1 03/81/90  -Remains with palpable DP pulses bilaterally  -Experiencing near constant pain to BLE and pain to B feet as soon as bearing weight, as well as intermittent cramping to legs at night  Not consistent with arterial etiology, possibly related to her spinal stenosis  -In regards to her mild PAD I have initiated her on baby aspirin therapy  -Encouraged regular physical activity with walking  -She will enter our routine surveillance monitoring program with annual LEAD  -Followup in 1 year    Spinal stenosis  -Her near constant BLE pain is not consistent with symptomatic PAD, especially in the setting of palpable DP pulses  -Her symptoms are likely related to her advanced spinal stenosis  -Patient states she has declined back surgery several times  -Recommended further followup with PCP in regards to direction of management     CKD (chronic kidney disease), stage III  -Continue to avoid nephrotoxic agents  -Should they be needed in the future would proceed with caution to any endovascular intervention  -Management per PCP       Diagnoses and all orders for this visit:    Atherosclerosis 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 [address] )  -     Ambulatory referral to Vascular Surgery    Spinal stenosis of lumbosacral region    CKD (chronic kidney disease), stage III ([facility] [address] )          Subjective:      Patient ID: Esmer Guo is a 80 y o  female  Patient referred by PCP for evaluation of PAD  [place] done 3/5/19    Patient reports a several year history of constant BLE pain that has worsened over the past year  She states pain is always present as an aching  She states that as soon as she bears weight on her legs she develops worsening pain to the plantar aspect of both feet  She says that at night she gets cramping in her feet intermittently  She reports pain to the right proximal lateral calf when ambulating  She states that she has a bad back and has declined back surgery multiple times by several different physicians  She says that she would rather just deal with the pain  She has no tissue loss  LEAD w/ mild disease  She is a non-smoker  Pt is new to our practice and was referred by Dr Dangelo Bazna MD for review of the Pt's LEV/LEV of 3/5/19  Pt has bilateral leg swelling with pain  Pt has leg pain almost all day  Pt get night cramping in her legs  Pt denies any numbness or tingling  Pt is not currently taking any blood thinners  The following portions of the patient's history were reviewed and updated as appropriate: allergies, current medications, past family history, past medical history, past social history, past surgical history and problem list     Review of Systems   Constitutional: Positive for chills, diaphoresis and fatigue  HENT: Negative  Eyes: Negative  Respiratory: Negative  Cardiovascular: Positive for leg swelling  Pain and cramping   Gastrointestinal: Positive for abdominal pain  Endocrine: Negative  Genitourinary: Negative  Musculoskeletal: Positive for back pain  Skin: Negative  Allergic/Immunologic: Negative  Neurological: Negative  Hematological: Negative  Psychiatric/Behavioral: Negative  Objective:       Physical Exam   Constitutional: She is oriented to person, place, and time  No distress  HENT:   Head: Normocephalic and atraumatic  Eyes: No scleral icterus  Neck: Neck supple  No JVD present     No carotid bruits   Cardiovascular: Normal rate and regular rhythm  Pulses:       Femoral pulses are 2+ on the right side, and 2+ on the left side  Popliteal pulses are 2+ on the right side, and 2+ on the left side  Dorsalis pedis pulses are 2+ on the right side, and 2+ on the left side  Posterior tibial pulses are 2+ on the right side, and 0 on the left side  Pulmonary/Chest: Effort normal and breath sounds normal    Abdominal: Soft  Bowel sounds are normal  She exhibits no distension  There is no tenderness  Musculoskeletal: She exhibits no edema  Neurological: She is alert and oriented to person, place, and time  Skin: Skin is warm and dry  Psychiatric: She has a normal mood and affect  I have reviewed and made appropriate changes to the review of systems input by the medical assistant      Vitals:    03/18/19 1046   BP: 128/68   BP Location: Left arm   Patient Position: Sitting   Cuff Size: Adult   Pulse: 72   Resp: 18   Temp: (!) 96 6 °F (35 9 °C)   TempSrc: Tympanic   Weight: 64 kg (141 lb)   Height: 5' (1 524 m)       Patient Active Problem List   Diagnosis    Anxiety    Mild intermittent asthma without complication    Degeneration of intervertebral disc of lumbar region    Diverticulosis    Elevated serum alkaline phosphatase level    Esophageal reflux    Fatty liver    Hyperlipidemia    Essential hypertension    Hyperthyroidism    Insomnia    Irritable bowel syndrome    Pre-syncope    Spinal stenosis    Spondylolisthesis, grade 1    Transient ischemic attack    Type 2 diabetes mellitus (HCC)    Vertigo    Vitamin D deficiency    Muscle cramps    Iliotibial band tendinitis of left side    Iliotibial band tendinitis of right side    Ovarian cyst    CKD (chronic kidney disease), stage III (HCC)    Trigger finger of right hand    Pain in both lower extremities    Radiculopathy, lumbar region    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 ([facility] [address] )    PAD (peripheral artery disease) ([facility] [address] )    Memory loss    Atherosclerosis 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 (HCC)       Past Surgical History:   Procedure Laterality Date    CHOLECYSTECTOMY      COLON SURGERY      partial colectomy - sigmoid, diverticulitis    EYE SURGERY      HYSTERECTOMY      ovaries remain       Family History   Problem Relation Age of Onset    Coronary artery disease Mother     Other Father         MVA    Alcohol abuse Neg Hx     Depression Neg Hx     Drug abuse Neg Hx     Substance Abuse Neg Hx        Social History     Socioeconomic History    Marital status:       Spouse name: Not on file    Number of children: 3    Years of education: Not on file    Highest education level: Not on file   Occupational History     Comment: retired   Social Needs    Financial resource strain: Not on file    Food insecurity:     Worry: Not on file     Inability: Not on file   [employer] needs:     Medical: Not on file     Non-medical: Not on file   Tobacco Use    Smoking status: Never Smoker    Smokeless tobacco: Never Used   Substance and Sexual Activity    Alcohol use: No    Drug use: No    Sexual activity: Not on file   Lifestyle    Physical activity:     Days per week: Not on file     Minutes per session: Not on file    Stress: Not on file   Relationships    Social connections:     Talks on phone: Not on file     Gets together: Not on file     Attends L[employer] service: Not on file     Active member of club or organization: Not on file     Attends meetings of clubs or organizations: Not on file     Relationship status: Not on file    Intimate partner violence:     Fear of current or ex partner: Not on file     Emotionally abused: Not on file     Physically abused: Not on file     Forced sexual activity: Not on file   Other Topics Concern    Not on file   Social History Narrative    Lives alone    Daughter in the area       Allergies Allergen Reactions    Bactrim [Sulfamethoxazole-Trimethoprim]     Ezetimibe     Gabapentin     Lisinopril     Naproxen Nausea Only    Statins     Triazolam Other (See Comments)     confusion         Current Outpatient Medications:     Albuterol Sulfate (VENTOLIN HFA IN), Inhale daily as needed, Disp: , Rfl:     amLODIPine (NORVASC) 5 mg tablet, Take 1 tablet (5 mg total) by mouth daily, Disp: 90 tablet, Rfl: 1    b complex vitamins capsule, Take 1 capsule by mouth daily, Disp: , Rfl:     dicyclomine (BENTYL) 10 mg capsule, Take 1 capsule (10 mg total) by mouth 3 (three) times a day as needed (cramping), Disp: 90 capsule, Rfl: 5    fluticasone (FLOVENT HFA) 110 MCG/ACT inhaler, Inhale 1 puff 2 (two) times a day, Disp: , Rfl:     LORazepam (ATIVAN) 0 5 mg tablet, Take 1/2 tablet to 1 tablet PO daily PRN, Disp: 30 tablet, Rfl: 0    meclizine (ANTIVERT) 25 mg tablet, Take 1 tablet (25 mg total) by mouth 3 (three) times a day as needed for dizziness, Disp: 90 tablet, Rfl: 0    methimazole (TAPAZOLE) 5 mg tablet, Take 5 mg by mouth 3 (three) times a day, Disp: , Rfl:     montelukast (SINGULAIR) 10 mg tablet, Take 1 tablet (10 mg total) by mouth daily at bedtime, Disp: 90 tablet, Rfl: 0    omeprazole (PriLOSEC) 20 mg delayed release capsule, Take 1 capsule (20 mg total) by mouth daily before breakfast, Disp: 90 capsule, Rfl: 1    oxyCODONE-acetaminophen (PERCOCET) 5-325 mg per tablet, Take 1 tablet by mouth 2 (two) times a day as needed for moderate pain Max Daily Amount: 2 tablets, Disp: 60 tablet, Rfl: 0

## 2019-03-18 ENCOUNTER — CONSULT (OUTPATIENT)
Dept: VASCULAR SURGERY | Facility: CLINIC | Age: 84
End: 2019-03-18
Payer: MEDICARE

## 2019-03-18 VITALS
TEMPERATURE: 96.6 F | HEART RATE: 72 BPM | SYSTOLIC BLOOD PRESSURE: 128 MMHG | WEIGHT: 141 LBS | DIASTOLIC BLOOD PRESSURE: 68 MMHG | HEIGHT: 60 IN | RESPIRATION RATE: 18 BRPM | BODY MASS INDEX: 27.68 KG/M2

## 2019-03-18 DIAGNOSIS — I73.9 PAD (PERIPHERAL ARTERY DISEASE) (HCC): ICD-10-CM

## 2019-03-18 DIAGNOSIS — M48.07 SPINAL STENOSIS OF LUMBOSACRAL REGION: ICD-10-CM

## 2019-03-18 DIAGNOSIS — E78.2 MIXED HYPERLIPIDEMIA: ICD-10-CM

## 2019-03-18 DIAGNOSIS — N18.30 CKD (CHRONIC KIDNEY DISEASE), STAGE III (HCC): ICD-10-CM

## 2019-03-18 DIAGNOSIS — I70.213 ATHEROSCLEROSIS OF NATIVE ARTERY OF BOTH LOWER EXTREMITIES WITH INTERMITTENT CLAUDICATION (HCC): Primary | ICD-10-CM

## 2019-03-18 PROCEDURE — 99204 OFFICE O/P NEW MOD 45 MIN: CPT | Performed by: NURSE PRACTITIONER

## 2019-03-18 NOTE — ASSESSMENT & PLAN NOTE
-Her near constant BLE pain is not consistent with symptomatic PAD, especially in the setting of palpable DP pulses  -Her symptoms are likely related to her advanced spinal stenosis  -Patient states she has declined back surgery several times  -Recommended further followup with PCP in regards to direction of management

## 2019-03-18 NOTE — ASSESSMENT & PLAN NOTE
-Continue to avoid nephrotoxic agents  -Should they be needed in the future would proceed with caution to any endovascular intervention  -Management per PCP

## 2019-03-18 NOTE — ASSESSMENT & PLAN NOTE
-LEAD results- mild diffuse disease BLE, L PT occlusion, normal ABIs/GTP, R DEVONTE 1 07/126/95 and L DEVONTE 1 03/81/90  -Remains with palpable DP pulses bilaterally  -Experiencing near constant pain to BLE and pain to B feet as soon as bearing weight, as well as intermittent cramping to legs at night    Not consistent with arterial etiology, possibly related to her spinal stenosis  -In regards to her mild PAD I have initiated her on baby aspirin therapy  -Encouraged regular physical activity with walking  -She will enter our routine surveillance monitoring program with annual LEAD  -Followup in 1 year

## 2019-03-18 NOTE — PATIENT INSTRUCTIONS
Mild peripheral arterial disease  -we reviewed the results of your recent arterial duplex  You have mild peripheral arterial disease  There is a blockage to your left posterior tibial artery (this is a small artery below the knee), however all the other main arteries in that leg are wide open  The pressures in your feet and throughout your legs are within the normal range despite this 1 artery that is narrowed/occluded  -you have peripheral arterial disease, but it is not the cause of your near constant bilateral lower extremity pain  -to help prevent progression of arterial disease I recommend starting an 81 mg baby aspirin once per day    Take this with your largest meal of the day  -we will monitor for any disease progression with a repeat arterial duplex in 1 year  -if you have any significant change in symptoms or any questions/concerns prior notify the office

## 2019-03-27 ENCOUNTER — APPOINTMENT (OUTPATIENT)
Dept: LAB | Facility: CLINIC | Age: 84
End: 2019-03-27
Payer: MEDICARE

## 2019-03-27 ENCOUNTER — TRANSCRIBE ORDERS (OUTPATIENT)
Dept: LAB | Facility: CLINIC | Age: 84
End: 2019-03-27

## 2019-03-27 DIAGNOSIS — N39.0 URINARY TRACT INFECTION WITHOUT HEMATURIA, SITE UNSPECIFIED: Primary | ICD-10-CM

## 2019-03-27 DIAGNOSIS — E05.90 PRETIBIAL MYXEDEMA: ICD-10-CM

## 2019-03-27 DIAGNOSIS — E05.00 BASEDOW'S DISEASE: ICD-10-CM

## 2019-03-27 DIAGNOSIS — E11.8 TYPE 2 DIABETES MELLITUS WITH COMPLICATION, UNSPECIFIED WHETHER LONG TERM INSULIN USE: ICD-10-CM

## 2019-03-27 DIAGNOSIS — N39.0 URINARY TRACT INFECTION WITHOUT HEMATURIA, SITE UNSPECIFIED: ICD-10-CM

## 2019-03-27 LAB
ANION GAP SERPL CALCULATED.3IONS-SCNC: 10 MMOL/L (ref 4–13)
BUN SERPL-MCNC: 17 MG/DL (ref 5–25)
CALCIUM SERPL-MCNC: 9.4 MG/DL (ref 8.3–10.1)
CHLORIDE SERPL-SCNC: 101 MMOL/L (ref 100–108)
CO2 SERPL-SCNC: 27 MMOL/L (ref 21–32)
CREAT SERPL-MCNC: 1.34 MG/DL (ref 0.6–1.3)
GFR SERPL CREATININE-BSD FRML MDRD: 36 ML/MIN/1.73SQ M
GLUCOSE SERPL-MCNC: 142 MG/DL (ref 65–140)
POTASSIUM SERPL-SCNC: 3.7 MMOL/L (ref 3.5–5.3)
SODIUM SERPL-SCNC: 138 MMOL/L (ref 136–145)
T4 FREE SERPL-MCNC: 1.03 NG/DL (ref 0.76–1.46)
TSH SERPL DL<=0.05 MIU/L-ACNC: 2.15 UIU/ML (ref 0.36–3.74)

## 2019-03-27 PROCEDURE — 84439 ASSAY OF FREE THYROXINE: CPT

## 2019-03-27 PROCEDURE — 36415 COLL VENOUS BLD VENIPUNCTURE: CPT

## 2019-03-27 PROCEDURE — 84443 ASSAY THYROID STIM HORMONE: CPT

## 2019-03-27 PROCEDURE — 80048 BASIC METABOLIC PNL TOTAL CA: CPT

## 2019-03-28 DIAGNOSIS — R42 VERTIGO: ICD-10-CM

## 2019-03-28 RX ORDER — MECLIZINE HYDROCHLORIDE 25 MG/1
25 TABLET ORAL 3 TIMES DAILY PRN
Qty: 90 TABLET | Refills: 0 | Status: SHIPPED | OUTPATIENT
Start: 2019-03-28 | End: 2019-05-01 | Stop reason: SDUPTHER

## 2019-05-01 DIAGNOSIS — K21.9 GASTROESOPHAGEAL REFLUX DISEASE, ESOPHAGITIS PRESENCE NOT SPECIFIED: ICD-10-CM

## 2019-05-01 DIAGNOSIS — R42 VERTIGO: ICD-10-CM

## 2019-05-01 DIAGNOSIS — M51.36 DEGENERATION OF INTERVERTEBRAL DISC OF LUMBAR REGION: ICD-10-CM

## 2019-05-01 DIAGNOSIS — F41.9 ANXIETY: ICD-10-CM

## 2019-05-01 RX ORDER — OMEPRAZOLE 20 MG/1
20 CAPSULE, DELAYED RELEASE ORAL
Qty: 90 CAPSULE | Refills: 1 | Status: SHIPPED | OUTPATIENT
Start: 2019-05-01 | End: 2019-09-30 | Stop reason: SDUPTHER

## 2019-05-01 RX ORDER — OXYCODONE HYDROCHLORIDE AND ACETAMINOPHEN 5; 325 MG/1; MG/1
1 TABLET ORAL 2 TIMES DAILY PRN
Qty: 60 TABLET | Refills: 0 | Status: SHIPPED | OUTPATIENT
Start: 2019-05-01 | End: 2019-06-21 | Stop reason: SDUPTHER

## 2019-05-01 RX ORDER — MECLIZINE HYDROCHLORIDE 25 MG/1
25 TABLET ORAL 3 TIMES DAILY PRN
Qty: 90 TABLET | Refills: 0 | Status: SHIPPED | OUTPATIENT
Start: 2019-05-01 | End: 2019-06-21 | Stop reason: SDUPTHER

## 2019-05-01 RX ORDER — LORAZEPAM 0.5 MG/1
TABLET ORAL
Qty: 30 TABLET | Refills: 0 | Status: SHIPPED | OUTPATIENT
Start: 2019-05-01 | End: 2019-05-02 | Stop reason: SDUPTHER

## 2019-05-02 DIAGNOSIS — F41.9 ANXIETY: ICD-10-CM

## 2019-05-02 RX ORDER — LORAZEPAM 0.5 MG/1
TABLET ORAL
Qty: 30 TABLET | Refills: 0 | Status: SHIPPED | OUTPATIENT
Start: 2019-05-02 | End: 2019-08-06 | Stop reason: SDUPTHER

## 2019-05-28 ENCOUNTER — OFFICE VISIT (OUTPATIENT)
Dept: INTERNAL MEDICINE CLINIC | Facility: CLINIC | Age: 84
End: 2019-05-28
Payer: MEDICARE

## 2019-05-28 VITALS
SYSTOLIC BLOOD PRESSURE: 118 MMHG | TEMPERATURE: 97.1 F | BODY MASS INDEX: 27.68 KG/M2 | RESPIRATION RATE: 18 BRPM | HEART RATE: 61 BPM | WEIGHT: 141 LBS | OXYGEN SATURATION: 97 % | HEIGHT: 60 IN | DIASTOLIC BLOOD PRESSURE: 66 MMHG

## 2019-05-28 DIAGNOSIS — I70.213 ATHEROSCLEROSIS OF NATIVE ARTERY OF BOTH LOWER EXTREMITIES WITH INTERMITTENT CLAUDICATION (HCC): ICD-10-CM

## 2019-05-28 DIAGNOSIS — N18.30 CKD (CHRONIC KIDNEY DISEASE), STAGE III (HCC): ICD-10-CM

## 2019-05-28 DIAGNOSIS — R41.3 MEMORY LOSS: ICD-10-CM

## 2019-05-28 DIAGNOSIS — R25.2 MUSCLE CRAMPS: ICD-10-CM

## 2019-05-28 DIAGNOSIS — I73.9 PAD (PERIPHERAL ARTERY DISEASE) (HCC): ICD-10-CM

## 2019-05-28 DIAGNOSIS — I10 ESSENTIAL HYPERTENSION: Primary | ICD-10-CM

## 2019-05-28 DIAGNOSIS — M51.36 DEGENERATION OF INTERVERTEBRAL DISC OF LUMBAR REGION: ICD-10-CM

## 2019-05-28 DIAGNOSIS — E05.90 HYPERTHYROIDISM: ICD-10-CM

## 2019-05-28 PROCEDURE — 99214 OFFICE O/P EST MOD 30 MIN: CPT | Performed by: INTERNAL MEDICINE

## 2019-05-28 RX ORDER — TIZANIDINE 2 MG/1
1 TABLET ORAL 2 TIMES DAILY
Qty: 30 TABLET | Refills: 0 | Status: SHIPPED | OUTPATIENT
Start: 2019-05-28 | End: 2019-09-30 | Stop reason: ALTCHOICE

## 2019-05-28 RX ORDER — TIZANIDINE 2 MG/1
4 TABLET ORAL 2 TIMES DAILY PRN
Qty: 60 TABLET | Refills: 0 | Status: SHIPPED | OUTPATIENT
Start: 2019-05-28 | End: 2019-05-28 | Stop reason: SDUPTHER

## 2019-05-28 RX ORDER — METHIMAZOLE 5 MG/1
TABLET ORAL
Qty: 30 TABLET | Refills: 0
Start: 2019-05-28 | End: 2022-03-30

## 2019-06-21 DIAGNOSIS — R42 VERTIGO: ICD-10-CM

## 2019-06-21 DIAGNOSIS — M51.36 DEGENERATION OF INTERVERTEBRAL DISC OF LUMBAR REGION: ICD-10-CM

## 2019-06-21 RX ORDER — MECLIZINE HYDROCHLORIDE 25 MG/1
25 TABLET ORAL 3 TIMES DAILY PRN
Qty: 90 TABLET | Refills: 0 | Status: SHIPPED | OUTPATIENT
Start: 2019-06-21 | End: 2019-08-21 | Stop reason: SDUPTHER

## 2019-06-21 RX ORDER — OXYCODONE HYDROCHLORIDE AND ACETAMINOPHEN 5; 325 MG/1; MG/1
1 TABLET ORAL 2 TIMES DAILY PRN
Qty: 60 TABLET | Refills: 0 | Status: SHIPPED | OUTPATIENT
Start: 2019-06-21 | End: 2019-08-26 | Stop reason: SDUPTHER

## 2019-07-14 ENCOUNTER — HOSPITAL ENCOUNTER (EMERGENCY)
Facility: HOSPITAL | Age: 84
Discharge: HOME | End: 2019-07-14
Attending: EMERGENCY MEDICINE | Admitting: EMERGENCY MEDICINE
Payer: MEDICARE

## 2019-07-14 VITALS
BODY MASS INDEX: 27.9 KG/M2 | RESPIRATION RATE: 16 BRPM | OXYGEN SATURATION: 97 % | HEART RATE: 73 BPM | SYSTOLIC BLOOD PRESSURE: 143 MMHG | TEMPERATURE: 98.3 F | WEIGHT: 142.86 LBS | DIASTOLIC BLOOD PRESSURE: 65 MMHG
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
## 2019-07-14 DIAGNOSIS — T78.40XA ALLERGIC REACTION, INITIAL ENCOUNTER: Primary | ICD-10-CM

## 2019-07-14 DIAGNOSIS — S00.86XA INSECT BITE OF FACE, INITIAL ENCOUNTER: ICD-10-CM

## 2019-07-14 PROCEDURE — 99283 EMERGENCY DEPT VISIT LOW MDM: CPT

## 2019-07-14 PROCEDURE — 99283 EMERGENCY DEPT VISIT LOW MDM: CPT | Performed by: EMERGENCY MEDICINE

## 2019-07-14 RX ORDER — HYDROXYZINE HYDROCHLORIDE 25 MG/1
25 TABLET, FILM COATED ORAL ONCE
Status: COMPLETED | OUTPATIENT
Start: 2019-07-14 | End: 2019-07-14

## 2019-07-14 RX ORDER — HYDROXYZINE HYDROCHLORIDE 25 MG/1
25 TABLET, FILM COATED ORAL EVERY 6 HOURS PRN
Qty: 15 TABLET | Refills: 0 | Status: SHIPPED | OUTPATIENT
Start: 2019-07-14 | End: 2020-02-20 | Stop reason: ALTCHOICE

## 2019-07-14 RX ADMIN — HYDROXYZINE HYDROCHLORIDE 25 MG: 25 TABLET ORAL at 14:39

## 2019-07-14 NOTE — ED PROVIDER NOTES
History  Chief Complaint   Patient presents with    Facial Swelling     woke up with facial swelling this am, possible insect bite hive noted on left cheek, denies any resp difficulty     80year-old female presents with facial erythema, pruritus and mild swelling associated with a small circular pruritic papules over her left cheek  Patient states she went sleep last night in her normal state of health, normal appearance and sensation of her face  , woke up this morning and noticed some generalized erythema, mainly over far head, cheeks, there was what appeared to be a small insect bite over her left cheek  , sparing perioral and akash orbital areas  Patient states associated with a large amount of pruritus I want scratch my face off  No associated pain  , no other modifying or alleviating factors has tried 1 pill a Benadryl around 7:00 a m  This morning which helped slightly but worsened about 2 hours ago when the Benadryl started wearing off , no difficulty swallowing  No oral involvement  , eating and drinking normally  No fevers  History provided by:  Patient and relative      Prior to Admission Medications   Prescriptions Last Dose Informant Patient Reported? Taking?    Albuterol Sulfate (VENTOLIN HFA IN)  Self Yes No   Sig: Inhale daily as needed   LORazepam (ATIVAN) 0 5 mg tablet  Self No No   Sig: Take 1/2 tablet to 1 tablet PO daily PRN   amLODIPine (NORVASC) 5 mg tablet  Self No No   Sig: Take 1 tablet (5 mg total) by mouth daily   b complex vitamins capsule  Self Yes No   Sig: Take 1 capsule by mouth daily   dicyclomine (BENTYL) 10 mg capsule  Self No No   Sig: Take 1 capsule (10 mg total) by mouth 3 (three) times a day as needed (cramping)   fluticasone (FLOVENT HFA) 110 MCG/ACT inhaler  Self Yes No   Sig: Inhale 1 puff 2 (two) times a day   meclizine (ANTIVERT) 25 mg tablet   No No   Sig: Take 1 tablet (25 mg total) by mouth 3 (three) times a day as needed for dizziness   methimazole (TAPAZOLE) 5 mg tablet   No No   Si/2 tab daily   montelukast (SINGULAIR) 10 mg tablet  Self No No   Sig: Take 1 tablet (10 mg total) by mouth daily at bedtime   omeprazole (PriLOSEC) 20 mg delayed release capsule  Self No No   Sig: Take 1 capsule (20 mg total) by mouth daily before breakfast   oxyCODONE-acetaminophen (PERCOCET) 5-325 mg per tablet   No No   Sig: Take 1 tablet by mouth 2 (two) times a day as needed for moderate painMax Daily Amount: 2 tablets   tiZANidine (ZANAFLEX) 2 mg tablet   No No   Sig: Take 0 5 tablets (1 mg total) by mouth 2 (two) times a day      Facility-Administered Medications: None       Past Medical History:   Diagnosis Date    Asthma     Chronic interstitial cystitis     Community acquired pneumonia     last assessed 10/4/13    Diabetes mellitus ([facility] [address] )     Disease of thyroid gland     Diverticulitis     GERD (gastroesophageal reflux disease)     Hyperlipidemia     Hypertension        Past Surgical History:   Procedure Laterality Date    CHOLECYSTECTOMY      COLON SURGERY      partial colectomy - sigmoid, diverticulitis    EYE SURGERY      HYSTERECTOMY      ovaries remain       Family History   Problem Relation Age of Onset    Coronary artery disease Mother     Other Father         MVA    Alcohol abuse Neg Hx     Depression Neg Hx     Drug abuse Neg Hx     Substance Abuse Neg Hx     Mental illness Neg Hx      I have reviewed and agree with the history as documented  Social History     Tobacco Use    Smoking status: Never Smoker    Smokeless tobacco: Never Used   Substance Use Topics    Alcohol use: No    Drug use: No        Review of Systems   Constitutional: Negative for fever  HENT: Positive for facial swelling  Negative for congestion, dental problem, ear discharge, ear pain, rhinorrhea, sinus pressure, trouble swallowing and voice change  Respiratory: Negative for chest tightness and shortness of breath  Cardiovascular: Negative for chest pain     Skin: Negative for rash  Neurological: Negative for dizziness, light-headedness and headaches  Physical Exam  Physical Exam   Constitutional: She appears well-developed  HENT:   Head: Atraumatic  Eyes: Conjunctivae are normal  Right eye exhibits no discharge  Left eye exhibits no discharge  No scleral icterus  Neck: Neck supple  No tracheal deviation present  Pulmonary/Chest: Effort normal  No stridor  No respiratory distress  Musculoskeletal: She exhibits no deformity  Neurological: She is alert  She exhibits normal muscle tone  Coordination normal    Skin: Skin is warm and dry  No rash noted  There is erythema  No pallor  Small papule over left cheek, appears to be insect/mosquito bite, erythema surrounding this area also on contralateral side as well as over chin , there is areas in between where her skin color is normal, it is slightly warm but not tender, patient reports increased pruritus with palpation but not pain  No evidence of abscess  , no oral involvement   Psychiatric: She has a normal mood and affect  Vitals reviewed        Vital Signs  ED Triage Vitals [07/14/19 1420]   Temperature Pulse Respirations Blood Pressure SpO2   98 3 °F (36 8 °C) 73 16 143/65 97 %      Temp Source Heart Rate Source Patient Position - Orthostatic VS BP Location FiO2 (%)   Oral Monitor Sitting Right arm --      Pain Score       No Pain           Vitals:    07/14/19 1420   BP: 143/65   Pulse: 73   Patient Position - Orthostatic VS: Sitting         Visual Acuity      ED Medications  Medications   hydrOXYzine HCL (ATARAX) tablet 25 mg (25 mg Oral Given 7/14/19 1439)       Diagnostic Studies  Results Reviewed     None                 No orders to display              Procedures  Procedures       ED Course                               MDM  Number of Diagnoses or Management Options  Allergic reaction, initial encounter: new and requires workup  Insect bite of face, initial encounter:   Diagnosis management comments: Patient appears evidence tick bite to the left cheek  , has a generalized erythematous reaction surrounding this, no oral involvement no difficulty swelling no evidence of anaphylaxis, we discussed treatment options  , prednisone verses antihistamines  , due to age and history of type 2 diabetes I would want to avoid prednisone, will treat with prescription Atarax       Amount and/or Complexity of Data Reviewed  Decide to obtain previous medical records or to obtain history from someone other than the patient: yes  Obtain history from someone other than the patient: yes  Review and summarize past medical records: yes        Disposition  Final diagnoses: Allergic reaction, initial encounter   Insect bite of face, initial encounter     Time reflects when diagnosis was documented in both MDM as applicable and the Disposition within this note     Time User Action Codes Description Comment    7/14/2019  2:28 PM Maryland Schwab Add [T78 [id number]] Allergic reaction, initial encounter     7/14/2019  2:57 PM Garrison Lion N Robert Hilliard,  U26  XXXA] Insect bite of face, initial encounter       ED Disposition     ED Disposition Condition Date/Time Comment    Discharge Stable Sun Jul 14, 2019  2:27 PM [address] [place] [address] discharge to home/self care              Follow-up Information     Follow up With Specialties Details Why Contact Info    Maribel Muse MD Internal Medicine Go to  If symptoms worsen [address] [place] [address],[address]  [place] [address]  [phone]            Discharge Medication List as of 7/14/2019  2:29 PM      START taking these medications    Details   hydrOXYzine HCL (ATARAX) 25 mg tablet Take 1 tablet (25 mg total) by mouth every 6 (six) hours as needed for itching, Starting Sun 7/14/2019, Print         CONTINUE these medications which have NOT CHANGED    Details   Albuterol Sulfate (VENTOLIN HFA IN) Inhale daily as needed, Historical Med      amLODIPine (NORVASC) 5 mg tablet Take 1 tablet (5 mg total) by mouth daily, Starting Wed 11/21/2018, Normal      b complex vitamins capsule Take 1 capsule by mouth daily, Historical Med      dicyclomine (BENTYL) 10 mg capsule Take 1 capsule (10 mg total) by mouth 3 (three) times a day as needed (cramping), Starting Wed 11/21/2018, Normal      fluticasone (FLOVENT HFA) 110 MCG/ACT inhaler Inhale 1 puff 2 (two) times a day, Starting Mon 4/4/2016, Historical Med      LORazepam (ATIVAN) 0 5 mg tablet Take 1/2 tablet to 1 tablet PO daily PRN, Normal      meclizine (ANTIVERT) 25 mg tablet Take 1 tablet (25 mg total) by mouth 3 (three) times a day as needed for dizziness, Starting Fri 6/21/2019, Normal      methimazole (TAPAZOLE) 5 mg tablet 1/2 tab daily, No Print      montelukast (SINGULAIR) 10 mg tablet Take 1 tablet (10 mg total) by mouth daily at bedtime, Starting Mon 7/9/2018, Normal      omeprazole (PriLOSEC) 20 mg delayed release capsule Take 1 capsule (20 mg total) by mouth daily before breakfast, Starting Wed 5/1/2019, Normal      oxyCODONE-acetaminophen (PERCOCET) 5-325 mg per tablet Take 1 tablet by mouth 2 (two) times a day as needed for moderate painMax Daily Amount: 2 tablets, Starting Fri 6/21/2019, Normal      tiZANidine (ZANAFLEX) 2 mg tablet Take 0 5 tablets (1 mg total) by mouth 2 (two) times a day, Starting Tue 5/28/2019, Normal           No discharge procedures on file      ED Provider  Electronically Signed by           Silvestre Vizcaino DO  07/14/19 6739

## 2019-07-15 ENCOUNTER — TELEPHONE (OUTPATIENT)
Dept: INTERNAL MEDICINE CLINIC | Facility: CLINIC | Age: 84
End: 2019-07-15

## 2019-07-15 DIAGNOSIS — S00.86XA INSECT BITE OF OTHER PART OF HEAD, INITIAL ENCOUNTER: Primary | ICD-10-CM
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
## 2019-07-15 RX ORDER — PREDNISONE 10 MG/1
TABLET ORAL
Qty: 20 TABLET | Refills: 0 | Status: SHIPPED | OUTPATIENT
Start: 2019-07-15 | End: 2019-09-05

## 2019-07-15 NOTE — TELEPHONE ENCOUNTER
Pt  Was in the ER yesterday for a bug bite on her face-cheek  Doctor gave her Hydroxyzine for the itching, but its not helping  Pt  Wants to know if you can giver her prednisone or something else for the swelling and itching  Pt  Doesn't know what kind of bug bit her  Pt  Is diabetic but doesn't take insulin

## 2019-07-15 NOTE — TELEPHONE ENCOUNTER
Patient states in the morning is worse, red and swollen  Not getting bigger  Yes very itchy, medications not helping  Has only tried benadryl as well as rx she was given at ER

## 2019-07-15 NOTE — TELEPHONE ENCOUNTER
Is the swelling or redness on your face worse? Is it getting bigger? Or is it just very itchy? Have you tried any creams?

## 2019-07-15 NOTE — TELEPHONE ENCOUNTER
Prednisone sent to the pharmacy  If redness and swelling worse or spreading, call ASAP to be evaluated  Need to make sure it does not get infected  You may continue Benadryl or any antihistamine with the prednisone

## 2019-07-28 DIAGNOSIS — K58.9 IRRITABLE BOWEL SYNDROME, UNSPECIFIED TYPE: ICD-10-CM

## 2019-07-29 RX ORDER — DICYCLOMINE HYDROCHLORIDE 10 MG/1
10 CAPSULE ORAL 3 TIMES DAILY PRN
Qty: 270 CAPSULE | Refills: 1 | Status: SHIPPED | OUTPATIENT
Start: 2019-07-29 | End: 2020-01-24

## 2019-08-06 DIAGNOSIS — F41.9 ANXIETY: ICD-10-CM

## 2019-08-06 DIAGNOSIS — J45.30 MILD PERSISTENT ASTHMA WITHOUT COMPLICATION: ICD-10-CM

## 2019-08-06 RX ORDER — MONTELUKAST SODIUM 10 MG/1
10 TABLET ORAL
Qty: 90 TABLET | Refills: 1 | Status: SHIPPED | OUTPATIENT
Start: 2019-08-06 | End: 2020-01-27 | Stop reason: SDUPTHER

## 2019-08-06 RX ORDER — LORAZEPAM 0.5 MG/1
TABLET ORAL
Qty: 30 TABLET | Refills: 0 | Status: SHIPPED | OUTPATIENT
Start: 2019-08-06 | End: 2020-01-06 | Stop reason: SDUPTHER

## 2019-08-08 DIAGNOSIS — I10 ESSENTIAL HYPERTENSION: ICD-10-CM

## 2019-08-08 RX ORDER — AMLODIPINE BESYLATE 5 MG/1
TABLET ORAL
Qty: 90 TABLET | Refills: 1 | Status: SHIPPED | OUTPATIENT
Start: 2019-08-08 | End: 2020-02-10

## 2019-08-21 DIAGNOSIS — R42 VERTIGO: ICD-10-CM

## 2019-08-21 RX ORDER — MECLIZINE HYDROCHLORIDE 25 MG/1
25 TABLET ORAL 3 TIMES DAILY PRN
Qty: 90 TABLET | Refills: 0 | Status: SHIPPED | OUTPATIENT
Start: 2019-08-21 | End: 2019-09-30 | Stop reason: SDUPTHER

## 2019-08-26 DIAGNOSIS — M51.36 DEGENERATION OF INTERVERTEBRAL DISC OF LUMBAR REGION: ICD-10-CM

## 2019-08-26 RX ORDER — OXYCODONE HYDROCHLORIDE AND ACETAMINOPHEN 5; 325 MG/1; MG/1
1 TABLET ORAL 2 TIMES DAILY PRN
Qty: 60 TABLET | Refills: 0 | Status: SHIPPED | OUTPATIENT
Start: 2019-08-26 | End: 2019-11-25 | Stop reason: SDUPTHER

## 2019-09-05 ENCOUNTER — APPOINTMENT (EMERGENCY)
Dept: CT IMAGING | Facility: HOSPITAL | Age: 84
End: 2019-09-05
Payer: COMMERCIAL

## 2019-09-05 ENCOUNTER — HOSPITAL ENCOUNTER (EMERGENCY)
Facility: HOSPITAL | Age: 84
Discharge: HOME | End: 2019-09-05
Attending: EMERGENCY MEDICINE | Admitting: EMERGENCY MEDICINE
Payer: COMMERCIAL

## 2019-09-05 ENCOUNTER — APPOINTMENT (EMERGENCY)
Dept: RADIOLOGY | Facility: HOSPITAL | Age: 84
End: 2019-09-05
Payer: COMMERCIAL

## 2019-09-05 VITALS
BODY MASS INDEX: 26.97 KG/M2 | WEIGHT: 137.35 LBS | OXYGEN SATURATION: 96 % | TEMPERATURE: 98 F | DIASTOLIC BLOOD PRESSURE: 87 MMHG | HEART RATE: 79 BPM | HEIGHT: 60 IN | RESPIRATION RATE: 16 BRPM | SYSTOLIC BLOOD PRESSURE: 187 MMHG

## 2019-09-05 DIAGNOSIS — S61.210A LACERATION OF RIGHT INDEX FINGER WITHOUT FOREIGN BODY WITHOUT DAMAGE TO NAIL, INITIAL ENCOUNTER: ICD-10-CM

## 2019-09-05 DIAGNOSIS — S02.401A CLOSED FRACTURE OF MAXILLARY SINUS, INITIAL ENCOUNTER (HCC): ICD-10-CM

## 2019-09-05 DIAGNOSIS — S09.90XA INJURY OF HEAD, INITIAL ENCOUNTER: ICD-10-CM

## 2019-09-05 DIAGNOSIS — S41.111A LACERATION OF RIGHT UPPER EXTREMITY, INITIAL ENCOUNTER: ICD-10-CM
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
## 2019-09-05 PROCEDURE — 99284 EMERGENCY DEPT VISIT MOD MDM: CPT | Performed by: EMERGENCY MEDICINE

## 2019-09-05 PROCEDURE — 72170 X-RAY EXAM OF PELVIS: CPT

## 2019-09-05 PROCEDURE — 72125 CT NECK SPINE W/O DYE: CPT

## 2019-09-05 PROCEDURE — 90471 IMMUNIZATION ADMIN: CPT

## 2019-09-05 PROCEDURE — 70450 CT HEAD/BRAIN W/O DYE: CPT

## 2019-09-05 PROCEDURE — 70486 CT MAXILLOFACIAL W/O DYE: CPT

## 2019-09-05 PROCEDURE — 71045 X-RAY EXAM CHEST 1 VIEW: CPT

## 2019-09-05 PROCEDURE — 99285 EMERGENCY DEPT VISIT HI MDM: CPT

## 2019-09-05 PROCEDURE — 90715 TDAP VACCINE 7 YRS/> IM: CPT | Performed by: EMERGENCY MEDICINE

## 2019-09-05 PROCEDURE — 12004 RPR S/N/AX/GEN/TRK7.6-12.5CM: CPT | Performed by: EMERGENCY MEDICINE

## 2019-09-05 PROCEDURE — 73130 X-RAY EXAM OF HAND: CPT

## 2019-09-05 RX ORDER — LIDOCAINE HYDROCHLORIDE AND EPINEPHRINE 10; 10 MG/ML; UG/ML
5 INJECTION, SOLUTION INFILTRATION; PERINEURAL ONCE
Status: COMPLETED | OUTPATIENT
Start: 2019-09-05 | End: 2019-09-05

## 2019-09-05 RX ORDER — TETRACAINE HYDROCHLORIDE 5 MG/ML
2 SOLUTION OPHTHALMIC ONCE
Status: COMPLETED | OUTPATIENT
Start: 2019-09-05 | End: 2019-09-05

## 2019-09-05 RX ORDER — LIDOCAINE HYDROCHLORIDE 10 MG/ML
5 INJECTION, SOLUTION EPIDURAL; INFILTRATION; INTRACAUDAL; PERINEURAL ONCE
Status: COMPLETED | OUTPATIENT
Start: 2019-09-05 | End: 2019-09-05

## 2019-09-05 RX ADMIN — LIDOCAINE HYDROCHLORIDE,EPINEPHRINE BITARTRATE 5 ML: 10; .01 INJECTION, SOLUTION INFILTRATION; PERINEURAL at 14:00

## 2019-09-05 RX ADMIN — TETANUS TOXOID, REDUCED DIPHTHERIA TOXOID AND ACELLULAR PERTUSSIS VACCINE, ADSORBED 0.5 ML: 5; 2.5; 8; 8; 2.5 SUSPENSION INTRAMUSCULAR at 12:46

## 2019-09-05 RX ADMIN — TETRACAINE HYDROCHLORIDE 2 DROP: 5 SOLUTION OPHTHALMIC at 15:25

## 2019-09-05 RX ADMIN — FLUORESCEIN SODIUM 1 STRIP: 1 STRIP OPHTHALMIC at 15:25

## 2019-09-05 RX ADMIN — LIDOCAINE HYDROCHLORIDE 5 ML: 10 INJECTION, SOLUTION EPIDURAL; INFILTRATION; INTRACAUDAL; PERINEURAL at 15:20
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
## 2019-09-05 NOTE — ED NOTES
Patient transported to [address] [place] [address], [address] [place] [address]  09/05/19 5456

## 2019-09-09 ENCOUNTER — OFFICE VISIT (OUTPATIENT)
Dept: INTERNAL MEDICINE CLINIC | Facility: CLINIC | Age: 84
End: 2019-09-09
Payer: MEDICARE

## 2019-09-09 VITALS
TEMPERATURE: 97.7 F | WEIGHT: 132.6 LBS | HEIGHT: 60 IN | OXYGEN SATURATION: 96 % | RESPIRATION RATE: 18 BRPM | SYSTOLIC BLOOD PRESSURE: 124 MMHG | BODY MASS INDEX: 26.03 KG/M2 | HEART RATE: 74 BPM | DIASTOLIC BLOOD PRESSURE: 76 MMHG

## 2019-09-09 DIAGNOSIS — E11.22 TYPE 2 DIABETES MELLITUS WITH STAGE 3 CHRONIC KIDNEY DISEASE AND HYPERTENSION (HCC): ICD-10-CM

## 2019-09-09 DIAGNOSIS — I12.9 TYPE 2 DIABETES MELLITUS WITH STAGE 3 CHRONIC KIDNEY DISEASE AND HYPERTENSION (HCC): ICD-10-CM

## 2019-09-09 DIAGNOSIS — S41.101D OPEN WOUND OF RIGHT UPPER EXTREMITY, SUBSEQUENT ENCOUNTER: ICD-10-CM

## 2019-09-09 DIAGNOSIS — R22.0 MASS OF RIGHT SUBMANDIBULAR REGION: ICD-10-CM

## 2019-09-09 DIAGNOSIS — E04.1 THYROID NODULE: ICD-10-CM
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
## 2019-09-09 DIAGNOSIS — N18.30 TYPE 2 DIABETES MELLITUS WITH STAGE 3 CHRONIC KIDNEY DISEASE AND HYPERTENSION (HCC): ICD-10-CM

## 2019-09-09 DIAGNOSIS — S02.40DS: ICD-10-CM

## 2019-09-09 LAB — SL AMB POCT HEMOGLOBIN AIC: 5.6 (ref ?–6.5)

## 2019-09-09 PROCEDURE — 83036 HEMOGLOBIN GLYCOSYLATED A1C: CPT | Performed by: INTERNAL MEDICINE

## 2019-09-09 PROCEDURE — 99214 OFFICE O/P EST MOD 30 MIN: CPT | Performed by: INTERNAL MEDICINE

## 2019-09-09 NOTE — ASSESSMENT & PLAN NOTE
Mild tenderness on area  Went to [facility] earlier today  Declined neurosurg evaluation  Monitor eye symptoms
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
## 2019-09-20 ENCOUNTER — OFFICE VISIT (OUTPATIENT)
Dept: INTERNAL MEDICINE CLINIC | Facility: CLINIC | Age: 84
End: 2019-09-20
Payer: MEDICARE

## 2019-09-20 VITALS
OXYGEN SATURATION: 97 % | WEIGHT: 135.4 LBS | TEMPERATURE: 97.4 F | HEIGHT: 60 IN | HEART RATE: 72 BPM | BODY MASS INDEX: 26.58 KG/M2 | RESPIRATION RATE: 18 BRPM | DIASTOLIC BLOOD PRESSURE: 76 MMHG | SYSTOLIC BLOOD PRESSURE: 124 MMHG

## 2019-09-20 DIAGNOSIS — S02.40DS: ICD-10-CM

## 2019-09-20 DIAGNOSIS — S61.210S LACERATION OF RIGHT INDEX FINGER WITHOUT FOREIGN BODY WITHOUT DAMAGE TO NAIL, SEQUELA: ICD-10-CM

## 2019-09-20 DIAGNOSIS — S41.111S ARM LACERATION, RIGHT, SEQUELA: ICD-10-CM

## 2019-09-20 DIAGNOSIS — Z48.02 VISIT FOR SUTURE REMOVAL: Primary | ICD-10-CM

## 2019-09-20 PROCEDURE — 99213 OFFICE O/P EST LOW 20 MIN: CPT | Performed by: NURSE PRACTITIONER
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
## 2019-09-30 ENCOUNTER — OFFICE VISIT (OUTPATIENT)
Dept: INTERNAL MEDICINE CLINIC | Facility: CLINIC | Age: 84
End: 2019-09-30
Payer: MEDICARE

## 2019-09-30 ENCOUNTER — APPOINTMENT (OUTPATIENT)
Dept: LAB | Facility: CLINIC | Age: 84
End: 2019-09-30
Payer: MEDICARE

## 2019-09-30 VITALS
WEIGHT: 132 LBS | RESPIRATION RATE: 16 BRPM | HEIGHT: 60 IN | HEART RATE: 86 BPM | OXYGEN SATURATION: 98 % | BODY MASS INDEX: 25.91 KG/M2 | SYSTOLIC BLOOD PRESSURE: 130 MMHG | TEMPERATURE: 99.1 F | DIASTOLIC BLOOD PRESSURE: 68 MMHG

## 2019-09-30 DIAGNOSIS — N18.30 TYPE 2 DIABETES MELLITUS WITH STAGE 3 CHRONIC KIDNEY DISEASE AND HYPERTENSION (HCC): ICD-10-CM

## 2019-09-30 DIAGNOSIS — E11.22 TYPE 2 DIABETES MELLITUS WITH STAGE 3 CHRONIC KIDNEY DISEASE AND HYPERTENSION (HCC): ICD-10-CM

## 2019-09-30 DIAGNOSIS — E05.90 HYPERTHYROIDISM: ICD-10-CM

## 2019-09-30 DIAGNOSIS — N18.30 CKD (CHRONIC KIDNEY DISEASE), STAGE III (HCC): Primary | ICD-10-CM

## 2019-09-30 DIAGNOSIS — I12.9 TYPE 2 DIABETES MELLITUS WITH STAGE 3 CHRONIC KIDNEY DISEASE AND HYPERTENSION (HCC): ICD-10-CM

## 2019-09-30 DIAGNOSIS — M51.36 DEGENERATION OF INTERVERTEBRAL DISC OF LUMBAR REGION: ICD-10-CM

## 2019-09-30 DIAGNOSIS — M79.642 LEFT HAND PAIN: ICD-10-CM

## 2019-09-30 DIAGNOSIS — K21.9 GASTROESOPHAGEAL REFLUX DISEASE, ESOPHAGITIS PRESENCE NOT SPECIFIED: ICD-10-CM

## 2019-09-30 DIAGNOSIS — R42 VERTIGO: ICD-10-CM

## 2019-09-30 DIAGNOSIS — N18.30 CKD (CHRONIC KIDNEY DISEASE), STAGE III (HCC): ICD-10-CM

## 2019-09-30 DIAGNOSIS — S02.40DS: ICD-10-CM

## 2019-09-30 DIAGNOSIS — R93.89 ABNORMAL FINDING ON IMAGING: ICD-10-CM

## 2019-09-30 LAB
ALBUMIN SERPL BCP-MCNC: 3.8 G/DL (ref 3.5–5)
ALP SERPL-CCNC: 123 U/L (ref 46–116)
ALT SERPL W P-5'-P-CCNC: 16 U/L (ref 12–78)
ANION GAP SERPL CALCULATED.3IONS-SCNC: 9 MMOL/L (ref 4–13)
AST SERPL W P-5'-P-CCNC: 17 U/L (ref 5–45)
BASOPHILS # BLD AUTO: 0.03 THOUSANDS/ΜL (ref 0–0.1)
BASOPHILS NFR BLD AUTO: 1 % (ref 0–1)
BILIRUB SERPL-MCNC: 0.5 MG/DL (ref 0.2–1)
BUN SERPL-MCNC: 9 MG/DL (ref 5–25)
CALCIUM SERPL-MCNC: 8.8 MG/DL (ref 8.3–10.1)
CHLORIDE SERPL-SCNC: 103 MMOL/L (ref 100–108)
CO2 SERPL-SCNC: 29 MMOL/L (ref 21–32)
CREAT SERPL-MCNC: 1.23 MG/DL (ref 0.6–1.3)
EOSINOPHIL # BLD AUTO: 0.01 THOUSAND/ΜL (ref 0–0.61)
EOSINOPHIL NFR BLD AUTO: 0 % (ref 0–6)
ERYTHROCYTE [DISTWIDTH] IN BLOOD BY AUTOMATED COUNT: 13.2 % (ref 11.6–15.1)
GFR SERPL CREATININE-BSD FRML MDRD: 39 ML/MIN/1.73SQ M
GLUCOSE SERPL-MCNC: 155 MG/DL (ref 65–140)
HCT VFR BLD AUTO: 40.8 % (ref 34.8–46.1)
HGB BLD-MCNC: 13.2 G/DL (ref 11.5–15.4)
IMM GRANULOCYTES # BLD AUTO: 0.01 THOUSAND/UL (ref 0–0.2)
IMM GRANULOCYTES NFR BLD AUTO: 0 % (ref 0–2)
LYMPHOCYTES # BLD AUTO: 1.29 THOUSANDS/ΜL (ref 0.6–4.47)
LYMPHOCYTES NFR BLD AUTO: 21 % (ref 14–44)
MCH RBC QN AUTO: 30.1 PG (ref 26.8–34.3)
MCHC RBC AUTO-ENTMCNC: 32.4 G/DL (ref 31.4–37.4)
MCV RBC AUTO: 93 FL (ref 82–98)
MONOCYTES # BLD AUTO: 0.62 THOUSAND/ΜL (ref 0.17–1.22)
MONOCYTES NFR BLD AUTO: 10 % (ref 4–12)
NEUTROPHILS # BLD AUTO: 4.31 THOUSANDS/ΜL (ref 1.85–7.62)
NEUTS SEG NFR BLD AUTO: 68 % (ref 43–75)
NRBC BLD AUTO-RTO: 0 /100 WBCS
PLATELET # BLD AUTO: 382 THOUSANDS/UL (ref 149–390)
PMV BLD AUTO: 9.6 FL (ref 8.9–12.7)
POTASSIUM SERPL-SCNC: 3.6 MMOL/L (ref 3.5–5.3)
PROT SERPL-MCNC: 7.6 G/DL (ref 6.4–8.2)
RBC # BLD AUTO: 4.38 MILLION/UL (ref 3.81–5.12)
SODIUM SERPL-SCNC: 141 MMOL/L (ref 136–145)
WBC # BLD AUTO: 6.27 THOUSAND/UL (ref 4.31–10.16)

## 2019-09-30 PROCEDURE — 99214 OFFICE O/P EST MOD 30 MIN: CPT | Performed by: INTERNAL MEDICINE

## 2019-09-30 PROCEDURE — 80053 COMPREHEN METABOLIC PANEL: CPT

## 2019-09-30 PROCEDURE — 85025 COMPLETE CBC W/AUTO DIFF WBC: CPT | Performed by: INTERNAL MEDICINE

## 2019-09-30 PROCEDURE — 36415 COLL VENOUS BLD VENIPUNCTURE: CPT | Performed by: INTERNAL MEDICINE

## 2019-09-30 RX ORDER — MECLIZINE HYDROCHLORIDE 25 MG/1
25 TABLET ORAL 3 TIMES DAILY PRN
Qty: 90 TABLET | Refills: 0 | Status: SHIPPED | OUTPATIENT
Start: 2019-09-30 | End: 2019-11-25 | Stop reason: SDUPTHER

## 2019-09-30 RX ORDER — OMEPRAZOLE 20 MG/1
20 CAPSULE, DELAYED RELEASE ORAL
Qty: 90 CAPSULE | Refills: 1 | Status: SHIPPED | OUTPATIENT
Start: 2019-09-30 | End: 2020-04-13

## 2019-09-30 NOTE — PATIENT INSTRUCTIONS
Drink more fluids during the daily  Limit fluids in the evening and at bedtime  Start Metamucil or similar fiber products, take at least one a day  Take Bentyl (dicyclomine) as needed only  Do hand exercises daily
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
## 2019-09-30 NOTE — ASSESSMENT & PLAN NOTE
Lab Results   Component Value Date    HGBA1C 5 6 09/09/2019     A1c within normal  BP stable, on amlodipine  Repeat labs, renal function has been stable

## 2019-10-04 ENCOUNTER — HOSPITAL ENCOUNTER (OUTPATIENT)
Dept: ULTRASOUND IMAGING | Facility: HOSPITAL | Age: 84
Discharge: HOME | End: 2019-10-04
Payer: MEDICARE

## 2019-10-04 ENCOUNTER — TELEPHONE (OUTPATIENT)
Dept: INTERNAL MEDICINE CLINIC | Facility: CLINIC | Age: 84
End: 2019-10-04

## 2019-10-04 ENCOUNTER — HOSPITAL ENCOUNTER (OUTPATIENT)
Dept: ULTRASOUND IMAGING | Facility: HOSPITAL | Age: 84
Discharge: HOME | End: 2019-10-04

## 2019-10-04 DIAGNOSIS — E04.1 THYROID NODULE: Primary | ICD-10-CM

## 2019-10-04 DIAGNOSIS — R93.89 ABNORMAL FINDING ON IMAGING: ICD-10-CM

## 2019-10-04 PROCEDURE — 76536 US EXAM OF HEAD AND NECK: CPT

## 2019-11-13 ENCOUNTER — HOSPITAL ENCOUNTER (OUTPATIENT)
Dept: RADIOLOGY | Facility: HOSPITAL | Age: 84
Discharge: HOME | End: 2019-11-13
Payer: MEDICARE

## 2019-11-13 ENCOUNTER — TRANSCRIBE ORDERS (OUTPATIENT)
Dept: RADIOLOGY | Facility: HOSPITAL | Age: 84
End: 2019-11-13

## 2019-11-13 DIAGNOSIS — E04.1 THYROID NODULE: ICD-10-CM

## 2019-11-13 PROCEDURE — 10005 FNA BX W/US GDN 1ST LES: CPT

## 2019-11-13 PROCEDURE — 88173 CYTOPATH EVAL FNA REPORT: CPT | Performed by: PATHOLOGY

## 2019-11-13 RX ORDER — LIDOCAINE HYDROCHLORIDE 10 MG/ML
2 INJECTION, SOLUTION EPIDURAL; INFILTRATION; INTRACAUDAL; PERINEURAL ONCE
Status: COMPLETED | OUTPATIENT
Start: 2019-11-13 | End: 2019-11-13

## 2019-11-13 RX ADMIN — LIDOCAINE HYDROCHLORIDE 2 ML: 10 INJECTION, SOLUTION EPIDURAL; INFILTRATION; INTRACAUDAL; PERINEURAL at 09:38

## 2019-11-14 ENCOUNTER — TELEPHONE (OUTPATIENT)
Dept: INTERNAL MEDICINE CLINIC | Facility: CLINIC | Age: 84
End: 2019-11-14

## 2019-11-25 DIAGNOSIS — M51.36 DEGENERATION OF INTERVERTEBRAL DISC OF LUMBAR REGION: ICD-10-CM

## 2019-11-25 DIAGNOSIS — R42 VERTIGO: ICD-10-CM

## 2019-11-25 RX ORDER — MECLIZINE HYDROCHLORIDE 25 MG/1
25 TABLET ORAL 3 TIMES DAILY PRN
Qty: 90 TABLET | Refills: 0 | Status: SHIPPED | OUTPATIENT
Start: 2019-11-25 | End: 2020-01-06 | Stop reason: SDUPTHER

## 2019-11-25 RX ORDER — OXYCODONE HYDROCHLORIDE AND ACETAMINOPHEN 5; 325 MG/1; MG/1
1 TABLET ORAL 2 TIMES DAILY PRN
Qty: 60 TABLET | Refills: 0 | Status: SHIPPED | OUTPATIENT
Start: 2019-11-25 | End: 2020-01-06 | Stop reason: SDUPTHER

## 2020-01-06 DIAGNOSIS — M51.36 DEGENERATION OF INTERVERTEBRAL DISC OF LUMBAR REGION: ICD-10-CM

## 2020-01-06 DIAGNOSIS — R42 VERTIGO: ICD-10-CM

## 2020-01-06 DIAGNOSIS — F41.9 ANXIETY: ICD-10-CM

## 2020-01-06 RX ORDER — LORAZEPAM 0.5 MG/1
TABLET ORAL
Qty: 30 TABLET | Refills: 0 | Status: SHIPPED | OUTPATIENT
Start: 2020-01-06 | End: 2020-04-13 | Stop reason: SDUPTHER

## 2020-01-06 RX ORDER — OXYCODONE HYDROCHLORIDE AND ACETAMINOPHEN 5; 325 MG/1; MG/1
1 TABLET ORAL 2 TIMES DAILY PRN
Qty: 60 TABLET | Refills: 0 | Status: SHIPPED | OUTPATIENT
Start: 2020-01-06 | End: 2020-02-28 | Stop reason: SDUPTHER

## 2020-01-06 RX ORDER — MECLIZINE HYDROCHLORIDE 25 MG/1
25 TABLET ORAL 3 TIMES DAILY PRN
Qty: 90 TABLET | Refills: 0 | Status: SHIPPED | OUTPATIENT
Start: 2020-01-06 | End: 2020-02-28 | Stop reason: SDUPTHER

## 2020-01-20 ENCOUNTER — TELEPHONE (OUTPATIENT)
Dept: INTERNAL MEDICINE CLINIC | Facility: CLINIC | Age: 85
End: 2020-01-20

## 2020-01-20 NOTE — TELEPHONE ENCOUNTER
May need physical therapy  You can take Tylenol 3 times a day and try to do some stretching exercises at home
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
## 2020-01-20 NOTE — TELEPHONE ENCOUNTER
Patient states the pain is in the right shoulder,elbow, down to hand- worst at night  No swelling, yes has weakness  No numbness/tingling, but has a burning pain  Can not move overhead, only with using her other hand and even then its hard due to pain

## 2020-01-20 NOTE — TELEPHONE ENCOUNTER
Do you have pain in your right shoulder, elbow, wrist or hand? Do you have any swelling? Any hand/arm weakness, numbness or tingling? Are you able to move your shoulder overhead?

## 2020-01-20 NOTE — TELEPHONE ENCOUNTER
Patient notified  Would like to know what else she can do for this until her apt?  She has been taking Tylenol and every 2-3 days has been taking 1/2 of oxycodone but nothing is really helping with the pain

## 2020-01-24 DIAGNOSIS — K58.9 IRRITABLE BOWEL SYNDROME, UNSPECIFIED TYPE: ICD-10-CM

## 2020-01-24 RX ORDER — DICYCLOMINE HYDROCHLORIDE 10 MG/1
10 CAPSULE ORAL 3 TIMES DAILY PRN
Qty: 270 CAPSULE | Refills: 0 | Status: SHIPPED | OUTPATIENT
Start: 2020-01-24 | End: 2020-02-20 | Stop reason: ALTCHOICE

## 2020-01-27 ENCOUNTER — TRANSCRIBE ORDERS (OUTPATIENT)
Dept: LAB | Facility: CLINIC | Age: 85
End: 2020-01-27

## 2020-01-27 ENCOUNTER — OFFICE VISIT (OUTPATIENT)
Dept: INTERNAL MEDICINE CLINIC | Facility: CLINIC | Age: 85
End: 2020-01-27
Payer: MEDICARE

## 2020-01-27 ENCOUNTER — TELEPHONE (OUTPATIENT)
Dept: INTERNAL MEDICINE CLINIC | Facility: CLINIC | Age: 85
End: 2020-01-27

## 2020-01-27 ENCOUNTER — APPOINTMENT (OUTPATIENT)
Dept: LAB | Facility: CLINIC | Age: 85
End: 2020-01-27
Payer: MEDICARE

## 2020-01-27 VITALS
WEIGHT: 129.8 LBS | HEIGHT: 60 IN | RESPIRATION RATE: 18 BRPM | SYSTOLIC BLOOD PRESSURE: 128 MMHG | TEMPERATURE: 98.3 F | OXYGEN SATURATION: 98 % | DIASTOLIC BLOOD PRESSURE: 70 MMHG | HEART RATE: 80 BPM | BODY MASS INDEX: 25.48 KG/M2

## 2020-01-27 DIAGNOSIS — Z79.899 ENCOUNTER FOR LONG-TERM CURRENT USE OF MEDICATION: ICD-10-CM

## 2020-01-27 DIAGNOSIS — M25.511 ACUTE PAIN OF RIGHT SHOULDER: ICD-10-CM

## 2020-01-27 DIAGNOSIS — R22.0 MASS OF RIGHT SUBMANDIBULAR REGION: ICD-10-CM

## 2020-01-27 DIAGNOSIS — R39.9 URINARY SYMPTOM OR SIGN: Primary | ICD-10-CM

## 2020-01-27 DIAGNOSIS — I12.9 TYPE 2 DIABETES MELLITUS WITH STAGE 3 CHRONIC KIDNEY DISEASE AND HYPERTENSION (HCC): ICD-10-CM

## 2020-01-27 DIAGNOSIS — E55.9 VITAMIN D DEFICIENCY: ICD-10-CM

## 2020-01-27 DIAGNOSIS — E05.90 HYPERTHYROIDISM: ICD-10-CM

## 2020-01-27 DIAGNOSIS — I10 ESSENTIAL HYPERTENSION: ICD-10-CM

## 2020-01-27 DIAGNOSIS — E78.2 MIXED HYPERLIPIDEMIA: ICD-10-CM

## 2020-01-27 DIAGNOSIS — J45.30 MILD PERSISTENT ASTHMA WITHOUT COMPLICATION: ICD-10-CM

## 2020-01-27 DIAGNOSIS — J45.20 MILD INTERMITTENT ASTHMA WITHOUT COMPLICATION: ICD-10-CM

## 2020-01-27 DIAGNOSIS — N18.30 CKD (CHRONIC KIDNEY DISEASE), STAGE III (HCC): ICD-10-CM

## 2020-01-27 DIAGNOSIS — N18.30 TYPE 2 DIABETES MELLITUS WITH STAGE 3 CHRONIC KIDNEY DISEASE AND HYPERTENSION (HCC): ICD-10-CM

## 2020-01-27 DIAGNOSIS — M51.36 DEGENERATION OF INTERVERTEBRAL DISC OF LUMBAR REGION: ICD-10-CM

## 2020-01-27 DIAGNOSIS — E11.22 TYPE 2 DIABETES MELLITUS WITH STAGE 3 CHRONIC KIDNEY DISEASE AND HYPERTENSION (HCC): ICD-10-CM

## 2020-01-27 DIAGNOSIS — Z00.00 HEALTH MAINTENANCE EXAMINATION: ICD-10-CM

## 2020-01-27 PROBLEM — S02.40DS: Status: RESOLVED | Noted: 2019-09-09 | Resolved: 2020-01-27

## 2020-01-27 LAB
25(OH)D3 SERPL-MCNC: 30.3 NG/ML (ref 30–100)
ALBUMIN SERPL BCP-MCNC: 4.3 G/DL (ref 3.5–5)
ALP SERPL-CCNC: 100 U/L (ref 46–116)
ALT SERPL W P-5'-P-CCNC: 23 U/L (ref 12–78)
ANION GAP SERPL CALCULATED.3IONS-SCNC: 10 MMOL/L (ref 4–13)
AST SERPL W P-5'-P-CCNC: 16 U/L (ref 5–45)
BASOPHILS # BLD AUTO: 0.03 THOUSANDS/ΜL (ref 0–0.1)
BASOPHILS NFR BLD AUTO: 1 % (ref 0–1)
BILIRUB SERPL-MCNC: 0.66 MG/DL (ref 0.2–1)
BUN SERPL-MCNC: 12 MG/DL (ref 5–25)
CALCIUM SERPL-MCNC: 9.3 MG/DL (ref 8.3–10.1)
CHLORIDE SERPL-SCNC: 102 MMOL/L (ref 100–108)
CHOLEST SERPL-MCNC: 232 MG/DL (ref 50–200)
CO2 SERPL-SCNC: 30 MMOL/L (ref 21–32)
CREAT SERPL-MCNC: 0.89 MG/DL (ref 0.6–1.3)
CREAT UR-MCNC: 99.9 MG/DL
EOSINOPHIL # BLD AUTO: 0.01 THOUSAND/ΜL (ref 0–0.61)
EOSINOPHIL NFR BLD AUTO: 0 % (ref 0–6)
ERYTHROCYTE [DISTWIDTH] IN BLOOD BY AUTOMATED COUNT: 14.2 % (ref 11.6–15.1)
EST. AVERAGE GLUCOSE BLD GHB EST-MCNC: 128 MG/DL
GFR SERPL CREATININE-BSD FRML MDRD: 58 ML/MIN/1.73SQ M
GLUCOSE P FAST SERPL-MCNC: 117 MG/DL (ref 65–99)
HBA1C MFR BLD: 6.1 % (ref 4.2–6.3)
HCT VFR BLD AUTO: 43 % (ref 34.8–46.1)
HDLC SERPL-MCNC: 57 MG/DL
HGB BLD-MCNC: 14 G/DL (ref 11.5–15.4)
IMM GRANULOCYTES # BLD AUTO: 0.01 THOUSAND/UL (ref 0–0.2)
IMM GRANULOCYTES NFR BLD AUTO: 0 % (ref 0–2)
LDLC SERPL CALC-MCNC: 146 MG/DL (ref 0–100)
LYMPHOCYTES # BLD AUTO: 1.95 THOUSANDS/ΜL (ref 0.6–4.47)
LYMPHOCYTES NFR BLD AUTO: 30 % (ref 14–44)
MCH RBC QN AUTO: 29.9 PG (ref 26.8–34.3)
MCHC RBC AUTO-ENTMCNC: 32.6 G/DL (ref 31.4–37.4)
MCV RBC AUTO: 92 FL (ref 82–98)
MICROALBUMIN UR-MCNC: 15.4 MG/L (ref 0–20)
MICROALBUMIN/CREAT 24H UR: 15 MG/G CREATININE (ref 0–30)
MONOCYTES # BLD AUTO: 0.6 THOUSAND/ΜL (ref 0.17–1.22)
MONOCYTES NFR BLD AUTO: 9 % (ref 4–12)
NEUTROPHILS # BLD AUTO: 3.82 THOUSANDS/ΜL (ref 1.85–7.62)
NEUTS SEG NFR BLD AUTO: 60 % (ref 43–75)
NONHDLC SERPL-MCNC: 175 MG/DL
NRBC BLD AUTO-RTO: 0 /100 WBCS
PLATELET # BLD AUTO: 371 THOUSANDS/UL (ref 149–390)
PMV BLD AUTO: 9.5 FL (ref 8.9–12.7)
POTASSIUM SERPL-SCNC: 3.5 MMOL/L (ref 3.5–5.3)
PROT SERPL-MCNC: 8 G/DL (ref 6.4–8.2)
RBC # BLD AUTO: 4.69 MILLION/UL (ref 3.81–5.12)
SL AMB  POCT GLUCOSE, UA: NORMAL
SL AMB LEUKOCYTE ESTERASE,UA: 125
SL AMB POCT BILIRUBIN,UA: NORMAL
SL AMB POCT BLOOD,UA: NORMAL
SL AMB POCT CLARITY,UA: NORMAL
SL AMB POCT COLOR,UA: YELLOW
SL AMB POCT KETONES,UA: NORMAL
SL AMB POCT NITRITE,UA: NORMAL
SL AMB POCT PH,UA: 6
SL AMB POCT SPECIFIC GRAVITY,UA: 1.01
SL AMB POCT URINE PROTEIN: NORMAL
SL AMB POCT UROBILINOGEN: 0.2
SODIUM SERPL-SCNC: 142 MMOL/L (ref 136–145)
T4 FREE SERPL-MCNC: 0.89 NG/DL (ref 0.76–1.46)
TRIGL SERPL-MCNC: 145 MG/DL
TSH SERPL DL<=0.05 MIU/L-ACNC: 8.19 UIU/ML (ref 0.36–3.74)
VIT B12 SERPL-MCNC: 713 PG/ML (ref 100–900)
WBC # BLD AUTO: 6.42 THOUSAND/UL (ref 4.31–10.16)

## 2020-01-27 PROCEDURE — 80061 LIPID PANEL: CPT | Performed by: INTERNAL MEDICINE

## 2020-01-27 PROCEDURE — 82043 UR ALBUMIN QUANTITATIVE: CPT | Performed by: INTERNAL MEDICINE

## 2020-01-27 PROCEDURE — 82607 VITAMIN B-12: CPT | Performed by: INTERNAL MEDICINE

## 2020-01-27 PROCEDURE — 81002 URINALYSIS NONAUTO W/O SCOPE: CPT | Performed by: INTERNAL MEDICINE

## 2020-01-27 PROCEDURE — 82306 VITAMIN D 25 HYDROXY: CPT | Performed by: INTERNAL MEDICINE

## 2020-01-27 PROCEDURE — 83036 HEMOGLOBIN GLYCOSYLATED A1C: CPT | Performed by: INTERNAL MEDICINE

## 2020-01-27 PROCEDURE — 85025 COMPLETE CBC W/AUTO DIFF WBC: CPT | Performed by: INTERNAL MEDICINE

## 2020-01-27 PROCEDURE — 36415 COLL VENOUS BLD VENIPUNCTURE: CPT | Performed by: INTERNAL MEDICINE

## 2020-01-27 PROCEDURE — 84439 ASSAY OF FREE THYROXINE: CPT | Performed by: INTERNAL MEDICINE

## 2020-01-27 PROCEDURE — 1123F ACP DISCUSS/DSCN MKR DOCD: CPT | Performed by: INTERNAL MEDICINE

## 2020-01-27 PROCEDURE — 80053 COMPREHEN METABOLIC PANEL: CPT | Performed by: INTERNAL MEDICINE

## 2020-01-27 PROCEDURE — G0438 PPPS, INITIAL VISIT: HCPCS | Performed by: INTERNAL MEDICINE

## 2020-01-27 PROCEDURE — 99214 OFFICE O/P EST MOD 30 MIN: CPT | Performed by: INTERNAL MEDICINE

## 2020-01-27 PROCEDURE — 84443 ASSAY THYROID STIM HORMONE: CPT | Performed by: INTERNAL MEDICINE

## 2020-01-27 PROCEDURE — 82570 ASSAY OF URINE CREATININE: CPT | Performed by: INTERNAL MEDICINE

## 2020-01-27 RX ORDER — CEPHALEXIN 500 MG/1
500 CAPSULE ORAL EVERY 12 HOURS SCHEDULED
Qty: 10 CAPSULE | Refills: 0 | Status: SHIPPED | OUTPATIENT
Start: 2020-01-27 | End: 2020-02-01

## 2020-01-27 RX ORDER — MONTELUKAST SODIUM 10 MG/1
10 TABLET ORAL
Qty: 90 TABLET | Refills: 1 | Status: SHIPPED | OUTPATIENT
Start: 2020-01-27 | End: 2020-05-01 | Stop reason: SDUPTHER

## 2020-01-27 NOTE — TELEPHONE ENCOUNTER
Lab results: Your TSH is high, please call your endocrinologist for an appointment  Your kidney function is better, continue taking Tylenol for pain  Cholesterol is slightly worse, sugars also worse      Rest of labs ok

## 2020-01-27 NOTE — ASSESSMENT & PLAN NOTE
Suspect adhesive capsulitis  Recommend to start ROM exercises, continue Tylenol  No NSAIDs due to CKD  She would prefer to see Ortho first before starting physical therapy

## 2020-01-27 NOTE — PROGRESS NOTES
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 )    Lab Results   Component Value Date    HGBA1C 5 6 09/09/2019     Not on medication  Hyperthyroidism  On methimazole, per Endo  PAD (peripheral artery disease) ([facility])  Ultrasound scheduled  Mild intermittent asthma without complication  No recent symptoms, has not used inhalers  Takes Singulair daily  Essential hypertension  BP controlled on amlodipine  Hyperlipidemia  Not on statin  CKD (chronic kidney disease), stage III  Repeat labs today  Not taking NSAIDs  Consider stopping PPI  Insomnia  Takes lorazepam qHS prn only  Esophageal reflux  Takes PPI daily  Diagnoses and all orders for this visit:    Urinary symptom or sign  Comments:  (+) UTI, start antibiotics  Orders:  -     POCT urine dip  -     cephalexin (KEFLEX) 500 mg capsule; Take 1 capsule (500 mg total) by mouth every 12 (twelve) hours for 5 days    Mild persistent asthma without complication  -     montelukast (SINGULAIR) 10 mg tablet; Take 1 tablet (10 mg total) by mouth daily at bedtime    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 ([facility])  -     Microalbumin / creatinine urine ratio  -     Comprehensive metabolic panel  -     Hemoglobin A1C    Mass of right submandibular region  -     US head neck soft tissue;  Future    Acute pain of right shoulder  -     XR shoulder 2+ vw right; Future  -     Ambulatory referral to Orthopedic Surgery; Future    Essential hypertension  -     CBC and differential  -     Comprehensive metabolic panel    CKD (chronic kidney disease), stage III (HCC)    Mixed hyperlipidemia  -     Lipid panel    Hyperthyroidism  -     Cancel: TSH, 3rd generation with Free T4 reflex  -     TSH, 3rd generation  -     T4, free    Encounter for long-term current use of medication  -     Vitamin B12    Vitamin D deficiency  -     Vitamin D 25 hydroxy    Mild intermittent asthma without complication    Degeneration of intervertebral disc of lumbar region    Health maintenance examination  Comments:  Declines further screening  Follow up in 3 months or as needed  Subjective:      Patient ID: Moody Goodwin is a 80 y o  female  Ms Lara Dale complains of right shoulder and arm pain  Pain started a few weeks ago, worse the past few days  She has a difficult time lifting it up, has trouble falling asleep at night due to pain  She complains of pain in her right shoulder, right arm and also on her right elbow  She is right handed  She has not applied any new medication  She ready takes Tylenol twice a day for her back  She denies any falls or injury  Denies any swelling  She denies any hand weakness or numbness, feels her handwriting is affected  She feels her left shoulder is getting worse also, but not as bad  She continues to experience frequent low back pain  She is reluctant to undergo an MRI and see pain management  She takes lorazepam occasionally to help her sleep at night  She feels she has been taking this more often due to her shoulder pain  She takes 2 extra-strength Tylenol every night regularly  The following portions of the patient's history were reviewed and updated as appropriate: allergies, current medications, past medical history, past social history and problem list     Review of Systems   Constitutional: Negative for appetite change and fatigue  HENT: Negative for congestion and ear pain  Eyes: Negative for visual disturbance  Respiratory: Negative for cough and shortness of breath  Cardiovascular: Negative for chest pain and leg swelling  Gastrointestinal: Negative for abdominal pain, constipation and diarrhea  Genitourinary: Positive for dysuria  Negative for frequency and urgency  Musculoskeletal: Positive for arthralgias and back pain  Negative for joint swelling and myalgias  Skin: Negative for rash and wound  Neurological: Negative for dizziness and headaches  Hematological: Does not bruise/bleed easily  Psychiatric/Behavioral: Positive for sleep disturbance  Negative for confusion  The patient is not nervous/anxious  Objective:      /70   Pulse 80   Temp 98 3 °F (36 8 °C)   Resp 18   Ht 5' (1 524 m)   Wt 58 9 kg (129 lb 12 8 oz)   SpO2 98%   BMI 25 35 kg/m²          Physical Exam   Constitutional: She is oriented to person, place, and time  She appears well-developed and well-nourished  HENT:   Head: Normocephalic and atraumatic  Eyes: Pupils are equal, round, and reactive to light  Cardiovascular: Normal rate, regular rhythm and normal heart sounds  Pulmonary/Chest: Effort normal and breath sounds normal  She has no wheezes  Abdominal: Soft  Bowel sounds are normal    Musculoskeletal: She exhibits no edema  Right shoulder: She exhibits decreased range of motion, tenderness and pain  Right elbow: Tenderness found  Lateral epicondyle tenderness noted  (+) tenderness R AC joint   Lymphadenopathy:     She has no cervical adenopathy  Neurological: She is alert and oriented to person, place, and time  Skin: Skin is warm  No rash noted  Psychiatric: She has a normal mood and affect  Her behavior is normal    Nursing note and vitals reviewed  Labs & imaging results reviewed with patient

## 2020-01-27 NOTE — PROGRESS NOTES
Assessment and Plan:     Problem List Items Addressed This Visit        Endocrine    Hyperthyroidism     On methimazole, per Endo  Relevant Orders    TSH, 3rd generation    T4, free    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 ([facility] )       Lab Results   Component Value Date    HGBA1C 5 6 09/09/2019     Not on medication  Relevant Orders    Microalbumin / creatinine urine ratio    Comprehensive metabolic panel    Hemoglobin A1C       Respiratory    Mild intermittent asthma without complication     No recent symptoms, has not used inhalers  Takes Singulair daily  Relevant Medications    montelukast (SINGULAIR) 10 mg tablet       Cardiovascular and Mediastinum    Essential hypertension     BP controlled on amlodipine  Relevant Orders    CBC and differential (Completed)    Comprehensive metabolic panel       Musculoskeletal and Integument    Degeneration of intervertebral disc of lumbar region     Stable, will think about MRI  Takes Tylenol 1000 mg bid, Percocet for severe pain  Genitourinary    CKD (chronic kidney disease), stage III ([facility] )     Repeat labs today  Not taking NSAIDs  Consider stopping PPI  Other    Acute pain of right shoulder     Suspect adhesive capsulitis  Recommend to start ROM exercises, continue Tylenol  No NSAIDs due to CKD  She would prefer to see Ortho first before starting physical therapy  Relevant Orders    XR shoulder 2+ vw right    Ambulatory referral to Orthopedic Surgery    Hyperlipidemia     Not on statin  Relevant Orders    Lipid panel    Mass of right submandibular region     Repeat ultrasound  Relevant Orders    US head neck soft tissue    Vitamin D deficiency    Relevant Orders    Vitamin D 25 hydroxy      Other Visit Diagnoses     Urinary symptom or sign    -  Primary    (+) UTI, start antibiotics      Relevant Medications    cephalexin (KEFLEX) 500 mg capsule Other Relevant Orders    POCT urine dip (Completed)    Mild persistent asthma without complication        Relevant Medications    montelukast (SINGULAIR) 10 mg tablet    Encounter for long-term current use of medication        Relevant Orders    Vitamin B12    Health maintenance examination        Declines further screening  Preventive health issues were discussed with patient, and age appropriate screening tests were ordered as noted in patient's After Visit Summary  Personalized health advice and appropriate referrals for health education or preventive services given if needed, as noted in patient's After Visit Summary       History of Present Illness:     Patient presents for Medicare Annual Wellness visit    Patient Care Team:  Arlene Amato MD as PCP - General  MD Yady Hidalgo DO Oliver Napoleon, MD Connye Perna, SILKE Jimenez (Vascular Surgery)     Problem List:     Patient Active Problem List   Diagnosis    Anxiety    Mild intermittent asthma without complication    Degeneration of intervertebral disc of lumbar region    Diverticulosis    Elevated serum alkaline phosphatase level    Esophageal reflux    Fatty liver    Hyperlipidemia    Essential hypertension    Hyperthyroidism    Insomnia    Irritable bowel syndrome    Spinal stenosis    Spondylolisthesis, grade 1    Transient ischemic attack    Type 2 diabetes mellitus ([address] )    Vitamin D deficiency    Muscle cramps    Ovarian cyst    CKD (chronic kidney disease), stage III ([address] )    Trigger finger of right hand    Pain in both lower extremities    Radiculopathy, lumbar region    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 ([address]    PAD (peripheral artery disease) ([address]    Memory loss    Atherosclerosis 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 ([address] )    Thyroid nodule    Mass of right submandibular region   Tarah Obrien Abnormal finding on imaging    Acute pain of right shoulder      Past Medical and Surgical History:     Past Medical History:   Diagnosis Date    Asthma     Chronic interstitial cystitis     Community acquired pneumonia     last assessed 10/4/13    Diabetes mellitus ([address] )     Disease of thyroid gland     Diverticulitis     GERD (gastroesophageal reflux disease)     Hyperlipidemia     Hypertension      Past Surgical History:   Procedure Laterality Date    CHOLECYSTECTOMY      COLON SURGERY      partial colectomy - sigmoid, diverticulitis    EYE SURGERY      HYSTERECTOMY      ovaries remain    US GUIDED THYROID BIOPSY  11/13/2019      Family History:     Family History   Problem Relation Age of Onset    Coronary artery disease Mother     Other Father         MVA    Alcohol abuse Neg Hx     Depression Neg Hx     Drug abuse Neg Hx     Substance Abuse Neg Hx     Mental illness Neg Hx       Social History:     Social History     Socioeconomic History    Marital status:       Spouse name: None    Number of children: 4    Years of education: None    Highest education level: None   Occupational History     Comment: retired   Social Needs    Financial resource strain: None    Food insecurity:     Worry: None     Inability: None    Transportation needs:     Medical: None     Non-medical: None   Tobacco Use    Smoking status: Never Smoker    Smokeless tobacco: Never Used   Substance and Sexual Activity    Alcohol use: No    Drug use: No    Sexual activity: None   Lifestyle    Physical activity:     Days per week: None     Minutes per session: None    Stress: None   Relationships    Social connections:     Talks on phone: None     Gets together: None     Attends [employer] service: None     Active member of club or organization: None     Attends meetings of clubs or organizations: None     Relationship status: None    Intimate partner violence:     Fear of current or ex partner: None Emotionally abused: None     Physically abused: None     Forced sexual activity: None   Other Topics Concern    None   Social History Narrative    Lives alone    Daughter in the area       Medications and Allergies:     Current Outpatient Medications   Medication Sig Dispense Refill    Albuterol Sulfate (VENTOLIN HFA IN) Inhale daily as needed      amLODIPine (NORVASC) 5 mg tablet TAKE 1 TABLET BY MOUTH EVERY DAY 90 tablet 1    b complex vitamins capsule Take 1 capsule by mouth daily      Cholecalciferol (VITAMIN D PO) Take 1 tablet by mouth daily      dicyclomine (BENTYL) 10 mg capsule TAKE 1 CAPSULE (10 MG TOTAL) BY MOUTH 3 (THREE) TIMES A DAY AS NEEDED (CRAMPING) 270 capsule 0    fluticasone (FLOVENT HFA) 110 MCG/ACT inhaler Inhale 1 puff 2 (two) times a day      hydrOXYzine HCL (ATARAX) 25 mg tablet Take 1 tablet (25 mg total) by mouth every 6 (six) hours as needed for itching 15 tablet 0    LORazepam (ATIVAN) 0 5 mg tablet Take 1/2 tablet to 1 tablet PO daily PRN 30 tablet 0    meclizine (ANTIVERT) 25 mg tablet Take 1 tablet (25 mg total) by mouth 3 (three) times a day as needed for dizziness 90 tablet 0    methimazole (TAPAZOLE) 5 mg tablet 1/2 tab daily 30 tablet 0    montelukast (SINGULAIR) 10 mg tablet Take 1 tablet (10 mg total) by mouth daily at bedtime 90 tablet 1    omeprazole (PriLOSEC) 20 mg delayed release capsule Take 1 capsule (20 mg total) by mouth daily before breakfast 90 capsule 1    oxyCODONE-acetaminophen (PERCOCET) 5-325 mg per tablet Take 1 tablet by mouth 2 (two) times a day as needed for moderate painMax Daily Amount: 2 tablets 60 tablet 0    cephalexin (KEFLEX) 500 mg capsule Take 1 capsule (500 mg total) by mouth every 12 (twelve) hours for 5 days 10 capsule 0     No current facility-administered medications for this visit        Allergies   Allergen Reactions    Bactrim [Sulfamethoxazole-Trimethoprim]     Ezetimibe     Gabapentin     Lisinopril     Naproxen Nausea Only    Statins     Triazolam Other (See Comments)     confusion      Immunizations:     Immunization History   Administered Date(s) Administered    H1N1, All Formulations 10/18/2002, 12/07/2006    Influenza Quadrivalent Preservative Free 3 years and older IM 07/26/2016    Pneumococcal Conjugate 13-Valent 03/16/2015    Pneumococcal Polysaccharide PPV23 02/22/1999, 09/23/2011    Tdap 09/05/2019      Health Maintenance: There are no preventive care reminders to display for this patient  Topic Date Due    Hepatitis B Vaccine (1 of 3 - Risk 3-dose series) 07/07/1950      Medicare Health Risk Assessment:     /70   Pulse 80   Temp 98 3 °F (36 8 °C)   Resp 18   Ht 5' (1 524 m)   Wt 58 9 kg (129 lb 12 8 oz)   SpO2 98%   BMI 25 35 kg/m²      Meliza Pay is here for her Subsequent Wellness visit  Last Medicare Wellness visit information reviewed, patient interviewed and updates made to the record today  Health Risk Assessment:   Patient rates overall health as good  Patient feels that their physical health rating is same  Eyesight was rated as same  Hearing was rated as same  Patient feels that their emotional and mental health rating is same  Pain experienced in the last 7 days has been a lot  Patient's pain rating has been 8/10  Patient states that she has experienced no weight loss or gain in last 6 months  Depression Screening:   PHQ-2 Score: 0      Fall Risk Screening: In the past year, patient has experienced: no history of falling in past year      Urinary Incontinence Screening:   Patient has not leaked urine accidently in the last six months  Home Safety:  Patient does not have trouble with stairs inside or outside of their home  Patient has working smoke alarms and has working carbon monoxide detector  Home safety hazards include: none  Nutrition:   Current diet is Regular  Medications:   Patient is currently taking over-the-counter supplements   OTC medications include: see medication list  Patient is able to manage medications  Activities of Daily Living (ADLs)/Instrumental Activities of Daily Living (IADLs):   Walk and transfer into and out of bed and chair?: Yes  Dress and groom yourself?: Yes    Bathe or shower yourself?: Yes    Feed yourself? Yes  Do your laundry/housekeeping?: Yes  Manage your money, pay your bills and track your expenses?: Yes  Make your own meals?: Yes    Do your own shopping?: Yes    Previous Hospitalizations:   Any hospitalizations or ED visits within the last 12 months?: No      Advance Care Planning:   Living will: No    Durable POA for healthcare: No    Advanced directive: No    End of Life Decisions reviewed with patient: Yes      Cognitive Screening:   Provider or family/friend/caregiver concerned regarding cognition?: No    PREVENTIVE SCREENINGS      Cardiovascular Screening:    General: History Lipid Disorder    Due for: Lipid Panel      Diabetes Screening:     General: History Diabetes and Screening Current      Colorectal Cancer Screening:     General: Screening Not Indicated      Breast Cancer Screening:     General: Patient Declines      Cervical Cancer Screening:    General: Screening Not Indicated      Osteoporosis Screening:    General: Patient Declines      Abdominal Aortic Aneurysm (AAA) Screening:        General: Screening Not Indicated      Lung Cancer Screening:     General: Screening Not Indicated      Hepatitis C Screening:    General: Screening Not Indicated    Other Counseling Topics:   Regular weightbearing exercise         Donna Kruse MD

## 2020-01-29 ENCOUNTER — HOSPITAL ENCOUNTER (OUTPATIENT)
Dept: ULTRASOUND IMAGING | Facility: HOSPITAL | Age: 85
Discharge: HOME | End: 2020-01-29
Payer: MEDICARE

## 2020-01-29 ENCOUNTER — HOSPITAL ENCOUNTER (OUTPATIENT)
Dept: RADIOLOGY | Facility: HOSPITAL | Age: 85
Discharge: HOME | End: 2020-01-29
Payer: MEDICARE

## 2020-01-29 DIAGNOSIS — R22.0 MASS OF RIGHT SUBMANDIBULAR REGION: ICD-10-CM

## 2020-01-29 DIAGNOSIS — M25.511 ACUTE PAIN OF RIGHT SHOULDER: ICD-10-CM

## 2020-01-29 PROCEDURE — 76536 US EXAM OF HEAD AND NECK: CPT

## 2020-01-29 PROCEDURE — 73030 X-RAY EXAM OF SHOULDER: CPT

## 2020-02-03 ENCOUNTER — TELEPHONE (OUTPATIENT)
Dept: INTERNAL MEDICINE CLINIC | Facility: CLINIC | Age: 85
End: 2020-02-03

## 2020-02-03 DIAGNOSIS — R22.0 MASS OF RIGHT SUBMANDIBULAR REGION: Primary | ICD-10-CM

## 2020-02-03 NOTE — TELEPHONE ENCOUNTER
Neck ultrasound showed slightly enlarged lymph node (right jaw area)  I would like you to see ENT to determine if this needs to be biopsied    Ok to send referral?    Shoulder x-ray was normal

## 2020-02-05 ENCOUNTER — CONSULT (OUTPATIENT)
Dept: OBGYN CLINIC | Facility: CLINIC | Age: 85
End: 2020-02-05
Payer: MEDICARE

## 2020-02-05 VITALS
WEIGHT: 129 LBS | BODY MASS INDEX: 25.32 KG/M2 | SYSTOLIC BLOOD PRESSURE: 149 MMHG | HEART RATE: 80 BPM | DIASTOLIC BLOOD PRESSURE: 66 MMHG | HEIGHT: 60 IN

## 2020-02-05 DIAGNOSIS — M25.511 CHRONIC RIGHT SHOULDER PAIN: Primary | ICD-10-CM

## 2020-02-05 DIAGNOSIS — G89.29 CHRONIC RIGHT SHOULDER PAIN: Primary | ICD-10-CM

## 2020-02-05 DIAGNOSIS — M67.911 ROTATOR CUFF DYSFUNCTION, RIGHT: ICD-10-CM

## 2020-02-05 PROCEDURE — 20610 DRAIN/INJ JOINT/BURSA W/O US: CPT | Performed by: ORTHOPAEDIC SURGERY

## 2020-02-05 PROCEDURE — 99203 OFFICE O/P NEW LOW 30 MIN: CPT | Performed by: ORTHOPAEDIC SURGERY

## 2020-02-05 PROCEDURE — 3044F HG A1C LEVEL LT 7.0%: CPT | Performed by: ORTHOPAEDIC SURGERY

## 2020-02-05 RX ORDER — METHYLPREDNISOLONE ACETATE 40 MG/ML
1 INJECTION, SUSPENSION INTRA-ARTICULAR; INTRALESIONAL; INTRAMUSCULAR; SOFT TISSUE
Status: COMPLETED | OUTPATIENT
Start: 2020-02-05 | End: 2020-02-05

## 2020-02-05 RX ORDER — LIDOCAINE HYDROCHLORIDE 10 MG/ML
4 INJECTION, SOLUTION INFILTRATION; PERINEURAL
Status: COMPLETED | OUTPATIENT
Start: 2020-02-05 | End: 2020-02-05

## 2020-02-05 RX ADMIN — LIDOCAINE HYDROCHLORIDE 4 ML: 10 INJECTION, SOLUTION INFILTRATION; PERINEURAL at 12:14

## 2020-02-05 RX ADMIN — METHYLPREDNISOLONE ACETATE 1 ML: 40 INJECTION, SUSPENSION INTRA-ARTICULAR; INTRALESIONAL; INTRAMUSCULAR; SOFT TISSUE at 12:14
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
## 2020-02-10 DIAGNOSIS — I10 ESSENTIAL HYPERTENSION: ICD-10-CM

## 2020-02-10 RX ORDER — AMLODIPINE BESYLATE 5 MG/1
TABLET ORAL
Qty: 90 TABLET | Refills: 1 | Status: SHIPPED | OUTPATIENT
Start: 2020-02-10 | End: 2020-08-17 | Stop reason: SDUPTHER

## 2020-02-21 ENCOUNTER — TRANSCRIBE ORDERS (OUTPATIENT)
Dept: ADMINISTRATIVE | Facility: HOSPITAL | Age: 85
End: 2020-02-21

## 2020-02-21 DIAGNOSIS — R59.0 SUBMANDIBULAR LYMPHADENOPATHY: Primary | ICD-10-CM

## 2020-02-28 DIAGNOSIS — M51.36 DEGENERATION OF INTERVERTEBRAL DISC OF LUMBAR REGION: ICD-10-CM

## 2020-02-28 DIAGNOSIS — R42 VERTIGO: ICD-10-CM

## 2020-02-28 RX ORDER — OXYCODONE HYDROCHLORIDE AND ACETAMINOPHEN 5; 325 MG/1; MG/1
1 TABLET ORAL 2 TIMES DAILY PRN
Qty: 60 TABLET | Refills: 0 | Status: SHIPPED | OUTPATIENT
Start: 2020-02-28 | End: 2020-04-13 | Stop reason: SDUPTHER

## 2020-02-28 RX ORDER — MECLIZINE HYDROCHLORIDE 25 MG/1
25 TABLET ORAL 3 TIMES DAILY PRN
Qty: 90 TABLET | Refills: 0 | Status: SHIPPED | OUTPATIENT
Start: 2020-02-28 | End: 2020-04-13 | Stop reason: SDUPTHER

## 2020-03-11 ENCOUNTER — HOSPITAL ENCOUNTER (OUTPATIENT)
Dept: RADIOLOGY | Facility: HOSPITAL | Age: 85
Discharge: HOME | End: 2020-03-11
Payer: MEDICARE

## 2020-03-11 DIAGNOSIS — R59.0 SUBMANDIBULAR LYMPHADENOPATHY: ICD-10-CM

## 2020-03-11 PROCEDURE — 88173 CYTOPATH EVAL FNA REPORT: CPT | Performed by: PATHOLOGY

## 2020-03-11 PROCEDURE — 88172 CYTP DX EVAL FNA 1ST EA SITE: CPT | Performed by: PATHOLOGY

## 2020-03-11 PROCEDURE — 10005 FNA BX W/US GDN 1ST LES: CPT

## 2020-03-11 RX ORDER — LIDOCAINE HYDROCHLORIDE 10 MG/ML
3 INJECTION, SOLUTION EPIDURAL; INFILTRATION; INTRACAUDAL; PERINEURAL ONCE
Status: COMPLETED | OUTPATIENT
Start: 2020-03-11 | End: 2020-03-11

## 2020-03-11 RX ADMIN — LIDOCAINE HYDROCHLORIDE 3 ML: 10 INJECTION, SOLUTION EPIDURAL; INFILTRATION; INTRACAUDAL; PERINEURAL at 10:24

## 2020-03-12 LAB — SCAN RESULT: NORMAL

## 2020-03-17 ENCOUNTER — OFFICE VISIT (OUTPATIENT)
Dept: OBGYN CLINIC | Facility: CLINIC | Age: 85
End: 2020-03-17
Payer: MEDICARE

## 2020-03-17 VITALS
HEART RATE: 71 BPM | SYSTOLIC BLOOD PRESSURE: 145 MMHG | DIASTOLIC BLOOD PRESSURE: 67 MMHG | BODY MASS INDEX: 25.13 KG/M2 | HEIGHT: 60 IN | WEIGHT: 128 LBS

## 2020-03-17 DIAGNOSIS — M75.101 ROTATOR CUFF SYNDROME OF RIGHT SHOULDER: Primary | ICD-10-CM

## 2020-03-17 PROCEDURE — 1160F RVW MEDS BY RX/DR IN RCRD: CPT | Performed by: ORTHOPAEDIC SURGERY

## 2020-03-17 PROCEDURE — 3077F SYST BP >= 140 MM HG: CPT | Performed by: ORTHOPAEDIC SURGERY

## 2020-03-17 PROCEDURE — 3008F BODY MASS INDEX DOCD: CPT | Performed by: ORTHOPAEDIC SURGERY

## 2020-03-17 PROCEDURE — 4040F PNEUMOC VAC/ADMIN/RCVD: CPT | Performed by: ORTHOPAEDIC SURGERY

## 2020-03-17 PROCEDURE — 3066F NEPHROPATHY DOC TX: CPT | Performed by: ORTHOPAEDIC SURGERY

## 2020-03-17 PROCEDURE — 3044F HG A1C LEVEL LT 7.0%: CPT | Performed by: ORTHOPAEDIC SURGERY

## 2020-03-17 PROCEDURE — 20610 DRAIN/INJ JOINT/BURSA W/O US: CPT | Performed by: ORTHOPAEDIC SURGERY

## 2020-03-17 PROCEDURE — 1036F TOBACCO NON-USER: CPT | Performed by: ORTHOPAEDIC SURGERY

## 2020-03-17 PROCEDURE — 3078F DIAST BP <80 MM HG: CPT | Performed by: ORTHOPAEDIC SURGERY

## 2020-03-17 PROCEDURE — 99213 OFFICE O/P EST LOW 20 MIN: CPT | Performed by: ORTHOPAEDIC SURGERY

## 2020-03-17 RX ORDER — METHYLPREDNISOLONE ACETATE 40 MG/ML
1 INJECTION, SUSPENSION INTRA-ARTICULAR; INTRALESIONAL; INTRAMUSCULAR; SOFT TISSUE
Status: COMPLETED | OUTPATIENT
Start: 2020-03-17 | End: 2020-03-17

## 2020-03-17 RX ORDER — LIDOCAINE HYDROCHLORIDE 10 MG/ML
4 INJECTION, SOLUTION INFILTRATION; PERINEURAL
Status: COMPLETED | OUTPATIENT
Start: 2020-03-17 | End: 2020-03-17

## 2020-03-17 RX ADMIN — METHYLPREDNISOLONE ACETATE 1 ML: 40 INJECTION, SUSPENSION INTRA-ARTICULAR; INTRALESIONAL; INTRAMUSCULAR; SOFT TISSUE at 11:37

## 2020-03-17 RX ADMIN — LIDOCAINE HYDROCHLORIDE 4 ML: 10 INJECTION, SOLUTION INFILTRATION; PERINEURAL at 11:37

## 2020-03-17 NOTE — PROGRESS NOTES
Patient Name:  Katja Castro  MRN:  [id number]    Assessment & Plan     Right shoulder rotator cuff tendinitis/bursitis  1  Steroid injection performed today into the right shoulder subacromial space  2  Activities as tolerated with modification to avoid pain  3  Follow-up as needed  Briefly discussed referral to physical therapy if pain persist     Subjective     80year-old female returns to the office today for follow-up regarding her right shoulder  She was initially seen on 2/5/20  At that time she received a steroid injection into the right shoulder subacromial space  She noted significant relief in the right shoulder up until a week or so ago  She notes return of her pain  She states the pain is localized to the lateral aspect of the shoulder with radiation distally to the elbow  She feels her pain is returned due to overdoing it  She denies any injury or trauma  Pain is worse with increased activities and lifting  She denies any weakness or instability  No numbness or tingling  No fevers or chills  General ROS:  Negative for fever or chills  Neurological ROS:  Negative for numbness or tingling  Objective     /67   Pulse 71   Ht 5' (1 524 m)   Wt 58 1 kg (128 lb)   BMI 25 00 kg/m²     Right shoulder:  No gross deformity  Tenderness to palpation lateral shoulder  No tenderness to palpation biceps insertion site and AC joint  Passive range of motion includes 150° of forward flexion, 90° of external rotation-abduction, 50° of internal rotation-abduction  Pain noted with terminal external rotation-abduction  Positive Celaya impingement test   Pain but no weakness with empty can and speed's test   Sensation intact axillary, median, ulnar and radial nerves  2+ radial pulse  Appropriate mood and affect      Large joint arthrocentesis: R subacromial bursa  Date/Time: 3/17/2020 11:37 AM  Procedure Details  Location: shoulder - R subacromial bursa  Needle size: 22 G  Ultrasound guidance: no  Approach: lateral  Medications administered: 4 mL lidocaine 1 %; 1 mL methylPREDNISolone acetate 40 mg/mL    Patient tolerance: patient tolerated the procedure well with no immediate complications  Dressing:  Sterile dressing applied            Social History     Tobacco Use    Smoking status: Never Smoker    Smokeless tobacco: Never Used   Substance Use Topics    Alcohol use: No    Drug use: No       Scribe Attestation    I,:   Lia Deleon PA-C am acting as a scribe while in the presence of the attending physician :        I,:   Diony Oliva MD personally performed the services described in this documentation    as scribed in my presence :

## 2020-04-13 DIAGNOSIS — M51.36 DEGENERATION OF INTERVERTEBRAL DISC OF LUMBAR REGION: ICD-10-CM

## 2020-04-13 DIAGNOSIS — F41.9 ANXIETY: ICD-10-CM

## 2020-04-13 DIAGNOSIS — R42 VERTIGO: ICD-10-CM

## 2020-04-13 DIAGNOSIS — K21.9 GASTROESOPHAGEAL REFLUX DISEASE, ESOPHAGITIS PRESENCE NOT SPECIFIED: ICD-10-CM

## 2020-04-13 RX ORDER — OXYCODONE HYDROCHLORIDE AND ACETAMINOPHEN 5; 325 MG/1; MG/1
1 TABLET ORAL 2 TIMES DAILY PRN
Qty: 60 TABLET | Refills: 0 | Status: SHIPPED | OUTPATIENT
Start: 2020-04-13 | End: 2020-07-16 | Stop reason: SDUPTHER

## 2020-04-13 RX ORDER — LORAZEPAM 0.5 MG/1
TABLET ORAL
Qty: 30 TABLET | Refills: 0 | Status: SHIPPED | OUTPATIENT
Start: 2020-04-13 | End: 2020-07-16 | Stop reason: SDUPTHER

## 2020-04-13 RX ORDER — MECLIZINE HYDROCHLORIDE 25 MG/1
25 TABLET ORAL 3 TIMES DAILY PRN
Qty: 90 TABLET | Refills: 0 | Status: SHIPPED | OUTPATIENT
Start: 2020-04-13 | End: 2020-06-03

## 2020-04-13 RX ORDER — OMEPRAZOLE 20 MG/1
20 CAPSULE, DELAYED RELEASE ORAL
Qty: 90 CAPSULE | Refills: 1 | Status: SHIPPED | OUTPATIENT
Start: 2020-04-13 | End: 2020-07-16 | Stop reason: SDUPTHER

## 2020-05-01 ENCOUNTER — TELEMEDICINE (OUTPATIENT)
Dept: INTERNAL MEDICINE CLINIC | Facility: CLINIC | Age: 85
End: 2020-05-01
Payer: MEDICARE

## 2020-05-01 VITALS
SYSTOLIC BLOOD PRESSURE: 154 MMHG | DIASTOLIC BLOOD PRESSURE: 66 MMHG | BODY MASS INDEX: 25.13 KG/M2 | WEIGHT: 128 LBS | HEIGHT: 60 IN

## 2020-05-01 DIAGNOSIS — N18.30 CKD (CHRONIC KIDNEY DISEASE), STAGE III (HCC): ICD-10-CM

## 2020-05-01 DIAGNOSIS — F41.9 ANXIETY: ICD-10-CM

## 2020-05-01 DIAGNOSIS — E05.90 HYPERTHYROIDISM: ICD-10-CM

## 2020-05-01 DIAGNOSIS — I10 ESSENTIAL HYPERTENSION: ICD-10-CM

## 2020-05-01 DIAGNOSIS — R06.02 SHORTNESS OF BREATH: Primary | ICD-10-CM

## 2020-05-01 DIAGNOSIS — J45.20 MILD INTERMITTENT ASTHMA WITHOUT COMPLICATION: ICD-10-CM

## 2020-05-01 DIAGNOSIS — E11.22 TYPE 2 DIABETES MELLITUS WITH STAGE 3 CHRONIC KIDNEY DISEASE AND HYPERTENSION (HCC): ICD-10-CM

## 2020-05-01 DIAGNOSIS — J45.30 MILD PERSISTENT ASTHMA WITHOUT COMPLICATION: ICD-10-CM

## 2020-05-01 DIAGNOSIS — K21.9 GASTROESOPHAGEAL REFLUX DISEASE, ESOPHAGITIS PRESENCE NOT SPECIFIED: ICD-10-CM

## 2020-05-01 DIAGNOSIS — M67.911 ROTATOR CUFF DISORDER, RIGHT: ICD-10-CM

## 2020-05-01 DIAGNOSIS — I73.9 PAD (PERIPHERAL ARTERY DISEASE) (HCC): ICD-10-CM

## 2020-05-01 DIAGNOSIS — M51.36 DEGENERATION OF INTERVERTEBRAL DISC OF LUMBAR REGION: ICD-10-CM

## 2020-05-01 DIAGNOSIS — R22.0 MASS OF RIGHT SUBMANDIBULAR REGION: ICD-10-CM

## 2020-05-01 DIAGNOSIS — I12.9 TYPE 2 DIABETES MELLITUS WITH STAGE 3 CHRONIC KIDNEY DISEASE AND HYPERTENSION (HCC): ICD-10-CM

## 2020-05-01 DIAGNOSIS — N18.30 TYPE 2 DIABETES MELLITUS WITH STAGE 3 CHRONIC KIDNEY DISEASE AND HYPERTENSION (HCC): ICD-10-CM

## 2020-05-01 PROCEDURE — 99214 OFFICE O/P EST MOD 30 MIN: CPT | Performed by: INTERNAL MEDICINE

## 2020-05-01 RX ORDER — MONTELUKAST SODIUM 10 MG/1
10 TABLET ORAL
Qty: 90 TABLET | Refills: 1 | Status: SHIPPED | OUTPATIENT
Start: 2020-05-01 | End: 2020-10-26 | Stop reason: SDUPTHER

## 2020-06-03 DIAGNOSIS — R42 VERTIGO: ICD-10-CM

## 2020-06-03 RX ORDER — MECLIZINE HYDROCHLORIDE 25 MG/1
TABLET ORAL
Qty: 90 TABLET | Refills: 0 | Status: SHIPPED | OUTPATIENT
Start: 2020-06-03 | End: 2020-07-23 | Stop reason: SDUPTHER

## 2020-06-16 ENCOUNTER — HOSPITAL ENCOUNTER (OUTPATIENT)
Dept: NON INVASIVE DIAGNOSTICS | Facility: HOSPITAL | Age: 85
Discharge: HOME | End: 2020-06-16
Payer: MEDICARE

## 2020-06-16 DIAGNOSIS — I70.213 ATHEROSCLEROSIS OF NATIVE ARTERY OF BOTH LOWER EXTREMITIES WITH INTERMITTENT CLAUDICATION (HCC): ICD-10-CM

## 2020-06-16 PROCEDURE — 93923 UPR/LXTR ART STDY 3+ LVLS: CPT

## 2020-06-16 PROCEDURE — 93925 LOWER EXTREMITY STUDY: CPT | Performed by: SURGERY

## 2020-06-16 PROCEDURE — 93922 UPR/L XTREMITY ART 2 LEVELS: CPT | Performed by: SURGERY

## 2020-06-16 PROCEDURE — 93925 LOWER EXTREMITY STUDY: CPT

## 2020-06-19 ENCOUNTER — APPOINTMENT (OUTPATIENT)
Dept: LAB | Facility: CLINIC | Age: 85
End: 2020-06-19
Payer: MEDICARE

## 2020-06-19 ENCOUNTER — TRANSCRIBE ORDERS (OUTPATIENT)
Dept: LAB | Facility: CLINIC | Age: 85
End: 2020-06-19

## 2020-06-19 DIAGNOSIS — E11.9 DIABETES MELLITUS WITHOUT COMPLICATION (HCC): ICD-10-CM

## 2020-06-19 DIAGNOSIS — E04.9 ENLARGEMENT OF THYROID: ICD-10-CM

## 2020-06-19 DIAGNOSIS — E05.90 PRETIBIAL MYXEDEMA: ICD-10-CM

## 2020-06-19 DIAGNOSIS — E06.3 CHRONIC LYMPHOCYTIC THYROIDITIS: ICD-10-CM

## 2020-06-19 DIAGNOSIS — E04.9 ENLARGEMENT OF THYROID: Primary | ICD-10-CM

## 2020-06-19 LAB
ALBUMIN SERPL BCP-MCNC: 4.1 G/DL (ref 3.5–5)
ALP SERPL-CCNC: 87 U/L (ref 46–116)
ALT SERPL W P-5'-P-CCNC: 22 U/L (ref 12–78)
ANION GAP SERPL CALCULATED.3IONS-SCNC: 9 MMOL/L (ref 4–13)
AST SERPL W P-5'-P-CCNC: 19 U/L (ref 5–45)
BASOPHILS # BLD AUTO: 0.03 THOUSANDS/ΜL (ref 0–0.1)
BASOPHILS NFR BLD AUTO: 0 % (ref 0–1)
BILIRUB SERPL-MCNC: 0.64 MG/DL (ref 0.2–1)
BUN SERPL-MCNC: 16 MG/DL (ref 5–25)
CALCIUM SERPL-MCNC: 9.9 MG/DL (ref 8.3–10.1)
CHLORIDE SERPL-SCNC: 101 MMOL/L (ref 100–108)
CO2 SERPL-SCNC: 28 MMOL/L (ref 21–32)
CREAT SERPL-MCNC: 1.1 MG/DL (ref 0.6–1.3)
EOSINOPHIL # BLD AUTO: 0.02 THOUSAND/ΜL (ref 0–0.61)
EOSINOPHIL NFR BLD AUTO: 0 % (ref 0–6)
ERYTHROCYTE [DISTWIDTH] IN BLOOD BY AUTOMATED COUNT: 13.4 % (ref 11.6–15.1)
EST. AVERAGE GLUCOSE BLD GHB EST-MCNC: 108 MG/DL
GFR SERPL CREATININE-BSD FRML MDRD: 45 ML/MIN/1.73SQ M
GLUCOSE P FAST SERPL-MCNC: 107 MG/DL (ref 65–99)
HBA1C MFR BLD: 5.4 %
HCT VFR BLD AUTO: 41 % (ref 34.8–46.1)
HGB BLD-MCNC: 13.3 G/DL (ref 11.5–15.4)
IMM GRANULOCYTES # BLD AUTO: 0.02 THOUSAND/UL (ref 0–0.2)
IMM GRANULOCYTES NFR BLD AUTO: 0 % (ref 0–2)
LYMPHOCYTES # BLD AUTO: 2.18 THOUSANDS/ΜL (ref 0.6–4.47)
LYMPHOCYTES NFR BLD AUTO: 32 % (ref 14–44)
MCH RBC QN AUTO: 29.8 PG (ref 26.8–34.3)
MCHC RBC AUTO-ENTMCNC: 32.4 G/DL (ref 31.4–37.4)
MCV RBC AUTO: 92 FL (ref 82–98)
MONOCYTES # BLD AUTO: 0.56 THOUSAND/ΜL (ref 0.17–1.22)
MONOCYTES NFR BLD AUTO: 8 % (ref 4–12)
NEUTROPHILS # BLD AUTO: 3.94 THOUSANDS/ΜL (ref 1.85–7.62)
NEUTS SEG NFR BLD AUTO: 60 % (ref 43–75)
NRBC BLD AUTO-RTO: 0 /100 WBCS
PLATELET # BLD AUTO: 329 THOUSANDS/UL (ref 149–390)
PMV BLD AUTO: 9.3 FL (ref 8.9–12.7)
POTASSIUM SERPL-SCNC: 3.9 MMOL/L (ref 3.5–5.3)
PROT SERPL-MCNC: 7.5 G/DL (ref 6.4–8.2)
RBC # BLD AUTO: 4.47 MILLION/UL (ref 3.81–5.12)
SODIUM SERPL-SCNC: 138 MMOL/L (ref 136–145)
T4 FREE SERPL-MCNC: 1.06 NG/DL (ref 0.76–1.46)
TSH SERPL DL<=0.05 MIU/L-ACNC: 3.77 UIU/ML (ref 0.36–3.74)
WBC # BLD AUTO: 6.75 THOUSAND/UL (ref 4.31–10.16)

## 2020-06-19 PROCEDURE — 84443 ASSAY THYROID STIM HORMONE: CPT | Performed by: INTERNAL MEDICINE

## 2020-06-19 PROCEDURE — 85025 COMPLETE CBC W/AUTO DIFF WBC: CPT

## 2020-06-19 PROCEDURE — 83036 HEMOGLOBIN GLYCOSYLATED A1C: CPT | Performed by: INTERNAL MEDICINE

## 2020-06-19 PROCEDURE — 84439 ASSAY OF FREE THYROXINE: CPT | Performed by: INTERNAL MEDICINE

## 2020-06-19 PROCEDURE — 80053 COMPREHEN METABOLIC PANEL: CPT

## 2020-06-19 PROCEDURE — 36415 COLL VENOUS BLD VENIPUNCTURE: CPT | Performed by: INTERNAL MEDICINE

## 2020-06-22 ENCOUNTER — TELEPHONE (OUTPATIENT)
Dept: INTERNAL MEDICINE CLINIC | Facility: CLINIC | Age: 85
End: 2020-06-22

## 2020-06-30 ENCOUNTER — TELEPHONE (OUTPATIENT)
Dept: VASCULAR SURGERY | Facility: CLINIC | Age: 85
End: 2020-06-30

## 2020-07-01 ENCOUNTER — OFFICE VISIT (OUTPATIENT)
Dept: VASCULAR SURGERY | Facility: CLINIC | Age: 85
End: 2020-07-01
Payer: MEDICARE

## 2020-07-01 VITALS
HEART RATE: 56 BPM | WEIGHT: 125 LBS | DIASTOLIC BLOOD PRESSURE: 62 MMHG | HEIGHT: 60 IN | BODY MASS INDEX: 24.54 KG/M2 | TEMPERATURE: 97.2 F | SYSTOLIC BLOOD PRESSURE: 118 MMHG

## 2020-07-01 DIAGNOSIS — E11.22 TYPE 2 DIABETES MELLITUS WITH STAGE 3 CHRONIC KIDNEY DISEASE AND HYPERTENSION (HCC): ICD-10-CM

## 2020-07-01 DIAGNOSIS — N18.30 TYPE 2 DIABETES MELLITUS WITH STAGE 3 CHRONIC KIDNEY DISEASE AND HYPERTENSION (HCC): ICD-10-CM

## 2020-07-01 DIAGNOSIS — I12.9 TYPE 2 DIABETES MELLITUS WITH STAGE 3 CHRONIC KIDNEY DISEASE AND HYPERTENSION (HCC): ICD-10-CM

## 2020-07-01 DIAGNOSIS — I10 ESSENTIAL HYPERTENSION: ICD-10-CM

## 2020-07-01 DIAGNOSIS — I73.9 PAD (PERIPHERAL ARTERY DISEASE) (HCC): Primary | ICD-10-CM

## 2020-07-01 DIAGNOSIS — I70.213 ATHEROSCLEROSIS OF NATIVE ARTERY OF BOTH LOWER EXTREMITIES WITH INTERMITTENT CLAUDICATION (HCC): ICD-10-CM

## 2020-07-01 PROCEDURE — 3074F SYST BP LT 130 MM HG: CPT | Performed by: PHYSICIAN ASSISTANT

## 2020-07-01 PROCEDURE — 4040F PNEUMOC VAC/ADMIN/RCVD: CPT | Performed by: PHYSICIAN ASSISTANT

## 2020-07-01 PROCEDURE — 3044F HG A1C LEVEL LT 7.0%: CPT | Performed by: PHYSICIAN ASSISTANT

## 2020-07-01 PROCEDURE — 1160F RVW MEDS BY RX/DR IN RCRD: CPT | Performed by: PHYSICIAN ASSISTANT

## 2020-07-01 PROCEDURE — 3078F DIAST BP <80 MM HG: CPT | Performed by: PHYSICIAN ASSISTANT

## 2020-07-01 PROCEDURE — 3066F NEPHROPATHY DOC TX: CPT | Performed by: PHYSICIAN ASSISTANT

## 2020-07-01 PROCEDURE — 99214 OFFICE O/P EST MOD 30 MIN: CPT | Performed by: PHYSICIAN ASSISTANT

## 2020-07-01 PROCEDURE — 1036F TOBACCO NON-USER: CPT | Performed by: PHYSICIAN ASSISTANT

## 2020-07-01 PROCEDURE — 3008F BODY MASS INDEX DOCD: CPT | Performed by: PHYSICIAN ASSISTANT

## 2020-07-01 NOTE — PATIENT INSTRUCTIONS
Peripheral Arterial Disease    Pain more consistent with spinal stenosis or musculoskeletal cause  -Regular exercise and walking as tolerated  -Follow good, heart healthy, diabetic  -Maintain healthy weight   -Consider aspirin 81  -Check feet and legs daily   -Consider physical therapy  -Call or return sooner for increased pain in your legs; constant numbness, tingling or coldness in the legs; or if you develop wounds on your toes/feet that do not heal      Raising your legs periodically during the day can help with swelling  Continue with measures that help your legs feel better with [employer] and pain relievers  You can try heat or ice  Also, consider physical therapy and evaluation by spine or pain management  Talk to your family doctor to consider Lidocaine patch to the RIGHT thigh         KENYATTA 6/16/2020  R 1 06/162/100; mild fem-pop and tibioperoneal disease w/o focal stenosis  L 1 07/134/112; mild fem-pop and tibioperoneal disease w/o focal stenosis

## 2020-07-01 NOTE — PROGRESS NOTES
Assessment/Plan:    Peripheral arterial disease   81 y/o F  Hypertension, hyperlipidemia, diabetes mellitus 2,  CKD III and spinal stenosis who is seen in follow-up for peripheral arterial disease      -B leg pain "all the time"  -pain on standing pain; pain the worst at night with turning  -nothing helps for long  -ambulatory dyfx    KENYATTA 6/16/2020  R 1 06/162/100; mild fem-pop and tibioperoneal disease w/o focal stenosis  L  1 07/134/112; mild fem-pop and tibioperoneal disease w/o focal stenosis    Discussion:    Patient with mild, diffuse bilateral peripheral arterial disease  She complains of severe bilateral leg pain at rest and with activity  Her symptoms and examination are not consistent with arterial etiology  We reviewed her lower extremity arterial duplex study which is essentially unchanged from last year with overall normal flow  Consider spinal stenosis or musculoskeletal etiology as cause for her ongoing pain  I suggested she consider spine physician or pain management physician, in addition to physical therapy  She can continue measures that help her feel better  Also, perhaps a lido patch to the R hip      - Patient education for PAD and healthy lifestyle were provided  - Consider PT to improve walking    - Consider ASA 81 which was recommended last year  - We discussed symptoms that would be concerning for which she should follow-up with vascular  Spinal stenosis  - recommended follow-up with spine physician  - She may benefit from physical therapy  - She plans to discuss these recommendations with her primary care        Subjective:      Patient ID: Gayle Robertson is a 80 y o  female  Patient presents in office today to review KENYATTA done 6/16  Patient reports constant bilat R>L leg pain that radiates down posterolateral aspect of thigh and calf, often waking her up at night  Distances vary of how far she can walk       HPI     Gayle Robertson 81 y/o F  Hypertension, hyperlipidemia, diabetes mellitus 2,  CKD III and spinal stenosis who is seen in follow-up for peripheral arterial disease  Jamar Jacobsen complains of near constant bilateral leg pain involving the hips, legs, calves and feet  She complains that the legs are very tender to palpation at she has difficulty walking  The legs feel their worst at night when she is moving and turning in bed  She uses acetaminophen, narcotic pain medication, there works and other measures to help alleviate the discomfort  She reports that nothing helps the pain for too long  She does have known spinal stenosis which she has not followed up upon in many years  She asks if chiropractic medicine would be helpful, but I advised her to see the spine surgeon or chronic pain management specialist          -leg pain "all the time"  -pain is the worst at night with turning  -nothing helps for long    KENYATTA 6/16/2020  R 1 06/162/100; mild fem-pop and tibioperoneal disease w/o focal stenosis  L  1 07/134/112; mild fem-pop and tibioperoneal disease w/o focal stenosis      The following portions of the patient's history were reviewed and updated as appropriate: allergies, current medications, past family history, past medical history, past social history, past surgical history and problem list     Review of Systems   Constitutional: Negative  HENT: Negative  Eyes: Negative  Respiratory: Negative  Cardiovascular: Negative  Gastrointestinal: Negative  Endocrine: Negative  Genitourinary: Negative  Musculoskeletal: Positive for arthralgias and back pain  Leg pain  Leg cramping with walking   Skin: Negative  Allergic/Immunologic: Negative  Neurological: Negative  Hematological: Negative  Psychiatric/Behavioral: Negative            Objective:    /62 (BP Location: Right arm, Patient Position: Sitting)   Pulse 56   Temp (!) 97 2 °F (36 2 °C) (Tympanic)   Ht 5' (1 524 m)   Wt 56 7 kg (125 lb)   BMI 24 41 kg/m²          Physical Exam   Constitutional: She is oriented to person, place, and time  She appears well-developed and well-nourished  She is cooperative  HENT:   Head: Normocephalic and atraumatic  Eyes: Pupils are equal, round, and reactive to light  EOM are normal    Neck: Trachea normal  Neck supple  No JVD present  No thyromegaly present  Cardiovascular: Normal rate, regular rhythm, S1 normal, S2 normal and normal heart sounds  Exam reveals no gallop and no friction rub  No murmur heard  Pulses:       Carotid pulses are 2+ on the right side, and 2+ on the left side  Radial pulses are 2+ on the right side, and 2+ on the left side  Femoral pulses are 2+ on the right side, and 2+ on the left side  Dorsalis pedis pulses are 2+ on the right side, and 1+ on the left side  Posterior tibial pulses are 1+ on the right side, and 0 on the left side  Feet are warm and well perfused; capillary refill less than 2 seconds    Skin is healthy and intact  Pulmonary/Chest: Effort normal and breath sounds normal  No accessory muscle usage  No respiratory distress  She has no wheezes  She has no rales  Abdominal: Soft  Bowel sounds are normal  She exhibits no distension  There is no hepatosplenomegaly  There is no tenderness  Musculoskeletal: Normal range of motion  She exhibits no edema or deformity  Neurological: She is alert and oriented to person, place, and time  Grossly normal    Skin: Skin is warm and dry  No lesion and no rash noted  No cyanosis  Nails show no clubbing  Psychiatric: She has a normal mood and affect  Nursing note and vitals reviewed  I have reviewed and made appropriate changes to the review of systems input by the medical assistant      Vitals:    07/01/20 1157   BP: 118/62   BP Location: Right arm   Patient Position: Sitting   Pulse: 56   Temp: (!) 97 2 °F (36 2 °C)   TempSrc: Tympanic   Weight: 56 7 kg (125 lb)   Height: 5' (1 524 m)       Patient Active Problem List   Diagnosis    Anxiety    Mild intermittent asthma without complication    Degeneration of intervertebral disc of lumbar region    Diverticulosis    Elevated serum alkaline phosphatase level    Esophageal reflux    Fatty liver    Hyperlipidemia    Essential hypertension    Hyperthyroidism    Insomnia    Irritable bowel syndrome    Spinal stenosis    Spondylolisthesis, grade 1    Transient ischemic attack    Type 2 diabetes mellitus (HCC)    Vitamin D deficiency    Muscle cramps    Ovarian cyst    CKD (chronic kidney disease), stage III (HCC)    Trigger finger of right hand    Pain in both lower extremities    Radiculopathy, lumbar region    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 ([facility] [address] )    PAD (peripheral artery disease) (HCC)    Memory loss    Atherosclerosis 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 (HCC)    Thyroid nodule    Mass of right submandibular region    Abnormal finding on imaging    Rotator cuff disorder, right       Past Surgical History:   Procedure Laterality Date    ADENOIDECTOMY      CHOLECYSTECTOMY      COLON SURGERY      partial colectomy - sigmoid, diverticulitis    EYE SURGERY      HYSTERECTOMY      ovaries remain    TONSILLECTOMY      US GUIDED LYMPH NODE BIOPSY RIGHT  3/11/2020    US GUIDED THYROID BIOPSY  11/13/2019       Family History   Problem Relation Age of Onset    Coronary artery disease Mother     Other Father         MVA    Alcohol abuse Neg Hx     Depression Neg Hx     Drug abuse Neg Hx     Substance Abuse Neg Hx     Mental illness Neg Hx        Social History     Socioeconomic History    Marital status:       Spouse name: Not on file    Number of children: 3    Years of education: Not on file    Highest education level: Not on file   Occupational History     Comment: retired   Social Needs    Financial resource strain: Not on Denise insecurity:     Worry: Not on file     Inability: Not on file    Transportation needs:     Medical: Not on file     Non-medical: Not on file   Tobacco Use    Smoking status: Never Smoker    Smokeless tobacco: Never Used   Substance and Sexual Activity    Alcohol use: No    Drug use: No    Sexual activity: Not on file   Lifestyle    Physical activity:     Days per week: Not on file     Minutes per session: Not on file    Stress: Not on file   Relationships    Social connections:     Talks on phone: Not on file     Gets together: Not on file     Attends Rastafarian service: Not on file     Active member of club or organization: Not on file     Attends meetings of clubs or organizations: Not on file     Relationship status: Not on file    Intimate partner violence:     Fear of current or ex partner: Not on file     Emotionally abused: Not on file     Physically abused: Not on file     Forced sexual activity: Not on file   Other Topics Concern    Not on file   Social History Narrative    Lives alone    Daughter in the area       Allergies   Allergen Reactions    Bactrim [Sulfamethoxazole-Trimethoprim]     Ezetimibe     Gabapentin     Lisinopril     Naproxen Nausea Only    Statins     Triazolam Other (See Comments)     confusion         Current Outpatient Medications:     amLODIPine (NORVASC) 5 mg tablet, TAKE 1 TABLET BY MOUTH EVERY DAY, Disp: 90 tablet, Rfl: 1    b complex vitamins capsule, Take 1 capsule by mouth daily, Disp: , Rfl:     Cholecalciferol (VITAMIN D PO), Take 1 tablet by mouth daily, Disp: , Rfl:     fluticasone (FLOVENT HFA) 110 MCG/ACT inhaler, Inhale 1 puff 2 (two) times a day, Disp: , Rfl:     LORazepam (ATIVAN) 0 5 mg tablet, Take 1/2 tablet to 1 tablet PO daily PRN, Disp: 30 tablet, Rfl: 0    meclizine (ANTIVERT) 25 mg tablet, TAKE 1 TABLET BY MOUTH THREE TIMES A DAY AS NEEDED FOR DIZZINESS, Disp: 90 tablet, Rfl: 0    methimazole (TAPAZOLE) 5 mg tablet, 1/2 tab daily, Disp: 30 tablet, Rfl: 0    montelukast (SINGULAIR) 10 mg tablet, Take 1 tablet (10 mg total) by mouth daily at bedtime, Disp: 90 tablet, Rfl: 1    omeprazole (PriLOSEC) 20 mg delayed release capsule, TAKE 1 CAPSULE (20 MG TOTAL) BY MOUTH DAILY BEFORE BREAKFAST, Disp: 90 capsule, Rfl: 1    oxyCODONE-acetaminophen (PERCOCET) 5-325 mg per tablet, Take 1 tablet by mouth 2 (two) times a day as needed for moderate painMax Daily Amount: 2 tablets, Disp: 60 tablet, Rfl: 0

## 2020-07-01 NOTE — LETTER
July 1, 2020     Trena Rodriguez MD  [address]  [id number] [place] [address]    Patient: Skinny Cortez   YOB: 1931   Date of Visit: 7/1/2020     Dear Dr Jose Edwards      Thank you for referring Marcellus Paulson to me for evaluation  Below are the relevant portions of my assessment and plan of care  If you have questions, please do not hesitate to call me  I look forward to following Italo Madrid along with you  Sincerely,        Jeremiah Elliott PA-C        CC: No Recipients    Progress Notes:    Assessment/Plan:    Peripheral arterial disease   79 y/o F  Hypertension, hyperlipidemia, diabetes mellitus 2,  CKD III and spinal stenosis who is seen in follow-up for peripheral arterial disease      -B leg pain "all the time"  -pain on standing pain; pain the worst at night with turning  -nothing helps for long  -ambulatory dyfx    KENYATTA 6/16/2020  R 1 06/162/100; mild fem-pop and tibioperoneal disease w/o focal stenosis  L  1 07/134/112; mild fem-pop and tibioperoneal disease w/o focal stenosis    Discussion:    Patient with mild, diffuse bilateral peripheral arterial disease  She complains of severe bilateral leg pain at rest and with activity  Her symptoms and examination are not consistent with arterial etiology  We reviewed her lower extremity arterial duplex study which is essentially unchanged from last year with overall normal flow  Consider spinal stenosis or musculoskeletal etiology as cause for her ongoing pain  I suggested she consider spine physician or pain management physician, in addition to physical therapy  She can continue measures that help her feel better  Also, perhaps a lido patch to the R hip      - Patient education for PAD and healthy lifestyle were provided  - Consider PT to improve walking    - Consider ASA 81 which was recommended last year  - We discussed symptoms that would be concerning for which she should follow-up with vascular         Spinal stenosis  - recommended follow-up with spine physician  - She may benefit from physical therapy  - She plans to discuss these recommendations with her primary care

## 2020-07-16 DIAGNOSIS — K21.9 GASTROESOPHAGEAL REFLUX DISEASE, ESOPHAGITIS PRESENCE NOT SPECIFIED: ICD-10-CM

## 2020-07-16 DIAGNOSIS — M51.36 DEGENERATION OF INTERVERTEBRAL DISC OF LUMBAR REGION: ICD-10-CM

## 2020-07-16 DIAGNOSIS — F41.9 ANXIETY: ICD-10-CM

## 2020-07-16 RX ORDER — OXYCODONE HYDROCHLORIDE AND ACETAMINOPHEN 5; 325 MG/1; MG/1
1 TABLET ORAL 2 TIMES DAILY PRN
Qty: 60 TABLET | Refills: 0 | Status: SHIPPED | OUTPATIENT
Start: 2020-07-16 | End: 2020-09-01 | Stop reason: SDUPTHER

## 2020-07-16 RX ORDER — OMEPRAZOLE 20 MG/1
20 CAPSULE, DELAYED RELEASE ORAL
Qty: 90 CAPSULE | Refills: 1 | Status: SHIPPED | OUTPATIENT
Start: 2020-07-16 | End: 2020-09-05 | Stop reason: HOSPADM

## 2020-07-16 RX ORDER — LORAZEPAM 0.5 MG/1
TABLET ORAL
Qty: 30 TABLET | Refills: 0 | Status: SHIPPED | OUTPATIENT
Start: 2020-07-16 | End: 2020-09-01 | Stop reason: SDUPTHER

## 2020-07-23 DIAGNOSIS — R42 VERTIGO: ICD-10-CM

## 2020-07-23 RX ORDER — MECLIZINE HYDROCHLORIDE 25 MG/1
25 TABLET ORAL EVERY 8 HOURS PRN
Qty: 90 TABLET | Refills: 0 | Status: SHIPPED | OUTPATIENT
Start: 2020-07-23 | End: 2020-09-01 | Stop reason: SDUPTHER

## 2020-08-17 DIAGNOSIS — I10 ESSENTIAL HYPERTENSION: ICD-10-CM

## 2020-08-17 RX ORDER — AMLODIPINE BESYLATE 5 MG/1
5 TABLET ORAL DAILY
Qty: 90 TABLET | Refills: 1 | Status: SHIPPED | OUTPATIENT
Start: 2020-08-17 | End: 2021-02-22

## 2020-09-01 ENCOUNTER — OFFICE VISIT (OUTPATIENT)
Dept: INTERNAL MEDICINE CLINIC | Facility: CLINIC | Age: 85
End: 2020-09-01
Payer: MEDICARE

## 2020-09-01 VITALS
WEIGHT: 124 LBS | DIASTOLIC BLOOD PRESSURE: 64 MMHG | SYSTOLIC BLOOD PRESSURE: 128 MMHG | HEIGHT: 60 IN | HEART RATE: 71 BPM | BODY MASS INDEX: 24.35 KG/M2 | RESPIRATION RATE: 18 BRPM | TEMPERATURE: 97.4 F | OXYGEN SATURATION: 97 %

## 2020-09-01 DIAGNOSIS — M51.36 DEGENERATION OF INTERVERTEBRAL DISC OF LUMBAR REGION: ICD-10-CM

## 2020-09-01 DIAGNOSIS — K21.9 GASTROESOPHAGEAL REFLUX DISEASE, ESOPHAGITIS PRESENCE NOT SPECIFIED: ICD-10-CM

## 2020-09-01 DIAGNOSIS — E11.22 TYPE 2 DIABETES MELLITUS WITH STAGE 3 CHRONIC KIDNEY DISEASE AND HYPERTENSION (HCC): ICD-10-CM

## 2020-09-01 DIAGNOSIS — N18.30 CKD (CHRONIC KIDNEY DISEASE), STAGE III (HCC): Primary | ICD-10-CM

## 2020-09-01 DIAGNOSIS — I74.3 EMBOLISM AND THROMBOSIS OF ARTERIES OF THE LOWER EXTREMITIES (HCC): ICD-10-CM

## 2020-09-01 DIAGNOSIS — E05.90 HYPERTHYROIDISM: ICD-10-CM

## 2020-09-01 DIAGNOSIS — N18.30 TYPE 2 DIABETES MELLITUS WITH STAGE 3 CHRONIC KIDNEY DISEASE AND HYPERTENSION (HCC): ICD-10-CM

## 2020-09-01 DIAGNOSIS — I73.9 PAD (PERIPHERAL ARTERY DISEASE) (HCC): ICD-10-CM

## 2020-09-01 DIAGNOSIS — R42 VERTIGO: ICD-10-CM

## 2020-09-01 DIAGNOSIS — E55.9 VITAMIN D DEFICIENCY: ICD-10-CM

## 2020-09-01 DIAGNOSIS — I12.9 TYPE 2 DIABETES MELLITUS WITH STAGE 3 CHRONIC KIDNEY DISEASE AND HYPERTENSION (HCC): ICD-10-CM

## 2020-09-01 DIAGNOSIS — I10 ESSENTIAL HYPERTENSION: ICD-10-CM

## 2020-09-01 DIAGNOSIS — J45.20 MILD INTERMITTENT ASTHMA WITHOUT COMPLICATION: ICD-10-CM

## 2020-09-01 DIAGNOSIS — F41.9 ANXIETY: ICD-10-CM

## 2020-09-01 PROCEDURE — 99214 OFFICE O/P EST MOD 30 MIN: CPT | Performed by: INTERNAL MEDICINE

## 2020-09-01 RX ORDER — FAMOTIDINE 40 MG/1
40 TABLET, FILM COATED ORAL DAILY
Qty: 90 TABLET | Refills: 0 | Status: SHIPPED | OUTPATIENT
Start: 2020-09-01 | End: 2020-12-07 | Stop reason: SDUPTHER

## 2020-09-01 RX ORDER — ALBUTEROL SULFATE 90 UG/1
2 AEROSOL, METERED RESPIRATORY (INHALATION) EVERY 6 HOURS PRN
Qty: 1 INHALER | Refills: 1 | Status: SHIPPED | OUTPATIENT
Start: 2020-09-01 | End: 2020-11-01

## 2020-09-01 RX ORDER — LORAZEPAM 0.5 MG/1
TABLET ORAL
Qty: 30 TABLET | Refills: 0 | Status: SHIPPED | OUTPATIENT
Start: 2020-09-01 | End: 2021-01-06 | Stop reason: SDUPTHER

## 2020-09-01 RX ORDER — OXYCODONE HYDROCHLORIDE AND ACETAMINOPHEN 5; 325 MG/1; MG/1
1 TABLET ORAL 2 TIMES DAILY PRN
Qty: 60 TABLET | Refills: 0 | Status: SHIPPED | OUTPATIENT
Start: 2020-09-01 | End: 2020-10-26 | Stop reason: SDUPTHER

## 2020-09-01 RX ORDER — MECLIZINE HYDROCHLORIDE 25 MG/1
25 TABLET ORAL EVERY 8 HOURS PRN
Qty: 90 TABLET | Refills: 0 | Status: SHIPPED | OUTPATIENT
Start: 2020-09-01 | End: 2020-10-26 | Stop reason: SDUPTHER

## 2020-09-01 NOTE — PATIENT INSTRUCTIONS
Monitor use of albuterol inhaler or nebulizer  May take omeprazole every other day, take famotidine instead  May take famotidine twice a day, as needed

## 2020-09-01 NOTE — PROGRESS NOTES
Diabetic Foot Exam    Patient's shoes and socks removed  Right Foot/Ankle   Right Foot Inspection  Skin Exam: skin normal and skin intact no dry skin, no warmth, no callus, no erythema, no maceration, no abnormal color, no pre-ulcer, no ulcer and no callus                          Toe Exam: ROM and strength within normal limits  Sensory       Monofilament testing: diminished  Vascular    The right DP pulse is 1+  Left Foot/Ankle  Left Foot Inspection  Skin Exam: skin normal and skin intactno dry skin, no warmth, no erythema, no maceration, normal color, no pre-ulcer, no ulcer and no callus                         Toe Exam: ROM and strength within normal limits                   Sensory       Monofilament: intact  Vascular    The left DP pulse is 2+  Assign Risk Category:  No deformity present;  Loss of protective sensation; Weak pulses       Risk: 2

## 2020-09-01 NOTE — PROGRESS NOTES
Assessment/Plan:    Anxiety  Takes lorazepam prn  PDMP reviewed  Degeneration of intervertebral disc of lumbar region  No change in symptoms  She takes Percocet 1/2 tab bid  She will consider seeing pain again  PDMP reviewed  Narcotic contract updated  Mild intermittent asthma without complication  Monitor use of albuterol inhaler / nebulizer  Not on steroid inhaler, takes Singulair daily  Esophageal reflux  May take omeprazole every other day, famotidine prn  Hyperthyroidism  Still on low dose methimazole  Reschedule Endo appointment  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 ([facility] [address] )    Lab Results   Component Value Date    HGBA1C 5 4 06/19/2020     A1c normal, not on medication  Essential hypertension  BP controlled on amlodipine  Rotator cuff disorder, right  Increase pain recently, after activity  Follow up with Ortho, would like another injection  Irritable bowel syndrome  May continue taking dicyclomine prn     CKD (chronic kidney disease), stage III  Stable  No NSAIDs  Mass of right submandibular region  Will see ENT next year  PAD (peripheral artery disease) ([facility])  Reviewed ultrasound  Explained leg pain most likely due to her back  Diagnoses and all orders for this visit:    CKD (chronic kidney disease), stage III ([facility])    Anxiety  -     LORazepam (ATIVAN) 0 5 mg tablet; Take 1/2 tablet to 1 tablet PO daily PRN    Degeneration of intervertebral disc of lumbar region  -     oxyCODONE-acetaminophen (PERCOCET) 5-325 mg per tablet; Take 1 tablet by mouth 2 (two) times a day as needed for moderate painMax Daily Amount: 2 tablets    Vertigo  -     meclizine (ANTIVERT) 25 mg tablet; Take 1 tablet (25 mg total) by mouth every 8 (eight) hours as needed for dizziness    Essential hypertension  -     Comprehensive metabolic panel; Future  -     Lipid panel;  Future    Hyperthyroidism    Mild intermittent asthma without complication  - albuterol (PROVENTIL HFA,VENTOLIN HFA) 90 mcg/act inhaler; Inhale 2 puffs every 6 (six) hours as needed for wheezing or shortness of breath    PAD (peripheral artery disease) ([facility])    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  -     Comprehensive metabolic panel; Future  -     Lipid panel; Future    Embolism and thrombosis of arteries of the lower extremities ([facility])    Vitamin D deficiency  -     Vitamin D 25 hydroxy; Future    Gastroesophageal reflux disease, esophagitis presence not specified  -     famotidine (PEPCID) 40 MG tablet; Take 1 tablet (40 mg total) by mouth daily      Follow up in 3 months or as needed  Subjective:      Patient ID: Marta Bahena is a 80 y o  female here for follow up  First, she continues to experience leg pain  She did have a normal ultrasound  She continues to experience daily pain, worse with certain activities  She does take half a tablet of Percocet twice a day, takes an additional 1 for severe pain  She denies any recent falls or injury  She does not walk or exercise regularly  Second, she reports her shortness of breath had resolved a few months ago  She was using her daughter's ProAir for a few days and symptoms improved  She does not have a steroid inhaler, reports that it costs too much  She does have an albuterol nebulizer which she uses occasionally  Third, she reports increased anxiety during the pandemic  She has been taking lorazepam more frequently during the week  She has no issues with sleep  She reports an episode of severe abdominal pain which lasted for 2 days, lasting through the night  She did not have any nausea or vomiting, did move her bowels afterwards  She takes dicyclomine as needed for abdominal cramping  She has been experiencing more frequent right shoulder pain, especially when she leans on it when sleeping  She did receive an injection in the past which had helped      She is concerned that she has been taking omeprazole for a long time  She continues to experience intermittent epigastric discomfort and heartburn  The following portions of the patient's history were reviewed and updated as appropriate: allergies, current medications, past medical history, past social history and problem list     Review of Systems   Constitutional: Negative for appetite change and fatigue  HENT: Negative for congestion, ear pain and postnasal drip  Eyes: Negative for visual disturbance  Respiratory: Negative for cough and shortness of breath  Cardiovascular: Negative for chest pain and leg swelling  Gastrointestinal: Negative for abdominal pain, constipation and diarrhea  Genitourinary: Negative for difficulty urinating, dysuria and frequency  Musculoskeletal: Positive for arthralgias  Negative for gait problem, joint swelling and myalgias  Skin: Negative for rash and wound  Neurological: Negative for dizziness, numbness and headaches  Hematological: Negative for adenopathy  Does not bruise/bleed easily  Psychiatric/Behavioral: Negative for agitation and confusion  The patient is nervous/anxious  Objective:      /64   Pulse 71   Temp (!) 97 4 °F (36 3 °C)   Resp 18   Ht 5' (1 524 m)   Wt 56 2 kg (124 lb)   SpO2 97%   BMI 24 22 kg/m²          Physical Exam  Vitals signs and nursing note reviewed  Constitutional:       General: She is not in acute distress  Appearance: She is well-developed  HENT:      Head: Normocephalic and atraumatic  Eyes:      Pupils: Pupils are equal, round, and reactive to light  Cardiovascular:      Rate and Rhythm: Normal rate and regular rhythm  Heart sounds: Normal heart sounds  Pulmonary:      Effort: Pulmonary effort is normal  No respiratory distress  Breath sounds: Normal breath sounds  No decreased breath sounds or wheezing     Abdominal:      General: Bowel sounds are normal       Palpations: Abdomen is soft  Musculoskeletal:      Right shoulder: Normal  She exhibits normal range of motion and no pain  Left shoulder: Normal  She exhibits normal range of motion  Skin:     General: Skin is warm  Findings: No rash  Neurological:      Mental Status: She is alert and oriented to person, place, and time  Psychiatric:         Mood and Affect: Mood normal          Behavior: Behavior normal            Labs & imaging results reviewed with patient

## 2020-09-01 NOTE — ASSESSMENT & PLAN NOTE
No change in symptoms  She takes Percocet 1/2 tab bid  She will consider seeing pain again  PDMP reviewed  Narcotic contract updated

## 2020-09-03 ENCOUNTER — APPOINTMENT (OUTPATIENT)
Dept: ULTRASOUND IMAGING | Facility: HOSPITAL | Age: 85
DRG: 125 | End: 2020-09-03
Payer: MEDICARE

## 2020-09-03 ENCOUNTER — HOSPITAL ENCOUNTER (INPATIENT)
Facility: HOSPITAL | Age: 85
LOS: 1 days | Discharge: HOME | DRG: 125 | End: 2020-09-05
Attending: EMERGENCY MEDICINE | Admitting: HOSPITALIST
Payer: MEDICARE

## 2020-09-03 ENCOUNTER — APPOINTMENT (EMERGENCY)
Dept: CT IMAGING | Facility: HOSPITAL | Age: 85
DRG: 125 | End: 2020-09-03
Payer: MEDICARE

## 2020-09-03 ENCOUNTER — APPOINTMENT (EMERGENCY)
Dept: RADIOLOGY | Facility: HOSPITAL | Age: 85
DRG: 125 | End: 2020-09-03
Payer: MEDICARE

## 2020-09-03 DIAGNOSIS — K21.9 GASTROESOPHAGEAL REFLUX DISEASE, ESOPHAGITIS PRESENCE NOT SPECIFIED: ICD-10-CM

## 2020-09-03 DIAGNOSIS — R29.90 STROKE-LIKE SYMPTOMS: ICD-10-CM

## 2020-09-03 DIAGNOSIS — R51.9 HEADACHE: Primary | ICD-10-CM

## 2020-09-03 DIAGNOSIS — I10 HYPERTENSION: ICD-10-CM

## 2020-09-03 DIAGNOSIS — H53.8 BLURRED VISION: ICD-10-CM

## 2020-09-03 DIAGNOSIS — H53.9 VISUAL DISTURBANCE: ICD-10-CM

## 2020-09-03 DIAGNOSIS — I65.09 VERTEBRAL ARTERY STENOSIS: ICD-10-CM

## 2020-09-03 DIAGNOSIS — H53.8 BLURRY VISION: ICD-10-CM

## 2020-09-03 PROBLEM — E87.6 HYPOKALEMIA: Status: ACTIVE | Noted: 2020-09-03

## 2020-09-03 LAB
ALBUMIN SERPL BCP-MCNC: 4 G/DL (ref 3.5–5)
ALP SERPL-CCNC: 111 U/L (ref 46–116)
ALT SERPL W P-5'-P-CCNC: 19 U/L (ref 12–78)
ANION GAP SERPL CALCULATED.3IONS-SCNC: 8 MMOL/L (ref 4–13)
AST SERPL W P-5'-P-CCNC: 19 U/L (ref 5–45)
BASOPHILS # BLD AUTO: 0.03 THOUSANDS/ΜL (ref 0–0.1)
BASOPHILS NFR BLD AUTO: 1 % (ref 0–1)
BILIRUB SERPL-MCNC: 0.44 MG/DL (ref 0.2–1)
BUN SERPL-MCNC: 15 MG/DL (ref 5–25)
CALCIUM SERPL-MCNC: 9.1 MG/DL (ref 8.3–10.1)
CHLORIDE SERPL-SCNC: 103 MMOL/L (ref 100–108)
CO2 SERPL-SCNC: 29 MMOL/L (ref 21–32)
CREAT SERPL-MCNC: 1.16 MG/DL (ref 0.6–1.3)
EOSINOPHIL # BLD AUTO: 0.01 THOUSAND/ΜL (ref 0–0.61)
EOSINOPHIL NFR BLD AUTO: 0 % (ref 0–6)
ERYTHROCYTE [DISTWIDTH] IN BLOOD BY AUTOMATED COUNT: 13.2 % (ref 11.6–15.1)
GFR SERPL CREATININE-BSD FRML MDRD: 42 ML/MIN/1.73SQ M
GLUCOSE SERPL-MCNC: 121 MG/DL (ref 65–140)
HCT VFR BLD AUTO: 40.3 % (ref 34.8–46.1)
HGB BLD-MCNC: 13.5 G/DL (ref 11.5–15.4)
IMM GRANULOCYTES # BLD AUTO: 0.01 THOUSAND/UL (ref 0–0.2)
IMM GRANULOCYTES NFR BLD AUTO: 0 % (ref 0–2)
LYMPHOCYTES # BLD AUTO: 2.26 THOUSANDS/ΜL (ref 0.6–4.47)
LYMPHOCYTES NFR BLD AUTO: 35 % (ref 14–44)
MCH RBC QN AUTO: 30.1 PG (ref 26.8–34.3)
MCHC RBC AUTO-ENTMCNC: 33.5 G/DL (ref 31.4–37.4)
MCV RBC AUTO: 90 FL (ref 82–98)
MONOCYTES # BLD AUTO: 0.67 THOUSAND/ΜL (ref 0.17–1.22)
MONOCYTES NFR BLD AUTO: 10 % (ref 4–12)
NEUTROPHILS # BLD AUTO: 3.51 THOUSANDS/ΜL (ref 1.85–7.62)
NEUTS SEG NFR BLD AUTO: 54 % (ref 43–75)
NRBC BLD AUTO-RTO: 0 /100 WBCS
PLATELET # BLD AUTO: 342 THOUSANDS/UL (ref 149–390)
PMV BLD AUTO: 9.2 FL (ref 8.9–12.7)
POTASSIUM SERPL-SCNC: 3 MMOL/L (ref 3.5–5.3)
PROT SERPL-MCNC: 7.9 G/DL (ref 6.4–8.2)
RBC # BLD AUTO: 4.49 MILLION/UL (ref 3.81–5.12)
SODIUM SERPL-SCNC: 140 MMOL/L (ref 136–145)
TROPONIN I SERPL-MCNC: <0.02 NG/ML
WBC # BLD AUTO: 6.49 THOUSAND/UL (ref 4.31–10.16)

## 2020-09-03 PROCEDURE — 84484 ASSAY OF TROPONIN QUANT: CPT | Performed by: EMERGENCY MEDICINE

## 2020-09-03 PROCEDURE — 71045 X-RAY EXAM CHEST 1 VIEW: CPT

## 2020-09-03 PROCEDURE — 93880 EXTRACRANIAL BILAT STUDY: CPT

## 2020-09-03 PROCEDURE — 80053 COMPREHEN METABOLIC PANEL: CPT | Performed by: EMERGENCY MEDICINE

## 2020-09-03 PROCEDURE — 99285 EMERGENCY DEPT VISIT HI MDM: CPT

## 2020-09-03 PROCEDURE — 85025 COMPLETE CBC W/AUTO DIFF WBC: CPT | Performed by: EMERGENCY MEDICINE

## 2020-09-03 PROCEDURE — 99285 EMERGENCY DEPT VISIT HI MDM: CPT | Performed by: EMERGENCY MEDICINE

## 2020-09-03 PROCEDURE — G1004 CDSM NDSC: HCPCS

## 2020-09-03 PROCEDURE — 93005 ELECTROCARDIOGRAM TRACING: CPT

## 2020-09-03 PROCEDURE — 99220 PR INITIAL OBSERVATION CARE/DAY 70 MINUTES: CPT | Performed by: NURSE PRACTITIONER

## 2020-09-03 PROCEDURE — 70498 CT ANGIOGRAPHY NECK: CPT

## 2020-09-03 PROCEDURE — 70496 CT ANGIOGRAPHY HEAD: CPT

## 2020-09-03 PROCEDURE — 36415 COLL VENOUS BLD VENIPUNCTURE: CPT

## 2020-09-03 RX ORDER — ALBUTEROL SULFATE 90 UG/1
2 AEROSOL, METERED RESPIRATORY (INHALATION) EVERY 6 HOURS PRN
Status: DISCONTINUED | OUTPATIENT
Start: 2020-09-03 | End: 2020-09-05 | Stop reason: HOSPADM

## 2020-09-03 RX ORDER — PANTOPRAZOLE SODIUM 40 MG/1
40 TABLET, DELAYED RELEASE ORAL
Status: DISCONTINUED | OUTPATIENT
Start: 2020-09-04 | End: 2020-09-05 | Stop reason: HOSPADM

## 2020-09-03 RX ORDER — AMLODIPINE BESYLATE 5 MG/1
5 TABLET ORAL DAILY
Status: DISCONTINUED | OUTPATIENT
Start: 2020-09-04 | End: 2020-09-05 | Stop reason: HOSPADM

## 2020-09-03 RX ORDER — MONTELUKAST SODIUM 10 MG/1
10 TABLET ORAL
Status: DISCONTINUED | OUTPATIENT
Start: 2020-09-03 | End: 2020-09-05 | Stop reason: HOSPADM

## 2020-09-03 RX ORDER — ASPIRIN 81 MG/1
81 TABLET, CHEWABLE ORAL DAILY
Status: DISCONTINUED | OUTPATIENT
Start: 2020-09-04 | End: 2020-09-05 | Stop reason: HOSPADM

## 2020-09-03 RX ORDER — OXYCODONE HYDROCHLORIDE AND ACETAMINOPHEN 5; 325 MG/1; MG/1
1 TABLET ORAL 2 TIMES DAILY PRN
Status: DISCONTINUED | OUTPATIENT
Start: 2020-09-03 | End: 2020-09-05 | Stop reason: HOSPADM

## 2020-09-03 RX ORDER — LORAZEPAM 0.5 MG/1
0.5 TABLET ORAL DAILY PRN
Status: DISCONTINUED | OUTPATIENT
Start: 2020-09-03 | End: 2020-09-05 | Stop reason: HOSPADM

## 2020-09-03 RX ORDER — POTASSIUM CHLORIDE 20 MEQ/1
40 TABLET, EXTENDED RELEASE ORAL ONCE
Status: COMPLETED | OUTPATIENT
Start: 2020-09-03 | End: 2020-09-03

## 2020-09-03 RX ORDER — HEPARIN SODIUM 5000 [USP'U]/ML
5000 INJECTION, SOLUTION INTRAVENOUS; SUBCUTANEOUS EVERY 8 HOURS SCHEDULED
Status: DISCONTINUED | OUTPATIENT
Start: 2020-09-03 | End: 2020-09-05 | Stop reason: HOSPADM

## 2020-09-03 RX ORDER — ACETAMINOPHEN 325 MG/1
650 TABLET ORAL EVERY 6 HOURS PRN
Status: DISCONTINUED | OUTPATIENT
Start: 2020-09-03 | End: 2020-09-05 | Stop reason: HOSPADM

## 2020-09-03 RX ORDER — METHIMAZOLE 5 MG/1
2.5 TABLET ORAL DAILY
Status: DISCONTINUED | OUTPATIENT
Start: 2020-09-04 | End: 2020-09-05 | Stop reason: HOSPADM

## 2020-09-03 RX ORDER — FAMOTIDINE 20 MG/1
40 TABLET, FILM COATED ORAL DAILY
Status: DISCONTINUED | OUTPATIENT
Start: 2020-09-04 | End: 2020-09-05 | Stop reason: HOSPADM

## 2020-09-03 RX ORDER — MECLIZINE HYDROCHLORIDE 25 MG/1
25 TABLET ORAL EVERY 8 HOURS PRN
Status: DISCONTINUED | OUTPATIENT
Start: 2020-09-03 | End: 2020-09-05 | Stop reason: HOSPADM

## 2020-09-03 RX ADMIN — HEPARIN SODIUM 5000 UNITS: 5000 INJECTION INTRAVENOUS; SUBCUTANEOUS at 23:03

## 2020-09-03 RX ADMIN — MONTELUKAST SODIUM 10 MG: 10 TABLET, FILM COATED ORAL at 23:03

## 2020-09-03 RX ADMIN — POTASSIUM CHLORIDE 40 MEQ: 1500 TABLET, EXTENDED RELEASE ORAL at 23:03

## 2020-09-03 RX ADMIN — IOHEXOL 85 ML: 350 INJECTION, SOLUTION INTRAVENOUS at 19:17

## 2020-09-03 NOTE — ED PROVIDER NOTES
History  Chief Complaint   Patient presents with   Julius Gutiérrez     Pt presents to the ED with blurry vision onset 1 hr ago  Reports HA x 1 day  Reports HTN episode at home of 180/74  Patient presents to the emergency department for evaluation of left eye blurriness which has improved and frontal headache that began earlier in the day  Patient denies trauma fall or injury  She denies vomiting but did have some nausea  Denies chest pain sob palpitations or dizziness  Denies extremity numbness tingling or weakness  Has never had a stroke  Is not on blood thinners  Her headache is now resolved and her blurry vision is almost normalized  Prior to Admission Medications   Prescriptions Last Dose Informant Patient Reported? Taking?    Cholecalciferol (VITAMIN D PO)  Self Yes No   Sig: Take 1 tablet by mouth daily   LORazepam (ATIVAN) 0 5 mg tablet   No No   Sig: Take 1/2 tablet to 1 tablet PO daily PRN   albuterol (PROVENTIL HFA,VENTOLIN HFA) 90 mcg/act inhaler   No No   Sig: Inhale 2 puffs every 6 (six) hours as needed for wheezing or shortness of breath   amLODIPine (NORVASC) 5 mg tablet  Self No No   Sig: Take 1 tablet (5 mg total) by mouth daily   b complex vitamins capsule  Self Yes No   Sig: Take 1 capsule by mouth daily   famotidine (PEPCID) 40 MG tablet   No No   Sig: Take 1 tablet (40 mg total) by mouth daily   meclizine (ANTIVERT) 25 mg tablet   No No   Sig: Take 1 tablet (25 mg total) by mouth every 8 (eight) hours as needed for dizziness   methimazole (TAPAZOLE) 5 mg tablet  Self No No   Si/2 tab daily   montelukast (SINGULAIR) 10 mg tablet  Self No No   Sig: Take 1 tablet (10 mg total) by mouth daily at bedtime   omeprazole (PriLOSEC) 20 mg delayed release capsule  Self No No   Sig: Take 1 capsule (20 mg total) by mouth daily before breakfast   oxyCODONE-acetaminophen (PERCOCET) 5-325 mg per tablet   No No   Sig: Take 1 tablet by mouth 2 (two) times a day as needed for moderate painMax Daily Amount: 2 tablets      Facility-Administered Medications: None       Past Medical History:   Diagnosis Date    Asthma     Chronic interstitial cystitis     Community acquired pneumonia     last assessed 10/4/13    Diabetes mellitus ([address] )     Disease of thyroid gland     Diverticulitis     Dizziness     GERD (gastroesophageal reflux disease)     Hyperlipidemia     Hypertension     Vertigo        Past Surgical History:   Procedure Laterality Date    ADENOIDECTOMY      CHOLECYSTECTOMY      COLON SURGERY      partial colectomy - sigmoid, diverticulitis    EYE SURGERY      HYSTERECTOMY      ovaries remain    TONSILLECTOMY      US GUIDED LYMPH NODE BIOPSY RIGHT  3/11/2020    US GUIDED THYROID BIOPSY  11/13/2019       Family History   Problem Relation Age of Onset    Coronary artery disease Mother     Other Father         MVA    Alcohol abuse Neg Hx     Depression Neg Hx     Drug abuse Neg Hx     Substance Abuse Neg Hx     Mental illness Neg Hx      I have reviewed and agree with the history as documented  E-Cigarette/Vaping    E-Cigarette Use Never User      E-Cigarette/Vaping Substances     Social History     Tobacco Use    Smoking status: Never Smoker    Smokeless tobacco: Never Used   Substance Use Topics    Alcohol use: No    Drug use: No       Review of Systems   Constitutional: Negative for activity change, appetite change, chills, diaphoresis, fatigue and fever  HENT: Negative  Negative for congestion, drooling, ear discharge, ear pain, rhinorrhea, sinus pain, sore throat, tinnitus, trouble swallowing and voice change  Eyes: Positive for visual disturbance  Negative for photophobia, pain, discharge, redness and itching  Respiratory: Negative  Negative for cough, chest tightness, shortness of breath, wheezing and stridor  Cardiovascular: Negative  Negative for chest pain, palpitations and leg swelling  Gastrointestinal: Negative    Negative for abdominal distention, abdominal pain, anal bleeding, blood in stool, constipation, diarrhea, nausea and vomiting  Endocrine: Negative  Genitourinary: Negative  Negative for dysuria, frequency, hematuria, urgency, vaginal bleeding, vaginal discharge and vaginal pain  Musculoskeletal: Negative  Negative for back pain, neck pain and neck stiffness  Skin: Negative  Negative for rash and wound  Allergic/Immunologic: Negative  Neurological: Positive for headaches  Negative for dizziness, tremors, seizures, syncope, facial asymmetry, speech difficulty, weakness, light-headedness and numbness  Hematological: Negative  Does not bruise/bleed easily  Psychiatric/Behavioral: Negative  Negative for agitation and confusion  Physical Exam  Physical Exam    Vital Signs  ED Triage Vitals [09/03/20 1753]   Temperature Pulse Respirations Blood Pressure SpO2   98 6 °F (37 °C) 74 16 156/68 97 %      Temp Source Heart Rate Source Patient Position - Orthostatic VS BP Location FiO2 (%)   Oral Monitor Sitting Left arm --      Pain Score       3           Vitals:    09/03/20 1753   BP: 156/68   Pulse: 74   Patient Position - Orthostatic VS: Sitting         Visual Acuity  Visual Acuity      Most Recent Value   Visual acuity R eye is  20/40   Visual acuity Left eye is  20/200   Wearing corrective eyewear/lenses?   Yes   L Pupil Size (mm)  3   R Pupil Size (mm)  3          ED Medications  Medications   iohexol (OMNIPAQUE) 350 MG/ML injection (MULTI-DOSE) 85 mL (85 mL Intravenous Given 9/3/20 1917)       Diagnostic Studies  Results Reviewed     Procedure Component Value Units Date/Time    Troponin I [[id number]]  (Normal) Collected:  09/03/20 1807    Lab Status:  Final result Specimen:  Blood from Arm, Left Updated:  09/03/20 1840     Troponin I <0 02 ng/mL     Comprehensive metabolic panel [[id number]]  (Abnormal) Collected:  09/03/20 1807    Lab Status:  Final result Specimen:  Blood from Arm, Left Updated:  09/03/20 1837 Sodium 140 mmol/L      Potassium 3 0 mmol/L      Chloride 103 mmol/L      CO2 29 mmol/L      ANION GAP 8 mmol/L      BUN 15 mg/dL      Creatinine 1 16 mg/dL      Glucose 121 mg/dL      Calcium 9 1 mg/dL      AST 19 U/L      ALT 19 U/L      Alkaline Phosphatase 111 U/L      Total Protein 7 9 g/dL      Albumin 4 0 g/dL      Total Bilirubin 0 44 mg/dL      eGFR 42 ml/min/1 73sq m     Narrative:       National Kidney Disease Foundation guidelines for Chronic Kidney Disease (CKD):     Stage 1 with normal or high GFR (GFR > 90 mL/min/1 73 square meters)    Stage 2 Mild CKD (GFR = 60-89 mL/min/1 73 square meters)    Stage 3A Moderate CKD (GFR = 45-59 mL/min/1 73 square meters)    Stage 3B Moderate CKD (GFR = 30-44 mL/min/1 73 square meters)    Stage 4 Severe CKD (GFR = 15-29 mL/min/1 73 square meters)    Stage 5 End Stage CKD (GFR <15 mL/min/1 73 square meters)  Note: GFR calculation is accurate only with a steady state creatinine    CBC and differential [[id number]] Collected:  09/03/20 1807    Lab Status:  Final result Specimen:  Blood from Arm, Left Updated:  09/03/20 1817     WBC 6 49 Thousand/uL      RBC 4 49 Million/uL      Hemoglobin 13 5 g/dL      Hematocrit 40 3 %      MCV 90 fL      MCH 30 1 pg      MCHC 33 5 g/dL      RDW 13 2 %      MPV 9 2 fL      Platelets 604 Thousands/uL      nRBC 0 /100 WBCs      Neutrophils Relative 54 %      Immat GRANS % 0 %      Lymphocytes Relative 35 %      Monocytes Relative 10 %      Eosinophils Relative 0 %      Basophils Relative 1 %      Neutrophils Absolute 3 51 Thousands/µL      Immature Grans Absolute 0 01 Thousand/uL      Lymphocytes Absolute 2 26 Thousands/µL      Monocytes Absolute 0 67 Thousand/µL      Eosinophils Absolute 0 01 Thousand/µL      Basophils Absolute 0 03 Thousands/µL                  CTA head and neck with and without contrast   Final Result by Yunier Maxwell MD (09/03 2013)         1  No evidence of acute intracranial hemorrhage     2  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  3  Small intraluminal low density identified proximal left vertebral artery just beyond its origin possibly related to artifact or thrombus rather than a dissection flap but is to be correlated clinically, I have no priors for comparison  Just beyond    this area, there is moderate atherosclerotic plaque narrowing left vertebral artery prior to entrance into the left transverse foramen  4  Severe right carotid atherosclerotic disease of the bifurcation  Approximately atherosclerotic plaque 75-80% stenosis proximal right cervical ICA just beyond the bifurcation  Vascular consult recommended  5  Bilateral cavernous carotid artery atherosclerotic plaque narrowing with severe right clinoid segment atherosclerotic plaque stenosis  6  Right MCA M2/M3 segment severe critical stenosis  7  Intracranial left V4 segment moderate to severe atherosclerotic plaque multifocal stenosis  Workstation performed: [id number]         XR chest 1 view portable   ED Interpretation by Corinne Corolla, MD (09/03 1935)   No acute disease  No CHF consolidation or pneumothoraces  Normal mediastinum      Final Result by Pasquale Hernandez MD (09/03 1949)      No acute cardiopulmonary disease  Workstation performed: [id number]                    Procedures  Procedures         ED Course  ED Course as of Sep 03 2059   Thu Sep 03, 2020   2041 Patient discussed with Stacy Hadley the physician assistant covering for the hospitalist service  CT scan without acute pathology but a lot of critical stenotic lesions and vessel sealed with plaque  Uncertain at this time whether this presentation is primarily an ophthalmologic etiology or possible central nervous system  To be admitted to the hospitalist service for further workup including possible MRI and neurologic and ophthalmologic consultation  US AUDIT      Most Recent Value   Initial Alcohol Screen: US AUDIT-C    1   How often do you have a drink containing alcohol?  0 Filed at: 09/03/2020 1752   2  How many drinks containing alcohol do you have on a typical day you are drinking? 0 Filed at: 09/03/2020 1752   3a  Male UNDER 65: How often do you have five or more drinks on one occasion? 0 Filed at: 09/03/2020 1752   3b  FEMALE Any Age, or MALE 65+: How often do you have 4 or more drinks on one occassion? 0 Filed at: 09/03/2020 1752   Audit-C Score  0 Filed at: 09/03/2020 1752                  AGATHA/DAST-10      Most Recent Value   How many times in the past year have you    Used an illegal drug or used a prescription medication for non-medical reasons? Never Filed at: 09/03/2020 1752                                MDM      Disposition  Final diagnoses:   Headache   Blurred vision   Blurry vision   Visual disturbance   Hypertension     Time reflects when diagnosis was documented in both MDM as applicable and the Disposition within this note     Time User Action Codes Description Comment    9/3/2020  8:42 PM Ellen Box Add [R51] Headache     9/3/2020  8:42 PM Alicia Rodríguez [facility] Add [H53 8] Blurred vision     9/3/2020  8:42 PM Ellen Box Add [H53 8] Blurry vision     9/3/2020  8:42 PM Ellen Box Add [H53 9] Visual disturbance     9/3/2020  8:42 PM Ellen Box Add [I10] Hypertension       ED Disposition     ED Disposition Condition Date/Time Comment    Admit Stable Thu Sep 3, 2020  8:42 PM Case was discussed with Julita HDEZ  and the patient's admission status was agreed to be Admission Status: observation status to the service of Dr Rashida Garcia    None         Patient's Medications   Discharge Prescriptions    No medications on file     No discharge procedures on file      PDMP Review       Value Time User    PDMP Reviewed  Yes 9/3/2020  8:48 PM [facility]          [facility] Provider  Electronically Signed by           Roger Ayon MD  09/03/20 2059

## 2020-09-04 ENCOUNTER — APPOINTMENT (INPATIENT)
Dept: MRI IMAGING | Facility: HOSPITAL | Age: 85
DRG: 125 | End: 2020-09-04
Payer: MEDICARE

## 2020-09-04 ENCOUNTER — APPOINTMENT (INPATIENT)
Dept: RADIOLOGY | Facility: HOSPITAL | Age: 85
DRG: 125 | End: 2020-09-04
Payer: MEDICARE

## 2020-09-04 ENCOUNTER — APPOINTMENT (INPATIENT)
Dept: NON INVASIVE DIAGNOSTICS | Facility: HOSPITAL | Age: 85
DRG: 125 | End: 2020-09-04
Payer: MEDICARE

## 2020-09-04 PROBLEM — I65.09 VERTEBRAL ARTERY STENOSIS: Status: ACTIVE | Noted: 2020-09-04

## 2020-09-04 PROBLEM — H53.9 VISION ABNORMALITIES: Status: ACTIVE | Noted: 2020-09-04

## 2020-09-04 PROBLEM — R10.819 ABDOMINAL TENDERNESS: Status: ACTIVE | Noted: 2020-09-04

## 2020-09-04 PROBLEM — E87.6 HYPOKALEMIA: Status: RESOLVED | Noted: 2020-09-03 | Resolved: 2020-09-04

## 2020-09-04 PROBLEM — I65.21 CAROTID STENOSIS, RIGHT: Status: ACTIVE | Noted: 2020-09-04

## 2020-09-04 PROBLEM — R41.0 CONFUSION: Status: ACTIVE | Noted: 2020-09-04

## 2020-09-04 LAB
ATRIAL RATE: 66 BPM
CHOLEST SERPL-MCNC: 243 MG/DL (ref 50–200)
CRP SERPL QL: <3 MG/L
ERYTHROCYTE [SEDIMENTATION RATE] IN BLOOD: 23 MM/HOUR (ref 0–29)
EST. AVERAGE GLUCOSE BLD GHB EST-MCNC: 114 MG/DL
HBA1C MFR BLD: 5.6 %
HDLC SERPL-MCNC: 52 MG/DL
LDLC SERPL CALC-MCNC: 155 MG/DL (ref 0–100)
P AXIS: 60 DEGREES
PR INTERVAL: 160 MS
QRS AXIS: 5 DEGREES
QRSD INTERVAL: 88 MS
QT INTERVAL: 398 MS
QTC INTERVAL: 417 MS
T WAVE AXIS: 64 DEGREES
TRIGL SERPL-MCNC: 180 MG/DL
VENTRICULAR RATE: 66 BPM

## 2020-09-04 PROCEDURE — 85652 RBC SED RATE AUTOMATED: CPT | Performed by: PHYSICIAN ASSISTANT

## 2020-09-04 PROCEDURE — 97163 PT EVAL HIGH COMPLEX 45 MIN: CPT

## 2020-09-04 PROCEDURE — 70551 MRI BRAIN STEM W/O DYE: CPT

## 2020-09-04 PROCEDURE — 86140 C-REACTIVE PROTEIN: CPT | Performed by: PHYSICIAN ASSISTANT

## 2020-09-04 PROCEDURE — 99223 1ST HOSP IP/OBS HIGH 75: CPT | Performed by: PSYCHIATRY & NEUROLOGY

## 2020-09-04 PROCEDURE — 93010 ELECTROCARDIOGRAM REPORT: CPT | Performed by: INTERNAL MEDICINE

## 2020-09-04 PROCEDURE — 99223 1ST HOSP IP/OBS HIGH 75: CPT | Performed by: SURGERY

## 2020-09-04 PROCEDURE — 80061 LIPID PANEL: CPT | Performed by: NURSE PRACTITIONER

## 2020-09-04 PROCEDURE — 74022 RADEX COMPL AQT ABD SERIES: CPT

## 2020-09-04 PROCEDURE — 99232 SBSQ HOSP IP/OBS MODERATE 35: CPT | Performed by: HOSPITALIST

## 2020-09-04 PROCEDURE — 93880 EXTRACRANIAL BILAT STUDY: CPT | Performed by: SURGERY

## 2020-09-04 PROCEDURE — 97165 OT EVAL LOW COMPLEX 30 MIN: CPT

## 2020-09-04 PROCEDURE — G1004 CDSM NDSC: HCPCS

## 2020-09-04 PROCEDURE — 83036 HEMOGLOBIN GLYCOSYLATED A1C: CPT | Performed by: NURSE PRACTITIONER

## 2020-09-04 RX ORDER — CLOPIDOGREL BISULFATE 75 MG/1
75 TABLET ORAL DAILY
Status: DISCONTINUED | OUTPATIENT
Start: 2020-09-04 | End: 2020-09-05 | Stop reason: HOSPADM

## 2020-09-04 RX ORDER — HALOPERIDOL 5 MG/ML
2 INJECTION INTRAMUSCULAR ONCE
Status: COMPLETED | OUTPATIENT
Start: 2020-09-04 | End: 2020-09-04

## 2020-09-04 RX ORDER — ONDANSETRON 2 MG/ML
4 INJECTION INTRAMUSCULAR; INTRAVENOUS ONCE
Status: COMPLETED | OUTPATIENT
Start: 2020-09-04 | End: 2020-09-04

## 2020-09-04 RX ORDER — CILOSTAZOL 100 MG/1
100 TABLET ORAL
Status: DISCONTINUED | OUTPATIENT
Start: 2020-09-04 | End: 2020-09-04

## 2020-09-04 RX ORDER — LORAZEPAM 2 MG/ML
0.5 INJECTION INTRAMUSCULAR ONCE
Status: COMPLETED | OUTPATIENT
Start: 2020-09-04 | End: 2020-09-04

## 2020-09-04 RX ADMIN — ASPIRIN 81 MG 81 MG: 81 TABLET ORAL at 08:13

## 2020-09-04 RX ADMIN — HEPARIN SODIUM 5000 UNITS: 5000 INJECTION INTRAVENOUS; SUBCUTANEOUS at 05:32

## 2020-09-04 RX ADMIN — FAMOTIDINE 40 MG: 20 TABLET, FILM COATED ORAL at 08:13

## 2020-09-04 RX ADMIN — CLOPIDOGREL BISULFATE 75 MG: 75 TABLET ORAL at 14:04

## 2020-09-04 RX ADMIN — LORAZEPAM 0.5 MG: 2 INJECTION INTRAMUSCULAR; INTRAVENOUS at 14:19

## 2020-09-04 RX ADMIN — AMLODIPINE BESYLATE 5 MG: 5 TABLET ORAL at 08:13

## 2020-09-04 RX ADMIN — HEPARIN SODIUM 5000 UNITS: 5000 INJECTION INTRAVENOUS; SUBCUTANEOUS at 14:04

## 2020-09-04 RX ADMIN — ONDANSETRON 4 MG: 2 INJECTION INTRAMUSCULAR; INTRAVENOUS at 08:58

## 2020-09-04 RX ADMIN — PANTOPRAZOLE SODIUM 40 MG: 40 TABLET, DELAYED RELEASE ORAL at 05:32

## 2020-09-04 RX ADMIN — B-COMPLEX W/ C & FOLIC ACID TAB 1 TABLET: TAB at 08:14

## 2020-09-04 RX ADMIN — METHIMAZOLE 2.5 MG: 5 TABLET ORAL at 08:13

## 2020-09-04 RX ADMIN — HALOPERIDOL LACTATE 2 MG: 5 INJECTION INTRAMUSCULAR at 14:19

## 2020-09-04 NOTE — ASSESSMENT & PLAN NOTE
VAS carotid:  RIGHT -50-69% stenosis ICA  LEFT:  <50% stenosis ICA  B/L Plaque is heterogenous and irregular  Vertebral artery flow is antegrade  There is no significant subclavian artery disease  Stenosis could cause longstanding dizziness/stroke-like symptoms/ophthalmic symptoms  Plan  · Vascular consulted

## 2020-09-04 NOTE — ASSESSMENT & PLAN NOTE
History of constipation  Two weeks ago she had severe abdominal pain mostly located in the right lower quadrant which she attributed to appendicitis  She almost called the ambulance but because the pain improved she did not  History of constipation, diverticulosis status post resection  On physical exam patient has abdominal tenderness x 4 quadrants  One episode of nausea and vomiting slightly better with 1 tablet Zofran  Likely secondary to constipation  · Plain x-ray abdomen obstruction series Nonobstructive bowel gas pattern  Plan  Has no obstruction seen on plain x-ray abdomen Most likely due to constipation therefore adjust bowel regimen as needed when patient is oriented

## 2020-09-04 NOTE — CONSULTS
This 49-year-old woman complains of sudden painless loss of vision in the left eye, like a a fog coming over her vision which persists to this time  Past ocular history includes cataract surgery in both eyes  She gets yearly eye examinations and had 1 done not too long ago  She is not on any ocular medications  On examination, visual acuity with correction at near is 2020 both eyes  The pupils are equal round reactive to light with no afferent defect  Extraocular movements are full  Confrontation fields are full  Intra-ocular pressures are 12 and 14  The eyes were dilated with Mydriacyl and Terence-Synephrine  The media is clear both eyes  The optic nerves are pink and flat with 0 25 physiologic cupping  The vessels macula and periphery are unremarkable  Impression:  Amaurosis fugax  Agree with workup  Plan:  Observation  She should follow up with myself or another eye care provider after discharge for additional evaluation and ongoing care

## 2020-09-04 NOTE — ASSESSMENT & PLAN NOTE
Lab Results   Component Value Date    HGBA1C 5 6 09/04/2020       No results for input(s): POCGLU in the last 72 hours  Blood Sugar Average: Last 72 hrs:  · Well controlled  · Diet controlled  · Patient currently not on any antidiabetic medication regimen  · Continue CCD while inpatient

## 2020-09-04 NOTE — ASSESSMENT & PLAN NOTE
POA:  left eye blurriness and frontal headache which has improved  Nausea +  NO trauma fall, injury, blood thinners or stroke history  Had nausea on exam   Only vomited saliva  Plan  · MRI brain plan for today  · Echo  · Neurology consulted    · Vascular consulted  · PT/OT  · Neuro checks  · Telemetry

## 2020-09-04 NOTE — ASSESSMENT & PLAN NOTE
Lab Results   Component Value Date    HGBA1C 5 4 06/19/2020       No results for input(s): POCGLU in the last 72 hours  Blood Sugar Average: Last 72 hrs:  · Well controlled  · Diet controlled  · Patient currently not on any antidiabetic medication regimen  · Continue CCD while inpatient

## 2020-09-04 NOTE — ASSESSMENT & PLAN NOTE
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  Small intraluminal low density identified proximal left vertebral artery just beyond its origin possibly related to artifact or thrombus rather than a dissection flap but is to be correlated clinically, I have no priors for comparison  Just beyond  this area, there is moderate atherosclerotic plaque narrowing left vertebral artery prior to entrance into the left transverse foramen  Severe right carotid atherosclerotic disease of the bifurcation  Approximately atherosclerotic plaque 75-80% stenosis proximal right cervical ICA just beyond the bifurcation  Bilateral cavernous carotid artery atherosclerotic plaque narrowing with severe right clinoid segment atherosclerotic plaque stenosis  Right MCA M2/M3 segment severe critical stenosis  Intracranial left V4 segment moderate to severe atherosclerotic plaque multifocal stenosis "    Stroke Pathway  o Frequent vital signs  o Neuro checks  o Telemetry  o Check lipid panel and A1c   o Aspirin  Statin contraindicated due to allergy   Obtain MRI brain   Obtain ECHO   Obtain VAS of carotids  [facility]   Consider Vascular consult pending carotids study   PT/OT consultation

## 2020-09-04 NOTE — ASSESSMENT & PLAN NOTE
- Continue on acute ischemic stroke pathway  Concern for possible ischemic stroke vs CRAO vs TIA (feel TIA is less likely as symptoms do continue this AM)  - Will request ophthalmology to evaluate  - Check ESR     - CTA head and neck with and without contrast completed and demonstrates intraluminal low density proximal L vertebral artery concerning for possible thrombus vs artifact, moderate atherosclerotic plaque narrowing L vertebral artery, severe R carotid atherosclerosis R ICA, B/L cavernous carotid artery atherosclerotic plaque stenosis, R MCA M2/3 segment severe critical stenosis, intracranial L V4 segment moderate to severe atherosclerotic plaque multifocal stenosis  - Vascular surgery consult pending     - MRI brain without contrast pending  Patient notes extreme claustrophobia  Will request EKG and as long as QTC okay will give Ativan 1 mg IV and Haldol 1 mg prior to MRI     - Echocardiogram pending     - Continue to monitor on telemetry  - Hemoglobin A1c pending     - Fasting lipid panel reviewed, total cholesterol 243, triglycerides 180, HDL 52,      - Continue on ASA 81 mg QD  Recommend start Pletal 100 mg BID  - Atorvastatin held in setting of statin allergy but may need to consider alternative as outpatient given elevated LDL     - Goal normothermia and euglycemia  - Allow for permissive hypertension today with BP goal <220/110     - PT/OT    - Stroke education     - Monitor exam and notify with changes

## 2020-09-04 NOTE — ASSESSMENT & PLAN NOTE
CT head and neck: moderate atherosclerotic plaque narrowing left vertebral artery prior to entrance into the left transverse foramen  Plan  · Vascular and Neurology consulted

## 2020-09-04 NOTE — ASSESSMENT & PLAN NOTE
Normally she has floaters due to macular degeneration, however 2 days ago she said that her vision changed  When asked if it was like a curtain falling she stated yes, she also claims she saw colors  After my examination, patient told nurse that when she looks down she has fluttering in both eyes, left more than right  She also claimed to have seen a mouse in the bathroom, when nurse checked did not see any  Plan  · Spoke to Neurology, neurology suggested Ophthalmology consult

## 2020-09-04 NOTE — UTILIZATION REVIEW
Initial Clinical Review    Admission: Date/Time/Statement:   OBSERVATION ADMISSION 9/3/2020 CONVERTED TO INPATIENT ADMISSION 9/4/2020 1027 DUE TO ISCHEMIC STROKE  09/04/20 1027    Inpatient Admission  Once      Transfer Service: Hospitalist       Question  Answer  Comment    Admitting Physician  Jenniffer Barton of [facility]  Med Surg     Estimated length of stay  More than 2 Midnights     Certification  I certify that inpatient services are medically necessary for this patient for a duration of greater than two midnights  See H&P and MD Progress Notes for additional information about the patient's course of treatment  09/04/20 1027       ED Arrival Information     Expected Arrival Acuity Means of Arrival Escorted By Service Admission Type    - 9/3/2020 17:36 Emergent Walk-In Family Member General Medicine Emergency    Arrival Complaint    Alysia Loving IN CHEST        Chief Complaint   Patient presents with    Blurred Vision     Pt presents to the ED with blurry vision onset 1 hr ago  Reports HA x 1 day  Reports HTN episode at home of 180/74  Assessment/Plan: 80 y o  female with a past medical history of asthma, type 2 diabetes, hyperlipidemia, hypertension, GERD, anxiety, and hyperthyroidism who presents with headache and blurred vision  Patient reports 1 day history of headache  Patient reports that today she began with left-sided blurred vision in the afternoon after her hair appointment  Patient reports chronic intermittent vertigo for which she is on meclizine as needed  Stroke-like symptoms  Assessment & Plan  · Presentation:  Presents with 1 day history of headache  Patient reports left-sided blurred vision that occurred around today  · CTA: "No evidence of acute intracranial hemorrhage  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   Small intraluminal low density identified proximal left vertebral artery just beyond its origin possibly related to artifact or thrombus rather than a dissection flap but is to be correlated clinically, I have no priors for comparison  Just beyond  this area, there is moderate atherosclerotic plaque narrowing left vertebral artery prior to entrance into the left transverse foramen  Severe right carotid atherosclerotic disease of the bifurcation  Approximately atherosclerotic plaque 75-80% stenosis proximal right cervical ICA just beyond the bifurcation  Bilateral cavernous carotid artery atherosclerotic plaque narrowing with severe right clinoid segment atherosclerotic plaque stenosis  Right MCA M2/M3 segment severe critical stenosis  Intracranial left V4 segment moderate to severe atherosclerotic plaque multifocal stenosis "   · Stroke Pathway  ? Frequent vital signs  ? Neuro checks  ? Telemetry  ? Check lipid panel and A1c   ? Aspirin  Statin contraindicated due to allergy  · Obtain MRI brain  · Obtain ECHO  · Obtain VAS of carotids  · Consult Neurology  · Consider Vascular consult pending carotids study  · PT/OT consultation  Hypokalemia  Assessment & Plan  · Potassium level upon admission: 3 0  · Replete and monitor BMP  Essential hypertension  Assessment & Plan  · Blood pressure is reviewed and acceptable  ? Last recorded pressure: 156/68  · Continue amlodipine  · Monitor blood pressure  Hyperlipidemia  Assessment & Plan  · Patient currently not on statin due allergy  [facility] 2 diabetes [facility] ([facility] )  Assessment & Plan        Lab Results   Component Value Date     HGBA1C 5 4 06/19/2020         No results for input(s): POCGLU in the last 72 hours  Blood Sugar Average: Last 72 hrs:  · Well controlled  · Diet controlled  · Patient currently not on any antidiabetic medication regimen  · Continue CCD while inpatient  CKD (chronic kidney disease), stage III (HCC)  Assessment & Plan  · Creatinine upon admission: 1 16  ? Baseline: 1 1-1 2  · Monitor BMP    Esophageal reflux  Assessment & Plan  · Continue Pepcid and PPI  Hyperthyroidism  Assessment & Plan  · Continue methimazole  Mild intermittent asthma without complication  Assessment & Plan  · Patient not in acute exacerbation  · Continue Singulair HS and albuterol p r n  Anxiety  Assessment & Plan  · PDMP reviewed  · Continue lorazepam 0 5 mg tablet daily p r n      ED Triage Vitals [09/03/20 1753]   Temperature Pulse Respirations Blood Pressure SpO2   98 6 °F (37 °C) 74 16 156/68 97 %      Temp Source Heart Rate Source Patient Position - Orthostatic VS BP Location FiO2 (%)   Oral Monitor Sitting Left arm --      Pain Score       3          Wt Readings from Last 1 Encounters:   09/03/20 56 2 kg (124 lb)     Additional Vital Signs:   Date/Time   Temp   Pulse   Resp   BP   MAP (mmHg)   SpO2   O2 Device   Patient Position - Orthostatic VS    09/04/20 0700   98 2 °F (36 8 °C)   70   18   165/72      97 %   None (Room air)   Lying    09/04/20 0500      65   18   170/69                09/04/20 0300      60   18   135/65                09/04/20 0100      65   18   128/62                09/04/20 0000      62   18   130/66                09/03/20 2300   97 6 °F (36 4 °C)   60   18   138/66      95 %   None (Room air)   Lying    09/03/20 2200   97 7 °F (36 5 °C)   73   18   187/84Abnormal        98 %          09/03/20 2122   98 6 °F (37 °C)   62   18   191/77Abnormal        100 %   None (Room air)   Lying    09/03/20 1753   98 6 °F (37 °C)   74   16   156/68   98   97 %   None (Room air)   Sitting           Pertinent Labs/Diagnostic Test Results:       Results from last 7 days   Lab Units 09/03/20  1807   WBC Thousand/uL 6 49   HEMOGLOBIN g/dL 13 5   HEMATOCRIT % 40 3   PLATELETS Thousands/uL 342   NEUTROS ABS Thousands/µL 3 51         Results from last 7 days   Lab Units 09/03/20  1807   SODIUM mmol/L 140   POTASSIUM mmol/L 3 0*   CHLORIDE mmol/L 103   CO2 mmol/L 29   ANION GAP mmol/L 8   BUN mg/dL 15   CREATININE mg/dL 1 16 EGFR ml/min/1 73sq m 42   CALCIUM mg/dL 9 1     Results from last 7 days   Lab Units 09/03/20  1807   AST U/L 19   ALT U/L 19   ALK PHOS U/L 111   TOTAL PROTEIN g/dL 7 9   ALBUMIN g/dL 4 0   TOTAL BILIRUBIN mg/dL 0 44         Results from last 7 days   Lab Units 09/03/20  1807   GLUCOSE RANDOM mg/dL 121             No results found for: BETA-HYDROXYBUTYRATE                   Results from last 7 days   Lab Units 09/03/20  1807   TROPONIN I ng/mL <0 02   9/3   CTA head and neck with and without contrast   Final Result by Juice Merino MD (09/03 2013)           1  No evidence of acute intracranial hemorrhage  2  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  3  Small intraluminal low density identified proximal left vertebral artery just beyond its origin possibly related to artifact or thrombus rather than a dissection flap but is to be correlated clinically, I have no priors for comparison  Just beyond    this area, there is moderate atherosclerotic plaque narrowing left vertebral artery prior to entrance into the left transverse foramen  4  Severe right carotid atherosclerotic disease of the bifurcation  Approximately atherosclerotic plaque 75-80% stenosis proximal right cervical ICA just beyond the bifurcation  Vascular consult recommended  5  Bilateral cavernous carotid artery atherosclerotic plaque narrowing with severe right clinoid segment atherosclerotic plaque stenosis  6  Right MCA M2/M3 segment severe critical stenosis  7  Intracranial left V4 segment moderate to severe atherosclerotic plaque multifocal stenosis  XR chest 1 view portable   No acute disease  No CHF consolidation or pneumothoraces  Normal mediastinum   No acute cardiopulmonary disease  9/3 vas carotid  CONCLUSION:   RIGHT:   There is 50-69% stenosis noted in the internal carotid artery  Plaque is   heterogenous and irregular  Vertebral artery flow is antegrade  There is no significant subclavian artery   disease  LEFT:   There is <50% stenosis noted in the internal carotid artery  Plaque is   heterogenous and irregular  Vertebral artery flow is antegrade  There is no significant subclavian artery   disease  ekg nsr         Past Medical History:   Diagnosis Date    Asthma     Chronic interstitial cystitis     Community acquired pneumonia     last assessed 10/4/13    Diabetes mellitus ([facility] )     Disease of thyroid gland     Diverticulitis     Dizziness     GERD (gastroesophageal reflux disease)     Hyperlipidemia     Hypertension     Vertigo      Present on Admission:   Hyperlipidemia   Essential hypertension   Type 2 diabetes mellitus (HCC)   CKD (chronic kidney disease), stage III (HCC)   Mild intermittent asthma without complication   Anxiety   Hyperthyroidism   Esophageal reflux    Admitting Diagnosis: Blurred vision [H53 8]  Visual disturbance [H53 9]  Hypertension [I10]  Blurry vision [H53 8]  Stroke-like symptoms [R29 90]  Headache [R51]  Age/Sex: 80 y o  female  Admission Orders:  Scheduled Medications:  amLODIPine, 5 mg, Oral, Daily  aspirin, 81 mg, Oral, Daily  famotidine, 40 mg, Oral, Daily  heparin (porcine), 5,000 Units, Subcutaneous, Q8H PORTILLO  methimazole, 2 5 mg, Oral, Daily  montelukast, 10 mg, Oral, HS  ondansetron, 4 mg, Intravenous, Once  pantoprazole, 40 mg, Oral, Early Morning  stress formula, 1 tablet, Oral, Daily      Continuous IV Infusions:     PRN Meds:  acetaminophen, 650 mg, Oral, Q6H PRN  albuterol, 2 puff, Inhalation, Q6H PRN  LORazepam, 0 5 mg, Oral, Daily PRN  meclizine, 25 mg, Oral, Q8H PRN  oxyCODONE-acetaminophen, 1 tablet, Oral, BID PRN        IP CONSULT TO NEUROLOGY  IP CONSULT TO CASE MANAGEMENT  IP CONSULT TO NUTRITION SERVICES    Network Utilization Review Department  [email or web address]  ATTENTION: Please call with any questions or concerns to [phone] and carefully listen to the prompts so that you are directed to the right person  All voicemails are confidential   Suazo Stephanie all requests for admission clinical reviews, approved or denied determinations and any other requests to dedicated fax number below belonging to the campus where the patient is receiving treatment   List of dedicated fax numbers for the Facilities:  [address] [place] [address] DENIALS (Administrative/Medical Necessity) [phone]   [address]  (Maternity/NICU/Pediatrics) [phone]   Amber Holly [phone]   [facility] [phone]   [facility] [address] Tommy [phone]   Marly Verdugo [phone]   [address] [place] [address] [phone]   [facility]  [phone]   [address] [facility] [address]  [address] [address] [facility] [phone]

## 2020-09-04 NOTE — ASSESSMENT & PLAN NOTE
- Fasting lipid panel reviewed, total cholesterol 243, triglycerides 180, HDL 52,   - Statin held in setting of allergy  May need to consider alternative as an outpatient given elevated LDL and intracranial stenosis

## 2020-09-04 NOTE — ASSESSMENT & PLAN NOTE
- Allow for permissive hypertension today, goal BP <220/110      - Antihypertensives as per medicine team

## 2020-09-04 NOTE — ASSESSMENT & PLAN NOTE
- On Methimzaole     - TSH 2 775 and free T4 1 06 in June 2020  Body Location Override (Optional - Billing Will Still Be Based On Selected Body Map Location If Applicable): left superior lateral upper back

## 2020-09-04 NOTE — ASSESSMENT & PLAN NOTE
Lab Results   Component Value Date    HGBA1C 5 4 06/19/2020       No results for input(s): POCGLU in the last 72 hours      Blood Sugar Average: Last 72 hrs:      - Hemoglobin A1c pending     - Management as per medicine team

## 2020-09-04 NOTE — ASSESSMENT & PLAN NOTE
· Patient currently not on statin due allergy  Lab Results   Component Value Date    CHOLESTEROL 243 (H) 09/04/2020    CHOLESTEROL 232 (H) 01/27/2020    CHOLESTEROL 227 (H) 02/19/2018     Lab Results   Component Value Date    HDL 52 09/04/2020    HDL 57 01/27/2020    HDL 52 02/19/2018     Lab Results   Component Value Date    TRIG 180 (H) 09/04/2020    TRIG 145 01/27/2020    TRIG 192 (H) 02/19/2018     Plan  · No medications at this time since patient has abdominal tenderness and most medications will call as GI upset/constipation  · Neurology follow up  Consider alternatives

## 2020-09-04 NOTE — PROGRESS NOTES
Patient came back from MRI unable to sit for procedure  Upon coming back to floor patient is very confused, agitated, and pacing the hallways and normally alert and oreinted  SLIM aware  Patient daughter called to come in and help patient feel more at ease  Will continue to monitor   Edouard Adhikari RN

## 2020-09-04 NOTE — ASSESSMENT & PLAN NOTE
Anxiety intermittent  Also related to claustrophobia phobia  Has family support  Plan  ·  IV Ativan for MRI as patient is hospital day  Explained to patient to use imagery in addition during procedure

## 2020-09-04 NOTE — ASSESSMENT & PLAN NOTE
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  Small intraluminal low density identified proximal left vertebral artery just beyond its origin possibly related to artifact or thrombus rather than a dissection flap but is to be correlated clinically, I have no priors for comparison  Just beyond  this area, there is moderate atherosclerotic plaque narrowing left vertebral artery prior to entrance into the left transverse foramen  Severe right carotid atherosclerotic disease of the bifurcation  Approximately atherosclerotic plaque 75-80% stenosis proximal right cervical ICA just beyond the bifurcation  Bilateral cavernous carotid artery atherosclerotic plaque narrowing with severe right clinoid segment atherosclerotic plaque stenosis  Right MCA M2/M3 segment severe critical stenosis  Intracranial left V4 segment moderate to severe atherosclerotic plaque multifocal stenosis "   · VAS of carotids:  ICA Right 50-69%  < 50%   Stroke Pathway  o Frequent vital signs  o Neuro checks  o Telemetry  o Aspirin  Statin contraindicated due to allergy   Obtain MRI brain   Obtain ECHO  [address]   Consulted vascular   PT/OT consultation

## 2020-09-04 NOTE — CASE MANAGEMENT
Cm received consult for ischemic stroke  PT/OT has evaluated pt and state pt is independent and does not have any needs  Cm will continue to be available in the event additional needs arise

## 2020-09-04 NOTE — SPEECH THERAPY NOTE
Speech Language/Pathology  Speech/Language Pathology  Assessment    Patient Name: Ester Ordonez  HRHKT'Q Date: 9/4/2020     Problem List  Principal Problem:    Stroke-like symptoms  Active Problems:    Anxiety    Mild intermittent asthma without complication    Esophageal reflux    Hyperlipidemia    Essential hypertension    Hyperthyroidism    Type 2 diabetes mellitus (HCC)    CKD (chronic kidney disease), stage III (HCC)    Hypokalemia    Carotid stenosis, right    Vertebral artery stenosis    Past Medical History  Past Medical History:   Diagnosis Date    Asthma     Chronic interstitial cystitis     Community acquired pneumonia     last assessed 10/4/13    Diabetes mellitus ([address] )     Disease of thyroid gland     Diverticulitis     Dizziness     GERD (gastroesophageal reflux disease)     Hyperlipidemia     Hypertension     Vertigo      Past Surgical History  Past Surgical History:   Procedure Laterality Date    ADENOIDECTOMY      CHOLECYSTECTOMY      COLON SURGERY      partial colectomy - sigmoid, diverticulitis    EYE SURGERY      HYSTERECTOMY      ovaries remain    TONSILLECTOMY      US GUIDED LYMPH NODE BIOPSY RIGHT  3/11/2020    US GUIDED THYROID BIOPSY  11/13/2019     Pt is an 79 yo female admitted to [facility]/3/20 dx: stroke like symptoms  Pt stated she was sitting on the porch talking to her neighbor when she lost vision in her eye  Patient reports 1 day history of headache  Patient reports that today she began with left-sided blurred vision in the afternoon after her hair appointment  Patient reports chronic intermittent vertigo for which she is on meclizine as needed  She states she has been on meclizine for quite some time  Patient denies tingling, numbness, ataxia, hemiparesis, dysphagia, aphasia, or weakness  Patient denies chest pain, chest tightness, shortness of breath, cough, fever or chills    Patient will be admitted for further evaluation including MRI, carotid study, echocardiogram and neurology consult  Consult received for speech/swallow eval on stroke pathway  Pt passed nsg swallow screen; tolerating regular diet w/o s/s dysphagia or aspiration  No speech/language deficits reported  NIH score is 0  No need for formal speech/swallow eval at this time  Reconsult if needed    Irene King, SLP

## 2020-09-04 NOTE — ASSESSMENT & PLAN NOTE
MRI brain today however after administration of IV Ativan with Haldol patient became confused  Later on became agitated  Most likely due to Haldol  Haldol adverse reaction is documented in the chart

## 2020-09-04 NOTE — ASSESSMENT & PLAN NOTE
CT head and neck: moderate atherosclerotic plaque narrowing left vertebral artery prior to entrance into the left transverse foramen   Vascular suggested left vertebral dissection  MRI brain:  No indication of acute ischemia  Age-appropriate volume loss, and mild to moderate degree of chronic small vessel disease, both of which have progressed since remote exam 7 years earlier  Plan  · Vascular and Neurology outpatient follow up     · Discharge with ASA and pletal

## 2020-09-04 NOTE — OCCUPATIONAL THERAPY NOTE
Occupational Therapy Evaluation     Patient Name: Mai Carrasco  ZNAHJ'T Date: 9/4/2020  Problem List  Principal Problem:    Stroke-like symptoms  Active Problems:    Anxiety    Mild intermittent asthma without complication    Esophageal reflux    Hyperlipidemia    Essential hypertension    Hyperthyroidism    Type 2 diabetes mellitus (HCC)    CKD (chronic kidney disease), stage III (HCC)    Carotid stenosis, right    Vertebral artery stenosis    Abdominal tenderness    Vision abnormalities    Past Medical History  Past Medical History:   Diagnosis Date    Asthma     Chronic interstitial cystitis     Community acquired pneumonia     last assessed 10/4/13    Diabetes mellitus ([address] )     Disease of thyroid gland     Diverticulitis     Dizziness     GERD (gastroesophageal reflux disease)     Hyperlipidemia     Hypertension     Vertigo      Past Surgical History  Past Surgical History:   Procedure Laterality Date    ADENOIDECTOMY      CHOLECYSTECTOMY      COLON SURGERY      partial colectomy - sigmoid, diverticulitis    EYE SURGERY      HYSTERECTOMY      ovaries remain    TONSILLECTOMY      US GUIDED LYMPH NODE BIOPSY RIGHT  3/11/2020    US GUIDED THYROID BIOPSY  11/13/2019 09/04/20 1255   Note Type   Note type Eval only   Restrictions/Precautions   Weight Bearing Precautions Per Order No   Other Precautions Visual impairment;Telemetry   Pain Assessment   Pain Assessment Tool Pain Assessment not indicated - pt denies pain   Pain Score No Pain   Home Living   Type of Home House; Other (Comment)  (1 SH)   Home Layout Laundry in basement; One level  (1-2 CEE)   [address] Other (Comment)  (no AD PTA)   Additional Comments Pt reports living in 1 [address] [place] [address] w/ 1-2 CEE PTA   Pt reports having pool outside and family comes over to swim   Prior Function   Level of [place] Independent with ADLs and functional mobility   Lives With Son;Daughter  (2 adult children live w/ her)   Receives Help From Family   ADL Assistance Independent   IADLs Needs assistance   Falls in the last 6 months 0   Vocational Retired   Comments Pt reports I w/ ADL PTA w/ out use of AD or DME   Lifestyle   Autonomy Pt reports I w/ ADL PTA w/ out use of AD or DME   Reciprocal Relationships Pt reports living w/ family in [address]   Service to Others 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   Intrinsic Gratification Pt reports enjoying spending time w/ family outside by pool and active lifestyle   Subjective   Subjective "I just cam back from getting more pictures"   ADL   Where Assessed Edge of bed   Eating Assistance 7  Independent   Eating Deficit Setup   Grooming Assistance 6  Modified Independent   Grooming Deficit Setup   UB Bathing Assistance Unable to assess   UB Bathing Deficit   (demo strength, coordination, balance to complete)   LB Bathing Assistance Unable to assess   UB Dressing Assistance 7  Independent   LB Dressing Assistance 6  Modified independent   LB Dressing Deficit Setup   Bed Mobility   Supine to Sit 7  Independent   Sit to Supine Unable to assess   Additional Comments Pt seated OOB In chair post eval w/ needs met, call bell in reach   Provided w/ today's newspaper   Transfers   Sit to Stand 6  Modified independent   Additional items Increased time required   Stand to Sit 6  Modified independent   Additional items Increased time required   Functional Mobility   Functional Mobility 6  Modified independent   Additional Comments w/ out use of AD or LOB   Additional items   (+ time)   Balance   Static Sitting Normal   Static Standing Good   Ambulatory Fair +   Activity Tolerance   Activity Tolerance Patient limited by fatigue   Medical Staff Made Aware care coordination w/ PTSuzanne   Nurse Made Aware per Barbara BLANCO appropriate to see pt   RUE Assessment   RUE Assessment WFL   RUE Strength   RUE Overall Strength Within Functional Limits - able to perform ADL tasks with strength   LUE Assessment   LUE Assessment WFL   LUE Strength   LUE Overall Strength Within Functional Limits - able to perform ADL tasks with strength   Hand Function   Gross Motor Coordination Functional   Fine Motor Coordination Functional   Sensation   Light Touch No apparent deficits  (B UE)   Sharp/Dull Not tested   Additional Comments Pt reports frequent muscle cramps / spasms B distal L LE   Vision-Basic Assessment   Current Vision Wears glasses all the time   Patient Visual Report Other (Comment)  (floaters; lower quadrant)   Vision - Complex Assessment   Acuity Able to read clock/calendar on wall without difficulty; Able to read newspaper without difficulty   Perception   Inattention/Neglect Appears intact   Motor Planning Appears intact   Perseveration Not present   Cognition   Overall Cognitive Status [facility]   Arousal/Participation Alert; Cooperative   Attention Within functional limits   Orientation Level Oriented X4   Memory Within functional limits   Following Commands Follows all commands and directions without difficulty   Comments Identified pt by full name and birthdate   Assessment   Assessment Pt is an 81yo female admitted [facility] on 9/3/2020  Pt presents w/ stroke - like symptoms and significant PMH impacting her occupational performance including DM, HTN, anxiety, vertigo  Pt reports living w/ family in [facility] w/ 1-2 CEE PTA  Pt reports I w/ ADL PTA w/ out use of AD and no falls  Upon eval, pt alert and oriented  Able to participate in conversation appropriately and follow directions  Pt demonstrated B UE AROM and strength WFL to complete ADL  Pt completed bed mobility independently  Pt completed sit <> stand w/ mod I and engaged in functional mobility w/ in room w/ out LOB or AD  Pt completing ADL at / near baseline level of I despite visual symptoms  No acute OT needs  Pt can return to PLOF w/ family support / assist as needed   Recommend active participation in ADL w/ nursing staff   D/C OT   Goals   Patient Goals Pt stated that she would like to return home   Recommendation   OT Discharge Recommendation Return to previous environment with social support   OT - OK to Discharge   (when medically stable)   [place] Index   Feeding 10   Bathing 0   Grooming Score 5   Dressing Score 10   Bladder Score 10   Bowels Score 10   Toilet Use Score 10   Transfers (Bed/Chair) Score 15   Mobility (Level Surface) Score 10   Stairs Score 0   Barthel Index Score 80   Modified [place] Scale   Modified [place] Scale 2      Elena Mc, OTR/L

## 2020-09-04 NOTE — ASSESSMENT & PLAN NOTE
· 1 x Amaurosis fugax   H/O  floaters and macular degeneration  Colors and imaginary objects seen on neck bending      Plan     · Ophthalmology consult

## 2020-09-04 NOTE — CONSULTS
Consultation -Vascular Surgery  Esmer Guo 80 y o  female MRN: [id number]  Unit/Bed#: S -01 Encounter: [id number]        Inpatient consult to Vascular Surgery  Consult performed by: Gary Terrazas PA-C  Consult ordered by: Christine Hull MD          ASSESSMENT:  Problem List     * (Principal) Stroke-like symptoms    Anxiety    Mild intermittent asthma without complication    Degeneration of intervertebral disc of lumbar region    Overview Addendum 2/18/2019 11:55 AM by Rafael Gates MD     Gabapentin, amitriptyline, Lyrica, Cymbalta - with side effects or did not help         Diverticulosis    Elevated serum alkaline phosphatase level    Esophageal reflux    Fatty liver    Hyperlipidemia    Essential hypertension    Hyperthyroidism    Overview Signed 3/10/2018  9:05 AM by Rafael Gates MD     Transitioned From: Nonspecific abnormal results of function study of thyroid; Description: Dr Fatoumata Strong  autoimmune  propranolol did not help, started methimazole 10/14         Insomnia    Irritable bowel syndrome    Spinal stenosis    Overview Signed 3/10/2018  9:05 AM by Rafael Gates MD     Transitioned From: Lower back pain         Spondylolisthesis, grade 1    Transient ischemic attack    Type 2 diabetes mellitus ([facility] [address] )    Lab Results   Component Value Date    HGBA1C 5 6 09/04/2020       No results for input(s): POCGLU in the last 72 hours  Blood Sugar Average: Last 72 hrs:          Vitamin D deficiency    Muscle cramps    Ovarian cyst    CKD (chronic kidney disease), stage III (HCC)    Trigger finger of right hand    Pain in both lower extremities    Radiculopathy, lumbar region    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 ([facility] )    Overview Signed 2/12/2019  5:39 AM by Carlos Coelho     Per CMS ICD 10 Guidelines         Lab Results   Component Value Date    HGBA1C 5 6 09/04/2020       No results for input(s): POCGLU in the last 72 hours      Blood Sugar Average: Last 72 hrs:          PAD (peripheral artery disease) ([facility] )    Memory loss    Overview Signed 5/28/2019 11:50 AM by Lexie Jessica MD     [facility] 18/30 5/18         Atherosclerosis 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 (HCC)    Thyroid nodule    Mass of right submandibular region    Abnormal finding on imaging    Rotator cuff disorder, right    Overview Signed 9/1/2020 11:53 AM by Lexie Jessica MD     S/p injection         Embolism and thrombosis of arteries of the lower extremities (HCC)    Carotid stenosis, right    Vertebral artery stenosis    Abdominal tenderness    Vision abnormalities          79 yo F PMH DM, HTN, HLD, CKD3 with stroke like sx, being worked up for stroke by [facility] and neuro  CTA head/neck showed L vertebral A stenosis, intraluminal low density  B/l carotid stenosis  Carotid duplex: R: 50-69% stenosis ICA, L: <50% stenosis ICA  Unclear if sx d/t TIA vs stroke vs CRAO per neuro  MRI brain pending  ophtho c/s pending    Plan:  Discussed with Dr Emerald Villegas, who will see later today  Possible focal left vertebral dissection  Carotid stenosis likely asymptomatic  - f/u MRI  - f/u ophtho c/s  - recommend ASA/plavix for possible vertebral dissection  - consider alternate to statin therapy for HLD  - f/u as outpatient    Thank you for the consult  Rest of care per primary medicine team    Reason for Consult / Principal Problem:    HPI: Ester Ordonez is a 80y o  year old female PMH DM2, HTN, HLD, CKD3 who presents with Stroke-like symptoms  Consulted by [facility] team for findings of vertebral artery and carotid artery stenosis  Per pt, yesterday noticed blurred vision in left eye and described it as "a curtain coming over my eye"  Also admits to mild frontal HA  Denies any other neurological sx  Denies any facial drooping, slurred or difficulty with speech, weakness in upper or lower extremities, or difficulty walking  Has never had this before   Today, seems to be improving but still with some visual disturbances  Admits to "seeing a mouse" in the bathroom, but when nurses came in there was nothing there  Also states earlier in the day thinking the "floor was bubbling"  Has hx of HLD and hypertriglyceridemia but does not take statin d/t allergy  States she has not tried anything else for HLD  Review of Systems   Eyes: Negative for pain and discharge  Neurological: Positive for headaches  Negative for syncope, speech difficulty, weakness, light-headedness and numbness  All other systems reviewed and are negative        Historical Information   Past Medical History:   Diagnosis Date    Asthma     Chronic interstitial cystitis     Community acquired pneumonia     last assessed 10/4/13    Diabetes mellitus ([facility] [address] )     Disease of thyroid gland     Diverticulitis     Dizziness     GERD (gastroesophageal reflux disease)     Hyperlipidemia     Hypertension     Vertigo      Past Surgical History:   Procedure Laterality Date    ADENOIDECTOMY      CHOLECYSTECTOMY      COLON SURGERY      partial colectomy - sigmoid, diverticulitis    EYE SURGERY      HYSTERECTOMY      ovaries remain    TONSILLECTOMY      US GUIDED LYMPH NODE BIOPSY RIGHT  3/11/2020    US GUIDED THYROID BIOPSY  11/13/2019     Social History   Social History     Substance and Sexual Activity   Alcohol Use No     Social History     Substance and Sexual Activity   Drug Use No     Social History     Tobacco Use   Smoking Status Never Smoker   Smokeless Tobacco Never Used     Family History   Problem Relation Age of Onset    Coronary artery disease Mother     Other Father         MVA    Alcohol abuse Neg Hx     Depression Neg Hx     Drug abuse Neg Hx     Substance Abuse Neg Hx     Mental illness Neg Hx        Meds/Allergies     Medications Prior to Admission   Medication    albuterol (PROVENTIL HFA,VENTOLIN HFA) 90 mcg/act inhaler    amLODIPine (NORVASC) 5 mg tablet    b complex vitamins capsule    Cholecalciferol (VITAMIN D PO)    famotidine (PEPCID) 40 MG tablet    LORazepam (ATIVAN) 0 5 mg tablet    meclizine (ANTIVERT) 25 mg tablet    methimazole (TAPAZOLE) 5 mg tablet    montelukast (SINGULAIR) 10 mg tablet    omeprazole (PriLOSEC) 20 mg delayed release capsule    oxyCODONE-acetaminophen (PERCOCET) 5-325 mg per tablet     Current Facility-Administered Medications   Medication Dose Route Frequency    acetaminophen (TYLENOL) tablet 650 mg  650 mg Oral Q6H PRN    albuterol (PROVENTIL HFA,VENTOLIN HFA) inhaler 2 puff  2 puff Inhalation Q6H PRN    amLODIPine (NORVASC) tablet 5 mg  5 mg Oral Daily    aspirin chewable tablet 81 mg  81 mg Oral Daily    cilostazol (PLETAL) tablet 100 mg  100 mg Oral BID AC    famotidine (PEPCID) tablet 40 mg  40 mg Oral Daily    heparin (porcine) subcutaneous injection 5,000 Units  5,000 Units Subcutaneous Q8H [facility]    LORazepam (ATIVAN) injection 0 5 mg  0 5 mg Intravenous Once    LORazepam (ATIVAN) tablet 0 5 mg  0 5 mg Oral Daily PRN    meclizine (ANTIVERT) tablet 25 mg  25 mg Oral Q8H PRN    methimazole (TAPAZOLE) tablet 2 5 mg  2 5 mg Oral Daily    montelukast (SINGULAIR) tablet 10 mg  10 mg Oral HS    oxyCODONE-acetaminophen (PERCOCET) 5-325 mg per tablet 1 tablet  1 tablet Oral BID PRN    pantoprazole (PROTONIX) EC tablet 40 mg  40 mg Oral Early Morning    stress formula tablet 1 tablet  1 tablet Oral Daily       Allergies   Allergen Reactions    Bactrim [Sulfamethoxazole-Trimethoprim]     Ezetimibe     Gabapentin     Lisinopril     Naproxen Nausea Only    Statins     Triazolam Other (See Comments)     confusion       Objective     Blood pressure 143/65, pulse 77, temperature 98 2 °F (36 8 °C), temperature source Oral, resp  rate 18, SpO2 97 %      Intake/Output Summary (Last 24 hours) at 9/4/2020 1211  Last data filed at 9/4/2020 0500  Gross per 24 hour   Intake 0 ml   Output 800 ml   Net -800 ml       PHYSICAL EXAM  Physical Exam  Vitals signs and nursing note reviewed  Constitutional:       General: She is not in acute distress  Appearance: Normal appearance  She is normal weight  She is not ill-appearing, toxic-appearing or diaphoretic  HENT:      Head: Normocephalic and atraumatic  Eyes:      Extraocular Movements: Extraocular movements intact  Neck:      Musculoskeletal: Neck supple  Vascular: No carotid bruit (b/l)  Cardiovascular:      Rate and Rhythm: Normal rate and regular rhythm  Pulses: Normal pulses  Heart sounds: Normal heart sounds  Pulmonary:      Effort: Pulmonary effort is normal       Breath sounds: Normal breath sounds  Skin:     General: Skin is warm and dry  Capillary Refill: Capillary refill takes less than 2 seconds  Neurological:      General: No focal deficit present  Mental Status: She is alert and oriented to person, place, and time  Psychiatric:         Mood and Affect: Mood normal          Behavior: Behavior normal        Radial pulse 2+ b/l  Strength 5/5 b/l upper and lower extremities  Lab Results:   I have personally reviewed pertinent lab results    , CBC:   Lab Results   Component Value Date    WBC 6 49 09/03/2020    HGB 13 5 09/03/2020    HCT 40 3 09/03/2020    MCV 90 09/03/2020     09/03/2020    MCH 30 1 09/03/2020    MCHC 33 5 09/03/2020    RDW 13 2 09/03/2020    MPV 9 2 09/03/2020    NRBC 0 09/03/2020   , CMP:   Lab Results   Component Value Date    SODIUM 140 09/03/2020    K 3 0 (L) 09/03/2020     09/03/2020    CO2 29 09/03/2020    BUN 15 09/03/2020    CREATININE 1 16 09/03/2020    CALCIUM 9 1 09/03/2020    AST 19 09/03/2020    ALT 19 09/03/2020    ALKPHOS 111 09/03/2020    EGFR 42 09/03/2020   , Coagulation: No results found for: PT, INR, APTT, Urinalysis: No results found for: COLORU, CLARITYU, SPECGRAV, PHUR, LEUKOCYTESUR, NITRITE, PROTEINUA, GLUCOSEU, KETONESU, BILIRUBINUR, BLOODU, Amylase: No results found for: AMYLASE, Lipase: No results found for: LIPASE  Imaging: I have personally reviewed pertinent reports  9/3 CTA head/neck:    IMPRESSION:  1  No evidence of acute intracranial hemorrhage  2  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  3  Small intraluminal low density identified proximal left vertebral artery just beyond its origin possibly related to artifact or thrombus rather than a dissection flap but is to be correlated clinically, I have no priors for comparison  Just beyond   this area, there is moderate atherosclerotic plaque narrowing left vertebral artery prior to entrance into the left transverse foramen  4  Severe right carotid atherosclerotic disease of the bifurcation  Approximately atherosclerotic plaque 75-80% stenosis proximal right cervical ICA just beyond the bifurcation  Vascular consult recommended  5  Bilateral cavernous carotid artery atherosclerotic plaque narrowing with severe right clinoid segment atherosclerotic plaque stenosis  6  Right MCA M2/M3 segment severe critical stenosis  7  Intracranial left V4 segment moderate to severe atherosclerotic plaque multifocal stenosis  9/3 Carotid duplex:  CONCLUSION:  RIGHT:  There is 50-69% stenosis noted in the internal carotid artery  Plaque is  heterogenous and irregular  Vertebral artery flow is antegrade  There is no significant subclavian artery  disease  LEFT:  There is <50% stenosis noted in the internal carotid artery  Plaque is  heterogenous and irregular  Vertebral artery flow is antegrade  There is no significant subclavian artery  disease  Compared to previous study in 2013, there is no significant change in disease  process  Recommend repeat testing in 6month as per protocol unless otherwise indicated        EKG, Pathology, and Other Studies: I have personally reviewed pertinent reports  Counseling / Coordination of Care  Total time spent today  30 minutes   Greater than 50% of total time was spent with the patient and / or family counseling and / or coordination of care           Guillermo Gomez PA-C  9/4/2020 12:11 PM

## 2020-09-04 NOTE — ASSESSMENT & PLAN NOTE
 Blood pressure is reviewed and acceptable   o Last recorded pressure: 156/68   Continue amlodipine   Monitor blood pressure

## 2020-09-04 NOTE — ASSESSMENT & PLAN NOTE
· Patient currently not on statin due allergy  Lab Results   Component Value Date    CHOLESTEROL 243 (H) 09/04/2020    CHOLESTEROL 232 (H) 01/27/2020    CHOLESTEROL 227 (H) 02/19/2018     Lab Results   Component Value Date    HDL 52 09/04/2020    HDL 57 01/27/2020    HDL 52 02/19/2018     Lab Results   Component Value Date    TRIG 180 (H) 09/04/2020    TRIG 145 01/27/2020    TRIG 192 (H) 02/19/2018     Plan  · No medications at this time since patient has abdominal tenderness and most medications will call as GI upset/constipation

## 2020-09-04 NOTE — ASSESSMENT & PLAN NOTE
Lab Results   Component Value Date    HGBA1C 5 6 09/04/2020       No results for input(s): POCGLU in the last 72 hours  Blood Sugar Average: Last 72 hrs:  · Well controlled  · Diet controlled  · Patient currently not on any antidiabetic medication regimen

## 2020-09-04 NOTE — ASSESSMENT & PLAN NOTE
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  Small intraluminal low density identified proximal left vertebral artery just beyond its origin possibly related to artifact or thrombus rather than a dissection flap but is to be correlated clinically, I have no priors for comparison  Just beyond  this area, there is moderate atherosclerotic plaque narrowing left vertebral artery prior to entrance into the left transverse foramen  Severe right carotid atherosclerotic disease of the bifurcation  Approximately atherosclerotic plaque 75-80% stenosis proximal right cervical ICA just beyond the bifurcation  Bilateral cavernous carotid artery atherosclerotic plaque narrowing with severe right clinoid segment atherosclerotic plaque stenosis  Right MCA M2/M3 segment severe critical stenosis  Intracranial left V4 segment moderate to severe atherosclerotic plaque multifocal stenosis "   · VAS of carotids:  ICA Right 50-69%  < 50%   MRI brain:  No indication of acute ischemia  Age-appropriate volume loss, and mild to moderate degree of chronic small vessel disease, both of which have progressed since remote exam 7 years earlier     Pending ECHO result   Neurology outpatient follow up   Gabriele Arnold

## 2020-09-04 NOTE — PLAN OF CARE
Problem: Potential for Falls  Goal: Patient will remain free of falls  Description: INTERVENTIONS:  - Assess patient frequently for physical needs  -  Identify cognitive and physical deficits and behaviors that affect risk of falls  -  Dike fall precautions as indicated by assessment   - Educate patient/family on patient safety including physical limitations  - Instruct patient to call for assistance with activity based on assessment  - Modify environment to reduce risk of injury  - Consider OT/PT consult to assist with strengthening/mobility  Outcome: Progressing     Problem: Neurological Deficit  Goal: Neurological status is stable or improving  Description: Interventions:  - Monitor and assess patient's level of consciousness, motor function, sensory function, and level of assistance needed for ADLs  - Monitor and report changes from baseline  Collaborate with interdisciplinary team to initiate plan and implement interventions as ordered  - Provide and maintain a safe environment  - Consider seizure precautions  - Consider fall precautions  - Consider aspiration precautions  - Consider bleeding precautions  Outcome: Progressing     Problem: Activity Intolerance/Impaired Mobility  Goal: Mobility/activity is maintained at optimum level for patient  Description: Interventions:  - Assess and monitor patient  barriers to mobility and need for assistive/adaptive devices  - Assess patient's emotional response to limitations  - Collaborate with interdisciplinary team and initiate plans and interventions as ordered  - Encourage independent activity per ability   - Maintain proper body alignment  - Perform active/passive rom as tolerated/ordered    - Plan activities to conserve energy   - Turn patient as appropriate  Outcome: Progressing     Problem: Communication Impairment  Goal: Ability to express needs and understand communication  Description: Assess patient's communication skills and ability to understand information  Patient will demonstrate use of effective communication techniques, alternative methods of communication and understanding even if not able to speak  - Encourage communication and provide alternate methods of communication as needed  - Collaborate with case management/ for discharge needs  - Include patient/family/caregiver in decisions related to communication  Outcome: Progressing     Problem: Potential for Aspiration  Goal: Non-ventilated patient's risk of aspiration is minimized  Description: Assess and monitor vital signs, respiratory status, and labs (WBC)  Monitor for signs of aspiration (tachypnea, cough, rales, wheezing, cyanosis, fever)  - Assess and monitor patient's ability to swallow  - Place patient up in chair to eat if possible  - HOB up at 90 degrees to eat if unable to get patient up into chair   - Supervise patient during oral intake  - Instruct patient/ family to take small bites  - Instruct patient/ family to take small single sips when taking liquids    - Follow patient-specific strategies generated by speech pathologist   Outcome: Progressing

## 2020-09-04 NOTE — ASSESSMENT & PLAN NOTE
 Blood pressure is reviewed  BP Readings from Last 1 Encounters:   09/04/20 165/72     Plan   Continue amlodipine   Monitor blood pressure

## 2020-09-04 NOTE — CONSULTS
Consultation - Neurology   Stephie Morocho 80 y o  female MRN: [id number]  Unit/Bed#: S -01 Encounter: [id number]      Assessment/Plan     80year old female with DM2, HTN, HLD, asthma, hyperthyroidism, GERD, CKD, PAD admitted with complaints of mild HA, nausea as well as L eye visual disturbance with "curtain coming over vision "    * Stroke-like symptoms  Assessment & Plan   - Continue on acute ischemic stroke pathway  Concern for possible ischemic stroke vs CRAO vs TIA (feel TIA is less likely as symptoms do continue this AM)  - Will request ophthalmology to evaluate  - Check ESR     - CTA head and neck with and without contrast completed and demonstrates intraluminal low density proximal L vertebral artery concerning for possible thrombus vs artifact, moderate atherosclerotic plaque narrowing L vertebral artery, severe R carotid atherosclerosis R ICA, B/L cavernous carotid artery atherosclerotic plaque stenosis, R MCA M2/3 segment severe critical stenosis, intracranial L V4 segment moderate to severe atherosclerotic plaque multifocal stenosis  - Vascular surgery consult pending     - MRI brain without contrast pending  Patient notes extreme claustrophobia  Will request EKG and as long as QTC okay will give Ativan 1 mg IV and Haldol 1 mg prior to MRI     - Echocardiogram pending     - Continue to monitor on telemetry  - Hemoglobin A1c pending     - Fasting lipid panel reviewed, total cholesterol 243, triglycerides 180, HDL 52,      - Continue on ASA 81 mg QD  Recommend start Pletal 100 mg BID  - Atorvastatin held in setting of statin allergy but may need to consider alternative as outpatient given elevated LDL     - Goal normothermia and euglycemia  - Allow for permissive hypertension today with BP goal <220/110     - PT/OT    - Stroke education     - Monitor exam and notify with changes       Carotid stenosis, right  Assessment & Plan   - Likely asymptomatic     - Vascular surgery consult pending  Essential hypertension  Assessment & Plan   - Allow for permissive hypertension today, goal BP <220/110  - Antihypertensives as per medicine team        Type 2 diabetes mellitus ([facility] [address] )  Assessment & Plan  Lab Results   Component Value Date    HGBA1C 5 4 06/19/2020       No results for input(s): POCGLU in the last 72 hours  Blood Sugar Average: Last 72 hrs:      - Hemoglobin A1c pending     - Management as per medicine team      Hyperlipidemia  Assessment & Plan   - Fasting lipid panel reviewed, total cholesterol 243, triglycerides 180, HDL 52,   - Statin held in setting of allergy  May need to consider alternative as an outpatient given elevated LDL and intracranial stenosis  Hyperthyroidism  Assessment & Plan   - On Methimzaole     - TSH 2 775 and free T4 1 06 in June 2020  CKD (chronic kidney disease), stage III [facility])  Assessment & Plan   - Management as per medicine team     - Monitor creatinine  Vertebral artery stenosis  Assessment & Plan   - Vascular surgery consult pending     - ASA 81 mg QD and Pletal 100 mg BID  Recommendations for outpatient neurological follow up have yet to be determined  History of Present Illness     Reason for Consult / Principal Problem: Stroke-like symptoms/Blurred vision/Headache  HPI: Gayle Robertson is a 80 y o   female who presents with complaints of sudden onset mild HA, nausea and appearance of "curtain coming over L eye " She notes she has never had similar symptoms previously  Patient with DM, HTN, HLD, asthma, hyperthyroidism, GERD, CKD, PAD  She follows with PCP as well as vascular surgery team  She notes that she was sitting with her friend on her porch and the symptoms came on suddenly  She is not sure how long the symptoms were present unfortunately as she became fairly upset when they started   She notes she did not let her friend call EMS but her friend did call her children and they encouraged her to come to the hospital  She notes that this morning she was feeling well but then developed "dry heaves" after she was given her medications this morning  She notes that prior to her symptoms starting yesterday she had been feeling well but does note that about 2 weeks ago she did have severe abdominal pain that was across the middle of her abdomen  She notes she did not seek attention for that at that time but that the pain has resolved  She notes this morning that she remains nauseous and continues to have visual disturbance in her L eye but notes it is no longer a "curtain" but feels as though she has "floaters" in her L eye  Inpatient consult to Neurology  Consult performed by: Karina Gee PA-C  Consult ordered by: SILKE Corcoran          Review of Systems   Constitutional: Negative for chills, fatigue and fever  HENT: Negative for trouble swallowing  Eyes: Positive for visual disturbance  Respiratory: Negative for shortness of breath  Cardiovascular: Negative for chest pain  Gastrointestinal: Positive for constipation and nausea  Negative for vomiting  Genitourinary: Negative for difficulty urinating and dysuria  Musculoskeletal: Positive for back pain  Negative for neck pain  Skin: Negative for rash  Neurological: Positive for dizziness  Negative for tremors, speech difficulty, weakness, numbness and headaches  Psychiatric/Behavioral: Negative for confusion         Historical Information   Past Medical History:   Diagnosis Date    Asthma     Chronic interstitial cystitis     Community acquired pneumonia     last assessed 10/4/13    Diabetes mellitus ([address] )     Disease of thyroid gland     Diverticulitis     Dizziness     GERD (gastroesophageal reflux disease)     Hyperlipidemia     Hypertension     Vertigo      Past Surgical History:   Procedure Laterality Date    ADENOIDECTOMY      CHOLECYSTECTOMY      COLON SURGERY      partial colectomy - sigmoid, diverticulitis    EYE SURGERY      HYSTERECTOMY      ovaries remain    TONSILLECTOMY      US GUIDED LYMPH NODE BIOPSY RIGHT  3/11/2020    US GUIDED THYROID BIOPSY  11/13/2019     Social History   Social History     Substance and Sexual Activity   Alcohol Use No     Social History     Substance and Sexual Activity   Drug Use No     E-Cigarette/Vaping    E-Cigarette Use Never User      E-Cigarette/Vaping Substances     Social History     Tobacco Use   Smoking Status Never Smoker   Smokeless Tobacco Never Used     Family History:   Family History   Problem Relation Age of Onset    Coronary artery disease Mother     Other Father         MVA    Alcohol abuse Neg Hx     Depression Neg Hx     Drug abuse Neg Hx     Substance Abuse Neg Hx     Mental illness Neg Hx        Review of previous medical records was completed  Please see HPI       Meds/Allergies   Scheduled Meds:  Current Facility-Administered Medications   Medication Dose Route Frequency Provider Last Rate    acetaminophen  650 mg Oral Q6H PRN Edelmira Staunton, CRNP      albuterol  2 puff Inhalation Q6H PRN Edelmira Staunton, CRNP      amLODIPine  5 mg Oral Daily Edelmira Staunton, CRNP      aspirin  81 mg Oral Daily Edelmira Staunton, CRNP      famotidine  40 mg Oral Daily Edelmira Staunton, CRNP      heparin (porcine)  5,000 Units Subcutaneous [facility] Emma Dupont, CRNP      LORazepam  0 5 mg Oral Daily PRN Edelmira Staunton, CRNP      meclizine  25 mg Oral Q8H PRN Edelmira Staunton, CRNP      methimazole  2 5 mg Oral Daily Edelmira Staunton, CRNP      montelukast  10 mg Oral HS Edelmira Staunton, CRNP      oxyCODONE-acetaminophen  1 tablet Oral BID PRN Edelmira Staunton, CRNP      pantoprazole  40 mg Oral Early Morning Edelmira Staunton, CRNP      stress formula  1 tablet Oral Daily Edelmira Staunton, CRNP       Continuous Infusions:   PRN Meds:   acetaminophen    albuterol    LORazepam    meclizine    oxyCODONE-acetaminophen      Allergies   Allergen Reactions  Bactrim [Sulfamethoxazole-Trimethoprim]     Ezetimibe     Gabapentin     Lisinopril     Naproxen Nausea Only    Statins     Triazolam Other (See Comments)     confusion       Objective   Vitals:Blood pressure 165/72, pulse 70, temperature 98 2 °F (36 8 °C), temperature source Oral, resp  rate 18, SpO2 97 %  ,There is no height or weight on file to calculate BMI  Intake/Output Summary (Last 24 hours) at 9/4/2020 0804  Last data filed at 9/4/2020 0500  Gross per 24 hour   Intake 0 ml   Output 800 ml   Net -800 ml       Invasive Devices: Invasive Devices     Peripheral Intravenous Line            Peripheral IV 09/03/20 Left Antecubital less than 1 day    Peripheral IV 09/03/20 Right Arm less than 1 day                Physical Exam  Constitutional:       General: She is not in acute distress  Appearance: Normal appearance  She is not ill-appearing, toxic-appearing or diaphoretic  HENT:      Head: Normocephalic and atraumatic  Mouth/Throat:      Mouth: Mucous membranes are moist       Pharynx: Oropharynx is clear  No oropharyngeal exudate or posterior oropharyngeal erythema  Eyes:      General: No scleral icterus  Right eye: No discharge  Left eye: No discharge  Extraocular Movements: Extraocular movements intact  Conjunctiva/sclera: Conjunctivae normal       Pupils: Pupils are equal, round, and reactive to light  Neck:      Musculoskeletal: Normal range of motion and neck supple  Cardiovascular:      Rate and Rhythm: Normal rate and regular rhythm  Pulmonary:      Effort: Pulmonary effort is normal       Breath sounds: Normal breath sounds  Abdominal:      General: There is no distension  Palpations: Abdomen is soft  Tenderness: There is no abdominal tenderness  Musculoskeletal: Normal range of motion  Right lower leg: No edema  Left lower leg: No edema  Skin:     General: Skin is warm and dry  Findings: No erythema or rash  Neurological:      Mental Status: She is alert and oriented to person, place, and time  Coordination: Finger-Nose-Finger Test normal       Deep Tendon Reflexes:      Reflex Scores:       Bicep reflexes are 2+ on the right side and 2+ on the left side  Brachioradialis reflexes are 2+ on the right side and 2+ on the left side  Patellar reflexes are 2+ on the right side and 2+ on the left side  Achilles reflexes are 1+ on the right side and 1+ on the left side  Psychiatric:         Mood and Affect: Mood normal          Speech: Speech normal          Behavior: Behavior normal        Neurologic Exam     Mental Status   Oriented to person, place, and time  Oriented to person  Oriented to place  Oriented to time  Registration: recalls 3 of 3 objects  Recall at 5 minutes: recalls 2 of 3 objects  Attention: normal  Concentration: normal    Speech: speech is normal   Level of consciousness: alert  Knowledge: good  Able to name object  Able to repeat  Normal comprehension  Able to spell word "world" forward and backward  Able to state how many quarters are in $1 as well as $1 75  Cranial Nerves     CN II   Right visual field deficit: none  Left visual field deficit: none     CN III, IV, VI   Pupils are equal, round, and reactive to light  Right pupil: Size: 3 mm  Shape: regular  Reactivity: brisk  Consensual response: intact  Left pupil: Size: 3 mm  Shape: regular  Reactivity: brisk  Consensual response: intact     Nystagmus: none   Diplopia: none  Ophthalmoparesis: none  Upgaze: normal  Downgaze: normal  Conjugate gaze: present    CN V   Right facial sensation deficit: none  Left facial sensation deficit: none    CN VII   Right facial weakness: none  Left facial weakness: none    CN VIII   Hearing: intact    CN IX, X   Palate: symmetric    CN XI   Right sternocleidomastoid strength: normal  Left sternocleidomastoid strength: normal    CN XII   Tongue: not atrophic  Fasciculations: absent  Tongue deviation: none    Motor Exam   Muscle bulk: normal  Overall muscle tone: normal  Right arm tone: normal  Left arm tone: normal  Right arm pronator drift: absent  Left arm pronator drift: absent  Right leg tone: normal  Left leg tone: normalMotor strength 5/5 B/L UE and LE     Sensory Exam   Right arm light touch: normal  Left arm light touch: normal  Right leg light touch: normal  Left leg light touch: normal  Right arm vibration: normal  Left arm vibration: normal  Right leg vibration: decreased from knee  Left leg vibration: decreased from knee    Gait, Coordination, and Reflexes     Coordination   Finger to nose coordination: normal    Tremor   Resting tremor: absent  Intention tremor: absent  Action tremor: absent    Reflexes   Right brachioradialis: 2+  Left brachioradialis: 2+  Right biceps: 2+  Left biceps: 2+  Right patellar: 2+  Left patellar: 2+  Right achilles: 1+  Left achilles: 1+  Right plantar: normal  Left plantar: normalGait deferred secondary to fall risk          Lab Results:   Recent Results (from the past 24 hour(s))   CBC and differential    Collection Time: 09/03/20  6:07 PM   Result Value Ref Range    WBC 6 49 4 31 - 10 16 Thousand/uL    RBC 4 49 3 81 - 5 12 Million/uL    Hemoglobin 13 5 11 5 - 15 4 g/dL    Hematocrit 40 3 34 8 - 46 1 %    MCV 90 82 - 98 fL    MCH 30 1 26 8 - 34 3 pg    MCHC 33 5 31 4 - 37 4 g/dL    RDW 13 2 11 6 - 15 1 %    MPV 9 2 8 9 - 12 7 fL    Platelets 180 425 - 152 Thousands/uL    nRBC 0 /100 WBCs    Neutrophils Relative 54 43 - 75 %    Immat GRANS % 0 0 - 2 %    Lymphocytes Relative 35 14 - 44 %    Monocytes Relative 10 4 - 12 %    Eosinophils Relative 0 0 - 6 %    Basophils Relative 1 0 - 1 %    Neutrophils Absolute 3 51 1 85 - 7 62 Thousands/µL    Immature Grans Absolute 0 01 0 00 - 0 20 Thousand/uL    Lymphocytes Absolute 2 26 0 60 - 4 47 Thousands/µL    Monocytes Absolute 0 67 0 17 - 1 22 Thousand/µL    Eosinophils Absolute 0 01 0 00 - 0 61 Thousand/µL    Basophils Absolute 0 03 0 00 - 0 10 Thousands/µL   Comprehensive metabolic panel    Collection Time: 09/03/20  6:07 PM   Result Value Ref Range    Sodium 140 136 - 145 mmol/L    Potassium 3 0 (L) 3 5 - 5 3 mmol/L    Chloride 103 100 - 108 mmol/L    CO2 29 21 - 32 mmol/L    ANION GAP 8 4 - 13 mmol/L    BUN 15 5 - 25 mg/dL    Creatinine 1 16 0 60 - 1 30 mg/dL    Glucose 121 65 - 140 mg/dL    Calcium 9 1 8 3 - 10 1 mg/dL    AST 19 5 - 45 U/L    ALT 19 12 - 78 U/L    Alkaline Phosphatase 111 46 - 116 U/L    Total Protein 7 9 6 4 - 8 2 g/dL    Albumin 4 0 3 5 - 5 0 g/dL    Total Bilirubin 0 44 0 20 - 1 00 mg/dL    eGFR 42 ml/min/1 73sq m   Troponin I    Collection Time: 09/03/20  6:07 PM   Result Value Ref Range    Troponin I <0 02 <=0 04 ng/mL   Lipid Panel with Direct LDL reflex    Collection Time: 09/04/20  4:51 AM   Result Value Ref Range    Cholesterol 243 (H) 50 - 200 mg/dL    Triglycerides 180 (H) <=150 mg/dL    HDL, Direct 52 >=40 mg/dL    LDL Calculated 155 (H) 0 - 100 mg/dL       Imaging Studies: I have personally reviewed pertinent reports  and I have personally reviewed pertinent films in PACS  CTA head and neck with and without contrast- No evidence of acute intracranial hemorrhage  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  Small intraluminal low density identified proximal left vertebral artery just beyond its origin possibly related to artifact or thrombus rather than a dissection flap but is to be correlated clinically  Just beyond this area, there is moderate atherosclerotic plaque narrowing left vertebral artery prior to entrance into the left transverse foramen  Severe right carotid atherosclerotic disease of the bifurcation  Approximately atherosclerotic plaque 75-80% stenosis proximal right cervical ICA just beyond the bifurcation  Vascular consult recommended   B/L cavernous carotid artery atherosclerotic plaque narrowing with severe right clinoid segment atherosclerotic plaque stenosis  Right MCA M2/M3 segment severe critical stenosis  Intracranial left V4 segment moderate to severe atherosclerotic plaque multifocal stenosis       VTE Prophylaxis: Heparin

## 2020-09-04 NOTE — ASSESSMENT & PLAN NOTE
History of constipation  Two weeks ago she had severe abdominal pain mostly located in the right lower quadrant which she attributed to appendicitis  She almost called the ambulance but because the pain improved she did not  History of constipation, diverticulosis status post resection  On physical exam patient has abdominal tenderness x 4 quadrants  One episode of nausea and vomiting slightly better with 1 tablet Zofran  Likely secondary to constipation        Plan  · Plain x-ray abdomen obstruction series

## 2020-09-04 NOTE — PROGRESS NOTES
Progress Note - Aidee Needle 7/7/1931, 80 y o  female MRN: [id number]    Unit/Bed#: S -01 Encounter: [id number]    Primary Care Provider: Charanjit Harmon MD   Date and time admitted to hospital: 9/3/2020  6:05 PM        * Stroke-like symptoms  Assessment & Plan  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  Small intraluminal low density identified proximal left vertebral artery just beyond its origin possibly related to artifact or thrombus rather than a dissection flap but is to be correlated clinically, I have no priors for comparison  Just beyond  this area, there is moderate atherosclerotic plaque narrowing left vertebral artery prior to entrance into the left transverse foramen  Severe right carotid atherosclerotic disease of the bifurcation  Approximately atherosclerotic plaque 75-80% stenosis proximal right cervical ICA just beyond the bifurcation  Bilateral cavernous carotid artery atherosclerotic plaque narrowing with severe right clinoid segment atherosclerotic plaque stenosis  Right MCA M2/M3 segment severe critical stenosis  Intracranial left V4 segment moderate to severe atherosclerotic plaque multifocal stenosis "   · VAS of carotids:  ICA Right 50-69%  < 50%   Stroke Pathway  o Frequent vital signs  o Neuro checks  o Telemetry  o Aspirin  Statin contraindicated due to allergy   Obtain MRI brain   Obtain ECHO  [address] [facility]   Consulted vascular   PT/OT consultation  Carotid stenosis, right  Assessment & Plan  VAS carotid:  RIGHT -50-69% stenosis ICA  LEFT:  <50% stenosis ICA  B/L Plaque is heterogenous and irregular  Vertebral artery flow is antegrade  There is no significant subclavian artery disease      Stenosis could cause longstanding dizziness/stroke-like symptoms/ophthalmic symptoms  Plan  · Vascular consulted  Vertebral artery stenosis  Assessment & Plan  CT head and neck: moderate atherosclerotic plaque narrowing left vertebral artery prior to entrance into the left transverse foramen  Plan  · Vascular and Neurology consulted  Abdominal tenderness  Assessment & Plan  History of constipation  Two weeks ago she had severe abdominal pain mostly located in the right lower quadrant which she attributed to appendicitis  She almost called the ambulance but because the pain improved she did not  History of constipation, diverticulosis status post resection  On physical exam patient has abdominal tenderness x 4 quadrants  One episode of nausea and vomiting slightly better with 1 tablet Zofran  Likely secondary to constipation  Plan  · Plain x-ray abdomen obstruction series     Vision abnormalities  Assessment & Plan  Normally she has floaters due to macular degeneration, however 2 days ago she said that her vision changed  When asked if it was like a curtain falling she stated yes, she also claims she saw colors  After my examination, patient told nurse that when she looks down she has fluttering in both eyes, left more than right  She also claimed to have seen a mouse in the bathroom, when nurse checked did not see any  Plan  · Spoke to Neurology, neurology suggested Ophthalmology consult  CKD (chronic kidney disease), stage III ([facility])  Assessment & Plan   Creatinine upon admission: 1 16  o Baseline: 1 1-1 2   Monitor BMP  Type 2 diabetes mellitus [facility])  Assessment & Plan  Lab Results   Component Value Date    HGBA1C 5 6 09/04/2020       No results for input(s): POCGLU in the last 72 hours  Blood Sugar Average: Last 72 hrs:  · Well controlled  · Diet controlled  · Patient currently not on any antidiabetic medication regimen  · Continue CCD while inpatient      Hyperthyroidism  Assessment & Plan  · Continue methimazole  Essential hypertension  Assessment & Plan   Blood pressure is reviewed  BP Readings from Last 1 Encounters:   09/04/20 165/72     Plan   Continue amlodipine   Monitor blood pressure  Hyperlipidemia  Assessment & Plan  · Patient currently not on statin due allergy  Lab Results   Component Value Date    CHOLESTEROL 243 (H) 09/04/2020    CHOLESTEROL 232 (H) 01/27/2020    CHOLESTEROL 227 (H) 02/19/2018     Lab Results   Component Value Date    HDL 52 09/04/2020    HDL 57 01/27/2020    HDL 52 02/19/2018     Lab Results   Component Value Date    TRIG 180 (H) 09/04/2020    TRIG 145 01/27/2020    TRIG 192 (H) 02/19/2018     Plan  · No medications at this time since patient has abdominal tenderness and most medications will call as GI upset/constipation  Esophageal reflux  Assessment & Plan  · Continue Pepcid and PPI  Mild intermittent asthma without complication  Assessment & Plan  · Patient not in acute exacerbation  · Continue Singulair HS and albuterol p r n  Anxiety  Assessment & Plan  · PDMP reviewed  Plan  · Continue lorazepam 0 5 mg tablet daily p r n  · Ativan once for MRI brain  VTE Pharmacologic Prophylaxis:   Pharmacologic: Heparin  Mechanical VTE Prophylaxis in Place: Yes    Discussions with Specialists or Other Care Team Provider:  Neurology suggested ophthalmology  Education and Discussions with Family / Patient: Spoke with daughter at bedside  All questions answered  Current Length of Stay: 0 day(s)    Current Patient Status: Inpatient     Discharge Plan / Estimated Discharge Date:  N/a    Code Status: Level 1 - Full Code      Subjective:   Patient states that she is doing to better today than yesterday  However she did have nausea and vomiting suddenly follows present in the room  Provided Zofran which eased the nausea and vomiting  Patient also states that she had blurring of vision during episode of headache    She states that this blurry vision was different as she saw colors,  upon questioning she did say it was like curtain falling however she did not know how to describe it at he time of admission  She also states that she did have severe right lower quadrant tenderness 2 weeks ago for which she was going to come to the hospital   Since it has subsided she 9 not go to the hospital but still continues to have right lower quadrant pain  She does states that she has a history of constipation  Objective:     Vitals:   Temp (24hrs), Av 1 °F (36 7 °C), Min:97 6 °F (36 4 °C), Max:98 6 °F (37 °C)    Temp:  [97 6 °F (36 4 °C)-98 6 °F (37 °C)] 98 2 °F (36 8 °C)  HR:  [60-77] 77  Resp:  [16-18] 18  BP: (128-191)/(62-84) 143/65  SpO2:  [95 %-100 %] 97 %  There is no height or weight on file to calculate BMI  Input and Output Summary (last 24 hours): Intake/Output Summary (Last 24 hours) at 2020 1049  Last data filed at 2020 0500  Gross per 24 hour   Intake 0 ml   Output 800 ml   Net -800 ml       Physical Exam:     Physical Exam  Vitals signs and nursing note reviewed  Constitutional:       General: She is not in acute distress  Appearance: Normal appearance  She is not ill-appearing, toxic-appearing or diaphoretic  Eyes:      General:         Right eye: No discharge  Left eye: No discharge  Pupils: Pupils are equal, round, and reactive to light  Neck:      Musculoskeletal: Normal range of motion and neck supple  No muscular tenderness  Cardiovascular:      Rate and Rhythm: Normal rate and regular rhythm  Pulses: Normal pulses  Heart sounds: Normal heart sounds  No murmur  No friction rub  No gallop  Pulmonary:      Effort: Pulmonary effort is normal  No respiratory distress  Breath sounds: No stridor  No wheezing or rhonchi  Abdominal:      General: Abdomen is flat  Bowel sounds are normal  There is no distension  Palpations: Abdomen is soft  There is no mass  Tenderness:  There is abdominal tenderness  There is rebound (Rebound tenderness)  There is no guarding  Musculoskeletal: Normal range of motion  General: No swelling, tenderness or deformity  Right lower leg: No edema  Skin:     Coloration: Skin is not jaundiced or pale  Findings: No bruising, erythema or rash  Neurological:      General: No focal deficit present  Mental Status: She is alert and oriented to person, place, and time  Comments: No dysarthria, naming intact, PERRLA +, bilateral sensation in face intact, cranial nerves 2-12 intact, no pronator drift, power in hands good, finger-to-nose test normal, memory 2/3,     no vibration sensation in left lower limb knee  Vibration sensation in right lower tract better in knee than in ankle       Psychiatric:         Mood and Affect: Mood normal          Behavior: Behavior normal          Thought Content: Thought content normal          Judgment: Judgment normal              Additional Data:     Labs:    Results from last 7 days   Lab Units 09/03/20  1807   WBC Thousand/uL 6 49   HEMOGLOBIN g/dL 13 5   HEMATOCRIT % 40 3   PLATELETS Thousands/uL 342   NEUTROS PCT % 54   LYMPHS PCT % 35   MONOS PCT % 10   EOS PCT % 0     Results from last 7 days   Lab Units 09/03/20  1807   POTASSIUM mmol/L 3 0*   CHLORIDE mmol/L 103   CO2 mmol/L 29   BUN mg/dL 15   CREATININE mg/dL 1 16   CALCIUM mg/dL 9 1   ALK PHOS U/L 111   ALT U/L 19   AST U/L 19           * I Have Reviewed All Lab Data Listed Above  * Additional Pertinent Lab Tests Reviewed:  [address] 66 Admission Reviewed    Imaging:    Imaging Reports Reviewed Today Include:  X-ray chest, CTA head and neck, VAS carotid  Imaging Personally Reviewed by Myself Includes:  None    Recent Cultures (last 7 days):           Last 24 Hours Medication List:   Current Facility-Administered Medications   Medication Dose Route Frequency Provider Last Rate    acetaminophen  650 mg Oral Q6H PRN Nola Alvarez Sondra, CRNP      albuterol  2 puff Inhalation Q6H PRN Shermon Claude, CRNP      amLODIPine  5 mg Oral Daily Shermon Claude, CRNP      aspirin  81 mg Oral Daily Shermon Claude, CRNP      cilostazol  100 mg Oral BID JOLENE Rene PA-C      famotidine  40 mg Oral Daily Shermon Claude, CRNP      heparin (porcine)  5,000 Units Subcutaneous [facility] Emma Dupont, CRNP      LORazepam  0 5 mg Intravenous Once Kody Lopes MD      LORazepam  0 5 mg Oral Daily PRN Shermon Claude, CRNP      meclizine  25 mg Oral Q8H PRN Shermon Claude, CRNP      methimazole  2 5 mg Oral Daily Shermon Claude, CRNP      montelukast  10 mg Oral HS Shermon Claude, CRNP      oxyCODONE-acetaminophen  1 tablet Oral BID PRN Shermon Claude, CRNP      pantoprazole  40 mg Oral Early Morning Shermon Claude, CRNP      stress formula  1 tablet Oral Daily Shermon Claude, SILKE          Today, Patient Was Seen By: Kody Lopes MD    ** Please Note: This note has been constructed using a voice recognition system   **

## 2020-09-04 NOTE — ASSESSMENT & PLAN NOTE
VAS carotid:  There is 50-69% stenosis noted in the internal carotid artery  Plaque is heterogenous and irregular  Vertebral artery flow is antegrade  There is no significant subclavian artery disease  Carotid stenosis could because of longstanding dizziness and ophthalmic symptoms also stroke-like symptoms  Plan  · Consult vascular as stenosis asymptomatic and asymptomatic and more likely due to left vertebral dissection

## 2020-09-04 NOTE — H&P
[address] Internal Medicine    H&P- Alena Carlosne 7/7/1931, 80 y o  female MRN: [id number]    Unit/Bed#: S -01 Encounter: [id number]    Primary Care Provider: Conner Dash MD   Date and time admitted to hospital: 9/3/2020  6:05 PM        * Stroke-like symptoms  Assessment & Plan  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  Small intraluminal low density identified proximal left vertebral artery just beyond its origin possibly related to artifact or thrombus rather than a dissection flap but is to be correlated clinically, I have no priors for comparison  Just beyond  this area, there is moderate atherosclerotic plaque narrowing left vertebral artery prior to entrance into the left transverse foramen  Severe right carotid atherosclerotic disease of the bifurcation  Approximately atherosclerotic plaque 75-80% stenosis proximal right cervical ICA just beyond the bifurcation  Bilateral cavernous carotid artery atherosclerotic plaque narrowing with severe right clinoid segment atherosclerotic plaque stenosis  Right MCA M2/M3 segment severe critical stenosis  Intracranial left V4 segment moderate to severe atherosclerotic plaque multifocal stenosis "    Stroke Pathway  o Frequent vital signs  o Neuro checks  o Telemetry  o Check lipid panel and A1c   o Aspirin  Statin contraindicated due to allergy   Obtain MRI brain   Obtain ECHO   Obtain VAS of carotids  [facility]   Consider Vascular consult pending carotids study   PT/OT consultation  Hypokalemia  Assessment & Plan   Potassium level upon admission: 3 0   Replete and monitor BMP  Essential hypertension  Assessment & Plan   Blood pressure is reviewed and acceptable   o Last recorded pressure: 156/68   Continue amlodipine     Monitor blood pressure  Hyperlipidemia  Assessment & Plan  · Patient currently not on statin due allergy  Type 2 diabetes mellitus [facility])  Assessment & Plan  Lab Results   Component Value Date    HGBA1C 5 4 06/19/2020       No results for input(s): POCGLU in the last 72 hours  Blood Sugar Average: Last 72 hrs:  · Well controlled  · Diet controlled  · Patient currently not on any antidiabetic medication regimen  · Continue CCD while inpatient  CKD (chronic kidney disease), stage III ([facility])  Assessment & Plan   Creatinine upon admission: 1 16  o Baseline: 1 1-1 2   Monitor BMP  Esophageal reflux  Assessment & Plan  · Continue Pepcid and PPI  Hyperthyroidism  Assessment & Plan  · Continue methimazole  Mild intermittent asthma without complication  Assessment & Plan  · Patient not in acute exacerbation  · Continue Singulair HS and albuterol p r n  Anxiety  Assessment & Plan  · PDMP reviewed  · Continue lorazepam 0 5 mg tablet daily p r n  VTE Prophylaxis: Heparin  / sequential compression device   Code Status: Full  POLST: POLST form is not discussed and not completed at this time  Anticipated Length of Stay:  Patient will be admitted on an Observation basis with an anticipated length of stay of  < 2 midnights  Justification for Hospital Stay:  Neurology and vascular consult  Total Time for Visit, including Counseling / Coordination of Care: 1 hour  Greater than 50% of this total time spent on direct patient counseling and coordination of care  Chief Complaint:   Headache and blurred vision    History of Present Illness:    Teresa Wright is a 80 y o  female with a past medical history of asthma, type 2 diabetes, hyperlipidemia, hypertension, GERD, anxiety, and hyperthyroidism who presents with headache and blurred vision  Patient reports 1 day history of headache  Patient reports that today she began with left-sided blurred vision in the afternoon after her hair appointment  Patient reports chronic intermittent vertigo for which she is on meclizine as needed  She states she has been on meclizine for quite some time  Patient denies tingling, numbness, ataxia, hemiparesis, dysphagia, aphasia, or weakness  Patient denies chest pain, chest tightness, shortness of breath, cough, fever or chills  Patient will be admitted for further evaluation including MRI, carotid study, echocardiogram and neurology consult  Review of Systems:    Review of Systems   Constitutional: Negative for chills and fever  Eyes: Positive for visual disturbance  Respiratory: Negative for cough and shortness of breath  Cardiovascular: Negative for chest pain  Neurological: Positive for headaches  Negative for dizziness, facial asymmetry, speech difficulty, weakness, light-headedness and numbness  All other systems reviewed and are negative  Past Medical and Surgical History:     Past Medical History:   Diagnosis Date    Asthma     Chronic interstitial cystitis     Community acquired pneumonia     last assessed 10/4/13    Diabetes mellitus ([address] )     Disease of thyroid gland     Diverticulitis     Dizziness     GERD (gastroesophageal reflux disease)     Hyperlipidemia     Hypertension     Vertigo        Past Surgical History:   Procedure Laterality Date    ADENOIDECTOMY      CHOLECYSTECTOMY      COLON SURGERY      partial colectomy - sigmoid, diverticulitis    EYE SURGERY      HYSTERECTOMY      ovaries remain    TONSILLECTOMY      US GUIDED LYMPH NODE BIOPSY RIGHT  3/11/2020    US GUIDED THYROID BIOPSY  11/13/2019       Meds/Allergies:    Prior to Admission medications    Medication Sig Start Date End Date Taking?  Authorizing Provider   albuterol (PROVENTIL HFA,VENTOLIN HFA) 90 mcg/act inhaler Inhale 2 puffs every 6 (six) hours as needed for wheezing or shortness of breath 9/1/20   Dangelo Bazan MD   amLODIPine (NORVASC) 5 mg tablet Take 1 tablet (5 mg total) by mouth daily 8/17/20   Jacquelyn Lloyd MD   b complex vitamins capsule Take 1 capsule by mouth daily    Historical Provider, MD   Cholecalciferol (VITAMIN D PO) Take 1 tablet by mouth daily    Historical Provider, MD   famotidine (PEPCID) 40 MG tablet Take 1 tablet (40 mg total) by mouth daily 9/1/20   Jacquelyn Lloyd, MD   LORazepam (ATIVAN) 0 5 mg tablet Take 1/2 tablet to 1 tablet PO daily PRN 9/1/20   Jacquelyn Lloyd, MD   meclizine (ANTIVERT) 25 mg tablet Take 1 tablet (25 mg total) by mouth every 8 (eight) hours as needed for dizziness 9/1/20   Jacquelyn Lloyd, MD   methimazole (TAPAZOLE) 5 mg tablet 1/2 tab daily 5/28/19   Jacquelyn Lloyd, MD   montelukast (SINGULAIR) 10 mg tablet Take 1 tablet (10 mg total) by mouth daily at bedtime 5/1/20   Jacquelyn Lloyd, MD   omeprazole (PriLOSEC) 20 mg delayed release capsule Take 1 capsule (20 mg total) by mouth daily before breakfast 7/16/20   Jacquelyn Lloyd, MD   oxyCODONE-acetaminophen (PERCOCET) 5-325 mg per tablet Take 1 tablet by mouth 2 (two) times a day as needed for moderate painMax Daily Amount: 2 tablets 9/1/20   Jacquelyn Lloyd MD     I have reviewed home medications with patient personally  Allergies: Allergies   Allergen Reactions    Bactrim [Sulfamethoxazole-Trimethoprim]     Ezetimibe     Gabapentin     Lisinopril     Naproxen Nausea Only    Statins     Triazolam Other (See Comments)     confusion       Social History:     Marital Status:     Occupation: Unknown  Patient Pre-hospital Living Situation: Private residence  Patient Pre-hospital Level of Mobility: Independent   Patient Pre-hospital Diet Restrictions: None  Substance Use History:   Social History     Substance and Sexual Activity   Alcohol Use No     Social History     Tobacco Use   Smoking Status Never Smoker   Smokeless Tobacco Never Used     Social History     Substance and Sexual Activity   Drug Use No       Family History:    non-contributory    Physical Exam:     Vitals:   Blood Pressure: (!) 187/84 (09/03/20 2200)  Pulse: 73 (09/03/20 2200)  Temperature: 97 7 °F (36 5 °C) (09/03/20 2200)  Temp Source: Oral (09/03/20 2122)  Respirations: 18 (09/03/20 2200)  SpO2: 98 % (09/03/20 2200)    Physical Exam  Vitals signs and nursing note reviewed  Constitutional:       General: She is not in acute distress  Appearance: Normal appearance  She is not ill-appearing  HENT:      Head: Normocephalic and atraumatic  Eyes:      General: No visual field deficit or scleral icterus  Extraocular Movements: Extraocular movements intact  Pupils: Pupils are equal, round, and reactive to light  Neck:      Musculoskeletal: Normal range of motion and neck supple  Cardiovascular:      Rate and Rhythm: Normal rate and regular rhythm  Pulses: Normal pulses  Heart sounds: Normal heart sounds  No murmur  Pulmonary:      Effort: Pulmonary effort is normal  No respiratory distress  Breath sounds: Normal breath sounds  No stridor  Abdominal:      General: Bowel sounds are normal  There is no distension  Tenderness: There is no abdominal tenderness  Musculoskeletal:         General: No swelling or deformity  Skin:     General: Skin is warm and dry  Neurological:      General: No focal deficit present  Mental Status: She is alert and oriented to person, place, and time  Cranial Nerves: No dysarthria or facial asymmetry  Sensory: No sensory deficit  Psychiatric:         Speech: Speech normal              Additional Data:     Lab Results: I have personally reviewed pertinent reports        Results from last 7 days   Lab Units 09/03/20  1807   WBC Thousand/uL 6 49   HEMOGLOBIN g/dL 13 5   HEMATOCRIT % 40 3   PLATELETS Thousands/uL 342   NEUTROS PCT % 54   LYMPHS PCT % 35   MONOS PCT % 10   EOS PCT % 0     Results from last 7 days   Lab Units 09/03/20  1807   SODIUM mmol/L 140   POTASSIUM mmol/L 3 0*   CHLORIDE mmol/L 103   CO2 mmol/L 29   BUN mg/dL 15   CREATININE mg/dL 1 16   ANION GAP mmol/L 8   CALCIUM mg/dL 9 1   ALBUMIN g/dL 4 0   TOTAL BILIRUBIN mg/dL 0 44   ALK PHOS U/L 111   ALT U/L 19   AST U/L 19   GLUCOSE RANDOM mg/dL 121                       Imaging: I have personally reviewed pertinent reports  CTA head and neck with and without contrast   Final Result by Ortiz Shah MD (09/03 2013)         1  No evidence of acute intracranial hemorrhage  2  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  3  Small intraluminal low density identified proximal left vertebral artery just beyond its origin possibly related to artifact or thrombus rather than a dissection flap but is to be correlated clinically, I have no priors for comparison  Just beyond    this area, there is moderate atherosclerotic plaque narrowing left vertebral artery prior to entrance into the left transverse foramen  4  Severe right carotid atherosclerotic disease of the bifurcation  Approximately atherosclerotic plaque 75-80% stenosis proximal right cervical ICA just beyond the bifurcation  Vascular consult recommended  5  Bilateral cavernous carotid artery atherosclerotic plaque narrowing with severe right clinoid segment atherosclerotic plaque stenosis  6  Right MCA M2/M3 segment severe critical stenosis  7  Intracranial left V4 segment moderate to severe atherosclerotic plaque multifocal stenosis  Workstation performed: [id number]         XR chest 1 view portable   ED Interpretation by Balaji Peña MD (09/03 1935)   No acute disease  No CHF consolidation or pneumothoraces  Normal mediastinum      Final Result by Austin Jensen MD (09/03 1949)      No acute cardiopulmonary disease              Workstation performed: [id number]         VAS carotid complete study    (Results Pending)   MRI Inpatient Order    (Results Pending)       EKG, Pathology, and Other Studies Reviewed on Admission:   · EKG:  Normal sinus rhythm, heart rate 66  Allscripts / Epic Records Reviewed: Yes     ** Please Note: This note has been constructed using a voice recognition system   **

## 2020-09-04 NOTE — ASSESSMENT & PLAN NOTE
Normally she has floaters due to macular degeneration, however 2 days ago she said that her vision changed  When asked if it was like a curtain falling she stated yes, she also claims she saw colors  After my examination, patient told nurse that when she looks down she has fluttering in both eyes, left more than right  She also claimed to have seen a mouse in the bathroom, when nurse checked did not see any  Plan  · Ophthalmology plan  She will follow up Dr Ofelia Naranjo after discharge for additional evaluation and ongoing care

## 2020-09-04 NOTE — ASSESSMENT & PLAN NOTE
 Blood pressure is reviewed  BP Readings from Last 1 Encounters:   09/04/20 143/65     Plan   Continue amlodipine    

## 2020-09-04 NOTE — PHYSICAL THERAPY NOTE
PHYSICAL THERAPY EVALUATION NOTE    Patient Name: Alka Garner  [id number] Date: 2020     AGE:   80 y o   Mrn:   [id number]  ADMIT DX:  Blurred vision [H53 8]  Visual disturbance [H53 9]  Hypertension [I10]  Blurry vision [H53 8]  Stroke-like symptoms [R29 90]  Headache [R51]    Past Medical History:   Diagnosis Date    Asthma     Chronic interstitial cystitis     Community acquired pneumonia     last assessed 10/4/13    Diabetes mellitus ([facility] [address] )     Disease of thyroid gland     Diverticulitis     Dizziness     GERD (gastroesophageal reflux disease)     Hyperlipidemia     Hypertension     Vertigo      Length Of Stay: 0  PHYSICAL THERAPY EVALUATION :   Patient's identity confirmed via 2 patient identifiers (full name and ) at start of session       20 1256   Note Type   Note type Eval only   Pain Assessment   Pain Assessment Tool Pain Assessment not indicated - pt denies pain   Pain Score No Pain   Home Living   Type of [address] [place] [address] One level; Laundry in basement;Stairs to enter with rails  (1-2 CEE)   [address]   (No AD or DME PTA)   Additional Comments Pt resides in a 1 SH w/ laundry in basement (pt does not go down)   Prior Function   Level of [place] Independent with ADLs and functional mobility   Lives With Family  (2 adult children live w/ her)   Receives Help From Family   ADL Assistance Independent   IADLs Needs assistance   Falls in the last 6 months 0   Vocational Retired   Restrictions/Precautions   Wells Jose Bearing Precautions Per Order No   Other Precautions Telemetry; Visual impairment   General   Family/Caregiver Present No   Cognition   Overall Cognitive Status WFL   Arousal/Participation Alert   Orientation Level Oriented X4  (Pt identified by full name and )   Memory Within functional limits   Following Commands Follows all commands and directions without difficulty   Comments Pt supine in bed upon arrival  she is agreeable to participate in therapy intervention   RLE Assessment   RLE Assessment WFL   Strength RLE   RLE Overall Strength 4-/5   R Ankle Dorsiflexion 3/5  (pt deferred resistance 2* to chronic cramping)   LLE Assessment   LLE Assessment WFL   Strength LLE   LLE Overall Strength 4-/5   L Ankle Dorsiflexion 3/5  (pt deferred resistance 2* to chronic cramping)   Coordination   Movements are Fluid and Coordinated 0   Coordination and Movement Description Pt w/ reports of "floaters" when looking B lower quadrant  Hallucinated mouse in bathroom either  No abnormalities when looking at eyelevel or above   Sensation [facility]   Light Touch   RLE Light Touch Grossly intact   LLE Light Touch Grossly intact   Bed Mobility   Supine to Sit 7  Independent   Sit to Supine Unable to assess   Additional Comments Pt supine in bed at start of eval and OOB in recliner chair at end of eval w/ call bell and room phone within reach w/ all needs met   Transfers   Sit to Stand 6  Modified independent   Additional items Increased time required   Stand to Sit 6  Modified independent   Additional items Increased time required   Additional Comments Pt reports she has times at home when she walks and then needs to sit due to her B leg cramps  Pt declined ambulation in hallway   Ambulation/Elevation   Gait pattern Decreased foot clearance   Gait Assistance 6  Modified independent   Assistive Device None   Distance 30 ft  (per pt has been self in room)   Balance   Static Sitting Normal   Static Standing Good   Ambulatory Fair +   Activity Tolerance   Activity Tolerance Patient limited by fatigue   Medical Staff Joseft coordination w/ OT SUE Parker'igor Loja   Nurse Made Aware Spoke to BELLA Minor who states pt is appropriate to participate in therapy evaluation   Assessment   Prognosis Good   Problem List Decreased strength; Impaired vision   Assessment Danya Davila is an 79 y/o Female who [facility] on [facility]2020 from home w/ c/o L sided visual field cut, dizziness, HA, with a diagnosis of stroke-like symptoms  Received orders for PT eval and treat, with activity order(s) of up and OOB as tolerated and fall precautions  This patient presents w/ comorbidities of HTN, HLD, DMII, CKD stage 3, macular degeneration and has personal factors of advanced age, stair(s) to enter home, anxiety and visual impairments  Patient presents with the following impairments at time of evaluation including: weakness and visual impairment  These impairments are evident in findings from the physical examination weakness, mobility assessment (tolerance to only 30 feet of ambulation- however pt declined additional mobility outside of room at this time), and objective measure(s) Barthel Index: 85/100  During session, pt needed input for task input  This patient's clinical presentation is unstable/unpredictable, which is evident in nature of symptoms, ongoing visual deficits, limited distance ambulated upon eval  At this time, pt is able to navigate around hospital environment without assistance, and report no concern w/ navigating home environment upon D/C  She appears to be ambulating per her baseline  Encouraged pt to ambulate around room  No acute PT needs at this time, will D/C from PT caseload  Discharge recommendation is for home w/ family support in order to reduce fall risk and maximize level of functional independence      Goals   Patient Goals Go home   Recommendation   PT Discharge Recommendation Return to previous environment with social support   Equipment Recommended   (None)   Barthel Index   Feeding 10   Bathing 0   Grooming Score 5   Dressing Score 10   Bladder Score 10   Bowels Score 10   Toilet Use Score 10   Transfers (Bed/Chair) Score 15   Mobility (Level Surface) Score 15   Stairs Score 0  (DNT)   Barthel Index Score 85     Given the above findings from this evaluation, at this time this patient does not require skilled inpatient PT  Will D/C patient from PT caseload       Albin Celaya, PT

## 2020-09-04 NOTE — ASSESSMENT & PLAN NOTE
VAS carotid:  RIGHT -50-69% stenosis ICA  LEFT:  <50% stenosis ICA  B/L Plaque is heterogenous and irregular  Vertebral artery flow is antegrade  There is no significant subclavian artery disease  Vascular consider carotid stenosis asymptomatic, states it is more likely left vertebral dissection  MRI showed negative for acute stroke      Plan  · Discharge with ASA 81 mg QD and Pletal 100 mg BID

## 2020-09-05 ENCOUNTER — TELEPHONE (OUTPATIENT)
Dept: OTHER | Facility: OTHER | Age: 85
End: 2020-09-05

## 2020-09-05 VITALS
SYSTOLIC BLOOD PRESSURE: 146 MMHG | RESPIRATION RATE: 18 BRPM | DIASTOLIC BLOOD PRESSURE: 66 MMHG | TEMPERATURE: 98.8 F | HEART RATE: 83 BPM | OXYGEN SATURATION: 94 %

## 2020-09-05 LAB
ANION GAP SERPL CALCULATED.3IONS-SCNC: 12 MMOL/L (ref 4–13)
BUN SERPL-MCNC: 16 MG/DL (ref 5–25)
CALCIUM SERPL-MCNC: 9.6 MG/DL (ref 8.3–10.1)
CHLORIDE SERPL-SCNC: 103 MMOL/L (ref 100–108)
CO2 SERPL-SCNC: 25 MMOL/L (ref 21–32)
CREAT SERPL-MCNC: 1.03 MG/DL (ref 0.6–1.3)
ERYTHROCYTE [DISTWIDTH] IN BLOOD BY AUTOMATED COUNT: 13.5 % (ref 11.6–15.1)
GFR SERPL CREATININE-BSD FRML MDRD: 48 ML/MIN/1.73SQ M
GLUCOSE SERPL-MCNC: 117 MG/DL (ref 65–140)
HCT VFR BLD AUTO: 41.8 % (ref 34.8–46.1)
HGB BLD-MCNC: 13.7 G/DL (ref 11.5–15.4)
MCH RBC QN AUTO: 29.7 PG (ref 26.8–34.3)
MCHC RBC AUTO-ENTMCNC: 32.8 G/DL (ref 31.4–37.4)
MCV RBC AUTO: 91 FL (ref 82–98)
PLATELET # BLD AUTO: 357 THOUSANDS/UL (ref 149–390)
PMV BLD AUTO: 9.4 FL (ref 8.9–12.7)
POTASSIUM SERPL-SCNC: 3.4 MMOL/L (ref 3.5–5.3)
RBC # BLD AUTO: 4.62 MILLION/UL (ref 3.81–5.12)
SODIUM SERPL-SCNC: 140 MMOL/L (ref 136–145)
WBC # BLD AUTO: 7.23 THOUSAND/UL (ref 4.31–10.16)

## 2020-09-05 PROCEDURE — 99239 HOSP IP/OBS DSCHRG MGMT >30: CPT | Performed by: INTERNAL MEDICINE

## 2020-09-05 PROCEDURE — 80048 BASIC METABOLIC PNL TOTAL CA: CPT | Performed by: INTERNAL MEDICINE

## 2020-09-05 PROCEDURE — 85027 COMPLETE CBC AUTOMATED: CPT | Performed by: INTERNAL MEDICINE

## 2020-09-05 RX ORDER — ASPIRIN 81 MG/1
81 TABLET, CHEWABLE ORAL DAILY
Qty: 30 TABLET | Refills: 0 | Status: SHIPPED | OUTPATIENT
Start: 2020-09-06

## 2020-09-05 RX ORDER — CILOSTAZOL 100 MG/1
100 TABLET ORAL 2 TIMES DAILY
Qty: 60 TABLET | Refills: 0 | Status: SHIPPED | OUTPATIENT
Start: 2020-09-05 | End: 2020-09-16 | Stop reason: SDUPTHER

## 2020-09-05 RX ORDER — PANTOPRAZOLE SODIUM 20 MG/1
20 TABLET, DELAYED RELEASE ORAL DAILY
Qty: 30 TABLET | Refills: 0 | Status: SHIPPED | OUTPATIENT
Start: 2020-09-05 | End: 2020-09-16 | Stop reason: SDUPTHER

## 2020-09-05 RX ADMIN — METHIMAZOLE 2.5 MG: 5 TABLET ORAL at 08:56

## 2020-09-05 RX ADMIN — B-COMPLEX W/ C & FOLIC ACID TAB 1 TABLET: TAB at 08:56

## 2020-09-05 RX ADMIN — FAMOTIDINE 40 MG: 20 TABLET, FILM COATED ORAL at 08:56

## 2020-09-05 RX ADMIN — ASPIRIN 81 MG 81 MG: 81 TABLET ORAL at 08:56

## 2020-09-05 RX ADMIN — AMLODIPINE BESYLATE 5 MG: 5 TABLET ORAL at 08:56

## 2020-09-05 RX ADMIN — PANTOPRAZOLE SODIUM 40 MG: 40 TABLET, DELAYED RELEASE ORAL at 06:38

## 2020-09-05 RX ADMIN — CLOPIDOGREL BISULFATE 75 MG: 75 TABLET ORAL at 08:56

## 2020-09-05 RX ADMIN — HEPARIN SODIUM 5000 UNITS: 5000 INJECTION INTRAVENOUS; SUBCUTANEOUS at 06:38

## 2020-09-05 NOTE — DISCHARGE SUMMARY
Discharge- Earlis Sandhoff 7/7/1931, 80 y o  female MRN: [id number]    Unit/Bed#: S -01 Encounter: [id number]    Primary Care Provider: Teresita Gallegos MD   Date and time admitted to hospital: 9/3/2020  6:05 PM        * Stroke-like symptoms  Assessment & Plan  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  Small intraluminal low density identified proximal left vertebral artery just beyond its origin possibly related to artifact or thrombus rather than a dissection flap but is to be correlated clinically, I have no priors for comparison  Just beyond  this area, there is moderate atherosclerotic plaque narrowing left vertebral artery prior to entrance into the left transverse foramen  Severe right carotid atherosclerotic disease of the bifurcation  Approximately atherosclerotic plaque 75-80% stenosis proximal right cervical ICA just beyond the bifurcation  Bilateral cavernous carotid artery atherosclerotic plaque narrowing with severe right clinoid segment atherosclerotic plaque stenosis  Right MCA M2/M3 segment severe critical stenosis  Intracranial left V4 segment moderate to severe atherosclerotic plaque multifocal stenosis "   · VAS of carotids:  ICA Right 50-69%  < 50%   MRI brain:  No indication of acute ischemia  Age-appropriate volume loss, and mild to moderate degree of chronic small vessel disease, both of which have progressed since remote exam 7 years earlier   Pending ECHO result   Neurology outpatient follow up    Vascular       Carotid stenosis, right  Assessment & Plan  VAS carotid:  RIGHT -50-69% stenosis ICA  LEFT:  <50% stenosis ICA  B/L Plaque is heterogenous and irregular  Vertebral artery flow is antegrade  There is no significant subclavian artery disease      Vascular consider carotid stenosis asymptomatic, states it is more likely left vertebral dissection  MRI showed negative for acute stroke  Plan  · Discharge with ASA 81 mg QD and Pletal 100 mg BID    Vertebral artery stenosis  Assessment & Plan  CT head and neck: moderate atherosclerotic plaque narrowing left vertebral artery prior to entrance into the left transverse foramen   Vascular suggested left vertebral dissection  MRI brain:  No indication of acute ischemia  Age-appropriate volume loss, and mild to moderate degree of chronic small vessel disease, both of which have progressed since remote exam 7 years earlier  Plan  · Vascular and Neurology outpatient follow up  · Discharge with ASA and pletal      Vision abnormalities  Assessment & Plan  Normally she has floaters due to macular degeneration, however 2 days ago she said that her vision changed  When asked if it was like a curtain falling she stated yes, she also claims she saw colors  After my examination, patient told nurse that when she looks down she has fluttering in both eyes, left more than right  She also claimed to have seen a mouse in the bathroom, when nurse checked did not see any  Plan  · Ophthalmology plan  She will follow up Dr Thompson Session after discharge for additional evaluation and ongoing care  Confusion  Assessment & Plan  MRI brain today however after administration of IV Ativan with Haldol patient became confused  Later on became agitated  Most likely due to Haldol  Haldol adverse reaction is documented in the chart  Abdominal tenderness  Assessment & Plan  History of constipation  Two weeks ago she had severe abdominal pain mostly located in the right lower quadrant which she attributed to appendicitis  She almost called the ambulance but because the pain improved she did not  History of constipation, diverticulosis status post resection  On physical exam patient has abdominal tenderness x 4 quadrants    One episode of nausea and vomiting slightly better with 1 tablet Zofran  Likely secondary to constipation  · Plain x-ray abdomen obstruction series Nonobstructive bowel gas pattern  Plan  Has no obstruction seen on plain x-ray abdomen Most likely due to constipation therefore adjust bowel regimen as needed when patient is oriented  CKD (chronic kidney disease), stage III ([facility])  Assessment & Plan   Creatinine upon admission: 1 16  o Baseline: 1 1-1 2   Creatinine is at baseline upon discharge    Type 2 diabetes mellitus ([facility] [address] )  Assessment & Plan  Lab Results   Component Value Date    HGBA1C 5 6 09/04/2020       No results for input(s): POCGLU in the last 72 hours  Blood Sugar Average: Last 72 hrs:  · Well controlled  · Diet controlled  · Patient currently not on any antidiabetic medication regimen  Hyperthyroidism  Assessment & Plan  · Continue methimazole  Essential hypertension  Assessment & Plan   Blood pressure is reviewed  BP Readings from Last 1 Encounters:   09/04/20 143/65     Plan   Continue amlodipine       Hyperlipidemia  Assessment & Plan  · Patient currently not on statin due allergy  Lab Results   Component Value Date    CHOLESTEROL 243 (H) 09/04/2020    CHOLESTEROL 232 (H) 01/27/2020    CHOLESTEROL 227 (H) 02/19/2018     Lab Results   Component Value Date    HDL 52 09/04/2020    HDL 57 01/27/2020    HDL 52 02/19/2018     Lab Results   Component Value Date    TRIG 180 (H) 09/04/2020    TRIG 145 01/27/2020    TRIG 192 (H) 02/19/2018     Plan  · No medications at this time since patient has abdominal tenderness and most medications will call as GI upset/constipation  · Neurology follow up  Consider alternatives  Esophageal reflux  Assessment & Plan  · Continue Pepcid and PPI  Mild intermittent asthma without complication  Assessment & Plan  · Patient not in acute exacerbation  · Continue Singulair HS and albuterol p r n      Anxiety  Assessment & Plan  · PDMP reviewed  Plan  · Continue lorazepam 0 5 mg tablet daily p r n  Discharging Resident Physician: Otto Mccullough MD  Attending: Ling Flores MD  PCP: Zulay Espino MD  Admission Date: 9/3/2020  Discharge Date: 09/05/20    Disposition:     Home    Reason for Admission: blurry vision and headache    Consultations During Hospital Stay:  · Vascular surgery, Neurology, ophthalmology    Procedures Performed:     · none    Significant Findings / Test Results:     · Carotid doppler on 9/3: RIGHT -50-69% stenosis ICA  LEFT:  <50% stenosis ICA  B/L Plaque is heterogenous and irregular  Vertebral artery flow is antegrade  There is no significant subclavian artery disease  · CTA of the head and neck on 9/3: No evidence of acute intracranial hemorrhage  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  Small intraluminal low density identified proximal left vertebral artery just beyond its origin possibly related to artifact or thrombus rather than a dissection flap but is to be correlated clinically, I have no priors for comparison  Just beyond  this area, there is moderate atherosclerotic plaque narrowing left vertebral artery prior to entrance into the left transverse foramen  Severe right carotid atherosclerotic disease of the bifurcation  Approximately atherosclerotic plaque 75-80% stenosis proximal right cervical ICA just beyond the bifurcation  Bilateral cavernous carotid artery atherosclerotic plaque narrowing with severe right clinoid segment atherosclerotic plaque stenosis  Right MCA M2/M3 segment severe critical stenosis  Intracranial left V4 segment moderate to severe atherosclerotic plaque multifocal stenosis "    MRI of the brain on 9/4: No indication of acute ischemia  Age-appropriate volume loss, and mild to moderate degree of chronic small vessel disease, both of which have progressed since remote exam 7 years earlier    ·     Incidental Findings:   None  Test Results Pending at Discharge (will require follow up):   · ECHO result     Outpatient Tests Requested:  · Vas carotid doppler    Complications:  none    Hospital Course:     Christopher Pool is a 80 y o  female patient with PMH of asthma, type 2 diabetes, hyperlipidemia, hypertension, GERD, anxiety, and hyperthyroidismwho originally presented to the hospital on 9/3/2020 due to headache and blurred vision  Patient reported 1 day history of headache  Patient reports that she began with left-sided blurred vision in the afternoon after her hair appointment  Patient reports chronic intermittent vertigo for which she is on meclizine as needed  She states she has been on meclizine for quite some time  Patient denies tingling, numbness, ataxia, hemiparesis, dysphagia, aphasia, or weakness  Patient denies chest pain, chest tightness, shortness of breath, cough, fever or chills  During admission, stroke pathway was initiated  CT of the head did not show acute intracranial hemorrhage  CTA head/neck showed right carotid artery stenosis 75% and focal left vertebral artery origin irregularity possible dissection  Carotid duplex reveals right ICA 50-69% stenosis and left ICA <50% stenosis  MRI brain is negative for stroke  Both vascular surgery and Neurology was consulted, and suggested that patient can be discharged with aspirin and Pletal  There is low suspicion for temporal arteritis due to normal ESR/CRP levels  Ophthalmologist also was consulted, and suggested no apparent vision abnormalities and it could be Amaurosis fugax  Outpatient follow up was advised  Patient will follow-up with vas carotid Doppler in 6 months  Patient has blurry vision has resolved prior to discharge  Condition at Discharge: stable     Discharge Day Visit / Exam:     Subjective:  Patient reported she feels good today  No blurred vision or headache    Vitals: Blood Pressure: 146/66 (09/05/20 0700)  Pulse: 83 (09/05/20 0700)  Temperature: 98 8 °F (37 1 °C) (09/05/20 0700)  Temp Source: Oral (09/05/20 0700)  Respirations: 18 (09/05/20 0700)  SpO2: 94 % (09/05/20 0700)  Exam:   Physical Exam  Vitals signs and nursing note reviewed  Constitutional:       General: She is not in acute distress  Appearance: Normal appearance  She is not ill-appearing, toxic-appearing or diaphoretic  Eyes:      General:         Right eye: No discharge  Left eye: No discharge  Pupils: Pupils are equal, round, and reactive to light  Neck:      Musculoskeletal: Normal range of motion and neck supple  No muscular tenderness  Cardiovascular:      Rate and Rhythm: Normal rate and regular rhythm  Pulses: Normal pulses  Heart sounds: Normal heart sounds  No murmur  No friction rub  No gallop  Pulmonary:      Effort: Pulmonary effort is normal  No respiratory distress  Breath sounds: No stridor  No wheezing or rhonchi  Abdominal:      General: Abdomen is flat  Bowel sounds are normal  There is no distension  Palpations: Abdomen is soft  There is no mass  Tenderness: There is no abdominal tenderness  There is no guarding or rebound (Rebound tenderness)  Musculoskeletal: Normal range of motion  General: No swelling, tenderness or deformity  Right lower leg: No edema  Skin:     Coloration: Skin is not jaundiced or pale  Findings: No bruising, erythema or rash  Neurological:      General: No focal deficit present  Mental Status: She is alert and oriented to person, place, and time  Comments: No dysarthria, naming intact, PERRLA +, bilateral sensation in face intact, cranial nerves 2-12 intact, no pronator drift, power in hands good, finger-to-nose test normal, memory 2/3,     no vibration sensation in left lower limb knee  Vibration sensation in right lower tract better in knee than in ankle       Psychiatric:         Mood and Affect: Mood normal          Behavior: Behavior normal          Thought Content:  Thought content normal          Judgment: Judgment normal            Discussion with Family:  Patient's daughters were notify that the MI was not done  We have explained to them MRI was done on September 4th successfully and the report shows no acute stroke at this point  Patient's daughters were also upset about patient was given holadol and Ativan prior to MRI because they have told the medical staff that she is allergic to Haldol  We have however explained that the patient requested higher dose of Ativan because of claustrophobia for MRI and therefore, neurology recommended haldol and ativan's benefit outweighs the risk of high dose ativan  Discharge instructions/Information to patient and family:   See after visit summary for information provided to patient and family  Provisions for Follow-Up Care:  See after visit summary for information related to follow-up care and any pertinent home health orders  Planned Readmission: none     Discharge Medications:  See after visit summary for reconciled discharge medications provided to patient and family        ** Please Note: This note has been constructed using a voice recognition system **

## 2020-09-05 NOTE — DISCHARGE INSTR - AVS FIRST PAGE
Dear Edel Terry,     It was our pleasure to care for you here at [facility]  It is our hope that we were always able to exceed the expected standards for your care during your stay  You were hospitalized due to blurry vision and headache  You were cared for on the 3rd floor by Josee Persaud MD under the service of Sarika Brito MD with the [facility] who covers for your primary care physician (PCP), Walker Paget, MD, while you were hospitalized  If you have any questions or concerns related to this hospitalization, you may contact us at [phone]  For follow up as well as any medication refills, we recommend that you follow up with your primary care physician  A registered nurse will reach out to you by phone within a few days after your discharge to answer any additional questions that you may have after going home  However, at this time we provide for you here, the most important instructions / recommendations at discharge:     · Notable Medication Adjustments -   · Please continue to take aspirin 81 mg once daily and Pletal 100 mg twice daily  Please stop taking omeprazole 20 mg due to drug interaction with Pletal  Take Protonix 20 mg instead  · Please continue other home medication as well  · Testing Required after Discharge -   · Please have the ultrasound of the neck in 6 months before your appointment with Vascular surgery  · Important follow up information -   · Please follow up with outpatient Neurology, vascular surgery and Ophalmology  · Other Instructions -   · Please your medication daily for stroke prevention  · Please review this entire after visit summary as additional general instructions including medication list, appointments, activity, diet, any pertinent wound care, and other additional recommendations from your care team that may be provided for you        Sincerely,     Josee Persaud MD

## 2020-09-05 NOTE — TELEPHONE ENCOUNTER
[place] [facility] on call: Answering service call: Mike Martins @ [address] [place] [address], [phone], re: Aristeo Waggoner, 7/7/1931, "major contraindication with cilostazol and omeprazole" Requesting a return call please  Thank you

## 2020-09-05 NOTE — PLAN OF CARE
Problem: Potential for Falls  Goal: Patient will remain free of falls  Description: INTERVENTIONS:  - Assess patient frequently for physical needs  -  Identify cognitive and physical deficits and behaviors that affect risk of falls  -  Ryan fall precautions as indicated by assessment   - Educate patient/family on patient safety including physical limitations  - Instruct patient to call for assistance with activity based on assessment  - Modify environment to reduce risk of injury  - Consider OT/PT consult to assist with strengthening/mobility  Outcome: Progressing     Problem: Neurological Deficit  Goal: Neurological status is stable or improving  Description: Interventions:  - Monitor and assess patient's level of consciousness, motor function, sensory function, and level of assistance needed for ADLs  - Monitor and report changes from baseline  Collaborate with interdisciplinary team to initiate plan and implement interventions as ordered  - Provide and maintain a safe environment  - Consider seizure precautions  - Consider fall precautions  - Consider aspiration precautions  - Consider bleeding precautions  Outcome: Progressing     Problem: Activity Intolerance/Impaired Mobility  Goal: Mobility/activity is maintained at optimum level for patient  Description: Interventions:  - Assess and monitor patient  barriers to mobility and need for assistive/adaptive devices  - Assess patient's emotional response to limitations  - Collaborate with interdisciplinary team and initiate plans and interventions as ordered  - Encourage independent activity per ability   - Maintain proper body alignment  - Perform active/passive rom as tolerated/ordered    - Plan activities to conserve energy   - Turn patient as appropriate  Outcome: Progressing     Problem: Communication Impairment  Goal: Ability to express needs and understand communication  Description: Assess patient's communication skills and ability to understand information  Patient will demonstrate use of effective communication techniques, alternative methods of communication and understanding even if not able to speak  - Encourage communication and provide alternate methods of communication as needed  - Collaborate with case management/ for discharge needs  - Include patient/family/caregiver in decisions related to communication  Outcome: Progressing     Problem: Potential for Aspiration  Goal: Non-ventilated patient's risk of aspiration is minimized  Description: Assess and monitor vital signs, respiratory status, and labs (WBC)  Monitor for signs of aspiration (tachypnea, cough, rales, wheezing, cyanosis, fever)  - Assess and monitor patient's ability to swallow  - Place patient up in chair to eat if possible  - HOB up at 90 degrees to eat if unable to get patient up into chair   - Supervise patient during oral intake  - Instruct patient/ family to take small bites  - Instruct patient/ family to take small single sips when taking liquids  - Follow patient-specific strategies generated by speech pathologist   Outcome: Progressing     Problem: Nutrition/Hydration-ADULT  Goal: Nutrient/Hydration intake appropriate for improving, restoring or maintaining nutritional needs  Description: Monitor and assess patient's nutrition/hydration status for malnutrition  Collaborate with interdisciplinary team and initiate plan and interventions as ordered  Monitor patient's weight and dietary intake as ordered or per policy  Utilize nutrition screening tool and intervene as necessary  Determine patient's food preferences and provide high-protein, high-caloric foods as appropriate       INTERVENTIONS:  - Monitor oral intake, urinary output, labs, and treatment plans  - Assess nutrition and hydration status and recommend course of action  - Evaluate amount of meals eaten  - Assist patient with eating if necessary   - Allow adequate time for meals  - Recommend/ encourage appropriate diets, oral nutritional supplements, and vitamin/mineral supplements  - Order, calculate, and assess calorie counts as needed  - Recommend, monitor, and adjust tube feedings and TPN/PPN based on assessed needs  - Assess need for intravenous fluids  - Provide specific nutrition/hydration education as appropriate  - Include patient/family/caregiver in decisions related to nutrition  Outcome: Progressing

## 2020-09-05 NOTE — PROGRESS NOTES
Patient awoke very agitated and OOB  PCA went into room because bed alarm was going off  Patient found to be throwing water, call bell, phone at [facility] and was trying to throw laundry cart  PCA left room because of safety and injury to hand  Patient could be seen pacing angrily around room, punching sharps container  Patient was found to be at this time in distress and an increased risk for injury to self and staff  Control team called  Hospital supervisor entered room along with security and other control team staff  Hospital supervisor attempted to redirect and reorient patient and patient grabbed at supervisor's head and got  on her hair  Patient would not let go of the supervisor's hair and continued to pull her onto the bed  Soft restraints and continual observation ordered  Family called  SLSHRUTI Taylor RN

## 2020-09-06 NOTE — QUICK NOTE
As I was not on duty yesterday, I want to clarify events which occurred on 09/04/2020 which I could not clarify to family on 09/05/2020 patient was discharged       Patient was to be scheduled for MRI brain to rule out stroke  Plan was to give patient IV Ativan 0 5 mg before so that it would help her claustrophobia/anxiety  Despite explaining to the patient that IV Ativan 0 5 mg is stronger than p o  Ativan 0 5 mg, she repeatedly  expressed that this would be inadequate  Despite discouragement of additional medication from [facility], several patient advocates such as Neurology and the nurse relayed patient's request for more coverage for her anxiety/clatasurphobia  IV Haldol was suggested by neurology  As patient was competent at that time and NO ALLERGY OF HALDOL was in the chart at this time before the MRI brain, Haldol prescribed in addition  Later same day, was informed that patient during MRI patient confused  Confusion noted on examination as well  Daughter Azeb Fortune came soon thereafter and was able to sedate patient with oral commands  Spoke to Azeb Fortune outside the room to review allergy history  At this time Azeb oFrtune which shown that only triazolam was in the allergy list   In order to prevent further recurrences with haloperidol, added haloperidol in allergy on 09/04/2020 for the 1st time with the following qualifier "Confusion /AMS  1 ep on 09/04/2020  AGGRESSIVENESS  AGITATION" in Aga's presence and confirmed with her that it was added to chart

## 2020-09-08 ENCOUNTER — TRANSITIONAL CARE MANAGEMENT (OUTPATIENT)
Dept: INTERNAL MEDICINE CLINIC | Facility: CLINIC | Age: 85
End: 2020-09-08

## 2020-09-09 ENCOUNTER — TELEPHONE (OUTPATIENT)
Dept: ADMINISTRATIVE | Facility: HOSPITAL | Age: 85
End: 2020-09-09

## 2020-09-09 NOTE — PHYSICIAN ADVISOR
Current patient class: Inpatient  The patient is currently on Hospital Day: 3 at [facility]      The patient was admitted to the hospital at [id number] [id number] [phone] on 9/4/20 for the following diagnosis:  Blurred vision [H53 8]  Visual disturbance [H53 9]  Hypertension [I10]  Blurry vision [H53 8]  Stroke-like symptoms [R29 90]  Headache [R51]       There is documentation in the medical record of an expected length of stay of at least 2 midnights  The patient is therefore expected to satisfy the 2 midnight benchmark and given the 2 midnight presumption is appropriate for INPATIENT ADMISSION  Given this expectation of a satisfying stay, CMS instructs us that the patient is most often appropriate for inpatient admission under part A provided medical necessity is documented in the chart  After review of the relevant documentation, labs, vital signs and test results, the patient is appropriate for INPATIENT ADMISSION  Admission to the hospital as an inpatient is a complex decision making process which requires the practitioner to consider the patients presenting complaint, history and physical examination and all relevant testing  With this in mind, in this case, the patient was deemed appropriate for INPATIENT ADMISSION  After review of the documentation and testing available at the time of the admission I concur with this clinical determination of medical necessity  Rationale is as follows: The patient is a 80 yrs old Female who presented to the ED at 9/3/2020  6:05 PM with a chief complaint of Blurred Vision (Pt presents to the ED with blurry vision onset 1 hr ago  Reports HA x 1 day  Reports HTN episode at home of 180/74  )  Patient described it as a curtain coming down over her eye  Patient was put on the stroke pathway with frequent vital signs and neurological checks  The patient also was ordered for an MRI as well as an echocardiogram and carotid Dopplers    Neurology and vascular surgery were both consulted as well as Ophthalmology  On day 2 of admission the patient still continued to have some symptoms such as headache and did also have an episode of nausea and vomiting  Patient also states that she had blurring of vision during the headache episodes  Patient was still pending ophthalmology examination and vascular consultation at that time an MRI and further workup were still pending  Given the need for complete diagnostic workup in a patient who is still symptomatic on day 2 of admission the patient did cross the 2 midnight benchmark as set by Medicare and is inpatient status appropriate      The patients vitals on arrival were   ED Triage Vitals [09/03/20 1753]   Temperature Pulse Respirations Blood Pressure SpO2   98 6 °F (37 °C) 74 16 156/68 97 %      Temp Source Heart Rate Source Patient Position - Orthostatic VS BP Location FiO2 (%)   Oral Monitor Sitting Left arm --      Pain Score       3           Past Medical History:   Diagnosis Date    Asthma     Chronic interstitial cystitis     Community acquired pneumonia     last assessed 10/4/13    Diabetes mellitus ([address] )     Disease of thyroid gland     Diverticulitis     Dizziness     GERD (gastroesophageal reflux disease)     Hyperlipidemia     Hypertension     Vertigo      Past Surgical History:   Procedure Laterality Date    ADENOIDECTOMY      CHOLECYSTECTOMY      COLON SURGERY      partial colectomy - sigmoid, diverticulitis    EYE SURGERY      HYSTERECTOMY      ovaries remain    TONSILLECTOMY      US GUIDED LYMPH NODE BIOPSY RIGHT  3/11/2020    US GUIDED THYROID BIOPSY  11/13/2019           Consults have been placed to:   IP CONSULT TO NEUROLOGY  IP CONSULT TO CASE MANAGEMENT  IP CONSULT TO NUTRITION SERVICES  IP CONSULT TO VASCULAR SURGERY  IP CONSULT TO OPHTHALMOLOGY    Vitals:    09/04/20 0900 09/04/20 1900 09/04/20 2300 09/05/20 0700   BP: 143/65 126/52 154/67 146/66   BP Location: Right arm Left arm Right arm Left arm   Pulse: 77 61 85 83   Resp:  17  18   Temp:  97 7 °F (36 5 °C) 97 9 °F (36 6 °C) 98 8 °F (37 1 °C)   TempSrc:  Oral Oral Oral   SpO2:  94% 94% 94%       Most recent labs:    No results for input(s): WBC, HGB, HCT, PLT, K, NA, CALCIUM, BUN, CREATININE, LIPASE, AMYLASE, INR, TROPONINI, CKTOTAL, AST, ALT, ALKPHOS, BILITOT in the last 72 hours  Scheduled Meds:  Continuous Infusions:No current facility-administered medications for this encounter  PRN Meds:      Surgical procedures (if appropriate):

## 2020-09-16 ENCOUNTER — OFFICE VISIT (OUTPATIENT)
Dept: INTERNAL MEDICINE CLINIC | Facility: CLINIC | Age: 85
End: 2020-09-16
Payer: MEDICARE

## 2020-09-16 VITALS
WEIGHT: 127 LBS | BODY MASS INDEX: 24.94 KG/M2 | HEART RATE: 91 BPM | TEMPERATURE: 97.2 F | DIASTOLIC BLOOD PRESSURE: 70 MMHG | OXYGEN SATURATION: 100 % | RESPIRATION RATE: 18 BRPM | HEIGHT: 60 IN | SYSTOLIC BLOOD PRESSURE: 130 MMHG

## 2020-09-16 DIAGNOSIS — R29.90 STROKE-LIKE SYMPTOMS: Primary | ICD-10-CM

## 2020-09-16 DIAGNOSIS — I65.21 CAROTID STENOSIS, RIGHT: ICD-10-CM

## 2020-09-16 DIAGNOSIS — I65.09 VERTEBRAL ARTERY STENOSIS: ICD-10-CM

## 2020-09-16 DIAGNOSIS — M51.36 DEGENERATION OF INTERVERTEBRAL DISC OF LUMBAR REGION: ICD-10-CM

## 2020-09-16 DIAGNOSIS — N18.30 CKD (CHRONIC KIDNEY DISEASE), STAGE III (HCC): ICD-10-CM

## 2020-09-16 DIAGNOSIS — I70.213 ATHEROSCLEROSIS OF NATIVE ARTERY OF BOTH LOWER EXTREMITIES WITH INTERMITTENT CLAUDICATION (HCC): ICD-10-CM

## 2020-09-16 DIAGNOSIS — K21.9 GASTROESOPHAGEAL REFLUX DISEASE, ESOPHAGITIS PRESENCE NOT SPECIFIED: ICD-10-CM

## 2020-09-16 PROCEDURE — 99495 TRANSJ CARE MGMT MOD F2F 14D: CPT | Performed by: INTERNAL MEDICINE

## 2020-09-16 RX ORDER — PANTOPRAZOLE SODIUM 20 MG/1
20 TABLET, DELAYED RELEASE ORAL DAILY
Qty: 90 TABLET | Refills: 0 | Status: SHIPPED | OUTPATIENT
Start: 2020-09-16 | End: 2020-12-20

## 2020-09-16 RX ORDER — CILOSTAZOL 100 MG/1
100 TABLET ORAL 2 TIMES DAILY
Qty: 180 TABLET | Refills: 0 | Status: SHIPPED | OUTPATIENT
Start: 2020-09-16 | End: 2020-12-20

## 2020-09-16 NOTE — PROGRESS NOTES
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 (HCC)  Stable, suspect pain mostly due to lumbar stenosis  Degeneration of intervertebral disc of lumbar region  She does not want to have an MRI, will consider seeing pain again  Essential hypertension  BP stable, on amlodipine  Esophageal reflux  Symptoms improved since omeprazole changed to pantoprazole  Diagnoses and all orders for this visit:    Stroke-like symptoms  -     Echo complete with contrast if indicated; Future    Vertebral artery stenosis  -     cilostazol (PLETAL) 100 mg tablet; Take 1 tablet (100 mg total) by mouth 2 (two) times a day  -     Ambulatory referral to Vascular Surgery; Future    Carotid stenosis, right  -     Ambulatory referral to Vascular Surgery; Future    CKD (chronic kidney disease), stage III (HCC)    Gastroesophageal reflux disease, esophagitis presence not specified  -     pantoprazole (PROTONIX) 20 mg tablet; Take 1 tablet (20 mg total) by mouth daily    Atherosclerosis 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 (HCC)    Degeneration of intervertebral disc of lumbar region      Follow up as scheduled or as needed  Subjective:     Patient ID: Dede Vyas is a 80 y o  female, is feeling alright  She is here with her daughter  She is still feeling tired  She had an eventful hospital stay, developed confusion after receiving Haldol  Her family was very upset about this  She reports no further episodes blurring of vision or left facial numbness  She does have an appointment with her eye doctor      She continues to complain of bilateral thigh pain  This was not thought to be PVD  She was started on blood tile, unable to tolerate statin  She is taking her daily aspirin  She denies any dizziness, chest pain or shortness of breath  She reports abdominal pain has resolved, denies constipation  Review of Systems   Constitutional: Negative for appetite change and fatigue  HENT: Negative for congestion, ear pain and postnasal drip  Eyes: Negative for visual disturbance  Respiratory: Negative for cough and shortness of breath  Cardiovascular: Negative for chest pain and leg swelling  Gastrointestinal: Negative for abdominal pain, constipation and diarrhea  Genitourinary: Negative for dysuria and frequency  Musculoskeletal: Negative for arthralgias and myalgias  Skin: Negative for rash and wound  Neurological: Negative for dizziness, numbness and headaches  Hematological: Does not bruise/bleed easily  Psychiatric/Behavioral: Negative for confusion  The patient is not nervous/anxious  Objective:     Physical Exam  Vitals signs and nursing note reviewed  Constitutional:       General: She is not in acute distress  Appearance: She is well-developed  HENT:      Head: Normocephalic and atraumatic  Eyes:      Pupils: Pupils are equal, round, and reactive to light  Cardiovascular:      Rate and Rhythm: Normal rate and regular rhythm  Heart sounds: Normal heart sounds  Pulmonary:      Effort: Pulmonary effort is normal       Breath sounds: Normal breath sounds  No wheezing  Abdominal:      General: Bowel sounds are normal       Palpations: Abdomen is soft  Skin:     General: Skin is warm  Findings: No rash  Neurological:      General: No focal deficit present  Mental Status: She is alert and oriented to person, place, and time     Psychiatric:         Behavior: Behavior normal            Vitals:    09/16/20 1251   BP: 130/70   Pulse: 91   Resp: 18   Temp: (!) 97 2 °F (36 2 °C)   SpO2: 100%   Weight: 57 6 kg (127 lb)   Height: 5' (1 524 m)       Transitional Care Management Review:  Jeet Encinas is a 80 y o  female here for TCM follow up  During the TCM phone call patient stated:    TCM Call (since 8/16/2020)     Date and time call was made  9/8/2020  2:18 PM    Hospital care reviewed  Records reviewed    Date of Admission  09/03/20    Date of discharge  09/05/20    Diagnosis  Stroke-like symptoms    Disposition  Home    Were the patients medications reviewed and updated  Yes    Current Symptoms  None      TCM Call (since 8/16/2020)     Post hospital issues  None    Should patient be enrolled in anticoag monitoring? No    Scheduled for follow up? Yes    Did you obtain your prescribed medications  Yes    Do you need help managing your prescriptions or medications  No    Is transportation to your appointment needed  No    I have advised the patient to call PCP with any new or worsening symptoms  Destiny Sandoval MD    Reviewed hospital records  Labs & imaging results reviewed with patient

## 2020-09-17 ENCOUNTER — OFFICE VISIT (OUTPATIENT)
Dept: NEUROLOGY | Facility: CLINIC | Age: 85
End: 2020-09-17
Payer: MEDICARE

## 2020-09-17 VITALS
BODY MASS INDEX: 25.13 KG/M2 | WEIGHT: 128 LBS | DIASTOLIC BLOOD PRESSURE: 60 MMHG | SYSTOLIC BLOOD PRESSURE: 130 MMHG | HEIGHT: 60 IN | HEART RATE: 90 BPM | TEMPERATURE: 97.5 F

## 2020-09-17 DIAGNOSIS — R29.90 STROKE-LIKE SYMPTOMS: ICD-10-CM

## 2020-09-17 DIAGNOSIS — I65.09 VERTEBRAL ARTERY STENOSIS: ICD-10-CM

## 2020-09-17 PROCEDURE — 99213 OFFICE O/P EST LOW 20 MIN: CPT | Performed by: PSYCHIATRY & NEUROLOGY

## 2020-09-17 NOTE — PROGRESS NOTES
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, significant asymptomatic right internal carotid artery atherosclerosis to approximately 75-80 percent  Right MCA M2 segment stenosis    The patient's recent event did not result in any evidence of cerebral ischemia  In fact the event precipitating admission was uncertain as to its etiology however incidentally found admission was the left vertebral dissection  Currently maintained on dual antiplatelets  I have asked for written repeat CTA in approximately 1 month follow-up in approximately 6 weeks to assess whether we can diminish therapy to a single anti-platelet agent  I reviewed the cardinal warning signs of stroke and to immediately activate 911  I reviewed the entirety of the hospitalization with the patient and her daughter    Subjective:    HPI  Patient presents to the office today following recent hospitalization for stroke work up and vetebral artery stenosis 9/3/2020 - 9/5/2020  Since discharge, she denies experiencing any new or worsening stroke-like symptoms  Patient reports she continues to have the following symptoms: slight blurred vision  Ambulation / ADLs:  Patient claims she is ambulating independently as well as preforming her own ADLs  Patient manages her own medications, appointments, and affairs  Appointments / Medication Review:  Patient successfully followed up with PCP 9/16  Scheduled with ophthalmology 10/5  She has not yet seen Vascular Surgery  I reminded her to do so  I reviewed medications with her  Patient reports having no difficulties obtaining medications  Reports she is taking all as prescribed with no missed doses, medication side effects, or signs of bleeding  She is to have CTA head and Neck in about 1 month       Risk Factors / Education:  We reviewed stroke type, symptoms, personal risk factors and management, medications, and resources  Patient states she understands teaching though could use reinforcement  Offered stroke education binder and my card to patient  She has been having difficulty managing personal stroke risk factors and reports the following: she is non smoker, she reports not following a specific diet  I reviewed low salt, low cholesterol, diabetic friendly diets with her and daughter  Patient reports she monitors her blood glucose at home though does not know what it is lately  She has not been exercising  Lifestyle modifications reviewed  I addressed all her questions  At the conclusion of the conversation, patient denies having any further questions or concerns  Objective: There were no vitals taken for this visit  Physical Exam    Neurological Exam the patient has normal speech both prosody language and grammar  Bedside mental status was entirely intact  Cranial nerves are fully intact  Interactive abilities were normal   Motor strength was 5/5 in the upper and lower extremities  Normal tone and bulk reflexes 111+ throughout      ROS:    Review of Systems   Constitutional: Negative  Negative for appetite change and fever  HENT: Negative  Negative for hearing loss, tinnitus, trouble swallowing and voice change  Eyes: Positive for visual disturbance (slight blurring)  Negative for photophobia and pain  Respiratory: Negative  Negative for shortness of breath  Cardiovascular: Negative  Negative for palpitations  Gastrointestinal: Negative  Negative for nausea and vomiting  Endocrine: Negative  Negative for cold intolerance  Genitourinary: Negative  Negative for dysuria, frequency and urgency  Musculoskeletal: Negative  Negative for myalgias and neck pain  Skin: Negative  Negative for rash  Neurological: Negative  Negative for dizziness, tremors, seizures, syncope, facial asymmetry, speech difficulty, weakness, light-headedness, numbness and headaches     Hematological: Negative  Does not bruise/bleed easily  Psychiatric/Behavioral: Negative  Negative for confusion, hallucinations and sleep disturbance

## 2020-10-01 ENCOUNTER — HOSPITAL ENCOUNTER (OUTPATIENT)
Dept: RADIOLOGY | Age: 85
Discharge: HOME | End: 2020-10-01
Payer: MEDICARE

## 2020-10-01 DIAGNOSIS — I65.09 VERTEBRAL ARTERY STENOSIS: ICD-10-CM

## 2020-10-01 DIAGNOSIS — R29.90 STROKE-LIKE SYMPTOMS: ICD-10-CM

## 2020-10-01 PROCEDURE — 70496 CT ANGIOGRAPHY HEAD: CPT

## 2020-10-01 PROCEDURE — 70498 CT ANGIOGRAPHY NECK: CPT

## 2020-10-01 PROCEDURE — G1004 CDSM NDSC: HCPCS

## 2020-10-01 RX ADMIN — IOHEXOL 85 ML: 350 INJECTION, SOLUTION INTRAVENOUS at 14:11

## 2020-10-02 ENCOUNTER — TRANSCRIBE ORDERS (OUTPATIENT)
Dept: ADMINISTRATIVE | Facility: HOSPITAL | Age: 85
End: 2020-10-02

## 2020-10-02 ENCOUNTER — TELEPHONE (OUTPATIENT)
Dept: NEUROLOGY | Facility: CLINIC | Age: 85
End: 2020-10-02

## 2020-10-02 DIAGNOSIS — E05.00 THYROTOXICOSIS WITH DIFFUSE GOITER WITHOUT THYROTOXIC CRISIS OR STORM: ICD-10-CM

## 2020-10-02 DIAGNOSIS — E06.3 AUTOIMMUNE THYROIDITIS: ICD-10-CM

## 2020-10-02 DIAGNOSIS — D34 BENIGN NEOPLASM OF THYROID GLAND: ICD-10-CM

## 2020-10-02 DIAGNOSIS — E34.9 ENDOCRINE DISORDER, UNSPECIFIED: ICD-10-CM

## 2020-10-02 DIAGNOSIS — E05.00 THYROTOXICOSIS WITH DIFFUSE GOITER WITHOUT THYROTOXIC CRISIS OR STORM: Primary | ICD-10-CM

## 2020-10-02 DIAGNOSIS — E05.90 THYROTOXICOSIS, UNSPECIFIED WITHOUT THYROTOXIC CRISIS OR STORM: Primary | ICD-10-CM

## 2020-10-05 LAB
LEFT EYE DIABETIC RETINOPATHY: NORMAL
RIGHT EYE DIABETIC RETINOPATHY: NORMAL

## 2020-10-22 NOTE — PROGRESS NOTES
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, significant asymptomatic right internal carotid artery atherosclerosis to approximately 75-80 percent  Right MCA M2 segment stenosis    The patient's recent event did not result in any evidence of cerebral ischemia  In fact the event precipitating admission was uncertain as to its etiology however incidentally found admission was the left vertebral dissection  Currently maintained on dual antiplatelets  I have asked for written repeat CTA in approximately 1 month follow-up in approximately 6 weeks to assess whether we can diminish therapy to a single anti-platelet agent  I reviewed the cardinal warning signs of stroke and to immediately activate 911  I reviewed the entirety of the hospitalization with the patient and her daughter    Subjective:    HPI  Patient presents to the office today following recent hospitalization for stroke work up and vetebral artery stenosis 9/3/2020 - 9/5/2020  Since discharge, she denies experiencing any new or worsening stroke-like symptoms  Patient reports she continues to have the following symptoms: slight blurred vision  Ambulation / ADLs:  Patient claims she is ambulating independently as well as preforming her own ADLs  Patient manages her own medications, appointments, and affairs  Appointments / Medication Review:  Patient successfully followed up with PCP 9/16  Scheduled with ophthalmology 10/5  She has not yet seen Vascular Surgery  I reminded her to do so  I reviewed medications with her  Patient reports having no difficulties obtaining medications  Reports she is taking all as prescribed with no missed doses, medication side effects, or signs of bleeding  She is to have CTA head and Neck in about 1 month       Risk Factors / Education:  We reviewed stroke type, symptoms, personal risk factors and management, medications, and resources  Patient states she understands teaching though could use reinforcement  Offered stroke education binder and my card to patient  She has been having difficulty managing personal stroke risk factors and reports the following: she is non smoker, she reports not following a specific diet  I reviewed low salt, low cholesterol, diabetic friendly diets with her and daughter  Patient reports she monitors her blood glucose at home though does not know what it is lately  She has not been exercising  Lifestyle modifications reviewed  I addressed all her questions  At the conclusion of the conversation, patient denies having any further questions or concerns  Objective:    Blood pressure 130/60, pulse 90, temperature 97 5 °F (36 4 °C), temperature source Tympanic, height 5' (1 524 m), weight 58 1 kg (128 lb)  Physical Exam    Neurological Exam the patient has normal speech both prosody language and grammar  Bedside mental status was entirely intact  Cranial nerves are fully intact  Interactive abilities were normal   Motor strength was 5/5 in the upper and lower extremities  Normal tone and bulk reflexes 111+ throughout      ROS:    Review of Systems   Constitutional: Negative  Negative for appetite change and fever  HENT: Negative  Negative for hearing loss, tinnitus, trouble swallowing and voice change  Eyes: Positive for visual disturbance (slight blurring)  Negative for photophobia and pain  Respiratory: Negative  Negative for shortness of breath  Cardiovascular: Negative  Negative for palpitations  Gastrointestinal: Negative  Negative for nausea and vomiting  Endocrine: Negative  Negative for cold intolerance  Genitourinary: Negative  Negative for dysuria, frequency and urgency  Musculoskeletal: Negative  Negative for myalgias and neck pain  Skin: Negative  Negative for rash  Neurological: Negative    Negative for dizziness, tremors, seizures, syncope, facial asymmetry, speech difficulty, weakness, light-headedness, numbness and headaches  Hematological: Negative  Does not bruise/bleed easily  Psychiatric/Behavioral: Negative  Negative for confusion, hallucinations and sleep disturbance

## 2020-10-26 DIAGNOSIS — M51.36 DEGENERATION OF INTERVERTEBRAL DISC OF LUMBAR REGION: ICD-10-CM

## 2020-10-26 DIAGNOSIS — J45.30 MILD PERSISTENT ASTHMA WITHOUT COMPLICATION: ICD-10-CM

## 2020-10-26 DIAGNOSIS — R42 VERTIGO: ICD-10-CM

## 2020-10-26 RX ORDER — OXYCODONE HYDROCHLORIDE AND ACETAMINOPHEN 5; 325 MG/1; MG/1
1 TABLET ORAL 2 TIMES DAILY PRN
Qty: 60 TABLET | Refills: 0 | Status: SHIPPED | OUTPATIENT
Start: 2020-10-26 | End: 2021-01-06 | Stop reason: SDUPTHER

## 2020-10-26 RX ORDER — MECLIZINE HYDROCHLORIDE 25 MG/1
25 TABLET ORAL EVERY 8 HOURS PRN
Qty: 90 TABLET | Refills: 0 | Status: SHIPPED | OUTPATIENT
Start: 2020-10-26 | End: 2020-12-07 | Stop reason: SDUPTHER

## 2020-10-26 RX ORDER — MONTELUKAST SODIUM 10 MG/1
10 TABLET ORAL
Qty: 90 TABLET | Refills: 1 | Status: SHIPPED | OUTPATIENT
Start: 2020-10-26 | End: 2021-07-05

## 2020-10-31 DIAGNOSIS — J45.20 MILD INTERMITTENT ASTHMA WITHOUT COMPLICATION: ICD-10-CM

## 2020-11-01 RX ORDER — ALBUTEROL SULFATE 90 UG/1
AEROSOL, METERED RESPIRATORY (INHALATION)
Qty: 8.5 INHALER | Refills: 1 | Status: SHIPPED | OUTPATIENT
Start: 2020-11-01 | End: 2021-01-22 | Stop reason: SDUPTHER

## 2020-11-12 ENCOUNTER — HOSPITAL ENCOUNTER (OUTPATIENT)
Dept: NON INVASIVE DIAGNOSTICS | Facility: CLINIC | Age: 85
Discharge: HOME | End: 2020-11-12
Payer: MEDICARE

## 2020-11-12 DIAGNOSIS — R29.90 STROKE-LIKE SYMPTOMS: ICD-10-CM

## 2020-11-12 PROCEDURE — 93306 TTE W/DOPPLER COMPLETE: CPT | Performed by: INTERNAL MEDICINE

## 2020-11-12 PROCEDURE — 93306 TTE W/DOPPLER COMPLETE: CPT

## 2020-11-19 ENCOUNTER — TELEPHONE (OUTPATIENT)
Dept: NEUROLOGY | Facility: CLINIC | Age: 85
End: 2020-11-19

## 2020-11-19 ENCOUNTER — TELEMEDICINE (OUTPATIENT)
Dept: NEUROLOGY | Facility: CLINIC | Age: 85
End: 2020-11-19
Payer: MEDICARE

## 2020-11-19 DIAGNOSIS — I65.09 VERTEBRAL ARTERY STENOSIS: ICD-10-CM

## 2020-11-19 DIAGNOSIS — I77.74 VERTEBRAL ARTERY DISSECTION (HCC): Primary | ICD-10-CM

## 2020-11-19 PROCEDURE — 99214 OFFICE O/P EST MOD 30 MIN: CPT | Performed by: PHYSICIAN ASSISTANT

## 2020-12-05 ENCOUNTER — HOSPITAL ENCOUNTER (OUTPATIENT)
Dept: CT IMAGING | Facility: HOSPITAL | Age: 85
Discharge: HOME | End: 2020-12-05
Payer: MEDICARE

## 2020-12-05 DIAGNOSIS — I65.09 VERTEBRAL ARTERY STENOSIS: ICD-10-CM

## 2020-12-05 DIAGNOSIS — I77.74 VERTEBRAL ARTERY DISSECTION (HCC): ICD-10-CM

## 2020-12-05 PROCEDURE — 70498 CT ANGIOGRAPHY NECK: CPT

## 2020-12-05 PROCEDURE — G1004 CDSM NDSC: HCPCS

## 2020-12-05 PROCEDURE — 70496 CT ANGIOGRAPHY HEAD: CPT

## 2020-12-05 RX ADMIN — IOHEXOL 85 ML: 350 INJECTION, SOLUTION INTRAVENOUS at 15:20

## 2020-12-07 DIAGNOSIS — K21.9 GASTROESOPHAGEAL REFLUX DISEASE: ICD-10-CM

## 2020-12-07 DIAGNOSIS — R42 VERTIGO: ICD-10-CM

## 2020-12-07 RX ORDER — MECLIZINE HYDROCHLORIDE 25 MG/1
25 TABLET ORAL EVERY 8 HOURS PRN
Qty: 90 TABLET | Refills: 0 | Status: SHIPPED | OUTPATIENT
Start: 2020-12-07 | End: 2021-01-22 | Stop reason: SDUPTHER

## 2020-12-07 RX ORDER — FAMOTIDINE 40 MG/1
40 TABLET, FILM COATED ORAL DAILY
Qty: 90 TABLET | Refills: 1 | Status: SHIPPED | OUTPATIENT
Start: 2020-12-07 | End: 2021-06-06

## 2020-12-20 DIAGNOSIS — K21.9 GASTROESOPHAGEAL REFLUX DISEASE: ICD-10-CM

## 2020-12-20 DIAGNOSIS — I65.09 VERTEBRAL ARTERY STENOSIS: ICD-10-CM

## 2020-12-20 RX ORDER — PANTOPRAZOLE SODIUM 20 MG/1
TABLET, DELAYED RELEASE ORAL
Qty: 90 TABLET | Refills: 0 | Status: SHIPPED | OUTPATIENT
Start: 2020-12-20 | End: 2021-03-15

## 2020-12-20 RX ORDER — CILOSTAZOL 100 MG/1
TABLET ORAL
Qty: 180 TABLET | Refills: 0 | Status: SHIPPED | OUTPATIENT
Start: 2020-12-20 | End: 2021-03-15

## 2020-12-28 LAB
EXT MICROALBUMIN URINE RANDOM: 0.5
HBA1C MFR BLD HPLC: 5.5 %

## 2021-01-06 ENCOUNTER — OFFICE VISIT (OUTPATIENT)
Dept: INTERNAL MEDICINE CLINIC | Facility: CLINIC | Age: 86
End: 2021-01-06
Payer: MEDICARE

## 2021-01-06 VITALS
BODY MASS INDEX: 23.05 KG/M2 | SYSTOLIC BLOOD PRESSURE: 130 MMHG | WEIGHT: 117.4 LBS | OXYGEN SATURATION: 98 % | HEIGHT: 60 IN | DIASTOLIC BLOOD PRESSURE: 72 MMHG | TEMPERATURE: 97.2 F | RESPIRATION RATE: 18 BRPM | HEART RATE: 85 BPM

## 2021-01-06 DIAGNOSIS — N18.31 STAGE 3A CHRONIC KIDNEY DISEASE (HCC): ICD-10-CM

## 2021-01-06 DIAGNOSIS — I10 ESSENTIAL HYPERTENSION: Primary | ICD-10-CM

## 2021-01-06 DIAGNOSIS — N18.30 TYPE 2 DIABETES MELLITUS WITH STAGE 3 CHRONIC KIDNEY DISEASE AND HYPERTENSION (HCC): ICD-10-CM

## 2021-01-06 DIAGNOSIS — E11.22 TYPE 2 DIABETES MELLITUS WITH STAGE 3 CHRONIC KIDNEY DISEASE AND HYPERTENSION (HCC): ICD-10-CM

## 2021-01-06 DIAGNOSIS — E05.90 HYPERTHYROIDISM: ICD-10-CM

## 2021-01-06 DIAGNOSIS — E04.1 THYROID NODULE: ICD-10-CM

## 2021-01-06 DIAGNOSIS — I77.74 VERTEBRAL ARTERY DISSECTION (HCC): ICD-10-CM

## 2021-01-06 DIAGNOSIS — I12.9 TYPE 2 DIABETES MELLITUS WITH STAGE 3 CHRONIC KIDNEY DISEASE AND HYPERTENSION (HCC): ICD-10-CM

## 2021-01-06 DIAGNOSIS — K21.9 GASTROESOPHAGEAL REFLUX DISEASE, UNSPECIFIED WHETHER ESOPHAGITIS PRESENT: ICD-10-CM

## 2021-01-06 DIAGNOSIS — M51.36 DEGENERATION OF INTERVERTEBRAL DISC OF LUMBAR REGION: ICD-10-CM

## 2021-01-06 DIAGNOSIS — R22.0 MASS OF RIGHT SUBMANDIBULAR REGION: ICD-10-CM

## 2021-01-06 DIAGNOSIS — E78.2 MIXED HYPERLIPIDEMIA: ICD-10-CM

## 2021-01-06 DIAGNOSIS — I65.21 CAROTID STENOSIS, RIGHT: ICD-10-CM

## 2021-01-06 DIAGNOSIS — F41.9 ANXIETY: ICD-10-CM

## 2021-01-06 DIAGNOSIS — I65.09 VERTEBRAL ARTERY STENOSIS, UNSPECIFIED LATERALITY: ICD-10-CM

## 2021-01-06 PROBLEM — R41.0 CONFUSION: Status: RESOLVED | Noted: 2020-09-04 | Resolved: 2021-01-06

## 2021-01-06 PROCEDURE — 99214 OFFICE O/P EST MOD 30 MIN: CPT | Performed by: INTERNAL MEDICINE

## 2021-01-06 RX ORDER — OXYCODONE HYDROCHLORIDE AND ACETAMINOPHEN 5; 325 MG/1; MG/1
1 TABLET ORAL 2 TIMES DAILY PRN
Qty: 60 TABLET | Refills: 0 | Status: SHIPPED | OUTPATIENT
Start: 2021-01-06 | End: 2021-03-08 | Stop reason: SDUPTHER

## 2021-01-06 RX ORDER — LORAZEPAM 0.5 MG/1
TABLET ORAL
Qty: 30 TABLET | Refills: 0 | Status: SHIPPED | OUTPATIENT
Start: 2021-01-06 | End: 2021-06-07 | Stop reason: SDUPTHER

## 2021-01-06 NOTE — PROGRESS NOTES
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 ([facility] )    Lab Results   Component Value Date    HGBA1C 5 6 09/04/2020     Not on medication  Sees Endo  Hyperthyroidism  Stable, on methimazole  Per Endo  Carotid stenosis, right  Ultrasound scheduled  Mass of right submandibular region  Repeat ultrasound  Thyroid nodule  Due for ultrasound  Hyperlipidemia  Lipids due  CKD (chronic kidney disease), stage III [facility])  Lab Results   Component Value Date    EGFR 48 09/05/2020    EGFR 42 09/03/2020    EGFR 45 06/19/2020    CREATININE 1 03 09/05/2020    CREATININE 1 16 09/03/2020    CREATININE 1 10 06/19/2020     Stable  Esophageal reflux  On PPI  Degeneration of intervertebral disc of lumbar region  Stable  Takes Percocet prn  PDMP reviewed  Anxiety  Takes lorazepam prn only  PDMP reviewed  Essential hypertension  BP stable, on amlodipine  Diagnoses and all orders for this visit:    Essential hypertension    Anxiety  -     LORazepam (ATIVAN) 0 5 mg tablet; Take 1/2 tablet to 1 tablet PO daily PRN    Degeneration of intervertebral disc of lumbar region  -     oxyCODONE-acetaminophen (PERCOCET) 5-325 mg per tablet;  Take 1 tablet by mouth 2 (two) times a day as needed for moderate painMax Daily Amount: 2 tablets    Stage 3a chronic kidney disease    Mixed hyperlipidemia    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 (HCC)  -     Microalbumin / creatinine urine ratio    Vertebral artery stenosis, unspecified laterality    Vertebral artery dissection (HCC)    Hyperthyroidism    Carotid stenosis, right    Mass of right submandibular region    Thyroid nodule    Gastroesophageal reflux disease, unspecified whether esophagitis present    Other orders  -     Cancel: POCT hemoglobin A1c      Follow up in 5 months or as needed  Subjective:      Patient ID: Young Simmonds is a 80 y o  female for follow up  She is feeling well, no new complaints  She brings in a form to be completed so that she can still drive  She usually drives short distances, during the daytime  She continues to experience intermittent leg pain, takes Percocet as needed only, not daily  She also takes lorazepam as needed for anxiety  She sees her endocrinologist, confused whether she still needs to do her neck ultrasound  She denies any extremity weakness or numbness, no syncopal episodes  She has not scheduled an appointment to see vascular  The following portions of the patient's history were reviewed and updated as appropriate: allergies, current medications, past medical history, past social history and problem list     Review of Systems   Constitutional: Negative for activity change, appetite change and fatigue  HENT: Negative for congestion  Eyes: Negative for visual disturbance  Respiratory: Negative for cough and shortness of breath  Cardiovascular: Negative for chest pain, palpitations and leg swelling  Gastrointestinal: Negative for abdominal pain, constipation and diarrhea  Genitourinary: Negative for dysuria and frequency  Musculoskeletal: Positive for arthralgias and back pain  Negative for myalgias  Neurological: Negative for dizziness, numbness and headaches  Psychiatric/Behavioral: Negative for confusion  The patient is not nervous/anxious  Objective:      /72   Pulse 85   Temp (!) 97 2 °F (36 2 °C)   Resp 18   Ht 5' (1 524 m)   Wt 53 3 kg (117 lb 6 4 oz)   SpO2 98%   BMI 22 93 kg/m²          Physical Exam  Vitals signs and nursing note reviewed  Constitutional:       General: She is not in acute distress  Appearance: She is well-developed  HENT:      Head: Normocephalic and atraumatic     Eyes:      Pupils: Pupils are equal, round, and reactive to light  Neck:      Musculoskeletal: Normal range of motion  Thyroid: Thyroid mass present  Cardiovascular:      Rate and Rhythm: Normal rate and regular rhythm  Heart sounds: Normal heart sounds  Pulmonary:      Effort: Pulmonary effort is normal       Breath sounds: Normal breath sounds  No wheezing  Abdominal:      General: Bowel sounds are normal       Palpations: Abdomen is soft  Lymphadenopathy:      Cervical: Cervical adenopathy present  Skin:     General: Skin is warm  Findings: No rash  Neurological:      Mental Status: She is alert and oriented to person, place, and time  Psychiatric:         Behavior: Behavior normal            Labs & imaging results reviewed with patient

## 2021-01-06 NOTE — ASSESSMENT & PLAN NOTE
Lab Results   Component Value Date    EGFR 48 09/05/2020    EGFR 42 09/03/2020    EGFR 45 06/19/2020    CREATININE 1 03 09/05/2020    CREATININE 1 16 09/03/2020    CREATININE 1 10 06/19/2020     Stable

## 2021-01-14 ENCOUNTER — TRANSCRIBE ORDERS (OUTPATIENT)
Dept: ADMINISTRATIVE | Facility: HOSPITAL | Age: 86
End: 2021-01-14

## 2021-01-14 DIAGNOSIS — E05.00 THYROTOXICOSIS WITH DIFFUSE GOITER WITHOUT THYROTOXIC CRISIS OR STORM: Primary | ICD-10-CM

## 2021-01-14 DIAGNOSIS — E11.8 TYPE 2 DIABETES MELLITUS WITH UNSPECIFIED COMPLICATIONS (HCC): ICD-10-CM

## 2021-01-14 DIAGNOSIS — N39.0 URINARY TRACT INFECTION, SITE NOT SPECIFIED: ICD-10-CM

## 2021-01-22 ENCOUNTER — TELEPHONE (OUTPATIENT)
Dept: INTERNAL MEDICINE CLINIC | Facility: CLINIC | Age: 86
End: 2021-01-22

## 2021-01-22 DIAGNOSIS — R42 VERTIGO: ICD-10-CM

## 2021-01-22 DIAGNOSIS — J45.20 MILD INTERMITTENT ASTHMA WITHOUT COMPLICATION: ICD-10-CM

## 2021-01-22 RX ORDER — MECLIZINE HYDROCHLORIDE 25 MG/1
25 TABLET ORAL EVERY 8 HOURS PRN
Qty: 90 TABLET | Refills: 0 | Status: SHIPPED | OUTPATIENT
Start: 2021-01-22 | End: 2021-03-12 | Stop reason: SDUPTHER

## 2021-01-22 RX ORDER — ALBUTEROL SULFATE 90 UG/1
2 AEROSOL, METERED RESPIRATORY (INHALATION) EVERY 6 HOURS PRN
Qty: 8.5 INHALER | Refills: 1 | Status: SHIPPED | OUTPATIENT
Start: 2021-01-22 | End: 2021-04-19 | Stop reason: SDUPTHER

## 2021-02-09 ENCOUNTER — TELEMEDICINE (OUTPATIENT)
Dept: INTERNAL MEDICINE CLINIC | Facility: CLINIC | Age: 86
End: 2021-02-09
Payer: MEDICARE

## 2021-02-09 DIAGNOSIS — Z00.00 HEALTH MAINTENANCE EXAMINATION: ICD-10-CM

## 2021-02-09 DIAGNOSIS — R42 DIZZINESS: ICD-10-CM

## 2021-02-09 DIAGNOSIS — I10 ESSENTIAL HYPERTENSION: ICD-10-CM

## 2021-02-09 DIAGNOSIS — K21.9 GASTROESOPHAGEAL REFLUX DISEASE, UNSPECIFIED WHETHER ESOPHAGITIS PRESENT: ICD-10-CM

## 2021-02-09 DIAGNOSIS — K59.09 OTHER CONSTIPATION: Primary | ICD-10-CM

## 2021-02-09 PROCEDURE — 1123F ACP DISCUSS/DSCN MKR DOCD: CPT | Performed by: INTERNAL MEDICINE

## 2021-02-09 PROCEDURE — 99213 OFFICE O/P EST LOW 20 MIN: CPT | Performed by: INTERNAL MEDICINE

## 2021-02-09 PROCEDURE — G0439 PPPS, SUBSEQ VISIT: HCPCS | Performed by: INTERNAL MEDICINE

## 2021-02-09 NOTE — PROGRESS NOTES
Assessment and Plan:     Problem List Items Addressed This Visit        Digestive    Esophageal reflux     Discussed low acid diet  Continue PPI in AM, famotidine in PM             Cardiovascular and Mediastinum    Essential hypertension     On amlodipine  Other Visit Diagnoses     Other constipation    -  Primary    Instructed to take Miralax today, start daily fiber supplement, Dulcolax if no BM in 3 days  Increase daily fiber and fluid intake  Dizziness        Recommend to split morning medications  Keep hydrated  Health maintenance examination        Updated  Preventive health issues were discussed with patient, and age appropriate screening tests were ordered as noted in patient's After Visit Summary  Personalized health advice and appropriate referrals for health education or preventive services given if needed, as noted in patient's After Visit Summary       History of Present Illness:     Patient presents for Medicare Annual Wellness visit    Patient Care Team:  Rebecca Ordoñez MD as PCP - General  MD Dylan Hernandez DO Rheba Arm, MD Geno Laurel, MD Barkley Ash, CRNP (Vascular Surgery)     Problem List:     Patient Active Problem List   Diagnosis    Anxiety    Mild intermittent asthma without complication    Degeneration of intervertebral disc of lumbar region    Diverticulosis    Elevated serum alkaline phosphatase level    Esophageal reflux    Fatty liver    Hyperlipidemia    Essential hypertension    Hyperthyroidism    Insomnia    Irritable bowel syndrome    Spinal stenosis    Spondylolisthesis, grade 1    Transient ischemic attack    Type 2 diabetes mellitus ([address] )    Vitamin D deficiency    Muscle cramps    Ovarian cyst    CKD (chronic kidney disease), stage III ([address] )    Trigger finger of right hand    Pain in both lower extremities    Radiculopathy, lumbar region    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 ([facility] )    PAD (peripheral artery disease) ([facility] )    Memory loss    Atherosclerosis 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 (HCC)    Thyroid nodule    Mass of right submandibular region    Abnormal finding on imaging    Rotator cuff disorder, right    Embolism and thrombosis of arteries of the lower extremities (HCC)    Stroke-like symptoms    Carotid stenosis, right    Vertebral artery stenosis    Abdominal tenderness    Vision abnormalities    Vertebral artery dissection [facility])      Past Medical and Surgical History:     Past Medical History:   Diagnosis Date    Asthma     Chronic interstitial cystitis     Community acquired pneumonia     last assessed 10/4/13    Diabetes mellitus ([facility] )     Disease of thyroid gland     Diverticulitis     Dizziness     GERD (gastroesophageal reflux disease)     Hyperlipidemia     Hypertension     Vertigo      Past Surgical History:   Procedure Laterality Date    ADENOIDECTOMY      CHOLECYSTECTOMY      COLON SURGERY      partial colectomy - sigmoid, diverticulitis    EYE SURGERY      HYSTERECTOMY      ovaries remain    TONSILLECTOMY      US GUIDED LYMPH NODE BIOPSY RIGHT  3/11/2020    US GUIDED THYROID BIOPSY  11/13/2019      Family History:     Family History   Problem Relation Age of Onset    Coronary artery disease Mother     Other Father         MVA    Alcohol abuse Neg Hx     Depression Neg Hx     Drug abuse Neg Hx     Substance Abuse Neg Hx     Mental illness Neg Hx       Social History:        Social History     Socioeconomic History    Marital status:       Spouse name: None    Number of children: 4    Years of education: None    Highest education level: None   Occupational History     Comment: retired   Social Needs    Financial resource strain: None    Food insecurity     Worry: None     Inability: None    Transportation needs     Medical: None Non-medical: None   Tobacco Use    Smoking status: Never Smoker    Smokeless tobacco: Never Used   Substance and Sexual Activity    Alcohol use: No    Drug use: No    Sexual activity: None   Lifestyle    Physical activity     Days per week: None     Minutes per session: None    Stress: None   Relationships    Social connections     Talks on phone: None     Gets together: None     Attends Restorationist service: None     Active member of club or organization: None     Attends meetings of clubs or organizations: None     Relationship status: None    Intimate partner violence     Fear of current or ex partner: None     Emotionally abused: None     Physically abused: None     Forced sexual activity: None   Other Topics Concern    None   Social History Narrative    Lives alone    Daughter in the area      Medications and Allergies:     Current Outpatient Medications   Medication Sig Dispense Refill    albuterol (PROVENTIL HFA,VENTOLIN HFA) 90 mcg/act inhaler Inhale 2 puffs every 6 (six) hours as needed for wheezing 8 5 Inhaler 1    amLODIPine (NORVASC) 5 mg tablet Take 1 tablet (5 mg total) by mouth daily 90 tablet 1    aspirin 81 mg chewable tablet Chew 1 tablet (81 mg total) daily 30 tablet 0    b complex vitamins capsule Take 1 capsule by mouth daily      Cholecalciferol (VITAMIN D PO) Take 1 tablet by mouth daily      cilostazol (PLETAL) 100 mg tablet TAKE 1 TABLET BY MOUTH TWICE A  tablet 0    famotidine (PEPCID) 40 MG tablet Take 1 tablet (40 mg total) by mouth daily 90 tablet 1    LORazepam (ATIVAN) 0 5 mg tablet Take 1/2 tablet to 1 tablet PO daily PRN 30 tablet 0    meclizine (ANTIVERT) 25 mg tablet Take 1 tablet (25 mg total) by mouth every 8 (eight) hours as needed for dizziness 90 tablet 0    methimazole (TAPAZOLE) 5 mg tablet 1/2 tab daily 30 tablet 0    montelukast (SINGULAIR) 10 mg tablet Take 1 tablet (10 mg total) by mouth daily at bedtime 90 tablet 1    oxyCODONE-acetaminophen (PERCOCET) 5-325 mg per tablet Take 1 tablet by mouth 2 (two) times a day as needed for moderate painMax Daily Amount: 2 tablets 60 tablet 0    pantoprazole (PROTONIX) 20 mg tablet TAKE 1 TABLET BY MOUTH EVERY DAY 90 tablet 0     No current facility-administered medications for this visit  Allergies   Allergen Reactions    Haloperidol      Confusion /AMS  1 ep on 09/04/2020  AGGRESSIVENESS  AGITATION    Triazolam Other (See Comments)     Confusion, and aggressivenss as well   Bactrim [Sulfamethoxazole-Trimethoprim]     Ezetimibe     Gabapentin     Lisinopril     Naproxen Nausea Only    Statins       Immunizations:     Immunization History   Administered Date(s) Administered    H1N1, All Formulations 10/18/2002, 12/07/2006    Influenza Quadrivalent Preservative Free 3 years and older IM 07/26/2016    Pneumococcal Conjugate 13-Valent 03/16/2015    Pneumococcal Polysaccharide PPV23 02/22/1999, 09/23/2011    Tdap 09/05/2019      Health Maintenance: There are no preventive care reminders to display for this patient  There are no preventive care reminders to display for this patient  Medicare Health Risk Assessment:     There were no vitals taken for this visit  Akosua Ivory is here for her Subsequent Wellness visit  Last Medicare Wellness visit information reviewed, patient interviewed and updates made to the record today  Health Risk Assessment:   Patient rates overall health as good  Patient feels that their physical health rating is same  Eyesight was rated as same  Hearing was rated as same  Patient feels that their emotional and mental health rating is same  Pain experienced in the last 7 days has been none  Patient states that she has experienced no weight loss or gain in last 6 months  Depression Screening:   PHQ-2 Score: 0      Fall Risk Screening:    In the past year, patient has experienced: no history of falling in past year      Urinary Incontinence Screening:   Patient has not leaked urine accidently in the last six months  Home Safety:  Patient does not have trouble with stairs inside or outside of their home  Patient has working smoke alarms and has working carbon monoxide detector  Home safety hazards include: none  Nutrition:   Current diet is Regular  Medications:   Patient is currently taking over-the-counter supplements  OTC medications include: see medication list  Patient is able to manage medications  Activities of Daily Living (ADLs)/Instrumental Activities of Daily Living (IADLs):   Walk and transfer into and out of bed and chair?: Yes  Dress and groom yourself?: Yes    Bathe or shower yourself?: Yes    Feed yourself? Yes  Do your laundry/housekeeping?: Yes  Manage your money, pay your bills and track your expenses?: Yes  Make your own meals?: Yes    Do your own shopping?: Yes    Previous Hospitalizations:   Any hospitalizations or ED visits within the last 12 months?: Yes    How many hospitalizations have you had in the last year?: 1-2    Advance Care Planning:   Living will: Yes    Durable POA for healthcare: No    Advanced directive: Yes    End of Life Decisions reviewed with patient: No      PREVENTIVE SCREENINGS      Cardiovascular Screening:    General: History Lipid Disorder and Screening Current      Diabetes Screening:     General: Screening Not Indicated, History Diabetes and Screening Current      Colorectal Cancer Screening:     General: Screening Not Indicated      Breast Cancer Screening:     General: Patient Declines      Cervical Cancer Screening:    General: Screening Not Indicated      Osteoporosis Screening:    General: Patient Declines      Abdominal Aortic Aneurysm (AAA) Screening:        General: Screening Not Indicated      Lung Cancer Screening:     General: Screening Not Indicated      Hepatitis C Screening:    General: Screening Not Indicated    Other Counseling Topics:   Regular weightbearing exercise         Tee Su MD  Virtual AWV Consent    Reason for visit is AWV    Encounter provider Rebecca Ordoñez MD    Provider located at [address] [place] [id number]      Recent Visits  No visits were found meeting these conditions  Showing recent visits within past 7 days and meeting all other requirements     Today's Visits  Date Type Provider Dept   02/09/21 Telemedicine Rebecca Ordoñez, [facility]   Showing today's visits and meeting all other requirements     Future Appointments  No visits were found meeting these conditions  Showing future appointments within next 150 days and meeting all other requirements        After connecting through [employer], the patient was identified by name and date of birth  Patiencedonnell Machuca was informed that this is a telemedicine visit and that the visit is being conducted through [facility] and patient was informed that this is a secure, HIPAA-compliant platform  She agrees to proceed  My office door was closed  No one else was in the room  She acknowledged consent and understanding of privacy and security of the video platform  The patient has agreed to participate and understands they can discontinue the visit at any time    Patient is aware this is a billable service

## 2021-02-09 NOTE — PROGRESS NOTES
Virtual Regular Visit      Assessment/Plan:    Problem List Items Addressed This Visit        Digestive    Esophageal reflux     Discussed low acid diet  Continue PPI in AM, famotidine in PM             Cardiovascular and Mediastinum    Essential hypertension     On amlodipine  Other Visit Diagnoses     Other constipation    -  Primary    Instructed to take Miralax today, start daily fiber supplement, Dulcolax if no BM in 3 days  Increase daily fiber and fluid intake  Dizziness        Recommend to split morning medications  Keep hydrated  Health maintenance examination        Updated  Reason for visit is constipation  Chief Complaint   Patient presents with    Abdominal Pain     constipation, last BM was friday - not much    Fatigue    Virtual Awv    Virtual Regular Visit        Encounter provider Lucy Costello MD    Provider located at [address] [place] [id number]      Recent Visits  No visits were found meeting these conditions  Showing recent visits within past 7 days and meeting all other requirements     Today's Visits  Date Type Provider Dept   02/09/21 Telemedicine Lucy Costello, [facility]   Showing today's visits and meeting all other requirements     Future Appointments  No visits were found meeting these conditions  Showing future appointments within next 150 days and meeting all other requirements        The patient was identified by name and date of birth  Madelyn Friends was informed that this is a telemedicine visit and that the visit is being conducted through [facility] and patient was informed that this is a secure, HIPAA-compliant platform  She agrees to proceed     My office door was closed  No one else was in the room  She acknowledged consent and understanding of privacy and security of the video platform   The patient has agreed to participate and understands they can discontinue the visit at any time  Patient is aware this is a billable service  Subjective  Valerie Parikh is a 80 y o  female c/o constipation   She reports having a bowel movement 4 days ago  She took a Dulcolax the day before and the day of  Afterwards, she had a hard stool followed by soft watery stools  This lasted until the next day  She reports cramping pain after bowel movements  Prior to that, she did not move her bowels for over a week  She is now experiencing more burning in the upper and lower abdomen  She has not moved her bowels since 3 days ago  He has not taken any over-the-counter medication again  She admits not eating a lot of fiber on a daily basis, drinks about 2-3 glasses of water daily  She tried [place] a few days ago, felt she had a lot of air  No nausea or vomiting  Daughter is concerned that she has been complaining of dizziness after she takes her morning medications  She thinks it may be due to the Pletal   She is to the eats a slight breakfast which consists of toast, then takes all her medications and vitamins         Past Medical History:   Diagnosis Date    Asthma     Chronic interstitial cystitis     Community acquired pneumonia     last assessed 10/4/13    Diabetes mellitus ([facility] [address] )     Disease of thyroid gland     Diverticulitis     Dizziness     GERD (gastroesophageal reflux disease)     Hyperlipidemia     Hypertension     Vertigo        Past Surgical History:   Procedure Laterality Date    ADENOIDECTOMY      CHOLECYSTECTOMY      COLON SURGERY      partial colectomy - sigmoid, diverticulitis    EYE SURGERY      HYSTERECTOMY      ovaries remain    TONSILLECTOMY      US GUIDED LYMPH NODE BIOPSY RIGHT  3/11/2020    US GUIDED THYROID BIOPSY  11/13/2019       Current Outpatient Medications   Medication Sig Dispense Refill    albuterol (PROVENTIL HFA,VENTOLIN HFA) 90 mcg/act inhaler Inhale 2 puffs every 6 (six) hours as needed for wheezing 8 5 Inhaler 1    amLODIPine (NORVASC) 5 mg tablet Take 1 tablet (5 mg total) by mouth daily 90 tablet 1    aspirin 81 mg chewable tablet Chew 1 tablet (81 mg total) daily 30 tablet 0    b complex vitamins capsule Take 1 capsule by mouth daily      Cholecalciferol (VITAMIN D PO) Take 1 tablet by mouth daily      cilostazol (PLETAL) 100 mg tablet TAKE 1 TABLET BY MOUTH TWICE A  tablet 0    famotidine (PEPCID) 40 MG tablet Take 1 tablet (40 mg total) by mouth daily 90 tablet 1    LORazepam (ATIVAN) 0 5 mg tablet Take 1/2 tablet to 1 tablet PO daily PRN 30 tablet 0    meclizine (ANTIVERT) 25 mg tablet Take 1 tablet (25 mg total) by mouth every 8 (eight) hours as needed for dizziness 90 tablet 0    methimazole (TAPAZOLE) 5 mg tablet 1/2 tab daily 30 tablet 0    montelukast (SINGULAIR) 10 mg tablet Take 1 tablet (10 mg total) by mouth daily at bedtime 90 tablet 1    oxyCODONE-acetaminophen (PERCOCET) 5-325 mg per tablet Take 1 tablet by mouth 2 (two) times a day as needed for moderate painMax Daily Amount: 2 tablets 60 tablet 0    pantoprazole (PROTONIX) 20 mg tablet TAKE 1 TABLET BY MOUTH EVERY DAY 90 tablet 0     No current facility-administered medications for this visit  Allergies   Allergen Reactions    Haloperidol      Confusion /AMS  1 ep on 09/04/2020  AGGRESSIVENESS  AGITATION    Triazolam Other (See Comments)     Confusion, and aggressivenss as well   Bactrim [Sulfamethoxazole-Trimethoprim]     Ezetimibe     Gabapentin     Lisinopril     Naproxen Nausea Only    Statins        Review of Systems   Constitutional: Negative for activity change, appetite change, fatigue and fever  Gastrointestinal: Positive for abdominal pain and constipation  Negative for nausea and vomiting  Genitourinary: Negative for difficulty urinating and dysuria  Video Exam    There were no vitals filed for this visit      Physical Exam  Constitutional:       General: She is not in acute distress  Appearance: She is well-developed  She is not ill-appearing  HENT:      Mouth/Throat:      Mouth: Mucous membranes are moist    Pulmonary:      Effort: Pulmonary effort is normal  No respiratory distress  Neurological:      General: No focal deficit present  Mental Status: She is alert and oriented to person, place, and time  Psychiatric:         Mood and Affect: Mood normal          Behavior: Behavior normal           I spent 12 minutes directly with the patient during this visit      VIRTUAL VISIT Uriel Monge acknowledges that she has consented to an online visit or consultation  She understands that the online visit is based solely on information provided by her, and that, in the absence of a face-to-face physical evaluation by the physician, the diagnosis she receives is both limited and provisional in terms of accuracy and completeness  This is not intended to replace a full medical face-to-face evaluation by the physician  Mert Acuna understands and accepts these terms

## 2021-02-10 ENCOUNTER — TELEPHONE (OUTPATIENT)
Dept: ADMINISTRATIVE | Facility: OTHER | Age: 86
End: 2021-02-10

## 2021-02-10 NOTE — TELEPHONE ENCOUNTER
----- Message from Mena Abel sent at 2/9/2021 12:10 PM EST -----  Regarding: DM EYE - [facility]  02/09/21 12:10 PM    Hello, our patient Virginie Luna has had Diabetic Eye Exam completed/performed  Please assist in updating the patient chart by pulling the document from the Media Tab  The date of service is 10/29/2020       Thank you,  Mena Abel  [facility]

## 2021-02-11 NOTE — TELEPHONE ENCOUNTER
Upon review of the In Basket request we were able to locate, review, and update the patient chart as requested for Diabetic Eye Exam     Any additional questions or concerns should be emailed to the Practice Liaisons via [email or web address] email, please do not reply via In Basket      Thank you  Jasen Rios MA

## 2021-02-12 ENCOUNTER — TELEPHONE (OUTPATIENT)
Dept: INTERNAL MEDICINE CLINIC | Facility: CLINIC | Age: 86
End: 2021-02-12

## 2021-02-12 DIAGNOSIS — R10.9 ABDOMINAL CRAMPING: Primary | ICD-10-CM

## 2021-02-12 RX ORDER — DICYCLOMINE HYDROCHLORIDE 10 MG/1
10 CAPSULE ORAL 3 TIMES DAILY PRN
Qty: 60 CAPSULE | Refills: 0 | Status: SHIPPED | OUTPATIENT
Start: 2021-02-12 | End: 2022-03-30 | Stop reason: ALTCHOICE

## 2021-02-12 NOTE — TELEPHONE ENCOUNTER
I sent in the bentyl which she was on in the past for her cramping  She can try this 3 times a day as needed along with the miralax  Advised to go to the ER for any worsening abdominal pain or vomiting over the weekend

## 2021-02-12 NOTE — TELEPHONE ENCOUNTER
Gema Patel called in, she is requesting a call back from Dr Darlene Valderrama in regards to her appointment she had the other day re: constipation and abdominal cramping    she has further questions and was advised to call back if so  She is having pains in her stomach  She started miralax and was going for the last 3 days somewhat but then she got all these pains back  She was given doxylamine in the past for cramping? She wants to know if she can have a refill on that med to take for the pain?

## 2021-02-15 NOTE — TELEPHONE ENCOUNTER
Please ask how stomach symptoms are  Are you moving your bowels daily? Are you taking Bentyl to help with the cramping?

## 2021-02-16 NOTE — TELEPHONE ENCOUNTER
Pt  Called  Pt  Is not moving her bowels daily  Went early this morning after not going for four days  It was very watery  She is taking Miralax and Bentyl  Doesn't have any cramping

## 2021-02-16 NOTE — TELEPHONE ENCOUNTER
You can stop the Miralax and continue taking the Bentyl, 3 to 4 x a a day as needed for abdominal pain /cramping  Start Metamucil twice a day instead

## 2021-02-22 DIAGNOSIS — I10 ESSENTIAL HYPERTENSION: ICD-10-CM

## 2021-02-22 RX ORDER — AMLODIPINE BESYLATE 5 MG/1
TABLET ORAL
Qty: 90 TABLET | Refills: 1 | Status: SHIPPED | OUTPATIENT
Start: 2021-02-22 | End: 2021-06-07 | Stop reason: SDUPTHER

## 2021-02-24 ENCOUNTER — TELEPHONE (OUTPATIENT)
Dept: INTERNAL MEDICINE CLINIC | Facility: CLINIC | Age: 86
End: 2021-02-24

## 2021-02-24 DIAGNOSIS — R39.9 URINARY SYMPTOM OR SIGN: Primary | ICD-10-CM

## 2021-02-24 RX ORDER — CEPHALEXIN 500 MG/1
500 CAPSULE ORAL EVERY 12 HOURS SCHEDULED
Qty: 10 CAPSULE | Refills: 0 | Status: SHIPPED | OUTPATIENT
Start: 2021-02-24 | End: 2021-03-01

## 2021-02-24 NOTE — TELEPHONE ENCOUNTER
Is okay w/ her bowels now but is having burning w/ urination for a couple days  Has pain in pelvic area also  Also increased urgency to void  No fever or blood in urine

## 2021-03-05 ENCOUNTER — HOSPITAL ENCOUNTER (OUTPATIENT)
Dept: NON INVASIVE DIAGNOSTICS | Facility: CLINIC | Age: 86
Discharge: HOME | End: 2021-03-05
Payer: MEDICARE

## 2021-03-05 DIAGNOSIS — I65.09 VERTEBRAL ARTERY STENOSIS: ICD-10-CM

## 2021-03-05 PROCEDURE — 93880 EXTRACRANIAL BILAT STUDY: CPT

## 2021-03-05 PROCEDURE — 93880 EXTRACRANIAL BILAT STUDY: CPT | Performed by: SURGERY

## 2021-03-08 DIAGNOSIS — M51.36 DEGENERATION OF INTERVERTEBRAL DISC OF LUMBAR REGION: ICD-10-CM

## 2021-03-08 RX ORDER — OXYCODONE HYDROCHLORIDE AND ACETAMINOPHEN 5; 325 MG/1; MG/1
1 TABLET ORAL 2 TIMES DAILY PRN
Qty: 60 TABLET | Refills: 0 | Status: SHIPPED | OUTPATIENT
Start: 2021-03-08 | End: 2021-04-19 | Stop reason: SDUPTHER

## 2021-03-10 ENCOUNTER — TELEPHONE (OUTPATIENT)
Dept: VASCULAR SURGERY | Facility: CLINIC | Age: 86
End: 2021-03-10

## 2021-03-11 ENCOUNTER — CONSULT (OUTPATIENT)
Dept: VASCULAR SURGERY | Facility: CLINIC | Age: 86
End: 2021-03-11
Payer: MEDICARE

## 2021-03-11 VITALS
SYSTOLIC BLOOD PRESSURE: 132 MMHG | HEIGHT: 60 IN | BODY MASS INDEX: 23.32 KG/M2 | WEIGHT: 118.8 LBS | HEART RATE: 82 BPM | DIASTOLIC BLOOD PRESSURE: 58 MMHG

## 2021-03-11 DIAGNOSIS — I65.21 CAROTID STENOSIS, RIGHT: Primary | ICD-10-CM

## 2021-03-11 PROCEDURE — 99213 OFFICE O/P EST LOW 20 MIN: CPT | Performed by: SURGERY

## 2021-03-11 NOTE — PROGRESS NOTES
Assessment/Plan:      Presents with history of possible vertebral artery dissection and did have some associated dizziness at that time  She is pretty much asymptomatic at this time with an approximate 50-60% right internal carotid artery stenosis  She has moderate stenosis of the left vertebral artery origin with normal vertebral artery on the right and reasonable collateral circulation intracranially  She is having no focal symptoms at this time no dizziness  Plan:  Continue anti-platelet medications no surgical intervention is indicated at this time  She does have a moderate heterogeneous plaque in around the 50-60% range on the right however remains asymptomatic from that lesion  Follow-up carotid Doppler in 6 months       Diagnoses and all orders for this visit:    Carotid stenosis, right  -     VAS carotid complete study; Future        Subjective:      Patient ID: Bianca Mackey is a 80 y o  female  Patient presents today to review CV done 3/5/2021and CTA done 12/5/2020  Patient has vertebral arterial stenosis  Patient was in [address] [place] [address] from 9/3-9/5 with left sided blurred vision,dizziness, and headache  Patient denies any new or worsening TIA/stroke symptoms  Patient takes ASA 81mg, and Pletal          HPI    The following portions of the patient's history were reviewed and updated as appropriate: allergies, current medications, past family history, past medical history, past social history, past surgical history and problem list     Review of Systems   Constitutional: Positive for chills  HENT: Negative  Eyes: Negative  Respiratory: Positive for shortness of breath (occasional)  Cardiovascular: Positive for leg swelling  Gastrointestinal: Positive for constipation and diarrhea  Endocrine: Positive for cold intolerance  Genitourinary: Negative  Musculoskeletal: Positive for arthralgias, back pain and neck pain          Leg pain  Leg cramping with walking   Skin: Negative  Allergic/Immunologic: Negative  Neurological: Positive for dizziness  Hematological: Negative  Psychiatric/Behavioral: Negative  Objective: There were no vitals taken for this visit  Physical Exam      Oriented x3 no evidence of clinical depression  Eyes:  Sclera non-icteric    Skin: normal without evidence of inflammation    Neck is supple carotid pulses equal bilaterally no bruits heard    Chest lungs clear, heart regular rhythm  Abdomen soft nontender no evidence of pulsatile masses  Pulses are palpable bilateral Femoral  Popliteal, DP     Neurological exam intact cranial nerves 2-12 grossly intact no gross motor sensory deficits detected  Imaging viewed and reviewed with Patient  I have reviewed and made appropriate changes to the review of systems input by the medical assistant      Vitals:    03/11/21 1329 03/11/21 1331   BP: 126/68 132/58   BP Location: Right arm Left arm   Patient Position:  Sitting   Pulse: 82    Weight: 53 9 kg (118 lb 12 8 oz)    Height: 5' (1 524 m)        Patient Active Problem List   Diagnosis    Anxiety    Mild intermittent asthma without complication    Degeneration of intervertebral disc of lumbar region    Diverticulosis    Elevated serum alkaline phosphatase level    Esophageal reflux    Fatty liver    Hyperlipidemia    Essential hypertension    Hyperthyroidism    Insomnia    Irritable bowel syndrome    Spinal stenosis    Spondylolisthesis, grade 1    Transient ischemic attack    Type 2 diabetes mellitus (HCC)    Vitamin D deficiency    Muscle cramps    Ovarian cyst    CKD (chronic kidney disease), stage III ([facility])    Trigger finger of right hand    Pain in both lower extremities    Radiculopathy, lumbar region    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 ([facility])    PAD (peripheral artery disease) ([facility])    Memory loss    Atherosclerosis 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 (HCC)    Thyroid nodule    Mass of right submandibular region    Abnormal finding on imaging    Rotator cuff disorder, right    Embolism and thrombosis of arteries of the lower extremities (HCC)    Stroke-like symptoms    Carotid stenosis, right    Vertebral artery stenosis    Abdominal tenderness    Vision abnormalities    Vertebral artery dissection (HCC)       Past Surgical History:   Procedure Laterality Date    ADENOIDECTOMY      CHOLECYSTECTOMY      COLON SURGERY      partial colectomy - sigmoid, diverticulitis    EYE SURGERY      HYSTERECTOMY      ovaries remain    TONSILLECTOMY      US GUIDED LYMPH NODE BIOPSY RIGHT  3/11/2020    US GUIDED THYROID BIOPSY  11/13/2019       Family History   Problem Relation Age of Onset    Coronary artery disease Mother     Other Father         MVA    Alcohol abuse Neg Hx     Depression Neg Hx     Drug abuse Neg Hx     Substance Abuse Neg Hx     Mental illness Neg Hx        Social History     Socioeconomic History    Marital status:       Spouse name: Not on file    Number of children: 3    Years of education: Not on file    Highest education level: Not on file   Occupational History     Comment: retired   Social Needs    Financial resource strain: Not on file    Food insecurity     Worry: Not on file     Inability: Not on file   [employer] needs     Medical: Not on file     Non-medical: Not on file   Tobacco Use    Smoking status: Never Smoker    Smokeless tobacco: Never Used   Substance and Sexual Activity    Alcohol use: No    Drug use: No    Sexual activity: Not on file   Lifestyle    Physical activity     Days per week: Not on file     Minutes per session: Not on file    Stress: Not on file   Relationships    Social connections     Talks on phone: Not on file     Gets together: Not on file     Attends [employer]oration[employer] service: Not on file     Active member of club or organization: Not on file     Attends meetings of clubs or organizations: Not on file     Relationship status: Not on file    Intimate partner violence     Fear of current or ex partner: Not on file     Emotionally abused: Not on file     Physically abused: Not on file     Forced sexual activity: Not on file   Other Topics Concern    Not on file   Social History Narrative    Lives alone    Daughter in the area       Allergies   Allergen Reactions    Haloperidol      Confusion /AMS  1 ep on 09/04/2020  AGGRESSIVENESS  AGITATION    Triazolam Other (See Comments)     Confusion, and aggressivenss as well      Bactrim [Sulfamethoxazole-Trimethoprim]     Ezetimibe     Gabapentin     Lisinopril     Naproxen Nausea Only    Statins          Current Outpatient Medications:     albuterol (PROVENTIL HFA,VENTOLIN HFA) 90 mcg/act inhaler, Inhale 2 puffs every 6 (six) hours as needed for wheezing, Disp: 8 5 Inhaler, Rfl: 1    amLODIPine (NORVASC) 5 mg tablet, TAKE 1 TABLET BY MOUTH EVERY DAY, Disp: 90 tablet, Rfl: 1    aspirin 81 mg chewable tablet, Chew 1 tablet (81 mg total) daily, Disp: 30 tablet, Rfl: 0    cilostazol (PLETAL) 100 mg tablet, TAKE 1 TABLET BY MOUTH TWICE A DAY, Disp: 180 tablet, Rfl: 0    dicyclomine (BENTYL) 10 mg capsule, Take 1 capsule (10 mg total) by mouth 3 (three) times a day as needed (abdominal cramping), Disp: 60 capsule, Rfl: 0    famotidine (PEPCID) 40 MG tablet, Take 1 tablet (40 mg total) by mouth daily, Disp: 90 tablet, Rfl: 1    LORazepam (ATIVAN) 0 5 mg tablet, Take 1/2 tablet to 1 tablet PO daily PRN, Disp: 30 tablet, Rfl: 0    meclizine (ANTIVERT) 25 mg tablet, Take 1 tablet (25 mg total) by mouth every 8 (eight) hours as needed for dizziness, Disp: 90 tablet, Rfl: 0    montelukast (SINGULAIR) 10 mg tablet, Take 1 tablet (10 mg total) by mouth daily at bedtime, Disp: 90 tablet, Rfl: 1    oxyCODONE-acetaminophen (PERCOCET) 5-325 mg per tablet, Take 1 tablet by mouth 2 (two) times a day as needed for moderate painMax Daily Amount: 2 tablets, Disp: 60 tablet, Rfl: 0    pantoprazole (PROTONIX) 20 mg tablet, TAKE 1 TABLET BY MOUTH EVERY DAY, Disp: 90 tablet, Rfl: 0    b complex vitamins capsule, Take 1 capsule by mouth daily, Disp: , Rfl:     Cholecalciferol (VITAMIN D PO), Take 1 tablet by mouth daily, Disp: , Rfl:     methimazole (TAPAZOLE) 5 mg tablet, 1/2 tab daily (Patient not taking: Reported on 3/11/2021), Disp: 30 tablet, Rfl: 0

## 2021-03-11 NOTE — PATIENT INSTRUCTIONS
Presents with history of possible vertebral artery dissection and did have some associated dizziness at that time  She is pretty much asymptomatic at this time with an approximate 50-60% right internal carotid artery stenosis  She has moderate stenosis of the left vertebral artery origin with normal vertebral artery on the right and reasonable collateral circulation intracranially  She is having no focal symptoms at this time no dizziness  Plan:  Continue anti-platelet medications no surgical intervention is indicated at this time  She does have a moderate heterogeneous plaque in around the 50-60% range on the right however remains asymptomatic from that lesion    Follow-up carotid Doppler in 6 months

## 2021-03-11 NOTE — LETTER
March 11, 2021     Hermelindo Vale MD  [address]  [address] [place] [address]    Patient: Bassem Moore   YOB: 1931   Date of Visit: 3/11/2021       Dear Dr Venancio Potter: Thank you for referring Sammy Whitehead to me for evaluation  Below are my notes for this consultation  If you have questions, please do not hesitate to call me  I look forward to following your patient along with you           Sincerely,        Talia Clark MD        CC: No Recipients

## 2021-03-12 ENCOUNTER — TELEPHONE (OUTPATIENT)
Dept: INTERNAL MEDICINE CLINIC | Facility: CLINIC | Age: 86
End: 2021-03-12

## 2021-03-12 DIAGNOSIS — R42 VERTIGO: ICD-10-CM

## 2021-03-12 RX ORDER — MECLIZINE HYDROCHLORIDE 25 MG/1
25 TABLET ORAL EVERY 8 HOURS PRN
Qty: 90 TABLET | Refills: 0 | Status: SHIPPED | OUTPATIENT
Start: 2021-03-12 | End: 2021-04-19 | Stop reason: SDUPTHER

## 2021-03-12 NOTE — TELEPHONE ENCOUNTER
Pt took the meclizine 3 times daily, not "as needed",  she is now out of this med  She realizes now that she may have been taking them incorrectly  Please advise

## 2021-03-12 NOTE — TELEPHONE ENCOUNTER
Are you still feeling dizzy? If not as bad, you can take meclizine twice a day for a week then once a day for another week  After that, just as needed  Refill sent

## 2021-03-15 DIAGNOSIS — K21.9 GASTROESOPHAGEAL REFLUX DISEASE: ICD-10-CM

## 2021-03-15 DIAGNOSIS — I65.09 VERTEBRAL ARTERY STENOSIS: ICD-10-CM

## 2021-03-15 RX ORDER — CILOSTAZOL 100 MG/1
TABLET ORAL
Qty: 180 TABLET | Refills: 1 | Status: SHIPPED | OUTPATIENT
Start: 2021-03-15 | End: 2021-04-19 | Stop reason: SDUPTHER

## 2021-03-15 RX ORDER — PANTOPRAZOLE SODIUM 20 MG/1
TABLET, DELAYED RELEASE ORAL
Qty: 90 TABLET | Refills: 1 | Status: SHIPPED | OUTPATIENT
Start: 2021-03-15 | End: 2021-06-07 | Stop reason: ALTCHOICE

## 2021-03-24 ENCOUNTER — OFFICE VISIT (OUTPATIENT)
Dept: NEUROLOGY | Facility: CLINIC | Age: 86
End: 2021-03-24
Payer: MEDICARE

## 2021-03-24 VITALS
WEIGHT: 118.9 LBS | DIASTOLIC BLOOD PRESSURE: 60 MMHG | HEIGHT: 60 IN | BODY MASS INDEX: 23.34 KG/M2 | SYSTOLIC BLOOD PRESSURE: 128 MMHG | HEART RATE: 68 BPM

## 2021-03-24 DIAGNOSIS — I65.21 STENOSIS OF RIGHT CAROTID ARTERY: ICD-10-CM

## 2021-03-24 DIAGNOSIS — Z86.73 HISTORY OF TIA (TRANSIENT ISCHEMIC ATTACK): ICD-10-CM

## 2021-03-24 DIAGNOSIS — I77.74 VERTEBRAL ARTERY DISSECTION (HCC): Primary | ICD-10-CM

## 2021-03-24 PROCEDURE — 99213 OFFICE O/P EST LOW 20 MIN: CPT | Performed by: PHYSICIAN ASSISTANT

## 2021-03-24 RX ORDER — PSYLLIUM HUSK 0.4 G
CAPSULE ORAL
COMMUNITY
End: 2022-06-28

## 2021-03-24 NOTE — ASSESSMENT & PLAN NOTE
· Pt did follow-up with Vascular Surgery  · She will continue ASA and Pletal  Discussed that shortness of breath is not a known side effect of Pletal  Encouraged her to follow-up with her PCP and Endocrinologist   I have spent 20 minutes with Patient and family today in which greater than 50% of this time was spent in counseling/coordination of care regarding Diagnostic results, Prognosis, Risks and benefits of tx options, Intructions for management, Patient and family education, Importance of tx compliance, Risk factor reductions and Impressions  She will follow-up in 6 months

## 2021-03-24 NOTE — PROGRESS NOTES
Patient ID: Gisela Givens is a 80 y o  female  Assessment/Plan:    No problem-specific Assessment & Plan notes found for this encounter  {Assess/PlanSmartLinks:32402}       Subjective:    HPI    {[facility] HPI texts:07567}    {Common ambulatory SmartLinks:93569}         Objective: There were no vitals taken for this visit  Physical Exam    Neurological Exam      ROS:    Review of Systems   Constitutional: Negative  Negative for appetite change and fever  HENT: Negative  Negative for hearing loss, tinnitus, trouble swallowing and voice change  Eyes: Negative  Negative for photophobia and pain  Respiratory: Positive for shortness of breath (PT states she has shortness of breath, thinks this is due to the Cilostazol)  Cardiovascular: Negative  Negative for palpitations  Gastrointestinal: Negative  Negative for nausea and vomiting  Endocrine: Negative  Negative for cold intolerance  Genitourinary: Negative  Negative for dysuria, frequency and urgency  Musculoskeletal: Negative  Negative for myalgias and neck pain  Skin: Negative  Negative for rash  Neurological: Positive for light-headedness (PT states once in a while  )  Negative for dizziness, tremors, seizures, syncope, facial asymmetry, speech difficulty, weakness, numbness and headaches  Hematological: Negative  Does not bruise/bleed easily  Psychiatric/Behavioral: Positive for sleep disturbance (PT states she tosses all night)  Negative for confusion and hallucinations  All other systems reviewed and are negative

## 2021-03-24 NOTE — PROGRESS NOTES
Patient ID: Shiraz Noland is a 80 y o  female  Assessment/Plan:    Vertebral artery dissection (HCC)  · Pt did follow-up with Vascular Surgery  · She will continue ASA and Pletal  Discussed that shortness of breath is not a known side effect of Pletal  Encouraged her to follow-up with her PCP and Endocrinologist   I have spent 20 minutes with Patient and family today in which greater than 50% of this time was spent in counseling/coordination of care regarding Diagnostic results, Prognosis, Risks and benefits of tx options, Intructions for management, Patient and family education, Importance of tx compliance, Risk factor reductions and Impressions  She will follow-up in 6 months  Vertebral artery stenosis  · Discussed the risk of stroke with intracranial stenosis  · Continue ASA and Pletal     Carotid stenosis, right  · Carotid ultrasound to be done in 6 months  Problem List Items Addressed This Visit        Cardiovascular and Mediastinum    Vertebral artery dissection ([address] ) - Primary     · Pt did follow-up with Vascular Surgery  · She will continue ASA and Pletal  Discussed that shortness of breath is not a known side effect of Pletal  Encouraged her to follow-up with her PCP and Endocrinologist   I have spent 20 minutes with Patient and family today in which greater than 50% of this time was spent in counseling/coordination of care regarding Diagnostic results, Prognosis, Risks and benefits of tx options, Intructions for management, Patient and family education, Importance of tx compliance, Risk factor reductions and Impressions  She will follow-up in 6 months  Other    History of TIA (transient ischemic attack)      Other Visit Diagnoses     Stenosis of right carotid artery                 Subjective:    HPI    Shiraz Noland is a 80 y o  female, with HTN, anxiety, hyperthyroididm, GERD and asthma, who follows in the office due to Lt vertebral artery dissection   CTA of the head and neck was performed 12/5/20 and revealed moderate stenosis origin left vertebral artery with a 2nd focus of moderate stenosis in the proximal vertebral artery prior to entering the foramen transversarium  Atherosclerotic calcification right carotid bifurcation results in approximately 50-60% stenosis  Moderate stenosis supraclinoid ICA bilaterally  Areas of mild to moderate stenosis involving the bilateral MCAs, left A2, and left P1 P2 junction compatible with intracranial atherosclerosis  Carotid Doppler revealed Rt ICA stenosis at 50-69% and <50% stenosis of the Lt ICA  There was no significant change since the previous study  She was seen by Vascular and it was recommended that she remain on ASA and Pletal and have a follow-up Doppler in 6 months  She reports that she has recently been experiencing some shortness of breath after taking her pills for the past few weeks  She was concerned it was from the Pletal or methimazole  She did stop the methimazole for a few days which she believes was helpful  She was advised to contact her PCP to investigate this problem  She does have an appt next week with her PCP  She will also discuss it with Dr Destiny Cervantes as he prescribes the methimazole  She admits to occasional lightheadedness which is seconds in duration  She admits to eating whatever she wants  She states that at her age she has earned the right to do so  She does keep herself busy and exercises regularly  She has not had any symptoms to suggest new CVA  The following portions of the patient's history were reviewed and updated as appropriate: allergies, current medications, past family history, past medical history, past social history, past surgical history and problem list          Objective:    Blood pressure 128/60, pulse 68, height 5' (1 524 m), weight 53 9 kg (118 lb 14 4 oz)  Physical Exam  Vitals signs reviewed     Eyes:      General: Lids are normal       Extraocular Movements: Extraocular movements intact  Pupils: Pupils are equal, round, and reactive to light  Neurological:      Coordination: Coordination is intact  Deep Tendon Reflexes: Strength normal and reflexes are normal and symmetric  Psychiatric:         Speech: Speech normal          Neurological Exam  Mental Status   Oriented to person, place, time and situation  Speech is normal  Language is fluent with no aphasia  Attention and concentration are normal  Fund of knowledge is appropriate for level of education  Apraxia absent  Cranial Nerves  CN II: Visual acuity is normal  Visual fields full to confrontation  CN III, IV, VI: Extraocular movements intact bilaterally  Normal lids and orbits bilaterally  Pupils equal round and reactive to light bilaterally  CN V: Facial sensation is normal   CN VII: Full and symmetric facial movement  CN VIII: Hearing is normal   CN IX, X: Palate elevates symmetrically  Normal gag reflex  CN XI: Shoulder shrug strength is normal   CN XII: Tongue midline without atrophy or fasciculations  Motor   Strength is 5/5 throughout all four extremities  Sensory  Sensation is intact to light touch, pinprick, vibration and proprioception in all four extremities  Reflexes  Deep tendon reflexes are 2+ and symmetric in all four extremities with downgoing toes bilaterally  Coordination  Finger-to-nose, rapid alternating movements and heel-to-shin normal bilaterally without dysmetria  Gait  Normal casual, toe, heel and tandem gait  ROS:     Review of Systems   Constitutional: Negative  Negative for appetite change and fever  HENT: Negative  Negative for hearing loss, tinnitus, trouble swallowing and voice change  Eyes: Negative  Negative for photophobia and pain  Respiratory: Positive for shortness of breath (PT states she has shortness of breath, thinks this is due to the Cilostazol)  Cardiovascular: Negative  Negative for palpitations  Gastrointestinal: Negative    Negative for nausea and vomiting  Endocrine: Negative  Negative for cold intolerance  Genitourinary: Negative  Negative for dysuria, frequency and urgency  Musculoskeletal: Negative  Negative for myalgias and neck pain  Skin: Negative  Negative for rash  Neurological: Positive for light-headedness (PT states once in a while  )  Negative for dizziness, tremors, seizures, syncope, facial asymmetry, speech difficulty, weakness, numbness and headaches  Hematological: Negative  Does not bruise/bleed easily  Psychiatric/Behavioral: Positive for sleep disturbance (PT states she tosses all night)  Negative for confusion and hallucinations  All other systems reviewed and are negative  Review of systems personally reviewed

## 2021-03-24 NOTE — PATIENT INSTRUCTIONS
Vertebral artery dissection (HCC)  · Pt did follow-up with Vascular Surgery  · She will continue ASA and Pletal  Discussed that shortness of breath is not a known side effect of Pletal  Encouraged her to follow-up with her PCP and Endocrinologist   I have spent 20 minutes with Patient and family today in which greater than 50% of this time was spent in counseling/coordination of care regarding Diagnostic results, Prognosis, Risks and benefits of tx options, Intructions for management, Patient and family education, Importance of tx compliance, Risk factor reductions and Impressions  She will follow-up in 6 months  Vertebral artery stenosis  · Discussed the risk of stroke with intracranial stenosis  · Continue ASA and Pletal     Carotid stenosis, right  · Carotid ultrasound to be done in 6 months

## 2021-04-07 ENCOUNTER — HOSPITAL ENCOUNTER (OUTPATIENT)
Dept: ULTRASOUND IMAGING | Facility: HOSPITAL | Age: 86
Discharge: HOME | End: 2021-04-07
Attending: SPECIALIST
Payer: MEDICARE

## 2021-04-07 ENCOUNTER — APPOINTMENT (OUTPATIENT)
Dept: LAB | Facility: CLINIC | Age: 86
End: 2021-04-07
Payer: MEDICARE

## 2021-04-07 ENCOUNTER — TRANSCRIBE ORDERS (OUTPATIENT)
Dept: LAB | Facility: CLINIC | Age: 86
End: 2021-04-07

## 2021-04-07 DIAGNOSIS — E05.00 BASEDOW'S DISEASE: Primary | ICD-10-CM

## 2021-04-07 DIAGNOSIS — E11.8 TYPE 2 DIABETES MELLITUS WITH UNSPECIFIED COMPLICATIONS (HCC): ICD-10-CM

## 2021-04-07 DIAGNOSIS — E05.00 THYROTOXICOSIS WITH DIFFUSE GOITER WITHOUT THYROTOXIC CRISIS OR STORM: ICD-10-CM

## 2021-04-07 DIAGNOSIS — E11.8 DIABETIC COMPLICATION (HCC): ICD-10-CM

## 2021-04-07 DIAGNOSIS — N39.0 URINARY TRACT INFECTION, SITE NOT SPECIFIED: ICD-10-CM

## 2021-04-07 DIAGNOSIS — N39.0 URINARY TRACT INFECTION WITHOUT HEMATURIA, SITE UNSPECIFIED: ICD-10-CM

## 2021-04-07 DIAGNOSIS — E05.00 BASEDOW'S DISEASE: ICD-10-CM

## 2021-04-07 LAB
ALBUMIN SERPL BCP-MCNC: 3.8 G/DL (ref 3.5–5)
ALP SERPL-CCNC: 100 U/L (ref 46–116)
ALT SERPL W P-5'-P-CCNC: 18 U/L (ref 12–78)
ANION GAP SERPL CALCULATED.3IONS-SCNC: 9 MMOL/L (ref 4–13)
AST SERPL W P-5'-P-CCNC: 16 U/L (ref 5–45)
BACTERIA UR QL AUTO: NORMAL /HPF
BILIRUB SERPL-MCNC: 0.66 MG/DL (ref 0.2–1)
BILIRUB UR QL STRIP: NEGATIVE
BUN SERPL-MCNC: 14 MG/DL (ref 5–25)
CALCIUM SERPL-MCNC: 8.7 MG/DL (ref 8.3–10.1)
CHLORIDE SERPL-SCNC: 105 MMOL/L (ref 100–108)
CLARITY UR: ABNORMAL
CO2 SERPL-SCNC: 28 MMOL/L (ref 21–32)
COLOR UR: YELLOW
CREAT SERPL-MCNC: 1.11 MG/DL (ref 0.6–1.3)
EST. AVERAGE GLUCOSE BLD GHB EST-MCNC: 108 MG/DL
GFR SERPL CREATININE-BSD FRML MDRD: 44 ML/MIN/1.73SQ M
GLUCOSE P FAST SERPL-MCNC: 151 MG/DL (ref 65–99)
GLUCOSE UR STRIP-MCNC: NEGATIVE MG/DL
HBA1C MFR BLD: 5.4 %
HGB UR QL STRIP.AUTO: NEGATIVE
KETONES UR STRIP-MCNC: NEGATIVE MG/DL
LEUKOCYTE ESTERASE UR QL STRIP: ABNORMAL
MAGNESIUM SERPL-MCNC: 2 MG/DL (ref 1.6–2.6)
NITRITE UR QL STRIP: NEGATIVE
NON-SQ EPI CELLS URNS QL MICRO: NORMAL /HPF
PH UR STRIP.AUTO: 5 [PH]
PHOSPHATE SERPL-MCNC: 3.5 MG/DL (ref 2.3–4.1)
POTASSIUM SERPL-SCNC: 4.2 MMOL/L (ref 3.5–5.3)
PROT SERPL-MCNC: 6.8 G/DL (ref 6.4–8.2)
PROT UR STRIP-MCNC: NEGATIVE MG/DL
RBC #/AREA URNS AUTO: NORMAL /HPF
SODIUM SERPL-SCNC: 142 MMOL/L (ref 136–145)
SP GR UR STRIP.AUTO: 1.02 (ref 1–1.03)
T4 FREE SERPL-MCNC: 0.86 NG/DL (ref 0.76–1.46)
TSH SERPL DL<=0.05 MIU/L-ACNC: 5.29 UIU/ML (ref 0.36–3.74)
UROBILINOGEN UR QL STRIP.AUTO: 0.2 E.U./DL
WBC #/AREA URNS AUTO: NORMAL /HPF

## 2021-04-07 PROCEDURE — 84100 ASSAY OF PHOSPHORUS: CPT

## 2021-04-07 PROCEDURE — 83036 HEMOGLOBIN GLYCOSYLATED A1C: CPT

## 2021-04-07 PROCEDURE — 36415 COLL VENOUS BLD VENIPUNCTURE: CPT

## 2021-04-07 PROCEDURE — 76536 US EXAM OF HEAD AND NECK: CPT

## 2021-04-07 PROCEDURE — 81001 URINALYSIS AUTO W/SCOPE: CPT | Performed by: SPECIALIST

## 2021-04-07 PROCEDURE — 80053 COMPREHEN METABOLIC PANEL: CPT

## 2021-04-07 PROCEDURE — 84439 ASSAY OF FREE THYROXINE: CPT

## 2021-04-07 PROCEDURE — 84443 ASSAY THYROID STIM HORMONE: CPT

## 2021-04-07 PROCEDURE — 83735 ASSAY OF MAGNESIUM: CPT

## 2021-04-19 DIAGNOSIS — J45.20 MILD INTERMITTENT ASTHMA WITHOUT COMPLICATION: ICD-10-CM

## 2021-04-19 DIAGNOSIS — M51.36 DEGENERATION OF INTERVERTEBRAL DISC OF LUMBAR REGION: ICD-10-CM

## 2021-04-19 DIAGNOSIS — I65.09 VERTEBRAL ARTERY STENOSIS: ICD-10-CM

## 2021-04-19 DIAGNOSIS — R42 VERTIGO: ICD-10-CM

## 2021-04-19 RX ORDER — ALBUTEROL SULFATE 90 UG/1
2 AEROSOL, METERED RESPIRATORY (INHALATION) EVERY 6 HOURS PRN
Qty: 8.5 INHALER | Refills: 1 | Status: SHIPPED | OUTPATIENT
Start: 2021-04-19 | End: 2021-06-15

## 2021-04-19 RX ORDER — CILOSTAZOL 100 MG/1
100 TABLET ORAL 2 TIMES DAILY
Qty: 180 TABLET | Refills: 1 | Status: SHIPPED | OUTPATIENT
Start: 2021-04-19 | End: 2021-07-22 | Stop reason: SDUPTHER

## 2021-04-19 RX ORDER — OXYCODONE HYDROCHLORIDE AND ACETAMINOPHEN 5; 325 MG/1; MG/1
1 TABLET ORAL 2 TIMES DAILY PRN
Qty: 60 TABLET | Refills: 0 | Status: SHIPPED | OUTPATIENT
Start: 2021-04-19 | End: 2021-06-07 | Stop reason: SDUPTHER

## 2021-04-19 RX ORDER — MECLIZINE HYDROCHLORIDE 25 MG/1
25 TABLET ORAL EVERY 8 HOURS PRN
Qty: 90 TABLET | Refills: 0 | Status: SHIPPED | OUTPATIENT
Start: 2021-04-19 | End: 2021-06-07 | Stop reason: SDUPTHER

## 2021-06-06 DIAGNOSIS — K21.9 GASTROESOPHAGEAL REFLUX DISEASE: ICD-10-CM

## 2021-06-06 RX ORDER — FAMOTIDINE 40 MG/1
TABLET, FILM COATED ORAL
Qty: 90 TABLET | Refills: 1 | Status: SHIPPED | OUTPATIENT
Start: 2021-06-06 | End: 2021-06-07 | Stop reason: SDUPTHER

## 2021-06-07 ENCOUNTER — OFFICE VISIT (OUTPATIENT)
Dept: INTERNAL MEDICINE CLINIC | Facility: CLINIC | Age: 86
End: 2021-06-07
Payer: MEDICARE

## 2021-06-07 VITALS
BODY MASS INDEX: 22.97 KG/M2 | TEMPERATURE: 97.8 F | WEIGHT: 117 LBS | DIASTOLIC BLOOD PRESSURE: 62 MMHG | SYSTOLIC BLOOD PRESSURE: 132 MMHG | HEIGHT: 60 IN | HEART RATE: 91 BPM | OXYGEN SATURATION: 98 %

## 2021-06-07 DIAGNOSIS — K59.09 OTHER CONSTIPATION: ICD-10-CM

## 2021-06-07 DIAGNOSIS — E05.90 HYPERTHYROIDISM: ICD-10-CM

## 2021-06-07 DIAGNOSIS — I65.09 VERTEBRAL ARTERY STENOSIS, UNSPECIFIED LATERALITY: ICD-10-CM

## 2021-06-07 DIAGNOSIS — K21.9 GASTROESOPHAGEAL REFLUX DISEASE: ICD-10-CM

## 2021-06-07 DIAGNOSIS — I65.21 CAROTID STENOSIS, RIGHT: Primary | ICD-10-CM

## 2021-06-07 DIAGNOSIS — R22.0 MASS OF RIGHT SUBMANDIBULAR REGION: ICD-10-CM

## 2021-06-07 DIAGNOSIS — F41.9 ANXIETY: ICD-10-CM

## 2021-06-07 DIAGNOSIS — J45.20 MILD INTERMITTENT ASTHMA WITHOUT COMPLICATION: ICD-10-CM

## 2021-06-07 DIAGNOSIS — Z79.899 ENCOUNTER FOR LONG-TERM CURRENT USE OF MEDICATION: ICD-10-CM

## 2021-06-07 DIAGNOSIS — N18.31 STAGE 3A CHRONIC KIDNEY DISEASE (HCC): ICD-10-CM

## 2021-06-07 DIAGNOSIS — M51.36 DEGENERATION OF INTERVERTEBRAL DISC OF LUMBAR REGION: ICD-10-CM

## 2021-06-07 DIAGNOSIS — E11.22 TYPE 2 DIABETES MELLITUS WITH STAGE 3 CHRONIC KIDNEY DISEASE AND HYPERTENSION (HCC): ICD-10-CM

## 2021-06-07 DIAGNOSIS — R42 VERTIGO: ICD-10-CM

## 2021-06-07 DIAGNOSIS — N18.30 TYPE 2 DIABETES MELLITUS WITH STAGE 3 CHRONIC KIDNEY DISEASE AND HYPERTENSION (HCC): ICD-10-CM

## 2021-06-07 DIAGNOSIS — E04.1 THYROID NODULE: ICD-10-CM

## 2021-06-07 DIAGNOSIS — I10 ESSENTIAL HYPERTENSION: ICD-10-CM

## 2021-06-07 DIAGNOSIS — K21.9 GASTROESOPHAGEAL REFLUX DISEASE, UNSPECIFIED WHETHER ESOPHAGITIS PRESENT: ICD-10-CM

## 2021-06-07 DIAGNOSIS — I12.9 TYPE 2 DIABETES MELLITUS WITH STAGE 3 CHRONIC KIDNEY DISEASE AND HYPERTENSION (HCC): ICD-10-CM

## 2021-06-07 DIAGNOSIS — E55.9 VITAMIN D DEFICIENCY: ICD-10-CM

## 2021-06-07 PROBLEM — R29.90 STROKE-LIKE SYMPTOMS: Status: RESOLVED | Noted: 2020-09-03 | Resolved: 2021-06-07

## 2021-06-07 PROBLEM — R10.819 ABDOMINAL TENDERNESS: Status: RESOLVED | Noted: 2020-09-04 | Resolved: 2021-06-07

## 2021-06-07 PROCEDURE — 99214 OFFICE O/P EST MOD 30 MIN: CPT | Performed by: INTERNAL MEDICINE

## 2021-06-07 RX ORDER — MECLIZINE HYDROCHLORIDE 25 MG/1
25 TABLET ORAL EVERY 12 HOURS PRN
Qty: 180 TABLET | Refills: 0 | Status: SHIPPED | OUTPATIENT
Start: 2021-06-07 | End: 2021-10-04 | Stop reason: SDUPTHER

## 2021-06-07 RX ORDER — ROSUVASTATIN CALCIUM 5 MG/1
TABLET, COATED ORAL
Qty: 25 TABLET | Refills: 0 | Status: SHIPPED | OUTPATIENT
Start: 2021-06-07 | End: 2021-08-04 | Stop reason: SDUPTHER

## 2021-06-07 RX ORDER — OXYCODONE HYDROCHLORIDE AND ACETAMINOPHEN 5; 325 MG/1; MG/1
1 TABLET ORAL 2 TIMES DAILY PRN
Qty: 60 TABLET | Refills: 0 | Status: SHIPPED | OUTPATIENT
Start: 2021-06-07 | End: 2022-03-21 | Stop reason: SDUPTHER

## 2021-06-07 RX ORDER — LORAZEPAM 0.5 MG/1
TABLET ORAL
Qty: 30 TABLET | Refills: 0 | Status: SHIPPED | OUTPATIENT
Start: 2021-06-07 | End: 2022-03-21 | Stop reason: SDUPTHER

## 2021-06-07 RX ORDER — AMLODIPINE BESYLATE 5 MG/1
5 TABLET ORAL DAILY
Qty: 90 TABLET | Refills: 1 | Status: SHIPPED | OUTPATIENT
Start: 2021-06-07 | End: 2021-09-01 | Stop reason: SDUPTHER

## 2021-06-07 RX ORDER — FAMOTIDINE 40 MG/1
40 TABLET, FILM COATED ORAL DAILY
Qty: 90 TABLET | Refills: 1 | Status: SHIPPED | OUTPATIENT
Start: 2021-06-07 | End: 2021-09-01 | Stop reason: SDUPTHER

## 2021-06-07 NOTE — PROGRESS NOTES
Assessment/Plan:    Vertebral artery stenosis  Intermittent dizziness  Taking ASA, Pletal   Agrees to start rosuvastatin 2 days a week  Recently saw vascular, neurology  Carotid stenosis, right  Repeat ultrasound 9/21  Mass of right submandibular region  Reviewed recent ultrasound, stable  Reviewed biopsy results from 3/20  Follow up with ENT  Thyroid nodule  No recent thyroid ultrasound  Normal biopsy of thyroid nodule 11/19  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 ([facility] [address] )    Lab Results   Component Value Date    HGBA1C 5 4 04/07/2021     Normal A1c  Sees Endo  CKD (chronic kidney disease), stage III [facility])  Lab Results   Component Value Date    EGFR 44 04/07/2021    EGFR 48 09/05/2020    EGFR 42 09/03/2020    CREATININE 1 11 04/07/2021    CREATININE 1 03 09/05/2020    CREATININE 1 16 09/03/2020     Stable  No NSAIDs  Hyperthyroidism  On methimazole, per Endo  Anxiety  Takes lorazepam prn  PDMP reviewed  Degeneration of intervertebral disc of lumbar region  Stable, takes Percocet prn  PDMP reviewed, narcotic contract updated  Esophageal reflux  Stopped PPI, taking famotidine daily  Mild intermittent asthma without complication  Uses albuterol inhaler prn  On daily Singulair  Other constipation  Instructed to ear fruit daily  Diagnoses and all orders for this visit:    Carotid stenosis, right  -     rosuvastatin (CRESTOR) 5 mg tablet; Take 1 tab twice a week  -     Lipid panel; Future    Gastroesophageal reflux disease  -     famotidine (PEPCID) 40 MG tablet; Take 1 tablet (40 mg total) by mouth daily    Essential hypertension  -     amLODIPine (NORVASC) 5 mg tablet; Take 1 tablet (5 mg total) by mouth daily  -     Comprehensive metabolic panel; Future  -     CBC and differential; Future    Vertigo  -     meclizine (ANTIVERT) 25 mg tablet;  Take 1 tablet (25 mg total) by mouth every 12 (twelve) hours as needed for dizziness    Anxiety  -     LORazepam (ATIVAN) 0 5 mg tablet; Take 1/2 tablet to 1 tablet PO daily PRN    Degeneration of intervertebral disc of lumbar region  -     oxyCODONE-acetaminophen (PERCOCET) 5-325 mg per tablet; Take 1 tablet by mouth 2 (two) times a day as needed for moderate painMax Daily Amount: 2 tablets    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 (HCC)    Vertebral artery stenosis, unspecified laterality  -     rosuvastatin (CRESTOR) 5 mg tablet; Take 1 tab twice a week    Encounter for long-term current use of medication  -     Vitamin B12; Future    Vitamin D deficiency  -     Vitamin D 25 hydroxy; Future    Thyroid nodule    Stage 3a chronic kidney disease (HCC)    Other constipation    Mild intermittent asthma without complication    Mass of right submandibular region    Hyperthyroidism    Gastroesophageal reflux disease, unspecified whether esophagitis present      Follow up in 5 months or as needed  Subjective:      Patient ID: Yeni Ash is a 80 y o  female here with her daughter, for a follow up  She is asking if any of her pills causes dizziness  She reports that usually the morning, she feels slightly lightheaded  Denies any syncopal episodes no falls  It usually occurs after she takes her morning pills  Symptoms would last anywhere between a few minutes to few hours  She reports no balance issues  Denies any chest pain or shortness of breath  No headaches, extremity weakness or numbness  She continues to experience frequent muscle cramps in both legs  She does take Percocet as needed for pain  She was told not to take pantoprazole, now taking famotidine only  Denies any reflux symptoms      The following portions of the patient's history were reviewed and updated as appropriate: allergies, current medications, past medical history, past social history and problem list     Review of Systems   Constitutional: Negative for activity change, appetite change and fatigue  HENT: Negative for congestion, ear pain and postnasal drip  Eyes: Negative for visual disturbance  Respiratory: Negative for cough and shortness of breath  Cardiovascular: Negative for chest pain and leg swelling  Gastrointestinal: Negative for abdominal pain, constipation and diarrhea  Genitourinary: Negative for dysuria, frequency and urgency  Musculoskeletal: Positive for arthralgias and back pain  Negative for gait problem and myalgias  Skin: Negative for rash and wound  Neurological: Positive for dizziness and light-headedness  Negative for syncope, numbness and headaches  Hematological: Does not bruise/bleed easily  Psychiatric/Behavioral: Positive for sleep disturbance  Negative for confusion  The patient is not nervous/anxious  Objective:      /62   Pulse 91   Temp 97 8 °F (36 6 °C)   Ht 5' (1 524 m)   Wt 53 1 kg (117 lb)   SpO2 98%   BMI 22 85 kg/m²          Physical Exam  Vitals signs and nursing note reviewed  Constitutional:       General: She is not in acute distress  Appearance: She is well-developed  HENT:      Head: Normocephalic and atraumatic  Eyes:      Pupils: Pupils are equal, round, and reactive to light  Cardiovascular:      Rate and Rhythm: Normal rate and regular rhythm  Heart sounds: Normal heart sounds  Pulmonary:      Effort: Pulmonary effort is normal       Breath sounds: Normal breath sounds  No wheezing  Abdominal:      General: Bowel sounds are normal       Palpations: Abdomen is soft  Musculoskeletal:         General: No swelling  Right lower leg: No edema  Left lower leg: No edema  Skin:     General: Skin is warm  Findings: No rash  Neurological:      General: No focal deficit present  Mental Status: She is alert and oriented to person, place, and time     Psychiatric:         Mood and Affect: Mood normal          Behavior: Behavior normal  Labs & imaging results reviewed with patient

## 2021-06-07 NOTE — ASSESSMENT & PLAN NOTE
Lab Results   Component Value Date    EGFR 44 04/07/2021    EGFR 48 09/05/2020    EGFR 42 09/03/2020    CREATININE 1 11 04/07/2021    CREATININE 1 03 09/05/2020    CREATININE 1 16 09/03/2020     Stable  No NSAIDs

## 2021-06-07 NOTE — ASSESSMENT & PLAN NOTE
Intermittent dizziness  Taking ASA, Pletal   Agrees to start rosuvastatin 2 days a week  Recently saw vascular, neurology

## 2021-06-15 DIAGNOSIS — J45.20 MILD INTERMITTENT ASTHMA WITHOUT COMPLICATION: ICD-10-CM

## 2021-06-15 RX ORDER — ALBUTEROL SULFATE 90 UG/1
AEROSOL, METERED RESPIRATORY (INHALATION)
Qty: 8.5 G | Refills: 1 | Status: SHIPPED | OUTPATIENT
Start: 2021-06-15 | End: 2021-08-05

## 2021-06-25 ENCOUNTER — TELEPHONE (OUTPATIENT)
Dept: INTERNAL MEDICINE CLINIC | Facility: CLINIC | Age: 86
End: 2021-06-25

## 2021-06-25 NOTE — TELEPHONE ENCOUNTER
Started taking rosuvastatin on Monday Wednesday her cheeks were red  She took her second pill on Thursday and her face got puffy, her eyes were itchy and her lips started to burn  This morning her face is less red, but still puffy and itchy  She is out in the yard a lot, but tries to stay out of the sun  Doesn't think she was in contact with anything like poison ivy  Hasn't taken anything for her symptoms as of yet

## 2021-07-03 DIAGNOSIS — J45.30 MILD PERSISTENT ASTHMA WITHOUT COMPLICATION: ICD-10-CM

## 2021-07-05 RX ORDER — MONTELUKAST SODIUM 10 MG/1
TABLET ORAL
Qty: 90 TABLET | Refills: 1 | Status: SHIPPED | OUTPATIENT
Start: 2021-07-05 | End: 2021-08-16 | Stop reason: SDUPTHER

## 2021-07-22 DIAGNOSIS — I65.09 VERTEBRAL ARTERY STENOSIS: ICD-10-CM

## 2021-07-22 RX ORDER — CILOSTAZOL 100 MG/1
100 TABLET ORAL 2 TIMES DAILY
Qty: 180 TABLET | Refills: 1 | Status: SHIPPED | OUTPATIENT
Start: 2021-07-22 | End: 2021-09-28 | Stop reason: SDUPTHER

## 2021-08-04 DIAGNOSIS — I65.09 VERTEBRAL ARTERY STENOSIS, UNSPECIFIED LATERALITY: ICD-10-CM

## 2021-08-04 DIAGNOSIS — I65.21 CAROTID STENOSIS, RIGHT: ICD-10-CM

## 2021-08-04 RX ORDER — ROSUVASTATIN CALCIUM 5 MG/1
TABLET, COATED ORAL
Qty: 25 TABLET | Refills: 0 | Status: SHIPPED | OUTPATIENT
Start: 2021-08-04 | End: 2021-08-16 | Stop reason: SDUPTHER

## 2021-08-16 DIAGNOSIS — I65.21 CAROTID STENOSIS, RIGHT: ICD-10-CM

## 2021-08-16 DIAGNOSIS — I65.09 VERTEBRAL ARTERY STENOSIS, UNSPECIFIED LATERALITY: ICD-10-CM

## 2021-08-16 DIAGNOSIS — J45.30 MILD PERSISTENT ASTHMA WITHOUT COMPLICATION: ICD-10-CM

## 2021-08-16 RX ORDER — ROSUVASTATIN CALCIUM 5 MG/1
5 TABLET, COATED ORAL DAILY
Qty: 90 TABLET | Refills: 1 | Status: SHIPPED | OUTPATIENT
Start: 2021-08-16 | End: 2021-09-28 | Stop reason: SINTOL

## 2021-08-16 RX ORDER — MONTELUKAST SODIUM 10 MG/1
10 TABLET ORAL
Qty: 90 TABLET | Refills: 1 | Status: SHIPPED | OUTPATIENT
Start: 2021-08-16 | End: 2022-02-03 | Stop reason: SDUPTHER

## 2021-08-16 NOTE — TELEPHONE ENCOUNTER
Pt  said it is a little early for refill of Rosuvastatin but she messed up and was taking it twice daily for awhile instead of twice weekly, so she is running out of it

## 2021-08-17 NOTE — TELEPHONE ENCOUNTER
Spoke w/ pt  And adv'd  She said she hasn't taken it for about 2 wks since she ran out and now is having blurry vision

## 2021-09-01 DIAGNOSIS — J45.20 MILD INTERMITTENT ASTHMA WITHOUT COMPLICATION: ICD-10-CM

## 2021-09-01 DIAGNOSIS — I10 ESSENTIAL HYPERTENSION: ICD-10-CM

## 2021-09-01 DIAGNOSIS — K21.9 GASTROESOPHAGEAL REFLUX DISEASE: ICD-10-CM

## 2021-09-01 RX ORDER — FAMOTIDINE 40 MG/1
40 TABLET, FILM COATED ORAL DAILY
Qty: 90 TABLET | Refills: 1 | Status: SHIPPED | OUTPATIENT
Start: 2021-09-01 | End: 2022-06-20

## 2021-09-01 RX ORDER — ALBUTEROL SULFATE 90 UG/1
1 AEROSOL, METERED RESPIRATORY (INHALATION) EVERY 6 HOURS PRN
Qty: 8.5 G | Refills: 1 | Status: SHIPPED | OUTPATIENT
Start: 2021-09-01 | End: 2021-11-29

## 2021-09-01 RX ORDER — AMLODIPINE BESYLATE 5 MG/1
5 TABLET ORAL DAILY
Qty: 90 TABLET | Refills: 1 | Status: SHIPPED | OUTPATIENT
Start: 2021-09-01 | End: 2022-05-25 | Stop reason: SDUPTHER

## 2021-09-10 ENCOUNTER — APPOINTMENT (OUTPATIENT)
Dept: LAB | Facility: CLINIC | Age: 86
End: 2021-09-10
Payer: MEDICARE

## 2021-09-10 ENCOUNTER — HOSPITAL ENCOUNTER (OUTPATIENT)
Dept: NON INVASIVE DIAGNOSTICS | Facility: CLINIC | Age: 86
Discharge: HOME | End: 2021-09-10
Payer: MEDICARE

## 2021-09-10 DIAGNOSIS — E05.90 THYROTOXICOSIS WITHOUT THYROID STORM, UNSPECIFIED THYROTOXICOSIS TYPE: ICD-10-CM

## 2021-09-10 DIAGNOSIS — E66.9 OBESITY, UNSPECIFIED CLASSIFICATION, UNSPECIFIED OBESITY TYPE, UNSPECIFIED WHETHER SERIOUS COMORBIDITY PRESENT: ICD-10-CM

## 2021-09-10 DIAGNOSIS — E11.9 DIABETES MELLITUS WITHOUT COMPLICATION (HCC): ICD-10-CM

## 2021-09-10 DIAGNOSIS — I65.21 CAROTID STENOSIS, RIGHT: ICD-10-CM

## 2021-09-10 DIAGNOSIS — E05.90 HYPERTHYROIDISM: ICD-10-CM

## 2021-09-10 LAB
ALBUMIN SERPL BCP-MCNC: 3.9 G/DL (ref 3.5–5)
ALP SERPL-CCNC: 104 U/L (ref 46–116)
ALT SERPL W P-5'-P-CCNC: 18 U/L (ref 12–78)
ANION GAP SERPL CALCULATED.3IONS-SCNC: 9 MMOL/L (ref 4–13)
AST SERPL W P-5'-P-CCNC: 25 U/L (ref 5–45)
BASOPHILS # BLD AUTO: 0.02 THOUSANDS/ΜL (ref 0–0.1)
BASOPHILS NFR BLD AUTO: 1 % (ref 0–1)
BILIRUB SERPL-MCNC: 0.54 MG/DL (ref 0.2–1)
BUN SERPL-MCNC: 12 MG/DL (ref 5–25)
CALCIUM SERPL-MCNC: 9.7 MG/DL (ref 8.3–10.1)
CHLORIDE SERPL-SCNC: 105 MMOL/L (ref 100–108)
CO2 SERPL-SCNC: 29 MMOL/L (ref 21–32)
CREAT SERPL-MCNC: 0.97 MG/DL (ref 0.6–1.3)
CREAT UR-MCNC: 75.2 MG/DL
EOSINOPHIL # BLD AUTO: 0.01 THOUSAND/ΜL (ref 0–0.61)
EOSINOPHIL NFR BLD AUTO: 0 % (ref 0–6)
ERYTHROCYTE [DISTWIDTH] IN BLOOD BY AUTOMATED COUNT: 13.3 % (ref 11.6–15.1)
GFR SERPL CREATININE-BSD FRML MDRD: 52 ML/MIN/1.73SQ M
GLUCOSE P FAST SERPL-MCNC: 104 MG/DL (ref 65–99)
HCT VFR BLD AUTO: 40.1 % (ref 34.8–46.1)
HGB BLD-MCNC: 13.1 G/DL (ref 11.5–15.4)
IMM GRANULOCYTES # BLD AUTO: 0.01 THOUSAND/UL (ref 0–0.2)
IMM GRANULOCYTES NFR BLD AUTO: 0 % (ref 0–2)
LYMPHOCYTES # BLD AUTO: 1.46 THOUSANDS/ΜL (ref 0.6–4.47)
LYMPHOCYTES NFR BLD AUTO: 35 % (ref 14–44)
MAGNESIUM SERPL-MCNC: 2.1 MG/DL (ref 1.6–2.6)
MCH RBC QN AUTO: 30.4 PG (ref 26.8–34.3)
MCHC RBC AUTO-ENTMCNC: 32.7 G/DL (ref 31.4–37.4)
MCV RBC AUTO: 93 FL (ref 82–98)
MICROALBUMIN UR-MCNC: 5.5 MG/L (ref 0–20)
MICROALBUMIN/CREAT 24H UR: 7 MG/G CREATININE (ref 0–30)
MONOCYTES # BLD AUTO: 0.43 THOUSAND/ΜL (ref 0.17–1.22)
MONOCYTES NFR BLD AUTO: 10 % (ref 4–12)
NEUTROPHILS # BLD AUTO: 2.19 THOUSANDS/ΜL (ref 1.85–7.62)
NEUTS SEG NFR BLD AUTO: 54 % (ref 43–75)
NRBC BLD AUTO-RTO: 0 /100 WBCS
PHOSPHATE SERPL-MCNC: 3.7 MG/DL (ref 2.3–4.1)
PLATELET # BLD AUTO: 316 THOUSANDS/UL (ref 149–390)
PMV BLD AUTO: 9.2 FL (ref 8.9–12.7)
POTASSIUM SERPL-SCNC: 3.7 MMOL/L (ref 3.5–5.3)
PROT SERPL-MCNC: 7.5 G/DL (ref 6.4–8.2)
RBC # BLD AUTO: 4.31 MILLION/UL (ref 3.81–5.12)
SODIUM SERPL-SCNC: 143 MMOL/L (ref 136–145)
T4 FREE SERPL-MCNC: 1 NG/DL (ref 0.76–1.46)
TSH SERPL DL<=0.05 MIU/L-ACNC: 1.74 UIU/ML (ref 0.36–3.74)
WBC # BLD AUTO: 4.12 THOUSAND/UL (ref 4.31–10.16)

## 2021-09-10 PROCEDURE — 84100 ASSAY OF PHOSPHORUS: CPT

## 2021-09-10 PROCEDURE — 83735 ASSAY OF MAGNESIUM: CPT

## 2021-09-10 PROCEDURE — 84439 ASSAY OF FREE THYROXINE: CPT

## 2021-09-10 PROCEDURE — 80053 COMPREHEN METABOLIC PANEL: CPT

## 2021-09-10 PROCEDURE — 85025 COMPLETE CBC W/AUTO DIFF WBC: CPT

## 2021-09-10 PROCEDURE — 84443 ASSAY THYROID STIM HORMONE: CPT

## 2021-09-10 PROCEDURE — 82570 ASSAY OF URINE CREATININE: CPT

## 2021-09-10 PROCEDURE — 93880 EXTRACRANIAL BILAT STUDY: CPT

## 2021-09-10 PROCEDURE — 82043 UR ALBUMIN QUANTITATIVE: CPT

## 2021-09-13 PROCEDURE — 93880 EXTRACRANIAL BILAT STUDY: CPT | Performed by: SURGERY

## 2021-09-28 ENCOUNTER — OFFICE VISIT (OUTPATIENT)
Dept: NEUROLOGY | Facility: CLINIC | Age: 86
End: 2021-09-28
Payer: MEDICARE

## 2021-09-28 VITALS
HEIGHT: 60 IN | HEART RATE: 82 BPM | TEMPERATURE: 96.6 F | BODY MASS INDEX: 23.16 KG/M2 | WEIGHT: 118 LBS | SYSTOLIC BLOOD PRESSURE: 130 MMHG | DIASTOLIC BLOOD PRESSURE: 60 MMHG

## 2021-09-28 DIAGNOSIS — Z86.73 HISTORY OF TIA (TRANSIENT ISCHEMIC ATTACK): ICD-10-CM

## 2021-09-28 DIAGNOSIS — I65.21 STENOSIS OF RIGHT CAROTID ARTERY: ICD-10-CM

## 2021-09-28 DIAGNOSIS — I65.09 VERTEBRAL ARTERY STENOSIS: ICD-10-CM

## 2021-09-28 DIAGNOSIS — I77.74 VERTEBRAL ARTERY DISSECTION (HCC): Primary | ICD-10-CM

## 2021-09-28 PROCEDURE — 99214 OFFICE O/P EST MOD 30 MIN: CPT | Performed by: PHYSICIAN ASSISTANT

## 2021-09-28 RX ORDER — CILOSTAZOL 100 MG/1
100 TABLET ORAL 2 TIMES DAILY
Qty: 180 TABLET | Refills: 3 | Status: SHIPPED | OUTPATIENT
Start: 2021-09-28

## 2021-09-28 NOTE — PROGRESS NOTES
Patient ID: Cal Bright is a 80 y o  female  Assessment/Plan:    History of TIA (transient ischemic attack)  · No further neurologic events  · She will continue Aspirin and cilostazol  · She did stop Crestor due to visual hallucinations  She will discuss it with her PCP at her next appt  · She will continue to exercise and follow a healthy diet  · If pt has any new stroke-like symptoms including sudden painless loss of vision or double vision, difficulty speaking or swallowing, vertigo / room spinning that does not quickly resolve, or weakness / numbness affecting 1 side of the face or body he should proceed by ambulance to the nearest emergency room immediately  I have spent 30 minutes with Patient and family today in which greater than 50% of this time was spent in counseling/coordination of care regarding Diagnostic results, Prognosis, Risks and benefits of tx options, Intructions for management, Patient and family education, Importance of tx compliance, Risk factor reductions and Impressions  · She will follow-up in 1 year  Vertebral artery dissection (HCC)  · Continue Aspirin and cilostazol  Carotid stenosis, right  · A follow-up Doppler will be ordered to be done in 6 months  · She will continue the current medications  Problem List Items Addressed This Visit        Cardiovascular and Mediastinum    Vertebral artery stenosis    Relevant Medications    cilostazol (PLETAL) 100 mg tablet    Other Relevant Orders    VAS carotid complete study    Vertebral artery dissection (HCC) - Primary     · Continue Aspirin and cilostazol  Other    History of TIA (transient ischemic attack)     · No further neurologic events  · She will continue Aspirin and cilostazol  · She did stop Crestor due to visual hallucinations  She will discuss it with her PCP at her next appt  · She will continue to exercise and follow a healthy diet    · If pt has any new stroke-like symptoms including sudden painless loss of vision or double vision, difficulty speaking or swallowing, vertigo / room spinning that does not quickly resolve, or weakness / numbness affecting 1 side of the face or body he should proceed by ambulance to the nearest emergency room immediately  I have spent 30 minutes with Patient and family today in which greater than 50% of this time was spent in counseling/coordination of care regarding Diagnostic results, Prognosis, Risks and benefits of tx options, Intructions for management, Patient and family education, Importance of tx compliance, Risk factor reductions and Impressions  · She will follow-up in 1 year  Other Visit Diagnoses     Stenosis of right carotid artery        Relevant Medications    cilostazol (PLETAL) 100 mg tablet    Other Relevant Orders    VAS carotid complete study             Subjective:    HPI    Deepika Leonardo is a 89y  o  female, with HTN, anxiety, macular degeneration, hyperthyroididm, GERD and asthma, who follows in the office due to Lt vertebral artery dissection and history of TIA  She is taking ASA and Pletal  She denies any side effects of these medications  She was taking Crestor but stopped it  She reports that she was having visual hallucinations, ie would see something across the street and think she saw people when she was actually looking at plants  She states that since she stopped the Crestor she has not had any more of these hallucinations  Carotid Doppler revealed Rt ICA stenosis at 50-69% and <50% stenosis of the Lt ICA  There was no significant change since the previous study  It was recommended that she have a follow-up Doppler in 6 months  She admits to occasional lightheadedness if she lays flat or stands up too quickly which is seconds in duration  She admits to eating whatever she wants  She states that at her age she has earned the right to do so  She does keep herself busy and exercises regularly   She has not had any symptoms to suggest TIA or CVA  The following portions of the patient's history were reviewed and updated as appropriate: allergies, current medications, past family history, past medical history, past social history, past surgical history and problem list          Objective:    Blood pressure 130/60, pulse 82, temperature (!) 96 6 °F (35 9 °C), temperature source Temporal, height 5' (1 524 m), weight 53 5 kg (118 lb)  Physical Exam  Vitals reviewed  Eyes:      General: Lids are normal       Extraocular Movements: Extraocular movements intact  Pupils: Pupils are equal, round, and reactive to light  Neurological:      Coordination: Coordination is intact  Deep Tendon Reflexes: Strength normal and reflexes are normal and symmetric  Psychiatric:         Speech: Speech normal          Neurological Exam  Mental Status   Oriented to person, place, time and situation  Recent and remote memory are intact  Speech is normal  Language is fluent with no aphasia  Attention and concentration are normal  Fund of knowledge is appropriate for level of education  Apraxia absent  Cranial Nerves  CN II: Visual acuity is normal  Visual fields full to confrontation  CN III, IV, VI: Extraocular movements intact bilaterally  Normal lids and orbits bilaterally  Pupils equal round and reactive to light bilaterally  CN V: Facial sensation is normal   CN VII: Full and symmetric facial movement  CN VIII: Hearing is normal   CN IX, X: Palate elevates symmetrically  Normal gag reflex  CN XI: Shoulder shrug strength is normal   CN XII: Tongue midline without atrophy or fasciculations  Motor   Strength is 5/5 throughout all four extremities  Sensory  Sensation is intact to light touch, pinprick, vibration and proprioception in all four extremities  Reflexes  Deep tendon reflexes are 2+ and symmetric in all four extremities with downgoing toes bilaterally      Coordination  Finger-to-nose, rapid alternating movements and heel-to-shin normal bilaterally without dysmetria  Gait  Normal casual, toe, heel and tandem gait  ROS:    Review of Systems   Constitutional: Negative  Negative for appetite change and fever  HENT: Negative  Negative for hearing loss, tinnitus, trouble swallowing and voice change  Eyes: Negative  Negative for photophobia and pain  Respiratory: Negative  Negative for shortness of breath  Cardiovascular: Negative  Negative for palpitations  Gastrointestinal: Negative  Negative for nausea and vomiting  Endocrine: Negative  Negative for cold intolerance  Genitourinary: Negative  Negative for dysuria, frequency and urgency  Musculoskeletal: Negative  Negative for myalgias and neck pain  Skin: Negative  Negative for rash  Neurological: Positive for dizziness  Negative for tremors, seizures, syncope, facial asymmetry, speech difficulty, weakness, light-headedness, numbness and headaches  Hematological: Negative  Does not bruise/bleed easily  Psychiatric/Behavioral: Negative  Negative for confusion, hallucinations and sleep disturbance  Review of systems personally reviewed

## 2021-09-28 NOTE — ASSESSMENT & PLAN NOTE
· A follow-up Doppler will be ordered to be done in 6 months  · She will continue the current medications

## 2021-09-28 NOTE — PATIENT INSTRUCTIONS
History of TIA (transient ischemic attack)  · No further neurologic events  · She will continue Aspirin and cilostazol  · She did stop Crestor due to visual hallucinations  She will discuss it with her PCP at her next appt  · She will continue to exercise and follow a healthy diet  · If pt has any new stroke-like symptoms including sudden painless loss of vision or double vision, difficulty speaking or swallowing, vertigo / room spinning that does not quickly resolve, or weakness / numbness affecting 1 side of the face or body he should proceed by ambulance to the nearest emergency room immediately  I have spent 30 minutes with Patient and family today in which greater than 50% of this time was spent in counseling/coordination of care regarding Diagnostic results, Prognosis, Risks and benefits of tx options, Intructions for management, Patient and family education, Importance of tx compliance, Risk factor reductions and Impressions  · She will follow-up in 1 year  Vertebral artery dissection (HCC)  · Continue Aspirin and cilostazol  Carotid stenosis, right  · A follow-up Doppler will be ordered to be done in 6 months  · She will continue the current medications

## 2021-09-28 NOTE — ASSESSMENT & PLAN NOTE
· No further neurologic events  · She will continue Aspirin and cilostazol  · She did stop Crestor due to visual hallucinations  She will discuss it with her PCP at her next appt  · She will continue to exercise and follow a healthy diet  · If pt has any new stroke-like symptoms including sudden painless loss of vision or double vision, difficulty speaking or swallowing, vertigo / room spinning that does not quickly resolve, or weakness / numbness affecting 1 side of the face or body he should proceed by ambulance to the nearest emergency room immediately  I have spent 30 minutes with Patient and family today in which greater than 50% of this time was spent in counseling/coordination of care regarding Diagnostic results, Prognosis, Risks and benefits of tx options, Intructions for management, Patient and family education, Importance of tx compliance, Risk factor reductions and Impressions  · She will follow-up in 1 year

## 2021-10-04 DIAGNOSIS — R42 VERTIGO: ICD-10-CM

## 2021-10-04 RX ORDER — MECLIZINE HYDROCHLORIDE 25 MG/1
25 TABLET ORAL EVERY 12 HOURS PRN
Qty: 180 TABLET | Refills: 0 | Status: SHIPPED | OUTPATIENT
Start: 2021-10-04 | End: 2022-02-03 | Stop reason: SDUPTHER

## 2021-12-31 ENCOUNTER — NURSE TRIAGE (OUTPATIENT)
Dept: OTHER | Facility: OTHER | Age: 86
End: 2021-12-31

## 2022-01-01 DIAGNOSIS — N39.0 URINARY TRACT INFECTION WITHOUT HEMATURIA, SITE UNSPECIFIED: Primary | ICD-10-CM

## 2022-01-01 DIAGNOSIS — R39.9 UTI SYMPTOMS: Primary | ICD-10-CM

## 2022-01-01 RX ORDER — CEPHALEXIN 250 MG/1
250 CAPSULE ORAL EVERY 6 HOURS SCHEDULED
Qty: 20 CAPSULE | Refills: 0 | Status: SHIPPED | OUTPATIENT
Start: 2022-01-01 | End: 2022-01-01

## 2022-01-01 RX ORDER — NITROFURANTOIN 25; 75 MG/1; MG/1
100 CAPSULE ORAL 2 TIMES DAILY
Qty: 10 CAPSULE | Refills: 0 | Status: SHIPPED | OUTPATIENT
Start: 2022-01-01 | End: 2022-01-01

## 2022-01-04 ENCOUNTER — HOSPITAL ENCOUNTER (EMERGENCY)
Facility: HOSPITAL | Age: 87
Discharge: HOME | End: 2022-01-04
Attending: EMERGENCY MEDICINE | Admitting: EMERGENCY MEDICINE
Payer: MEDICARE

## 2022-01-04 ENCOUNTER — TELEPHONE (OUTPATIENT)
Dept: INTERNAL MEDICINE CLINIC | Facility: CLINIC | Age: 87
End: 2022-01-04

## 2022-01-04 ENCOUNTER — APPOINTMENT (EMERGENCY)
Dept: RADIOLOGY | Facility: HOSPITAL | Age: 87
End: 2022-01-04
Payer: MEDICARE

## 2022-01-04 ENCOUNTER — APPOINTMENT (EMERGENCY)
Dept: CT IMAGING | Facility: HOSPITAL | Age: 87
End: 2022-01-04
Payer: MEDICARE

## 2022-01-04 VITALS
OXYGEN SATURATION: 97 % | TEMPERATURE: 98.5 F | SYSTOLIC BLOOD PRESSURE: 133 MMHG | DIASTOLIC BLOOD PRESSURE: 59 MMHG | RESPIRATION RATE: 20 BRPM | HEART RATE: 66 BPM

## 2022-01-04 DIAGNOSIS — R91.1 PULMONARY NODULE: ICD-10-CM

## 2022-01-04 DIAGNOSIS — U07.1 COVID: Primary | ICD-10-CM

## 2022-01-04 LAB
2HR DELTA HS TROPONIN: 0 NG/L
ALBUMIN SERPL BCP-MCNC: 3.9 G/DL (ref 3.5–5)
ALP SERPL-CCNC: 94 U/L (ref 46–116)
ALT SERPL W P-5'-P-CCNC: 13 U/L (ref 12–78)
ANION GAP SERPL CALCULATED.3IONS-SCNC: 11 MMOL/L (ref 4–13)
AST SERPL W P-5'-P-CCNC: 20 U/L (ref 5–45)
ATRIAL RATE: 67 BPM
ATRIAL RATE: 75 BPM
BASOPHILS # BLD AUTO: 0.01 THOUSANDS/ΜL (ref 0–0.1)
BASOPHILS NFR BLD AUTO: 0 % (ref 0–1)
BILIRUB SERPL-MCNC: 0.76 MG/DL (ref 0.2–1)
BUN SERPL-MCNC: 7 MG/DL (ref 5–25)
CALCIUM SERPL-MCNC: 8.7 MG/DL (ref 8.3–10.1)
CARDIAC TROPONIN I PNL SERPL HS: 4 NG/L
CARDIAC TROPONIN I PNL SERPL HS: 4 NG/L
CHLORIDE SERPL-SCNC: 103 MMOL/L (ref 100–108)
CO2 SERPL-SCNC: 26 MMOL/L (ref 21–32)
CREAT SERPL-MCNC: 0.95 MG/DL (ref 0.6–1.3)
D DIMER PPP FEU-MCNC: 1.3 UG/ML FEU
EOSINOPHIL # BLD AUTO: 0 THOUSAND/ΜL (ref 0–0.61)
EOSINOPHIL NFR BLD AUTO: 0 % (ref 0–6)
ERYTHROCYTE [DISTWIDTH] IN BLOOD BY AUTOMATED COUNT: 13.4 % (ref 11.6–15.1)
FLUAV RNA RESP QL NAA+PROBE: NEGATIVE
FLUBV RNA RESP QL NAA+PROBE: NEGATIVE
GFR SERPL CREATININE-BSD FRML MDRD: 52 ML/MIN/1.73SQ M
GLUCOSE SERPL-MCNC: 121 MG/DL (ref 65–140)
HCT VFR BLD AUTO: 39.4 % (ref 34.8–46.1)
HGB BLD-MCNC: 13.5 G/DL (ref 11.5–15.4)
IMM GRANULOCYTES # BLD AUTO: 0.01 THOUSAND/UL (ref 0–0.2)
IMM GRANULOCYTES NFR BLD AUTO: 0 % (ref 0–2)
LYMPHOCYTES # BLD AUTO: 1.13 THOUSANDS/ΜL (ref 0.6–4.47)
LYMPHOCYTES NFR BLD AUTO: 36 % (ref 14–44)
MCH RBC QN AUTO: 29.9 PG (ref 26.8–34.3)
MCHC RBC AUTO-ENTMCNC: 34.3 G/DL (ref 31.4–37.4)
MCV RBC AUTO: 87 FL (ref 82–98)
MONOCYTES # BLD AUTO: 0.35 THOUSAND/ΜL (ref 0.17–1.22)
MONOCYTES NFR BLD AUTO: 11 % (ref 4–12)
NEUTROPHILS # BLD AUTO: 1.65 THOUSANDS/ΜL (ref 1.85–7.62)
NEUTS SEG NFR BLD AUTO: 53 % (ref 43–75)
NRBC BLD AUTO-RTO: 0 /100 WBCS
NT-PROBNP SERPL-MCNC: 50 PG/ML
P AXIS: 58 DEGREES
P AXIS: 61 DEGREES
PLATELET # BLD AUTO: 266 THOUSANDS/UL (ref 149–390)
PMV BLD AUTO: 9.4 FL (ref 8.9–12.7)
POTASSIUM SERPL-SCNC: 3.4 MMOL/L (ref 3.5–5.3)
PR INTERVAL: 150 MS
PR INTERVAL: 152 MS
PROT SERPL-MCNC: 7.4 G/DL (ref 6.4–8.2)
QRS AXIS: 32 DEGREES
QRS AXIS: 36 DEGREES
QRSD INTERVAL: 68 MS
QRSD INTERVAL: 78 MS
QT INTERVAL: 372 MS
QT INTERVAL: 396 MS
QTC INTERVAL: 415 MS
QTC INTERVAL: 418 MS
RBC # BLD AUTO: 4.51 MILLION/UL (ref 3.81–5.12)
RSV RNA RESP QL NAA+PROBE: NEGATIVE
SARS-COV-2 RNA RESP QL NAA+PROBE: POSITIVE
SODIUM SERPL-SCNC: 140 MMOL/L (ref 136–145)
T WAVE AXIS: 64 DEGREES
T WAVE AXIS: 74 DEGREES
VENTRICULAR RATE: 67 BPM
VENTRICULAR RATE: 75 BPM
WBC # BLD AUTO: 3.15 THOUSAND/UL (ref 4.31–10.16)

## 2022-01-04 PROCEDURE — G1004 CDSM NDSC: HCPCS

## 2022-01-04 PROCEDURE — 80053 COMPREHEN METABOLIC PANEL: CPT | Performed by: PHYSICIAN ASSISTANT

## 2022-01-04 PROCEDURE — 0241U HB NFCT DS VIR RESP RNA 4 TRGT: CPT | Performed by: PHYSICIAN ASSISTANT

## 2022-01-04 PROCEDURE — 99285 EMERGENCY DEPT VISIT HI MDM: CPT | Performed by: PHYSICIAN ASSISTANT

## 2022-01-04 PROCEDURE — 85025 COMPLETE CBC W/AUTO DIFF WBC: CPT | Performed by: PHYSICIAN ASSISTANT

## 2022-01-04 PROCEDURE — 96360 HYDRATION IV INFUSION INIT: CPT

## 2022-01-04 PROCEDURE — 85379 FIBRIN DEGRADATION QUANT: CPT | Performed by: PHYSICIAN ASSISTANT

## 2022-01-04 PROCEDURE — 84484 ASSAY OF TROPONIN QUANT: CPT | Performed by: PHYSICIAN ASSISTANT

## 2022-01-04 PROCEDURE — 36415 COLL VENOUS BLD VENIPUNCTURE: CPT | Performed by: PHYSICIAN ASSISTANT

## 2022-01-04 PROCEDURE — 93005 ELECTROCARDIOGRAM TRACING: CPT

## 2022-01-04 PROCEDURE — 93010 ELECTROCARDIOGRAM REPORT: CPT | Performed by: INTERNAL MEDICINE

## 2022-01-04 PROCEDURE — 71045 X-RAY EXAM CHEST 1 VIEW: CPT

## 2022-01-04 PROCEDURE — 99285 EMERGENCY DEPT VISIT HI MDM: CPT

## 2022-01-04 PROCEDURE — 71275 CT ANGIOGRAPHY CHEST: CPT

## 2022-01-04 PROCEDURE — 83880 ASSAY OF NATRIURETIC PEPTIDE: CPT | Performed by: PHYSICIAN ASSISTANT

## 2022-01-04 RX ORDER — ACETAMINOPHEN 325 MG/1
975 TABLET ORAL ONCE
Status: COMPLETED | OUTPATIENT
Start: 2022-01-04 | End: 2022-01-04

## 2022-01-04 RX ORDER — POTASSIUM CHLORIDE 20 MEQ/1
20 TABLET, EXTENDED RELEASE ORAL ONCE
Status: COMPLETED | OUTPATIENT
Start: 2022-01-04 | End: 2022-01-04

## 2022-01-04 RX ADMIN — ACETAMINOPHEN 975 MG: 325 TABLET, FILM COATED ORAL at 09:50

## 2022-01-04 RX ADMIN — IOHEXOL 85 ML: 350 INJECTION, SOLUTION INTRAVENOUS at 11:13

## 2022-01-04 RX ADMIN — POTASSIUM CHLORIDE 20 MEQ: 1500 TABLET, EXTENDED RELEASE ORAL at 10:36

## 2022-01-04 RX ADMIN — SODIUM CHLORIDE 500 ML: 0.9 INJECTION, SOLUTION INTRAVENOUS at 09:45

## 2022-01-04 NOTE — ED PROVIDER NOTES
History  Chief Complaint   Patient presents with    Shortness of Breath     Patient arrives to the ER from home with complaints of SOB since last night  Dx with COVID 6 days ago with home test  No distress noted  97% RA oxygen saturation  No fever  Pt  complains of dry throat  Pt  states she was exerting herself yesterday while taking down radha decorations  Joey Howell is a 80 y o  female with a PMHx of HTN, HLD and DM who presents to the ED with complaints of SOB upon awakening last night  Patient reports she has had a dry cough and nasal congestion over the past 6 days  Patient had a home COVID test which was positive  Patient reports sick contacts at [place] time  Patient states she suffers from chronic back pain and did not take any home medications today  Denies nausea, vomiting, diarrhea, abdominal pain, leg pain, leg swelling, chest pain, chest tightness, hemoptysis, neck pain, neck stiffness, fever, chills  Family reports that the patient reports a burning in her chest this morning and appeared to be having trouble breathing which prompted the family to call EMS  History provided by:  Patient      Prior to Admission Medications   Prescriptions Last Dose Informant Patient Reported? Taking?    Cholecalciferol (VITAMIN D PO)  Child Yes No   Sig: Take 1 tablet by mouth daily   FIBER PO   Yes No   Sig: Take by mouth   LORazepam (ATIVAN) 0 5 mg tablet   No No   Sig: Take 1/2 tablet to 1 tablet PO daily PRN   Psyllium (Metamucil) 0 36 g CAPS   Yes No   Sig: Take by mouth PRN   albuterol (PROVENTIL HFA,VENTOLIN HFA) 90 mcg/act inhaler   No No   Sig: INHALE 1 PUFF BY MOUTH EVERY 6 HOURS AS NEEDED FOR WHEEZE   amLODIPine (NORVASC) 5 mg tablet   No No   Sig: Take 1 tablet (5 mg total) by mouth daily   aspirin 81 mg chewable tablet  Child No No   Sig: Chew 1 tablet (81 mg total) daily   b complex vitamins capsule  Child Yes No   Sig: Take 1 capsule by mouth daily   cilostazol (PLETAL) 100 mg tablet No No   Sig: Take 1 tablet (100 mg total) by mouth 2 (two) times a day   dicyclomine (BENTYL) 10 mg capsule  Child No No   Sig: Take 1 capsule (10 mg total) by mouth 3 (three) times a day as needed (abdominal cramping)   famotidine (PEPCID) 40 MG tablet   No No   Sig: Take 1 tablet (40 mg total) by mouth daily   meclizine (ANTIVERT) 25 mg tablet   No No   Sig: Take 1 tablet (25 mg total) by mouth every 12 (twelve) hours as needed for dizziness   methimazole (TAPAZOLE) 5 mg tablet  Child No No   Si/2 tab daily   montelukast (SINGULAIR) 10 mg tablet   No No   Sig: Take 1 tablet (10 mg total) by mouth daily at bedtime   oxyCODONE-acetaminophen (PERCOCET) 5-325 mg per tablet   No No   Sig: Take 1 tablet by mouth 2 (two) times a day as needed for moderate painMax Daily Amount: 2 tablets      Facility-Administered Medications: None       Past Medical History:   Diagnosis Date    Asthma     Chronic interstitial cystitis     Community acquired pneumonia     last assessed 10/4/13    Diabetes mellitus ([facility] [address] )     Disease of thyroid gland     Diverticulitis     Dizziness     GERD (gastroesophageal reflux disease)     Hyperlipidemia     Hypertension     Vertigo        Past Surgical History:   Procedure Laterality Date    ADENOIDECTOMY      CHOLECYSTECTOMY      COLON SURGERY      partial colectomy - sigmoid, diverticulitis    EYE SURGERY      HYSTERECTOMY      ovaries remain    TONSILLECTOMY      US GUIDED LYMPH NODE BIOPSY RIGHT  3/11/2020    US GUIDED THYROID BIOPSY  2019       Family History   Problem Relation Age of Onset    Coronary artery disease Mother     Other Father         MVA    Alcohol abuse Neg Hx     Depression Neg Hx     Drug abuse Neg Hx     Substance Abuse Neg Hx     Mental illness Neg Hx      I have reviewed and agree with the history as documented      E-Cigarette/Vaping    E-Cigarette Use Never User      E-Cigarette/Vaping Substances     Social History     Tobacco Use    Smoking status: Never Smoker    Smokeless tobacco: Never Used   Vaping Use    Vaping Use: Never used   Substance Use Topics    Alcohol use: No    Drug use: No       Review of Systems   Constitutional: Negative for appetite change, chills, fever and unexpected weight change  HENT: Positive for congestion  Negative for drooling, ear pain, rhinorrhea, sore throat, trouble swallowing and voice change  Eyes: Negative for pain, discharge, redness and visual disturbance  Respiratory: Positive for cough and shortness of breath  Negative for wheezing and stridor  Cardiovascular: Negative for chest pain, palpitations and leg swelling  Gastrointestinal: Negative for abdominal pain, blood in stool, constipation, diarrhea, nausea and vomiting  Genitourinary: Negative for dysuria, flank pain, frequency, hematuria and urgency  Musculoskeletal: Negative for gait problem, joint swelling, neck pain and neck stiffness  Skin: Negative for color change and rash  Neurological: Positive for light-headedness  Negative for dizziness, seizures, weakness, numbness and headaches  Physical Exam  Physical Exam  Vitals and nursing note reviewed  Constitutional:       Appearance: She is well-developed  HENT:      Head: Normocephalic and atraumatic  Nose: Nose normal       Mouth/Throat:      Mouth: Mucous membranes are dry  Eyes:      Conjunctiva/sclera: Conjunctivae normal       Pupils: Pupils are equal, round, and reactive to light  Cardiovascular:      Rate and Rhythm: Normal rate and regular rhythm  Pulmonary:      Effort: Pulmonary effort is normal       Breath sounds: Normal breath sounds  No wheezing, rhonchi or rales  Abdominal:      General: Abdomen is flat  Bowel sounds are normal       Tenderness: There is no abdominal tenderness  Musculoskeletal:         General: No tenderness  Normal range of motion  Right lower leg: No edema  Left lower leg: No edema     Skin:     General: Skin is warm and dry  Capillary Refill: Capillary refill takes less than 2 seconds  Neurological:      Mental Status: She is alert and oriented to person, place, and time  Vital Signs  ED Triage Vitals   Temperature Pulse Respirations Blood Pressure SpO2   01/04/22 0930 01/04/22 0915 01/04/22 0915 01/04/22 0915 01/04/22 0915   98 5 °F (36 9 °C) 78 20 122/63 97 %      Temp src Heart Rate Source Patient Position - Orthostatic VS BP Location FiO2 (%)   -- -- -- -- --             Pain Score       --                  Vitals:    01/04/22 0930 01/04/22 0945 01/04/22 1000 01/04/22 1030   BP: 134/61 112/56 135/62 133/59   Pulse: 76 70 70 66         Visual Acuity      ED Medications  Medications   sodium chloride 0 9 % bolus 500 mL (0 mL Intravenous Stopped 1/4/22 1108)   acetaminophen (TYLENOL) tablet 975 mg (975 mg Oral Given 1/4/22 0950)   potassium chloride (K-DUR,KLOR-CON) CR tablet 20 mEq (20 mEq Oral Given 1/4/22 1036)   iohexol (OMNIPAQUE) 350 MG/ML injection (MULTI-DOSE) 100 mL (85 mL Intravenous Given 1/4/22 1113)       Diagnostic Studies  Results Reviewed     Procedure Component Value Units Date/Time    HS Troponin I 2hr [[id number]] Collected: 01/04/22 1139    Lab Status: Final result Specimen: Blood from Arm, Right Updated: 01/04/22 1221     hs TnI 2hr 4 ng/L      Delta 2hr hsTnI 0 ng/L     COVID/FLU/RSV - 2 hour TAT [[id number]]  (Abnormal) Collected: 01/04/22 0944    Lab Status: Final result Specimen: Nares from Nasopharyngeal Swab Updated: 01/04/22 1056     SARS-CoV-2 Positive     INFLUENZA A PCR Negative     INFLUENZA B PCR Negative     RSV PCR Negative    Narrative:      FOR PEDIATRIC PATIENTS - copy/paste COVID Guidelines URL to browser: [email or web address]    SARS-CoV-2 assay is a Nucleic Acid Amplification assay intended for the  qualitative detection of nucleic acid from SARS-CoV-2 in nasopharyngeal  swabs   Results are for the presumptive identification of SARS-CoV-2 RNA  Positive results are indicative of infection with SARS-CoV-2, the virus  causing COVID-19, but do not rule out bacterial infection or co-infection  with other viruses  Laboratories within the [place] and its  territories are required to report all positive results to the appropriate  public health authorities  Negative results do not preclude SARS-CoV-2  infection and should not be used as the sole basis for treatment or other  patient management decisions  Negative results must be combined with  clinical observations, patient history, and epidemiological information  This test has not been FDA cleared or approved  This test has been authorized by FDA under an Emergency Use Authorization  (EUA)  This test is only authorized for the duration of time the  declaration that circumstances exist justifying the authorization of the  emergency use of an in vitro diagnostic tests for detection of SARS-CoV-2  virus and/or diagnosis of COVID-19 infection under section 564(b)(1) of  the Act, 21 U  S C  680TBT-1(J)(2), unless the authorization is terminated  or revoked sooner  The test has been validated but independent review by FDA  and CLIA is pending  Test performed using [employer] Gene[employer]pert: This RT-PCR assay targets N2,  a region unique to SARS-CoV-2  A conserved region in the E-gene was chosen  for pan-Sarbecovirus detection which includes SARS-CoV-2      NT-BNP PRO [[id number]]  (Normal) Collected: 01/04/22 0944    Lab Status: Final result Specimen: Blood from Arm, Right Updated: 01/04/22 1032     NT-proBNP 50 pg/mL     HS Troponin 0hr (reflex protocol) [[id number]]  (Normal) Collected: 01/04/22 0944    Lab Status: Final result Specimen: Blood from Arm, Right Updated: 01/04/22 1029     hs TnI 0hr 4 ng/L     Comprehensive metabolic panel [[id number]]  (Abnormal) Collected: 01/04/22 0944    Lab Status: Final result Specimen: Blood from Arm, Right Updated: 01/04/22 1023     Sodium 140 mmol/L      Potassium 3 4 mmol/L      Chloride 103 mmol/L      CO2 26 mmol/L      ANION GAP 11 mmol/L      BUN 7 mg/dL      Creatinine 0 95 mg/dL      Glucose 121 mg/dL      Calcium 8 7 mg/dL      AST 20 U/L      ALT 13 U/L      Alkaline Phosphatase 94 U/L      Total Protein 7 4 g/dL      Albumin 3 9 g/dL      Total Bilirubin 0 76 mg/dL      eGFR 52 ml/min/1 73sq m     Narrative:      National Kidney Disease Foundation guidelines for Chronic Kidney Disease (CKD):     Stage 1 with normal or high GFR (GFR > 90 mL/min/1 73 square meters)    Stage 2 Mild CKD (GFR = 60-89 mL/min/1 73 square meters)    Stage 3A Moderate CKD (GFR = 45-59 mL/min/1 73 square meters)    Stage 3B Moderate CKD (GFR = 30-44 mL/min/1 73 square meters)    Stage 4 Severe CKD (GFR = 15-29 mL/min/1 73 square meters)    Stage 5 End Stage CKD (GFR <15 mL/min/1 73 square meters)  Note: GFR calculation is accurate only with a steady state creatinine    D-Dimer [[id number]]  (Abnormal) Collected: 01/04/22 0944    Lab Status: Final result Specimen: Blood from Arm, Right Updated: 01/04/22 1021     D-Dimer, Quant 1 30 ug/ml FEU     CBC and differential [[id number]]  (Abnormal) Collected: 01/04/22 0944    Lab Status: Final result Specimen: Blood from Arm, Right Updated: 01/04/22 1001     WBC 3 15 Thousand/uL      RBC 4 51 Million/uL      Hemoglobin 13 5 g/dL      Hematocrit 39 4 %      MCV 87 fL      MCH 29 9 pg      MCHC 34 3 g/dL      RDW 13 4 %      MPV 9 4 fL      Platelets 579 Thousands/uL      nRBC 0 /100 WBCs      Neutrophils Relative 53 %      Immat GRANS % 0 %      Lymphocytes Relative 36 %      Monocytes Relative 11 %      Eosinophils Relative 0 %      Basophils Relative 0 %      Neutrophils Absolute 1 65 Thousands/µL      Immature Grans Absolute 0 01 Thousand/uL      Lymphocytes Absolute 1 13 Thousands/µL      Monocytes Absolute 0 35 Thousand/µL      Eosinophils Absolute 0 00 Thousand/µL      Basophils Absolute 0 01 Thousands/µL CTA ED chest PE study   Final Result by Molly Oliva MD (01/04 1222)   No pulmonary embolism   No acute airspace consolidation   Scattered areas of peripheral  to nodular groundglass opacity in the both lungs, may be Covid related infiltrate  Short interval follow-up at 3 months suggested to demonstrate resolution and for complete characterization         Incidentally detected left lung nodules, measuring less than 6 mm  Based on current Fleischner Society 2017 Guidelines on incidental pulmonary nodule, no routine follow-up is needed if the patient is considered low risk for lung cancer  If the patient    is considered high risk for lung cancer, 12 month follow-up non-contrast chest CT is recommended  If patient is at risk for any metastatic disease short interval follow-up at 3 months can be considered         The study was marked in [facility] for immediate notification  Follow-up notification has been created      Workstation performed: [id number]         XR chest 1 view portable   Final Result by Charlie Ramsay MD (01/04 1021)      No acute cardiopulmonary disease  Workstation performed: [id number]                    Procedures  ECG 12 Lead Documentation Only    Date/Time: 1/4/2022 9:49 AM  Performed by: Stephany Wilcox PA-C  Authorized by: Smiley Romero MD     Indications / Diagnosis:  SOB, COVID  Patient location:  ED  Rate:     ECG rate:  75    ECG rate assessment: normal    Rhythm:     Rhythm: sinus rhythm    QRS:     QRS axis:  Normal  ST segments:     ST segments:  Normal  T waves:     T waves: flattening      Flattening:  AVL  Comments:      No acute ST or T wave changes   QT/QTc 372/415             ED Course  ED Course as of 01/04/22 1247   Tue Jan 04, 2022   1022 D-Dimer, Quant(!): 1 30   1056 SARS-COV-2(!): Positive   1056 Potassium(!): 3 4             HEART Risk Score      Most Recent Value   Heart Score Risk Calculator    History 1 Filed at: 01/04/2022 1134 ECG 0 Filed at: 01/04/2022 1134   Age 2 Filed at: 01/04/2022 1134   Risk Factors 2 Filed at: 01/04/2022 1134   Troponin 0 Filed at: 01/04/2022 1134   HEART Score 5 Filed at: 01/04/2022 1134                PERC Rule for PE      Most Recent Value   PERC Rule for PE    Age >=50 1 Filed at: 01/04/2022 1135   HR >=100 0 Filed at: 01/04/2022 1135   O2 Sat on room air < 95% 0 Filed at: 01/04/2022 1135   History of PE or DVT 0 Filed at: 01/04/2022 1135   Recent trauma or surgery 0 Filed at: 01/04/2022 1135   Hemoptysis 0 Filed at: 01/04/2022 1135   Exogenous estrogen 0 Filed at: 01/04/2022 1135   Unilateral leg swelling 0 Filed at: 01/04/2022 1135   PERC Rule for PE Results 1 Filed at: 01/04/2022 1135              SBIRT 22yo+      Most Recent Value   SBIRT (25 yo +)    In order to provide better care to our patients, we are screening all of our patients for alcohol and drug use  Would it be okay to ask you these screening questions? Unable to answer at this time Filed at: 01/04/2022 [address]' Criteria for PE      Most Recent Value   Wells' Criteria for PE    Clinical signs and symptoms of DVT 0 Filed at: 01/04/2022 1135   PE is primary diagnosis or equally likely 0 Filed at: 01/04/2022 1135   HR >100 0 Filed at: 01/04/2022 1135   Immobilization at least 3 days or Surgery in the previous 4 weeks 0 Filed at: 01/04/2022 1135   Previous, objectively diagnosed PE or DVT 0 Filed at: 01/04/2022 1135   Hemoptysis 0 Filed at: 01/04/2022 1135   Malignancy with treatment within 6 months or palliative 0 Filed at: 01/04/2022 1135   Wells' Criteria Total 0 Filed at: 01/04/2022 1135                MDM  Number of Diagnoses or Management Options  COVID: new and requires workup  Pulmonary nodule: new and requires workup  Diagnosis management comments: COVID positive  EKG without acute findings  Delta troponin 0  HEART Score = 5   CXR normal  Labs significant for mild hypokalemia (which was orally replenished) and elevated D-Dimer  CT PE Study without evidence of pulmonary embolism  No acute airspace consolidation  Scattered areas of peripheral  to nodular groundglass opacity in the both lungs, may be Covid related infiltrate  Short interval follow-up at 3 months suggested to demonstrate resolution and for complete characterization  Incidentally detected left lung nodules, measuring less than 6 mm  Based on current Fleischner Society 2017 Guidelines on incidental pulmonary nodule, no routine follow-up is needed if the patient is considered low risk for lung cancer  If the patient is considered high risk for lung cancer, 12 month follow-up non-contrast chest CT is recommended  If patient is at risk for any metastatic disease short interval follow-up at 3 months can be considered  I had an extensive conversation with the patient in regards the the findings of the imaging study including incidental findings as portrayed to me by the radiologist's report  Patient ambulated and remained > 96% on RA  Patient no longer has complaints of chest pain or SOB  Patient and family are agreeable to monitoring symptoms and oxygen levels at home  I did send a message to the [facility] Follow Up Team and PCP informing of positive result  I provided patient with strict RTER precautions  I advised patient follow-up with PCP in 24-48 hours  Patient verbalized understanding          Amount and/or Complexity of Data Reviewed  Clinical lab tests: reviewed and ordered  Tests in the radiology section of CPT®: ordered and reviewed  Review and summarize past medical records: yes    Patient Progress  Patient progress: stable      Disposition  Final diagnoses:   COVID   Pulmonary nodule     Time reflects when diagnosis was documented in both MDM as applicable and the Disposition within this note     Time User Action Codes Description Comment    1/4/2022 12:24 PM Fifi Godinez Add [U07 1] COVID     1/4/2022 12:25 PM Sharda Andrade Add [R91 1] Pulmonary nodule ED Disposition     ED Disposition Condition Date/Time Comment    Discharge Stable Tue Jan 4, 2022 12:42  [facility] discharge to home/self care  Follow-up Information     Follow up With Specialties Details Why Contact Info Additional [address] [facility] Emergency Department Emergency Medicine Go to  If symptoms worsen - chest pain, trouble breathing, oxygen levels <90% [address] [facility] [id number] [place] [address] Emergency Department, [address], [place], [facility]    Seven Flores MD Internal Medicine Call  Schedule virtual appointment for monitoring of COVID symptoms and follow up for pulmonary nodules/CT Findings [address] [address]  [phone]             Patient's Medications   Discharge Prescriptions    No medications on file       No discharge procedures on file      PDMP Review       Value Time User    PDMP Reviewed  Yes 6/7/2021 11:14 AM Seven Flores MD          ED Provider  Electronically Signed by           Angelica Werner PA-C  01/04/22 3485

## 2022-01-04 NOTE — TELEPHONE ENCOUNTER
Please call patient, schedule appt today, virtual  We can offer monoclonal antibody infusion if she is interested

## 2022-01-04 NOTE — ED NOTES
Pt denies SOB with ambulation   O2 sat remains 95% and above with ambulation     Justice Parks RN  01/04/22 5824

## 2022-01-04 NOTE — DISCHARGE INSTRUCTIONS
Your CT today did not show any blood clots  We do see some ground glass opacities in both lungs which we do commonly see with COVID pneumonia however I would like you to follow up with your PCP who may recommend additional imaging in 3 months to make sure this clears up  You will also require follow up for pulmonary/lung nodules in your left lung     Please monitor your oxygen levels at home  Please return to the emergency room if your oxygen levels are below 90 or you are experiencing chest pain or trouble breathing         COVID-19 (Coronavirus Disease 2019)   WHAT YOU NEED TO KNOW:   Coronavirus disease 2019 (COVID-19) is the disease caused by a coronavirus first discovered in December 2019  Coronaviruses generally cause upper respiratory (nose, throat, and lung) infections, such as a cold  The new virus spreads quickly and easily  The virus can be spread starting 2 days before symptoms even begin  The virus has also changed into several new forms (called variants) since it was discovered  The variants may be more contagious (easily spread) than the original form  Some may also cause more severe illness than others  It is important to follow local, national, and worldwide measures to protect yourself and others from infection  DISCHARGE INSTRUCTIONS:   If you think you or someone you know may be infected:  Do the following to protect others:  · If emergency care is needed,  tell the  about the possible infection, or call ahead and tell the emergency department  · Call a healthcare provider  for instructions if symptoms are mild  Anyone who may be infected should not  arrive without calling first  The provider will need to protect staff members and other patients  · The person who may be infected needs to wear a face covering  while getting medical care  This will help lower the risk of infecting others   Coverings are not used for anyone who is younger than 2 years, has breathing problems, or cannot remove it  The provider can give you instructions for anyone who cannot wear a covering  Call your local emergency number (911 in the [address]) or an emergency department if:   · You have trouble breathing or shortness of breath at rest     · You have chest pain or pressure that lasts longer than 5 minutes  · You become confused or hard to wake  · Your lips or face are blue  · You have a fever of 104°F (40°C) or higher  Call your doctor if:   · You do not  have symptoms of COVID-19 but had close physical contact within 14 days with someone who tested positive  · You have questions or concerns about your condition or care  Medicines: You may need any of the following for mild symptoms:  · Decongestants  help reduce nasal congestion and help you breathe more easily  If you take decongestant pills, they may make you feel restless or cause problems with your sleep  Do not use decongestant sprays for more than a few days  · Cough suppressants  help reduce coughing  Ask your healthcare provider which type of cough medicine is best for you  · Acetaminophen  decreases pain and fever  It is available without a doctor's order  Ask how much to take and how often to take it  Follow directions  Read the labels of all other medicines you are using to see if they also contain acetaminophen, or ask your doctor or pharmacist  Acetaminophen can cause liver damage if not taken correctly  Do not use more than 4 grams (4,000 milligrams) total of acetaminophen in one day  · NSAIDs , such as ibuprofen, help decrease swelling, pain, and fever  NSAIDs can cause stomach bleeding or kidney problems in certain people  If you take blood thinner medicine, always ask your healthcare provider if NSAIDs are safe for you  Always read the medicine label and follow directions  · Take your medicine as directed  Contact your healthcare provider if you think your medicine is not helping or if you have side effects   Tell him or her if you are allergic to any medicine  Keep a list of the medicines, vitamins, and herbs you take  Include the amounts, and when and why you take them  Bring the list or the pill bottles to follow-up visits  Carry your medicine list with you in case of an emergency  What you need to know about COVID-19 vaccines:  Get a vaccine even if you already had COVID-19  · COVID-19 vaccines are given as a shot in 1 or 2 doses  Some vaccines have emergency use authorization (EUA)  An EUA means the vaccine is not approved but is given because the benefits outweigh the risks  A 2-dose vaccine is fully approved for use in those 16 years or older  This vaccine also has an EUA for adolescents 12 to 15 years  Your healthcare provider can help you understand the benefits and risks  · A third dose is recommended for adults with a weakened immune system who get a 2-dose vaccine  The third dose is given at least 28 days after the second  · Even after you get the vaccine, continue social distancing and other measures  Experts are still learning how well the vaccines work to prevent infection, transmission, and severe illness  Although rare, you can become infected after you get the vaccine  You may also be able to pass the virus to others without knowing you are infected  · After you get the vaccine, check local, national, and international travel rules  Check to see if you need to be tested before you travel  You may also need to quarantine after you return  Some countries require proof of a negative test before you leave  You should also be tested 3 to 5 days after you return from another country  How the 2019 coronavirus spreads: The following are ways the virus is thought to spread, but more information may be coming:  · Droplets are the main way all coronaviruses spread  The virus travels in droplets that form when a person talks, coughs, or sneezes   The droplets can also float in the air for minutes or hours  Infection happens when you breathe in the droplets or get them in your eyes or nose  Close personal contact with an infected person increases your risk for infection  This means being within 6 feet (2 meters) of the person for at least 15 minutes over 24 hours  · Person-to-person contact can spread the virus  For example, a person with the virus on his or her hands can spread it by shaking hands with someone  · The virus can stay on objects and surfaces for a short time  You may become infected by touching the object or surface and then touching your eyes or mouth  · An infected animal may be able to infect a person who touches it  This may happen at live markets or on a farm  Help lower the risk for COVID-19:  The best way to prevent infection is to avoid anyone who is infected, but this can be hard to do  An infected person can spread the virus before signs or symptoms begin, or even if signs or symptoms never develop  The following can help lower the risk for infection:      · Wash your hands often throughout the day  Use soap and water  Rub your soapy hands together, lacing your fingers, for at least 20 seconds  Rinse with warm, running water  Dry your hands with a clean towel or paper towel  Use hand  that contains alcohol if soap and water are not available  Teach children how to wash their hands and use hand   · Cover sneezes and coughs  Turn your face away and cover your mouth and nose with a tissue  Throw the tissue away  Use the bend of your arm if a tissue is not available  Then wash your hands well with soap and water or use hand   Teach children how to cover a cough or sneeze  · Wear a face covering (mask) around anyone who does not live in your home  Use a cloth covering with at least 2 layers  You can also create layers by putting a cloth covering over a disposable non-medical mask  Cover your mouth and your nose   The covering should fit snugly against the bridge of your nose  Securely fasten it under your chin and on the sides of your face  Do not  wear a plastic face shield instead of a covering  Continue social distancing and washing your hands often  A face covering is not a substitute for social distancing safety measures  · Follow worldwide, national, and local social distancing guidelines  Keep at least 6 feet (2 meters) between you and others  Also keep this distance from anyone who comes to your home, such as someone making a delivery  Wear a face covering while you are around others  You will need to wear a covering in restaurants, stores, and other public buildings  You will also need a covering on mass transit, such as a bus, subway, or airplane  Remember to use a covering made from thick material or wear 2 coverings together  · Make a habit of not touching your face  If you get the virus on your hands, you can transfer it to your eyes, nose, or mouth and become infected  You can also transfer it to objects, surfaces, or people  Do not touch your eyes, nose, or mouth without washing your hands first     · Clean and disinfect high-touch surfaces and objects often  Use disinfecting wipes, or make a solution of 4 teaspoons of bleach in 1 quart (4 cups) of water  Clean and disinfect even if you think no one living in or coming to your home is infected with the virus  · Ask about other vaccines you may need  Get the influenza (flu) vaccine as soon as recommended each year, usually starting in September or October  Get the pneumonia vaccine if recommended  Your healthcare provider can tell you if you should also get other vaccines, and when to get them  Follow social distancing guidelines:  National and local social distancing rules vary  Rules may change over time as restrictions are lifted  Restrictions may return if an outbreak happens where you live   It is important to know and follow all current social distancing rules in your area  The following are general guidelines:  · Stay home if you are sick or think you may have COVID-19  It is important to stay home if you are waiting for a testing appointment or for test results  Even if you do not have symptoms, you can pass the virus to others  · Limit trips out of your home  Have food, medicines, and other supplies delivered and left at your door or other area, if possible  Plan trips out of your home so you make the fewest stops possible to limit close personal contact  Keep track of places you go  This will help contact tracers notify others if you become infected  · Avoid close physical contact with anyone who does not live in your home  Do not shake hands with, hug, or kiss a person as a greeting  If you must use public transportation (such as a bus or subway), try to sit or stand away from others  Only allow necessary people into your home  Wear your face covering, and remind others to wear a face covering  Remind them to wash their hands when they arrive and before they leave  Do not  let someone into your home or go to someone's home just to visit  Even if you both do not feel sick, the virus can pass from one of you to the other  · Avoid in-person gatherings and crowds  Gatherings or crowds of 10 or more individuals can cause the virus to spread  Avoid places such as [place], beaches, sporting events, and tourist attractions  For events such as parties, holiday meals, Syn[employer] services, and conferences, attend virtually (on a computer), if possible  · Ask your healthcare provider for other ways to have appointments  Some providers offer phone, video, or other types of appointments  You may also be able to get prescriptions for a few months of your medicines at a time  · Stay safe if you must go out to work  Keep physical distance between you and other workers as much as possible  Follow your employer's rules so everyone stays safe      If you have COVID-19 and are recovering at home,  healthcare providers will give you specific instructions to follow  The following are general guidelines to remind you how to keep others safe until you are well:  · Wash your hands often  Use soap and water as much as possible  Use hand  that contains alcohol if soap and water are not available  Dry your hands with a clean towel or paper towel  Do not share towels with anyone  If you use paper towels, throw them away in a lined trash can kept in your room or area  Use a covered trash can, if possible  · Do not go out of your home unless it is necessary  Ask someone who is not infected to go out for groceries or supplies, or have them delivered  Do not go to your healthcare provider's office without an appointment  · Only have close physical contact with a person giving direct care, or a baby or child you must care for  Family members and friends should not visit you  If possible, stay in a separate area or room of your home if you live with others  No one should go into the area or room except to give you care  You can visit with others by phone, video chat, e-mail, or similar systems  · Wear a face covering while others are near you  This can help prevent droplets from spreading the virus when you talk, sneeze, or cough  Put the covering on before anyone comes into your room or area  Remind the person to cover his or her nose and mouth before coming in to provide care for you  · Do not share items  Do not share dishes, towels, or other items with anyone  Items need to be washed after you use them  · Protect your baby  Some newborns have tested positive for the virus  It is not known if they became infected before or after birth  The highest risk is when a  has close contact with an infected person  If you are pregnant or breastfeeding, talk to your healthcare provider or obstetrician about any concerns you have   He or she will tell you when to bring your baby in for check-ups and vaccines  He or she will also tell you what to do if you think your baby was infected with the coronavirus  Wash your hands and put on a clean face covering before you breastfeed or care for your baby  · Do not handle live animals unless it is necessary  Some animals, including pets, have been infected with the new coronavirus  Ask someone who is not infected to take care of your pet until you are well  If you must care for a pet, wear a face covering  Wash your hands before and after you give care  Talk to your healthcare provider about how to keep a service animal safe, if needed  · Follow directions from your healthcare provider for being around others after you recover  It is not known if or for how long a recovered person can pass the virus to others  Your provider may give you instructions, such as continuing social distancing and wearing a face covering  He or she will tell you when it is okay to be around others again  This may be 10 to 20 days after symptoms started or you had a positive test  Most symptoms will also need to be gone  Your provider will give you more information  Follow up with your doctor as directed:  Write down your questions so you remember to ask them during your visits  For more information:   · Centers for Disease Control and Prevention  [address] , [address]  Phone: 2- 509 - [phone]  Web Address: [employer]    © [facility] 2021 Information is for End User's use only and may not be sold, redistributed or otherwise used for commercial purposes  All illustrations and images included in CareNotes® are the copyrighted property of A D A M , Inc  or [facility]   The above information is an  only  It is not intended as medical advice for individual conditions or treatments   Talk to your doctor, nurse or pharmacist before following any medical regimen to see if it is safe and effective for you

## 2022-01-04 NOTE — TELEPHONE ENCOUNTER
Pt  just came home from the ER-[facility]  for covid  Said she was having a lot of trouble breathing  Do you want to do virtual visit right away or in a few days?

## 2022-01-05 ENCOUNTER — TELEMEDICINE (OUTPATIENT)
Dept: INTERNAL MEDICINE CLINIC | Facility: CLINIC | Age: 87
End: 2022-01-05
Payer: MEDICARE

## 2022-01-05 ENCOUNTER — HOSPITAL ENCOUNTER (OUTPATIENT)
Dept: INFUSION CENTER | Facility: HOSPITAL | Age: 87
Discharge: HOME | End: 2022-01-05
Payer: MEDICARE

## 2022-01-05 VITALS
TEMPERATURE: 96.9 F | HEART RATE: 65 BPM | RESPIRATION RATE: 18 BRPM | OXYGEN SATURATION: 96 % | DIASTOLIC BLOOD PRESSURE: 49 MMHG | SYSTOLIC BLOOD PRESSURE: 131 MMHG

## 2022-01-05 DIAGNOSIS — U07.1 COVID-19: Primary | ICD-10-CM

## 2022-01-05 PROCEDURE — 99441 PR PHYS/QHP TELEPHONE EVALUATION 5-10 MIN: CPT | Performed by: INTERNAL MEDICINE

## 2022-01-05 PROCEDURE — M0247 HB SOTROVIMAB INF ADMIN: HCPCS | Performed by: INTERNAL MEDICINE

## 2022-01-05 RX ORDER — ONDANSETRON 2 MG/ML
4 INJECTION INTRAMUSCULAR; INTRAVENOUS ONCE AS NEEDED
Status: DISCONTINUED | OUTPATIENT
Start: 2022-01-05 | End: 2022-01-08 | Stop reason: HOSPADM

## 2022-01-05 RX ORDER — ACETAMINOPHEN 325 MG/1
650 TABLET ORAL ONCE AS NEEDED
Status: DISCONTINUED | OUTPATIENT
Start: 2022-01-05 | End: 2022-01-08 | Stop reason: HOSPADM

## 2022-01-05 RX ORDER — ACETAMINOPHEN 325 MG/1
650 TABLET ORAL ONCE AS NEEDED
Status: CANCELLED | OUTPATIENT
Start: 2022-01-05

## 2022-01-05 RX ORDER — ALBUTEROL SULFATE 90 UG/1
3 AEROSOL, METERED RESPIRATORY (INHALATION) ONCE AS NEEDED
Status: CANCELLED | OUTPATIENT
Start: 2022-01-05

## 2022-01-05 RX ORDER — SODIUM CHLORIDE 9 MG/ML
20 INJECTION, SOLUTION INTRAVENOUS ONCE
Status: CANCELLED | OUTPATIENT
Start: 2022-01-05

## 2022-01-05 RX ORDER — ALBUTEROL SULFATE 90 UG/1
3 AEROSOL, METERED RESPIRATORY (INHALATION) ONCE AS NEEDED
Status: DISCONTINUED | OUTPATIENT
Start: 2022-01-05 | End: 2022-01-08 | Stop reason: HOSPADM

## 2022-01-05 RX ORDER — ONDANSETRON 2 MG/ML
4 INJECTION INTRAMUSCULAR; INTRAVENOUS ONCE AS NEEDED
Status: CANCELLED | OUTPATIENT
Start: 2022-01-05

## 2022-01-05 RX ORDER — SODIUM CHLORIDE 9 MG/ML
20 INJECTION, SOLUTION INTRAVENOUS ONCE
Status: COMPLETED | OUTPATIENT
Start: 2022-01-05 | End: 2022-01-05

## 2022-01-05 RX ADMIN — SODIUM CHLORIDE 20 ML/HR: 0.9 INJECTION, SOLUTION INTRAVENOUS at 12:53

## 2022-01-05 RX ADMIN — SODIUM CHLORIDE 500 MG: 9 INJECTION, SOLUTION INTRAVENOUS at 12:52

## 2022-01-05 NOTE — PROGRESS NOTES
One hour observation complete  Vitals checked and stable  Iv removed  Discharge instructions given  Patient walked out of unit

## 2022-01-05 NOTE — PLAN OF CARE
Problem: Potential for Falls  Goal: Patient will remain free of falls  Description: INTERVENTIONS:  - Educate patient/family on patient safety including physical limitations  - Instruct patient to call for assistance with activity   - Consult OT/PT to assist with strengthening/mobility   - Keep Call bell within reach  - Keep bed low and locked with side rails adjusted as appropriate  - Keep care items and personal belongings within reach  - Initiate and maintain comfort rounds  - Make Fall Risk Sign visible to staff  - Apply yellow socks and bracelet for high fall risk patients  - Consider moving patient to room near nurses station  Outcome: Progressing     Problem: SAFETY ADULT  Goal: Patient will remain free of falls  Description: INTERVENTIONS:  - Educate patient/family on patient safety including physical limitations  - Instruct patient to call for assistance with activity   - Consult OT/PT to assist with strengthening/mobility   - Keep Call bell within reach  - Keep bed low and locked with side rails adjusted as appropriate  - Keep care items and personal belongings within reach  - Initiate and maintain comfort rounds  - Make Fall Risk Sign visible to staff  -- Apply yellow socks and bracelet for high fall risk patients  - Consider moving patient to room near nurses station  Outcome: Progressing     Problem: Knowledge Deficit  Goal: Patient/family/caregiver demonstrates understanding of disease process, treatment plan, medications, and discharge instructions  Description: Complete learning assessment and assess knowledge base    Interventions:  - Provide teaching at level of understanding  - Provide teaching via preferred learning methods  Outcome: Progressing

## 2022-01-05 NOTE — PROGRESS NOTES
COVID-19 Outpatient Progress Note    Assessment/Plan:    Problem List Items Addressed This Visit     None      Visit Diagnoses     COVID-19    -  Primary         Disposition:     Patient is not fully vaccinated and I recommended self quarantine for 5 days followed by strict mask use for an additional 5 days  If patient were to develop symptoms, they should immediately self isolate and call our office for further guidance  Recommend monoclonal antibody infusion  Discussed risks and benefits in detail  Monitor symptoms, O2 levels if possible  Patient is at increased risk of progressing towards severe COVID-19 due to the following high risk criteria:   - Older age (age >= 72years old)  - Chronic kidney disease  - Diabetes  - Cardiovascular disease    Patient meets criteria for Sotrovimab infusion  They were counseled in regards to risks, benefits, and side effects of this infusion  Sotrovimab is an investigational medicine used to treat mild-to-moderate symptoms of COVID-19 in adults and children (15years of age and older weighing at least 80 pounds (40 kg)) with positive results of direct SARS-CoV-2 viral testing, and who are at high risk of progression to severe COVID-19, including hospitalization or death  Sotrovimab is investigational because it is still being studied  There is limited information about the safety and effectiveness of using sotrovimab to treat people with mild-to-moderate COVID-19  The FDA has authorized the emergency use of sotrovimab for the treatment of COVID-19 under an Emergency Use Authorization (EUA)  How will I receive sotrovimab?    - You will receive 1 dose of sotrovimab  - Sotrovimab will be given through a vein (intravenous or IV infusion) over 30 minutes    Possible side effects of sotrovimab: Allergic reactions can happen during and after infusion with sotrovimab      Possible reactions include: fever, chills, nausea, headache, shortness of breath, low or high blood pressure, rapid or slow heart rate, chest discomfort or pain, weakness, confusion, feeling tired, wheezing, swelling of your lips, face, or throat, rash including hives, itching, muscle aches, dizziness, and sweating  These reactions may be severe or life threatening  Worsening symptoms after treatment: You may experience new or worsening symptoms after infusion, including fever, difficulty breathing, rapid or slow heart rate, tiredness, weakness or confusion  If these occur, contact your healthcare provider or seek immediate medical attention as some of these events have required hospitalization  It is unknown if these events are related to treatment or are due to the progression of COVID19  The side effects of getting any medicine by vein may include brief pain, bleeding, bruising of the skin, soreness, swelling, and possible infection at the infusion site  These are not all the possible side effects of sotrovimab  Not a lot of people have been given sotrovimab  Serious and unexpected side effects may happen  Sotrovimab are still being studied so it is possible that all of the risks are not known at this time  It is possible that sotrovimab could interfere with your body's own ability to fight off a future infection of SARS-CoV-2  Similarly, sotrovimab may reduce your bodys immune response to a vaccine for SARS-CoV-2  Specific studies have not been conducted to address these possible risks  Talk to your healthcare provider if you have any questions  Emergency Use Authorization:    The [facility] FDA has made sotrovimab available under an emergency access mechanism called an EUA  The EUA is supported by a [place] of Health and Human Service (HHS) declaration that circumstances exist to justify the emergency use of drugs and biological products during the COVID-19 pandemic  Sotrovimab have not undergone the same type of review as an FDA-approved or cleared product   The FDA may issue an EUA when certain criteria are met, which includes that there are no adequate, approved, and available alternatives  In addition, the FDA decision is based on the totality of scientific evidence available showing that it is reasonable to believe that the product meets certain criteria for safety, performance, and labeling and may be effective in treatment of patients during the COVID-19 pandemic  All of these criteria must be met to allow for the product to be used in the treatment of patients during the COVID-19 pandemic  The EUA for sotrovimab together is in effect for the duration of the COVID-19 declaration justifying emergency use of these products, unless terminated or revoked (after which the product may no longer be used)  What if I am pregnant or breastfeeding? There is no experience treating pregnant women or breastfeeding mothers with sotrovimab  For a mother and unborn baby, the benefit of receiving sotrovimab may be greater than the risk from the treatment  If you are pregnant or breastfeeding, discuss your options and specific situation with your healthcare provider  Regarding COVID-19 Vaccination:    Currently there is no data or safety or efficacy of COVID-19 vaccination in persons who received monoclonal antibodies as part of COVID-19 treatment  Treatment should be deferred for at least 90 days to avoid interference of the treatment with vaccine-induced immune responses (this is based on estimated half-life of therapies and evidence suggesting reinfection is uncommon within 90 days of initial infection)  Full fact sheet document for patients can be found at: [employer]    The patient consents to proceed with sotrovimab infusion  I have spent 10 minutes directly with the patient   Greater than 50% of this time was spent in counseling/coordination of care regarding: risks and benefits of treatment options, instructions for management, patient and family education and impressions  Encounter provider Lisa Rojas MD    Provider located at [address] [place] [id number]    Recent Visits  Date Type Provider Dept   01/04/22 Telephone [facility] Internal Med   Showing recent visits within past 7 days and meeting all other requirements  Today's Visits  Date Type Provider Dept   01/05/22 Telemedicine Lisa Rojas MD [facility] Internal Med   Showing today's visits and meeting all other requirements  Future Appointments  No visits were found meeting these conditions  Showing future appointments within next 150 days and meeting all other requirements     This virtual check-in was done via telephone and she agrees to proceed  Patient agrees to participate in a virtual check in via telephone or video visit instead of presenting to the office to address urgent/immediate medical needs  Patient is aware this is a billable service  After connecting through Telephone, the patient was identified by name and date of birth  Dewey Johnson was informed that this was a telemedicine visit and that the exam was being conducted confidentially over secure lines  My office door was closed  No one else was in the room  Dewey Johnson acknowledged consent and understanding of privacy and security of the telemedicine visit  I informed the patient that I have reviewed her record in Epic and presented the opportunity for her to ask any questions regarding the visit today  The patient agreed to participate  It was my intent to perform this visit via video technology but the patient was not able to do a video connection so the visit was completed via audio telephone only          Verification of patient location:  Patient is located in the following state in which I hold an active license: [place]    Subjective:   Dewey Johnson is a 80 y o  female who is concerned about COVID-19  Patient's symptoms include fatigue and shortness of breath  Patient denies fever, chills, congestion, sore throat, cough, chest tightness, nausea, vomiting, diarrhea, myalgias and headaches  Date of symptom onset: 1/3/2022  Date of exposure: 12/26/2021  COVID-19 vaccination status: Not vaccinated    Exposure:   Contact with a person who is under investigation (PUI) for or who is positive for COVID-19 within the last 14 days?: Yes    Hospitalized recently for fever and/or lower respiratory symptoms?: No      Currently a healthcare worker that is involved in direct patient care?: No      Works in a special setting where the risk of COVID-19 transmission may be high? (this may include long-term care, correctional and shelter facilities; homeless shelters; assisted-living facilities and group homes ): No      Resident in a special setting where the risk of COVID-19 transmission may be high? (this may include long-term care, correctional and shelter facilities; homeless shelters; assisted-living facilities and group homes ): No      She was with family during Christmas, some tested (+) for [place]  Her grand daughter tested her at home last week 12/31 and she tested (+)  She did not experience any symptoms  2 days ago, in the evening, she suddenly felt short of breath  She felt pressure on her back and couldn't get a deep breath in  She went to the ER yesterday morning  PCR (+) for COVID, oxygen was normal   Today, she feels her breathing is better but not 100%  No GI symptoms, cough or cold symptoms      Lab Results   Component Value Date    SARSCOV2 Positive (A) 01/04/2022     Past Medical History:   Diagnosis Date    Asthma     Chronic interstitial cystitis     Community acquired pneumonia     last assessed 10/4/13    Diabetes mellitus ([facility] [address] )     Disease of thyroid gland     Diverticulitis     Dizziness     GERD (gastroesophageal reflux disease)     Hyperlipidemia     Hypertension     Vertigo      Past Surgical History:   Procedure Laterality Date    ADENOIDECTOMY      CHOLECYSTECTOMY      COLON SURGERY      partial colectomy - sigmoid, diverticulitis    EYE SURGERY      HYSTERECTOMY      ovaries remain    TONSILLECTOMY      US GUIDED LYMPH NODE BIOPSY RIGHT  3/11/2020    US GUIDED THYROID BIOPSY  11/13/2019     Current Outpatient Medications   Medication Sig Dispense Refill    albuterol (PROVENTIL HFA,VENTOLIN HFA) 90 mcg/act inhaler INHALE 1 PUFF BY MOUTH EVERY 6 HOURS AS NEEDED FOR WHEEZE 8 5 g 1    amLODIPine (NORVASC) 5 mg tablet Take 1 tablet (5 mg total) by mouth daily 90 tablet 1    aspirin 81 mg chewable tablet Chew 1 tablet (81 mg total) daily 30 tablet 0    b complex vitamins capsule Take 1 capsule by mouth daily      Cholecalciferol (VITAMIN D PO) Take 1 tablet by mouth daily      cilostazol (PLETAL) 100 mg tablet Take 1 tablet (100 mg total) by mouth 2 (two) times a day 180 tablet 3    dicyclomine (BENTYL) 10 mg capsule Take 1 capsule (10 mg total) by mouth 3 (three) times a day as needed (abdominal cramping) 60 capsule 0    famotidine (PEPCID) 40 MG tablet Take 1 tablet (40 mg total) by mouth daily 90 tablet 1    FIBER PO Take by mouth      LORazepam (ATIVAN) 0 5 mg tablet Take 1/2 tablet to 1 tablet PO daily PRN 30 tablet 0    meclizine (ANTIVERT) 25 mg tablet Take 1 tablet (25 mg total) by mouth every 12 (twelve) hours as needed for dizziness 180 tablet 0    methimazole (TAPAZOLE) 5 mg tablet 1/2 tab daily 30 tablet 0    montelukast (SINGULAIR) 10 mg tablet Take 1 tablet (10 mg total) by mouth daily at bedtime 90 tablet 1    oxyCODONE-acetaminophen (PERCOCET) 5-325 mg per tablet Take 1 tablet by mouth 2 (two) times a day as needed for moderate painMax Daily Amount: 2 tablets 60 tablet 0    Psyllium (Metamucil) 0 36 g CAPS Take by mouth PRN       No current facility-administered medications for this visit       Allergies   Allergen Reactions    Haloperidol Confusion /AMS  1 ep on 09/04/2020  AGGRESSIVENESS  AGITATION    Triazolam Other (See Comments)     Confusion, and aggressivenss as well   Bactrim [Sulfamethoxazole-Trimethoprim]     Ezetimibe     Gabapentin     Lisinopril     Naproxen Nausea Only    Statins        Review of Systems   Constitutional: Positive for fatigue  Negative for chills and fever  HENT: Negative for congestion and sore throat  Respiratory: Positive for shortness of breath  Negative for cough and chest tightness  Gastrointestinal: Negative for diarrhea, nausea and vomiting  Musculoskeletal: Negative for myalgias  Neurological: Negative for headaches  Objective: There were no vitals filed for this visit  VIRTUAL VISIT Uriel Monge verbally agrees to participate in [employer]  Pt is aware that [employer] could be limited without vital signs or the ability to perform a full hands-on physical exam  Radha Zapata understands she or the provider may request at any time to terminate the video visit and request the patient to seek care or treatment in person

## 2022-01-05 NOTE — PROGRESS NOTES
Patient arrived for Sotrovimab infusion  Vital signs stable  EUA reviewed and verbal consent obtained  Peripheral IV started  Call bell within reach

## 2022-01-06 ENCOUNTER — TELEMEDICINE (OUTPATIENT)
Dept: INTERNAL MEDICINE CLINIC | Facility: CLINIC | Age: 87
End: 2022-01-06
Payer: MEDICARE

## 2022-01-06 DIAGNOSIS — U07.1 COVID-19: Primary | ICD-10-CM

## 2022-01-06 PROCEDURE — 99441 PR PHYS/QHP TELEPHONE EVALUATION 5-10 MIN: CPT | Performed by: NURSE PRACTITIONER

## 2022-01-06 NOTE — PROGRESS NOTES
COVID-19 Outpatient Progress Note    Assessment/Plan:    Problem List Items Addressed This Visit        Other    COVID-19 - Primary         Disposition:     Patient has COVID-19 infection  Based off CDC guidelines, they were recommended to isolate for 5 days from the date of the positive test  If they remain asymptomatic, isolation may be ended followed by 5 days of wearing a mask when around othes to minimize risk of infecting others  If they have a fever, continue to stay home until fever resolves for at least 24 hours  Doing well, continue to monitor pulse ox, should be >92%  Stay well hydrated  Ambulate as much as tolerated  Off quarantine 1/9  I have spent 7 minutes directly with the patient  Greater than 50% of this time was spent in counseling/coordination of care regarding: instructions for management and patient and family education  Encounter provider SILKE Nunez    Provider located at [address] [id number]    Recent Visits  Date Type Provider Dept   01/05/22 [facility], [address] [place] [address] Internal Med   01/04/22 Telephone [facility] Internal Med   Showing recent visits within past 7 days and meeting all other requirements  Today's Visits  Date Type Provider Dept   01/06/22 [facility] [address] Internal Med   Showing today's visits and meeting all other requirements  Future Appointments  No visits were found meeting these conditions  Showing future appointments within next 150 days and meeting all other requirements     This virtual check-in was done via telephone and she agrees to proceed  Patient agrees to participate in a virtual check in via telephone or video visit instead of presenting to the office to address urgent/immediate medical needs  Patient is aware this is a billable service      After connecting through Telephone, the patient was identified by name and date of birth  Jeet Encinas was informed that this was a telemedicine visit and that the exam was being conducted confidentially over secure lines  My office door was closed  No one else was in the room  Jeet Encinas acknowledged consent and understanding of privacy and security of the telemedicine visit  I informed the patient that I have reviewed her record in Epic and presented the opportunity for her to ask any questions regarding the visit today  The patient agreed to participate  It was my intent to perform this visit via video technology but the patient was not able to do a video connection so the visit was completed via audio telephone only  Verification of patient location:  Patient is located in the following state in which I hold an active license: [place]    Subjective:   Jeet Encinas is a 80 y o  female who has been screened for COVID-19  Symptom change since last report: improving  Patient's symptoms include fatigue and shortness of breath  Patient denies fever, chills, malaise, congestion, rhinorrhea, sore throat, anosmia, loss of taste, cough, chest tightness, nausea, vomiting, diarrhea, myalgias and headaches  Date of symptom onset: 1/3/2022  Date of exposure: 12/26/2021  Date of positive COVID-19 PCR: 1/4/2022  COVID-19 vaccination status: Not vaccinated    Naveen Darden has been staying home and has isolated themselves in her home  She is taking care to not share personal items and is cleaning all surfaces that are touched often, like counters, tabletops, and doorknobs using household cleaning sprays or wipes  She is wearing a mask when she leaves her room  Monoclonal Antibody Follow-up Symptom Questionnaire  I feel overall: somewhat better  My breathing is: somewhat better  My fatigue is: somewhat better    Feeling pretty good  Some very mild shortness of breath  Staying well hydrated   Pulse ox 96%     Lab Results   Component Value Date    SARSCOV2 Positive (A) 01/04/2022     Past Medical History:   Diagnosis Date    Asthma     Chronic interstitial cystitis     Community acquired pneumonia     last assessed 10/4/13    Diabetes mellitus ([address] )     Disease of thyroid gland     Diverticulitis     Dizziness     GERD (gastroesophageal reflux disease)     Hyperlipidemia     Hypertension     Vertigo      Past Surgical History:   Procedure Laterality Date    ADENOIDECTOMY      CHOLECYSTECTOMY      COLON SURGERY      partial colectomy - sigmoid, diverticulitis    EYE SURGERY      HYSTERECTOMY      ovaries remain    TONSILLECTOMY      US GUIDED LYMPH NODE BIOPSY RIGHT  3/11/2020    US GUIDED THYROID BIOPSY  11/13/2019     Current Outpatient Medications   Medication Sig Dispense Refill    albuterol (PROVENTIL HFA,VENTOLIN HFA) 90 mcg/act inhaler INHALE 1 PUFF BY MOUTH EVERY 6 HOURS AS NEEDED FOR WHEEZE 8 5 g 1    amLODIPine (NORVASC) 5 mg tablet Take 1 tablet (5 mg total) by mouth daily 90 tablet 1    aspirin 81 mg chewable tablet Chew 1 tablet (81 mg total) daily 30 tablet 0    b complex vitamins capsule Take 1 capsule by mouth daily      Cholecalciferol (VITAMIN D PO) Take 1 tablet by mouth daily      cilostazol (PLETAL) 100 mg tablet Take 1 tablet (100 mg total) by mouth 2 (two) times a day 180 tablet 3    dicyclomine (BENTYL) 10 mg capsule Take 1 capsule (10 mg total) by mouth 3 (three) times a day as needed (abdominal cramping) 60 capsule 0    famotidine (PEPCID) 40 MG tablet Take 1 tablet (40 mg total) by mouth daily 90 tablet 1    FIBER PO Take by mouth      LORazepam (ATIVAN) 0 5 mg tablet Take 1/2 tablet to 1 tablet PO daily PRN 30 tablet 0    meclizine (ANTIVERT) 25 mg tablet Take 1 tablet (25 mg total) by mouth every 12 (twelve) hours as needed for dizziness 180 tablet 0    methimazole (TAPAZOLE) 5 mg tablet 1/2 tab daily 30 tablet 0    montelukast (SINGULAIR) 10 mg tablet Take 1 tablet (10 mg total) by mouth daily at bedtime 90 tablet 1    oxyCODONE-acetaminophen (PERCOCET) 5-325 mg per tablet Take 1 tablet by mouth 2 (two) times a day as needed for moderate painMax Daily Amount: 2 tablets 60 tablet 0    Psyllium (Metamucil) 0 36 g CAPS Take by mouth PRN       No current facility-administered medications for this visit  Facility-Administered Medications Ordered in Other Visits   Medication Dose Route Frequency Provider Last Rate Last Admin    acetaminophen (TYLENOL) tablet 650 mg  650 mg Oral Once PRN Jace Dawson MD        albuterol (PROVENTIL HFA,VENTOLIN HFA) inhaler 3 puff  3 puff Inhalation Once PRN Jace Dawson MD        ondansetron [facility]) injection 4 mg  4 mg Intravenous Once PRN Jace Dawson MD         Allergies   Allergen Reactions    Haloperidol      Confusion /AMS  1 ep on 09/04/2020  AGGRESSIVENESS  AGITATION    Triazolam Other (See Comments)     Confusion, and aggressivenss as well   Bactrim [Sulfamethoxazole-Trimethoprim]     Ezetimibe     Gabapentin     Lisinopril     Naproxen Nausea Only    Statins        Review of Systems   Constitutional: Positive for fatigue  Negative for chills and fever  HENT: Negative for congestion, rhinorrhea and sore throat  Respiratory: Positive for shortness of breath  Negative for cough and chest tightness  Gastrointestinal: Negative for diarrhea, nausea and vomiting  Musculoskeletal: Negative for myalgias  Neurological: Negative for headaches  Objective: There were no vitals filed for this visit  VIRTUAL VISIT Uriel Monge verbally agrees to participate in [employer]  Pt is aware that [employer] could be limited without vital signs or the ability to perform a full hands-on physical exam  Radha Zapata understands she or the provider may request at any time to terminate the video visit and request the patient to seek care or treatment in person

## 2022-01-12 NOTE — TELEPHONE ENCOUNTER
Patient states no back pain  She does have trouble breathing that is worse at night, when she rolls over feels as if she cant catch her breath

## 2022-01-12 NOTE — TELEPHONE ENCOUNTER
Pt states she had a lot of gas over the weekend and had taken gas x and metamucil  That has eased up but she has a sharp pain in between her shoulder blades when breathing and sometimes depending on how she turns  Checking her oxygen and it has been fine at 97

## 2022-01-12 NOTE — TELEPHONE ENCOUNTER
Pt  Is experiencing sharp upper back pain when breathing and some tightness in her chest  Hard to take a deep breath but after some time is able to do so  Started yesterday  Says she has a lot of indigestion but that is nothing new for her  Her 02 level is 97%  Otherwise, she is feeling good  Wants to know if she can have an antibiotic

## 2022-01-12 NOTE — TELEPHONE ENCOUNTER
Any chest pain or breathing issues? As long as your oxygen levels have been good at rest and when walking, that is reassuring  Mid back pain may be muscle or your heart

## 2022-01-12 NOTE — TELEPHONE ENCOUNTER
When did the back pain and tightness start? Does it occur with movement or breathing? Have you checked your oxygen levels while walking?

## 2022-01-13 ENCOUNTER — OFFICE VISIT (OUTPATIENT)
Dept: INTERNAL MEDICINE CLINIC | Facility: CLINIC | Age: 87
End: 2022-01-13
Payer: MEDICARE

## 2022-01-13 VITALS
DIASTOLIC BLOOD PRESSURE: 50 MMHG | WEIGHT: 115.2 LBS | HEIGHT: 60 IN | TEMPERATURE: 97.2 F | SYSTOLIC BLOOD PRESSURE: 112 MMHG | OXYGEN SATURATION: 99 % | HEART RATE: 75 BPM | BODY MASS INDEX: 22.62 KG/M2

## 2022-01-13 DIAGNOSIS — M54.9 UPPER BACK PAIN: ICD-10-CM

## 2022-01-13 DIAGNOSIS — U07.1 COVID-19 VIRUS INFECTION: Primary | ICD-10-CM

## 2022-01-13 DIAGNOSIS — F41.9 ANXIETY: ICD-10-CM

## 2022-01-13 DIAGNOSIS — J45.20 MILD INTERMITTENT ASTHMA WITHOUT COMPLICATION: ICD-10-CM

## 2022-01-13 PROCEDURE — 99213 OFFICE O/P EST LOW 20 MIN: CPT | Performed by: INTERNAL MEDICINE

## 2022-01-13 RX ORDER — MOMETASONE FUROATE 110 UG/1
2 INHALANT RESPIRATORY (INHALATION) 2 TIMES DAILY
Qty: 1 EACH | Refills: 0 | Status: SHIPPED | COMMUNITY
Start: 2022-01-13 | End: 2022-03-30 | Stop reason: ALTCHOICE

## 2022-01-13 RX ORDER — TIZANIDINE 2 MG/1
2 TABLET ORAL 2 TIMES DAILY PRN
Qty: 60 TABLET | Refills: 0 | Status: SHIPPED | OUTPATIENT
Start: 2022-01-13 | End: 2022-02-07

## 2022-01-13 NOTE — PATIENT INSTRUCTIONS
You may take acetaminophen  (Tylenol) 500 mg, 1 to 2 tablets 3 times a day, as needed for pain  No more than 6 tablets a day  If it is 650 mg tablets, no more than 4 a day  May take Mucinex twice a day (1/2 to full tablet)  May take 1/2 tablet of lorazepam in the morning  Use incentive spirometer (apparatus with the ball) several times a day

## 2022-01-13 NOTE — ASSESSMENT & PLAN NOTE
Still feeling tired with occasional shortness of breath  O2 sats at rest and with ambulation maintained >90%  Reviewed normal CTA last week  Start steroid inhaler, Asmanex sample given  May take Mucinex prn  Recommend incentive spirometer

## 2022-01-13 NOTE — PROGRESS NOTES
Assessment/Plan:    COVID-19 virus infection  Still feeling tired with occasional shortness of breath  O2 sats at rest and with ambulation maintained >90%  Reviewed normal CTA last week  Start steroid inhaler, Asmanex sample given  May take Mucinex prn  Recommend incentive spirometer  Mild intermittent asthma without complication  Not in exacerbation, has not used albuterol inhaler  On Singulair  Thyroid nodule  Will discuss repeat ultrasound next visit  Anxiety  May take 1/2 tab of lorazepam every morning  Will consider daily SSRI  Diagnoses and all orders for this visit:    COVID-19 virus infection  -     mometasone (Asmanex, 30 Metered Doses,) 110 MCG/INH AEPB inhaler; Inhale 2 puffs 2 (two) times a day Rinse mouth after use  Upper back pain  Comments:  Continue Tylenol and warm compress  Given muscle relaxant to take prn  Orders:  -     tiZANidine (ZANAFLEX) 2 mg tablet; Take 1 tablet (2 mg total) by mouth 2 (two) times a day as needed for muscle spasms    Mild intermittent asthma without complication    Anxiety      Follow up as scheduled or as needed  Subjective:      Patient ID: Alka Garner is a 80 y o  female here with her grand daughter for COVID follow up  She complains of intermittent upper back pain  She reports pain occasionally sharp, feels that she is unable to get a deep breath in  She reports minimal cough, no shortness of breath or chest pains  She checks her oxygen levels at rest and while walking, it has been always over 90%  She has been applying heat, taking Tylenol 4 times a day for the pain  She mainly complains of feeling tired, has no energy  She feels her breathing helps when she takes Mucinex, she has been taking this as needed only  She is asking if there is an antibiotic she can take for this  Denies any fever or chills  Granddaughter feels that she is more anxious since she has not been feeling well    Does have lorazepam to take as needed, use he usually takes half a tablet only  The following portions of the patient's history were reviewed and updated as appropriate: allergies, current medications, past medical history, past social history and problem list     Review of Systems   Constitutional: Positive for activity change and fatigue  Negative for appetite change and fever  HENT: Negative for congestion and postnasal drip  Eyes: Negative for visual disturbance  Respiratory: Positive for shortness of breath  Negative for cough and chest tightness  Cardiovascular: Negative for chest pain  Gastrointestinal: Negative for abdominal pain, constipation and diarrhea  Genitourinary: Negative for dysuria  Musculoskeletal: Positive for arthralgias and myalgias  Skin: Negative for rash and wound  Neurological: Negative for dizziness and headaches  Psychiatric/Behavioral: Positive for dysphoric mood  The patient is nervous/anxious  Objective:      /50   Pulse 75   Temp (!) 97 2 °F (36 2 °C)   Ht 5' (1 524 m)   Wt 52 3 kg (115 lb 3 2 oz)   SpO2 99%   BMI 22 50 kg/m²          Physical Exam  Vitals and nursing note reviewed  Constitutional:       General: She is not in acute distress  Appearance: She is well-developed  HENT:      Head: Normocephalic and atraumatic  Eyes:      Pupils: Pupils are equal, round, and reactive to light  Cardiovascular:      Rate and Rhythm: Normal rate and regular rhythm  Heart sounds: Normal heart sounds  Pulmonary:      Effort: Pulmonary effort is normal  No respiratory distress  Breath sounds: Normal breath sounds  No decreased breath sounds or wheezing  Abdominal:      General: Bowel sounds are normal       Palpations: Abdomen is soft  Musculoskeletal:         General: No swelling  Cervical back: Spasms present  No tenderness or bony tenderness  No pain with movement  Thoracic back: Spasms and tenderness present        Right lower leg: No edema  Left lower leg: No edema  Skin:     General: Skin is warm  Findings: No rash  Neurological:      General: No focal deficit present  Mental Status: She is alert and oriented to person, place, and time  Psychiatric:         Attention and Perception: Attention normal          Mood and Affect: Affect normal  Mood is depressed  Mood is not anxious           Behavior: Behavior normal

## 2022-01-19 ENCOUNTER — APPOINTMENT (EMERGENCY)
Dept: RADIOLOGY | Facility: HOSPITAL | Age: 87
End: 2022-01-19
Payer: MEDICARE

## 2022-01-19 ENCOUNTER — HOSPITAL ENCOUNTER (EMERGENCY)
Facility: HOSPITAL | Age: 87
Discharge: HOME | End: 2022-01-19
Attending: EMERGENCY MEDICINE | Admitting: EMERGENCY MEDICINE
Payer: MEDICARE

## 2022-01-19 ENCOUNTER — TELEPHONE (OUTPATIENT)
Dept: CARDIOLOGY CLINIC | Facility: CLINIC | Age: 87
End: 2022-01-19

## 2022-01-19 ENCOUNTER — DOCUMENTATION (OUTPATIENT)
Dept: CARDIOLOGY CLINIC | Facility: CLINIC | Age: 87
End: 2022-01-19

## 2022-01-19 ENCOUNTER — APPOINTMENT (EMERGENCY)
Dept: CT IMAGING | Facility: HOSPITAL | Age: 87
End: 2022-01-19
Payer: MEDICARE

## 2022-01-19 ENCOUNTER — OFFICE VISIT (OUTPATIENT)
Dept: CARDIOLOGY CLINIC | Facility: CLINIC | Age: 87
End: 2022-01-19
Payer: MEDICARE

## 2022-01-19 VITALS
SYSTOLIC BLOOD PRESSURE: 106 MMHG | WEIGHT: 114.5 LBS | HEIGHT: 60 IN | OXYGEN SATURATION: 97 % | BODY MASS INDEX: 22.48 KG/M2 | HEART RATE: 73 BPM | DIASTOLIC BLOOD PRESSURE: 48 MMHG

## 2022-01-19 VITALS
SYSTOLIC BLOOD PRESSURE: 156 MMHG | HEIGHT: 60 IN | RESPIRATION RATE: 16 BRPM | HEART RATE: 62 BPM | BODY MASS INDEX: 23.76 KG/M2 | TEMPERATURE: 97.9 F | DIASTOLIC BLOOD PRESSURE: 70 MMHG | OXYGEN SATURATION: 98 % | WEIGHT: 121.03 LBS

## 2022-01-19 DIAGNOSIS — I10 HYPERTENSION, UNSPECIFIED TYPE: ICD-10-CM

## 2022-01-19 DIAGNOSIS — R06.02 SHORTNESS OF BREATH: Primary | ICD-10-CM

## 2022-01-19 DIAGNOSIS — I95.1 ORTHOSTATIC HYPOTENSION: ICD-10-CM

## 2022-01-19 DIAGNOSIS — E87.6 HYPOKALEMIA: ICD-10-CM

## 2022-01-19 LAB
2HR DELTA HS TROPONIN: 1 NG/L
ALBUMIN SERPL BCP-MCNC: 3.9 G/DL (ref 3.5–5)
ALP SERPL-CCNC: 106 U/L (ref 46–116)
ALT SERPL W P-5'-P-CCNC: 20 U/L (ref 12–78)
ANION GAP SERPL CALCULATED.3IONS-SCNC: 10 MMOL/L (ref 4–13)
APTT PPP: 33 SECONDS (ref 23–37)
AST SERPL W P-5'-P-CCNC: 19 U/L (ref 5–45)
ATRIAL RATE: 60 BPM
BASOPHILS # BLD AUTO: 0.02 THOUSANDS/ΜL (ref 0–0.1)
BASOPHILS NFR BLD AUTO: 0 % (ref 0–1)
BILIRUB SERPL-MCNC: 0.54 MG/DL (ref 0.2–1)
BUN SERPL-MCNC: 12 MG/DL (ref 5–25)
CALCIUM SERPL-MCNC: 9.5 MG/DL (ref 8.3–10.1)
CARDIAC TROPONIN I PNL SERPL HS: 4 NG/L
CARDIAC TROPONIN I PNL SERPL HS: 5 NG/L
CHLORIDE SERPL-SCNC: 104 MMOL/L (ref 100–108)
CO2 SERPL-SCNC: 26 MMOL/L (ref 21–32)
CREAT SERPL-MCNC: 1.11 MG/DL (ref 0.6–1.3)
D DIMER PPP FEU-MCNC: 1.55 UG/ML FEU
EOSINOPHIL # BLD AUTO: 0 THOUSAND/ΜL (ref 0–0.61)
EOSINOPHIL NFR BLD AUTO: 0 % (ref 0–6)
ERYTHROCYTE [DISTWIDTH] IN BLOOD BY AUTOMATED COUNT: 13.7 % (ref 11.6–15.1)
GFR SERPL CREATININE-BSD FRML MDRD: 43 ML/MIN/1.73SQ M
GLUCOSE SERPL-MCNC: 107 MG/DL (ref 65–140)
HCT VFR BLD AUTO: 39.9 % (ref 34.8–46.1)
HGB BLD-MCNC: 13.5 G/DL (ref 11.5–15.4)
IMM GRANULOCYTES # BLD AUTO: 0.01 THOUSAND/UL (ref 0–0.2)
IMM GRANULOCYTES NFR BLD AUTO: 0 % (ref 0–2)
INR PPP: 0.93 (ref 0.84–1.19)
LACTATE SERPL-SCNC: 1.1 MMOL/L (ref 0.5–2)
LACTATE SERPL-SCNC: 2.1 MMOL/L (ref 0.5–2)
LYMPHOCYTES # BLD AUTO: 2.63 THOUSANDS/ΜL (ref 0.6–4.47)
LYMPHOCYTES NFR BLD AUTO: 54 % (ref 14–44)
MAGNESIUM SERPL-MCNC: 2 MG/DL (ref 1.6–2.6)
MCH RBC QN AUTO: 29.9 PG (ref 26.8–34.3)
MCHC RBC AUTO-ENTMCNC: 33.8 G/DL (ref 31.4–37.4)
MCV RBC AUTO: 89 FL (ref 82–98)
MONOCYTES # BLD AUTO: 0.53 THOUSAND/ΜL (ref 0.17–1.22)
MONOCYTES NFR BLD AUTO: 11 % (ref 4–12)
NEUTROPHILS # BLD AUTO: 1.71 THOUSANDS/ΜL (ref 1.85–7.62)
NEUTS SEG NFR BLD AUTO: 35 % (ref 43–75)
NRBC BLD AUTO-RTO: 0 /100 WBCS
NT-PROBNP SERPL-MCNC: 137 PG/ML
P AXIS: 72 DEGREES
PHOSPHATE SERPL-MCNC: 2.6 MG/DL (ref 2.3–4.1)
PLATELET # BLD AUTO: 332 THOUSANDS/UL (ref 149–390)
PMV BLD AUTO: 8.8 FL (ref 8.9–12.7)
POTASSIUM SERPL-SCNC: 3.4 MMOL/L (ref 3.5–5.3)
PR INTERVAL: 174 MS
PROT SERPL-MCNC: 7.5 G/DL (ref 6.4–8.2)
PROTHROMBIN TIME: 12.5 SECONDS (ref 11.6–14.5)
QRS AXIS: 27 DEGREES
QRSD INTERVAL: 68 MS
QT INTERVAL: 422 MS
QTC INTERVAL: 415 MS
RBC # BLD AUTO: 4.51 MILLION/UL (ref 3.81–5.12)
SODIUM SERPL-SCNC: 140 MMOL/L (ref 136–145)
T WAVE AXIS: 70 DEGREES
VENTRICULAR RATE: 58 BPM
WBC # BLD AUTO: 4.9 THOUSAND/UL (ref 4.31–10.16)

## 2022-01-19 PROCEDURE — 80053 COMPREHEN METABOLIC PANEL: CPT | Performed by: EMERGENCY MEDICINE

## 2022-01-19 PROCEDURE — 83605 ASSAY OF LACTIC ACID: CPT | Performed by: EMERGENCY MEDICINE

## 2022-01-19 PROCEDURE — 99284 EMERGENCY DEPT VISIT MOD MDM: CPT | Performed by: EMERGENCY MEDICINE

## 2022-01-19 PROCEDURE — 85379 FIBRIN DEGRADATION QUANT: CPT | Performed by: EMERGENCY MEDICINE

## 2022-01-19 PROCEDURE — 99285 EMERGENCY DEPT VISIT HI MDM: CPT

## 2022-01-19 PROCEDURE — G1004 CDSM NDSC: HCPCS

## 2022-01-19 PROCEDURE — 84484 ASSAY OF TROPONIN QUANT: CPT | Performed by: EMERGENCY MEDICINE

## 2022-01-19 PROCEDURE — 83735 ASSAY OF MAGNESIUM: CPT | Performed by: EMERGENCY MEDICINE

## 2022-01-19 PROCEDURE — 93010 ELECTROCARDIOGRAM REPORT: CPT | Performed by: INTERNAL MEDICINE

## 2022-01-19 PROCEDURE — 93005 ELECTROCARDIOGRAM TRACING: CPT

## 2022-01-19 PROCEDURE — 85025 COMPLETE CBC W/AUTO DIFF WBC: CPT | Performed by: EMERGENCY MEDICINE

## 2022-01-19 PROCEDURE — 85730 THROMBOPLASTIN TIME PARTIAL: CPT | Performed by: EMERGENCY MEDICINE

## 2022-01-19 PROCEDURE — 71275 CT ANGIOGRAPHY CHEST: CPT

## 2022-01-19 PROCEDURE — 36415 COLL VENOUS BLD VENIPUNCTURE: CPT | Performed by: EMERGENCY MEDICINE

## 2022-01-19 PROCEDURE — 84100 ASSAY OF PHOSPHORUS: CPT | Performed by: EMERGENCY MEDICINE

## 2022-01-19 PROCEDURE — 71045 X-RAY EXAM CHEST 1 VIEW: CPT

## 2022-01-19 PROCEDURE — 85610 PROTHROMBIN TIME: CPT | Performed by: EMERGENCY MEDICINE

## 2022-01-19 PROCEDURE — 99215 OFFICE O/P EST HI 40 MIN: CPT | Performed by: NURSE PRACTITIONER

## 2022-01-19 PROCEDURE — 83880 ASSAY OF NATRIURETIC PEPTIDE: CPT | Performed by: EMERGENCY MEDICINE

## 2022-01-19 RX ORDER — CILOSTAZOL 100 MG/1
100 TABLET ORAL ONCE
Status: COMPLETED | OUTPATIENT
Start: 2022-01-19 | End: 2022-01-19

## 2022-01-19 RX ORDER — POTASSIUM CHLORIDE 750 MG/1
10 TABLET, EXTENDED RELEASE ORAL DAILY
Qty: 90 TABLET | Refills: 3 | Status: SHIPPED | OUTPATIENT
Start: 2022-01-19 | End: 2022-06-28

## 2022-01-19 RX ORDER — AMLODIPINE BESYLATE 5 MG/1
5 TABLET ORAL ONCE
Status: COMPLETED | OUTPATIENT
Start: 2022-01-19 | End: 2022-01-19

## 2022-01-19 RX ADMIN — IOHEXOL 85 ML: 350 INJECTION, SOLUTION INTRAVENOUS at 05:23

## 2022-01-19 RX ADMIN — CILOSTAZOL 100 MG: 100 TABLET ORAL at 06:25

## 2022-01-19 RX ADMIN — AMLODIPINE BESYLATE 5 MG: 5 TABLET ORAL at 06:25

## 2022-01-19 NOTE — PROGRESS NOTES
RA sat 96% HR 75  Ambulated total 620 ft    C/o SOB upon end of walk  Lowest sat 96%  Peak   BP post walk 108/50  Shona aware

## 2022-01-19 NOTE — PROGRESS NOTES
Cardiology Consultation     Alka Garner  [id number]  7/7/1931  HEART & VASCULAR [facility] [address] [address]    1  Shortness of breath  Echo complete w/ contrast if indicated    Pulse oximetry overnight    Compression Stocking   2  Orthostatic hypotension  Compression Stocking   3  Hypertension, unspecified type     4  Hypokalemia  potassium chloride (K-DUR,KLOR-CON) 10 mEq tablet       Ms Meenakshi Gómez presents to our office for a cardiology consultation due to shortness of breath with exertion  She is accompanied by her granddaughter  Bruce Luevano lives with her daughter  According to Bruce Luevano since her diagnosis of COVID-19 on 1/4/22 she has been experiencing shortness of breath with exertion, orthopnea and +PND  Last evening she became very short of breath, 911 was called  She was taken to [place] [id number] ED  /91,   Lactic acid 2 1 potassium 3 4 D-dimer 1 55,  NT proBNP 137  Chest x-ray showed no acute pulmonary disease  CTA showed no PE,  No acute pathology unchanged sub 5 mm solid nodules and 1 4 cm ground-glass nodules  Unchanged ill-defined ground-glass densities in the right upper lobe laterally measuring proximally 1 4 cm  Coronary artery calcifications normal caliber thoracic aorta with mild atherosclerotic calcifications left-sided retrosternal thyroid nodule  She was discharged home  According to her granddaughter Bruce Luevano is not drinking or eating well  Bruce Luevano denies CP or palpitations  When she wakes up in the morning she denies shortness of breath  Her shortness of breath worsens throughout the day and is more prominent at night  2 Pillow orthopnea and PND          HPI:  Hypertension  Hyperlipidemia 9/04/2020 , , HDL 52,  - not tolerant to statins   TIA   Vertebral artery stenosis  Bilateral carotid artery stenosis 9/10/21 right 50-69% ICA stenosis, left <50% ICA stenosis Vertigo  Mild Asthma  Anxiety     Allergies: statins, Crestor causing visual hallucinations, lisinopril, Ezetimibe      11/12/20  Left ventricular systolic function normal, LVEF 14%, grade 1 diastolic dysfunction  Mild aortic valve regurgitation, trace tricuspid valve regurgitation    Patient Active Problem List   Diagnosis    Anxiety    Mild intermittent asthma without complication    Degeneration of intervertebral disc of lumbar region    Diverticulosis    Elevated serum alkaline phosphatase level    Esophageal reflux    Fatty liver    Hyperlipidemia    Essential hypertension    Hyperthyroidism    Insomnia    Irritable bowel syndrome    Spinal stenosis    Spondylolisthesis, grade 1    History of TIA (transient ischemic attack)    Type 2 diabetes mellitus (HCC)    Vitamin D deficiency    Muscle cramps    Ovarian cyst    CKD (chronic kidney disease), stage III (HCC)    Trigger finger of right hand    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 ([address] )    PAD (peripheral artery disease) (HCC)    Memory loss    Atherosclerosis 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 (HCC)    Thyroid nodule    Mass of right submandibular region    Abnormal finding on imaging    Rotator cuff disorder, right    Embolism and thrombosis of arteries of the lower extremities ([address] )    Carotid stenosis, right    Vertebral artery stenosis    Vision abnormalities    Vertebral artery dissection (HCC)    Other constipation    COVID-19 virus infection    COVID-19     Past Medical History:   Diagnosis Date    Asthma     Chronic interstitial cystitis     Community acquired pneumonia     last assessed 10/4/13    Diabetes mellitus ([address] )     Disease of thyroid gland     Diverticulitis     Dizziness     GERD (gastroesophageal reflux disease)     Hyperlipidemia     Hypertension     Vertigo      Social History     Socioeconomic History    Marital status:       Spouse name: Not on file    Number of children: 3    Years of education: Not on file    Highest education level: Not on file   Occupational History     Comment: retired   Tobacco Use    Smoking status: Never Smoker    Smokeless tobacco: Never Used   Vaping Use    Vaping Use: Never used   Substance and Sexual Activity    Alcohol use: No    Drug use: No    Sexual activity: Not on file   Other Topics Concern    Not on file   Social History Narrative    Lives alone    Daughter in the area     Social Determinants of Health     Financial Resource Strain: Not on file   Food Insecurity: Not on file   Transportation Needs: Not on file   Physical Activity: Not on file   Stress: Not on file   Social Connections: Not on file   Intimate Partner Violence: Not on file   Housing Stability: Not on file      Family History   Problem Relation Age of Onset    Coronary artery disease Mother     Other Father         MVA    Alcohol abuse Neg Hx     Depression Neg Hx     Drug abuse Neg Hx     Substance Abuse Neg Hx     Mental illness Neg Hx      Past Surgical History:   Procedure Laterality Date    ADENOIDECTOMY      CHOLECYSTECTOMY      COLON SURGERY      partial colectomy - sigmoid, diverticulitis    EYE SURGERY      HYSTERECTOMY      ovaries remain    TONSILLECTOMY      US GUIDED LYMPH NODE BIOPSY RIGHT  3/11/2020    US GUIDED THYROID BIOPSY  11/13/2019       Current Outpatient Medications:     albuterol (PROVENTIL HFA,VENTOLIN HFA) 90 mcg/act inhaler, INHALE 1 PUFF BY MOUTH EVERY 6 HOURS AS NEEDED FOR WHEEZE, Disp: 8 5 g, Rfl: 1    amLODIPine (NORVASC) 5 mg tablet, Take 1 tablet (5 mg total) by mouth daily, Disp: 90 tablet, Rfl: 1    aspirin 81 mg chewable tablet, Chew 1 tablet (81 mg total) daily, Disp: 30 tablet, Rfl: 0    b complex vitamins capsule, Take 1 capsule by mouth daily, Disp: , Rfl:     Cholecalciferol (VITAMIN D PO), Take 1 tablet by mouth daily, Disp: , Rfl:    cilostazol (PLETAL) 100 mg tablet, Take 1 tablet (100 mg total) by mouth 2 (two) times a day, Disp: 180 tablet, Rfl: 3    dicyclomine (BENTYL) 10 mg capsule, Take 1 capsule (10 mg total) by mouth 3 (three) times a day as needed (abdominal cramping), Disp: 60 capsule, Rfl: 0    famotidine (PEPCID) 40 MG tablet, Take 1 tablet (40 mg total) by mouth daily, Disp: 90 tablet, Rfl: 1    FIBER PO, Take by mouth, Disp: , Rfl:     LORazepam (ATIVAN) 0 5 mg tablet, Take 1/2 tablet to 1 tablet PO daily PRN, Disp: 30 tablet, Rfl: 0    meclizine (ANTIVERT) 25 mg tablet, Take 1 tablet (25 mg total) by mouth every 12 (twelve) hours as needed for dizziness, Disp: 180 tablet, Rfl: 0    methimazole (TAPAZOLE) 5 mg tablet, 1/2 tab daily, Disp: 30 tablet, Rfl: 0    mometasone (Asmanex, 30 Metered Doses,) 110 MCG/INH AEPB inhaler, Inhale 2 puffs 2 (two) times a day Rinse mouth after use , Disp: 1 each, Rfl: 0    montelukast (SINGULAIR) 10 mg tablet, Take 1 tablet (10 mg total) by mouth daily at bedtime, Disp: 90 tablet, Rfl: 1    oxyCODONE-acetaminophen (PERCOCET) 5-325 mg per tablet, Take 1 tablet by mouth 2 (two) times a day as needed for moderate painMax Daily Amount: 2 tablets, Disp: 60 tablet, Rfl: 0    Psyllium (Metamucil) 0 36 g CAPS, Take by mouth PRN, Disp: , Rfl:     tiZANidine (ZANAFLEX) 2 mg tablet, Take 1 tablet (2 mg total) by mouth 2 (two) times a day as needed for muscle spasms, Disp: 60 tablet, Rfl: 0  No current facility-administered medications for this visit  Allergies   Allergen Reactions    Haloperidol      Confusion /AMS  1 ep on 09/04/2020  AGGRESSIVENESS  AGITATION    Triazolam Other (See Comments)     Confusion, and aggressivenss as well   Bactrim [Sulfamethoxazole-Trimethoprim]     Ezetimibe     Gabapentin     Lisinopril     Naproxen Nausea Only    Statins      There were no vitals filed for this visit      Labs:  Admission on 01/19/2022, Discharged on 01/19/2022   Component Date Value    Sodium 01/19/2022 140     Potassium 01/19/2022 3 4*    Chloride 01/19/2022 104     CO2 01/19/2022 26     ANION GAP 01/19/2022 10     BUN 01/19/2022 12     Creatinine 01/19/2022 1 11     Glucose 01/19/2022 107     Calcium 01/19/2022 9 5     AST 01/19/2022 19     ALT 01/19/2022 20     Alkaline Phosphatase 01/19/2022 106     Total Protein 01/19/2022 7 5     Albumin 01/19/2022 3 9     Total Bilirubin 01/19/2022 0 54     eGFR 01/19/2022 43     WBC 01/19/2022 4 90     RBC 01/19/2022 4 51     Hemoglobin 01/19/2022 13 5     Hematocrit 01/19/2022 39 9     MCV 01/19/2022 89     MCH 01/19/2022 29 9     MCHC 01/19/2022 33 8     RDW 01/19/2022 13 7     MPV 01/19/2022 8 8*    Platelets 29/29/2382 332     nRBC 01/19/2022 0     Neutrophils Relative 01/19/2022 35*    Immat GRANS % 01/19/2022 0     Lymphocytes Relative 01/19/2022 54*    Monocytes Relative 01/19/2022 11     Eosinophils Relative 01/19/2022 0     Basophils Relative 01/19/2022 0     Neutrophils Absolute 01/19/2022 1 71*    Immature Grans Absolute 01/19/2022 0 01     Lymphocytes Absolute 01/19/2022 2 63     Monocytes Absolute 01/19/2022 0 53     Eosinophils Absolute 01/19/2022 0 00     Basophils Absolute 01/19/2022 0 02     Protime 01/19/2022 12 5     INR 01/19/2022 0 93     PTT 01/19/2022 33     LACTIC ACID 01/19/2022 2 1*    Magnesium 01/19/2022 2 0     Phosphorus 01/19/2022 2 6     NT-proBNP 01/19/2022 137     hs TnI 0hr 01/19/2022 4     D-Dimer, Quant 01/19/2022 1 55*    hs TnI 2hr 01/19/2022 5     Delta 2hr hsTnI 01/19/2022 1     LACTIC ACID 01/19/2022 1 1    Admission on 01/04/2022, Discharged on 01/04/2022   Component Date Value    WBC 01/04/2022 3 15*    RBC 01/04/2022 4 51     Hemoglobin 01/04/2022 13 5     Hematocrit 01/04/2022 39 4     MCV 01/04/2022 87     MCH 01/04/2022 29 9     MCHC 01/04/2022 34 3     RDW 01/04/2022 13 4     MPV 01/04/2022 9 4     Platelets 47/16/0235 266     nRBC 01/04/2022 0  Neutrophils Relative 01/04/2022 53     Immat GRANS % 01/04/2022 0     Lymphocytes Relative 01/04/2022 36     Monocytes Relative 01/04/2022 11     Eosinophils Relative 01/04/2022 0     Basophils Relative 01/04/2022 0     Neutrophils Absolute 01/04/2022 1 65*    Immature Grans Absolute 01/04/2022 0 01     Lymphocytes Absolute 01/04/2022 1 13     Monocytes Absolute 01/04/2022 0 35     Eosinophils Absolute 01/04/2022 0 00     Basophils Absolute 01/04/2022 0 01     Sodium 01/04/2022 140     Potassium 01/04/2022 3 4*    Chloride 01/04/2022 103     CO2 01/04/2022 26     ANION GAP 01/04/2022 11     BUN 01/04/2022 7     Creatinine 01/04/2022 0 95     Glucose 01/04/2022 121     Calcium 01/04/2022 8 7     AST 01/04/2022 20     ALT 01/04/2022 13     Alkaline Phosphatase 01/04/2022 94     Total Protein 01/04/2022 7 4     Albumin 01/04/2022 3 9     Total Bilirubin 01/04/2022 0 76     eGFR 01/04/2022 52     hs TnI 0hr 01/04/2022 4     NT-proBNP 01/04/2022 50     SARS-CoV-2 01/04/2022 Positive*    INFLUENZA A PCR 01/04/2022 Negative     INFLUENZA B PCR 01/04/2022 Negative     RSV PCR 01/04/2022 Negative     D-Dimer, Quant 01/04/2022 1 30*    hs TnI 2hr 01/04/2022 4     Delta 2hr hsTnI 01/04/2022 0     Ventricular Rate 01/04/2022 75     Atrial Rate 01/04/2022 75     ID Interval 01/04/2022 150     QRSD Interval 01/04/2022 68     QT Interval 01/04/2022 372     QTC Interval 01/04/2022 415     P Axis 01/04/2022 58     QRS Axis 01/04/2022 32     T Wave Axis 01/04/2022 64     Ventricular Rate 01/04/2022 67     Atrial Rate 01/04/2022 67     ID Interval 01/04/2022 152     QRSD Interval 01/04/2022 78     QT Interval 01/04/2022 396     QTC Interval 01/04/2022 418     P Axis 01/04/2022 61     QRS Axis 01/04/2022 36     T Wave Axis 01/04/2022 74      Imaging: XR chest 1 view portable    Result Date: 1/19/2022  Narrative: CHEST INDICATION:   sob   COMPARISON:  Chest radiograph dated 1/4/2022  EXAM PERFORMED/VIEWS:  XR CHEST PORTABLE FINDINGS: Cardiomediastinal silhouette appears unremarkable  No focal airspace consolidation  No pneumothorax or pleural effusion  Osseous structures appear within normal limits for patient age  Impression: No acute cardiopulmonary disease  Workstation performed: [id number]     XR chest 1 view portable    Result Date: 1/4/2022  Narrative: CHEST INDICATION:   Cough, SOB, COVID  Patient has confirmed COVID-19  Tested positive with a home test 6 days ago  COMPARISON:  Chest radiograph from 9/3/2020  EXAM PERFORMED/VIEWS:  XR CHEST PORTABLE FINDINGS: Cardiomediastinal silhouette appears unremarkable  The lungs are clear  No pneumothorax or pleural effusion  Osseous structures appear within normal limits for patient age  Cholecystectomy  Impression: No acute cardiopulmonary disease  Workstation performed: [id number]     CTA ED chest PE Study    Result Date: 1/19/2022  Narrative: CTA - CHEST WITH IV CONTRAST - PULMONARY ANGIOGRAM INDICATION:   shortness of breath  COMPARISON: CT 1/4/2022  TECHNIQUE: CTA examination of the chest was performed using angiographic technique according to a protocol specifically tailored to evaluate for pulmonary embolism  Axial, sagittal, and coronal 2D reformatted images were created from the source data and  submitted for interpretation  In addition, coronal 3D MIP postprocessing was performed on the acquisition scanner  Radiation dose length product (DLP) for this visit:  348 mGy-cm   This examination, like all CT scans performed in the [facility], was performed utilizing techniques to minimize radiation dose exposure, including the use of iterative reconstruction and automated exposure control  IV Contrast:  85 mL of iohexol (OMNIPAQUE)  FINDINGS: PULMONARY ARTERIAL TREE:  No pulmonary embolus is seen   LUNGS:  Unchanged pleural-based nodules in the posterior left lower lobe, measuring 3 mm on image 3/68 and 5 mm on image 3/70  Unchanged ill-defined groundglass densities in the right upper lobe laterally measuring approximately 1 4 cm on image 3/51, and in the lingula measuring 1 4 cm on image 3/57  There is no tracheal or endobronchial lesion  PLEURA:  Unremarkable  HEART/GREAT VESSELS:  Normal heart size  Coronary artery calcifications  Normal caliber thoracic aorta with mild atherosclerotic calcifications  MEDIASTINUM AND DEREK:  Unremarkable  CHEST WALL AND LOWER NECK:   Left-sided retrosternal thyroid nodule again identified  VISUALIZED STRUCTURES IN THE UPPER ABDOMEN:  Cholecystectomy  OSSEOUS STRUCTURES:  No acute fracture or destructive osseous lesion  Impression: No acute pathology  No pulmonary embolus  Unchanged sub-5 mm solid nodules, and 1 4 cm groundglass nodules  Based on current Fleischner Society 2017 Guidelines on incidental pulmonary nodule, followup noncontrast CT is recommended at 6-12 months from the initial examination to confirm persistence; if stable at that time, additional followup CT is recommended for every 2 years until 5 years of stability is demonstrated  The study was marked in [facility] for immediate notification regarding the pulmonary nodules  Workstation performed: [id number]     CTA ED chest PE study    Addendum Date: 1/4/2022 Addendum:   ADDENDUM: 3 x 1 8 cm focal density extending from the thyroid into the retrosternal region through the thoracic inlet likely represents a exophytic thyroid nodule  Consider nonemergent evaluation with ultrasound    A significant finding notification has been created for this addendum    Result Date: 1/4/2022  Narrative: CTA - CHEST WITH IV CONTRAST - PULMONARY ANGIOGRAM INDICATION:   SOB, COVID, Cough, Elevated D-Dimer  COMPARISON: None  TECHNIQUE: CTA examination of the chest was performed using angiographic technique according to a protocol specifically tailored to evaluate for pulmonary embolism    Axial, sagittal, and coronal 2D reformatted images were created from the source data and  submitted for interpretation  In addition, coronal 3D MIP postprocessing was performed on the acquisition scanner  Radiation dose length product (DLP) for this visit:  134 mGy-cm   This examination, like all CT scans performed in the [facility], was performed utilizing techniques to minimize radiation dose exposure, including the use of iterative reconstruction and automated exposure control  IV Contrast:  85 mL of iohexol (OMNIPAQUE)  FINDINGS: PULMONARY ARTERIAL TREE:  No pulmonary embolus is seen  LUNGS:  A granuloma seen left upper lung A noncalcified nodule seen left upper lung, measuring 3 mm in image 57 series 2 An additional nodule seen left apex medially measuring about 2 mm A subpleural nodule seen in left lower lobe superior segment, measuring about 4 mm in image 131 series 2 There are a few scattered areas of  groundglass density some of which are peripherally  distributed and the others have nodular configuration, seen in image 69 series 2 in the right lung, image 97 series 2 in the left lung and in image 113 series 2 in the right lung and in image 151 series 2 in the left lower lobe No acute airspace consolidation seen PLEURA:  Unremarkable  HEART/GREAT VESSELS:  Unremarkable for patient's age  MEDIASTINUM AND DEREK:  Unremarkable  CHEST WALL AND LOWER NECK:   A rounded enhancing density seen adjacent to the esophagus extending from the inferior aspect of the thyroid and extending into the upper thorax, measuring 3 cm x 1 8 cm likely represents a exophytic nodule from the inferior left pole VISUALIZED STRUCTURES IN THE UPPER ABDOMEN:  Spleen, right adrenal gland appear unremarkable Mild nodularity of the left adrenal gland seen    The liver appear unremarkable no acute compression collapse vertebra No gross lytic lesion seen OSSEOUS STRUCTURES:  No acute compression collapse of  the vertebra     Impression: No pulmonary embolism No acute airspace consolidation Scattered areas of peripheral  to nodular groundglass opacity in the both lungs, may be Covid related infiltrate  Short interval follow-up at 3 months suggested to demonstrate resolution and for complete characterization Incidentally detected left lung nodules, measuring less than 6 mm  Based on current Fleischner Society 2017 Guidelines on incidental pulmonary nodule, no routine follow-up is needed if the patient is considered low risk for lung cancer  If the patient is considered high risk for lung cancer, 12 month follow-up non-contrast chest CT is recommended  If patient is at risk for any metastatic disease short interval follow-up at 3 months can be considered The study was marked in [facility] for immediate notification  Follow-up notification has been created Workstation performed: [id number]       Review of Systems:  Review of Systems   Constitutional: Positive for fatigue  Respiratory: Positive for shortness of breath  Musculoskeletal: Positive for arthralgias  Psychiatric/Behavioral: The patient is nervous/anxious  All other systems reviewed and are negative  Physical Exam:  Physical Exam  Vitals reviewed  Constitutional:       Appearance: Normal appearance  HENT:      Head: Normocephalic  Eyes:      Pupils: Pupils are equal, round, and reactive to light  Neck:      Comments: No JVD   Cardiovascular:      Rate and Rhythm: Normal rate and regular rhythm  Pulses: Normal pulses  Heart sounds: Normal heart sounds  Pulmonary:      Effort: Pulmonary effort is normal       Breath sounds: Normal breath sounds  Abdominal:      General: Bowel sounds are normal       Palpations: Abdomen is soft  Musculoskeletal:         General: Normal range of motion  Cervical back: Normal range of motion and neck supple  Right lower leg: No edema  Left lower leg: Edema present  Comments: Trace LLE edema    Skin:     General: Skin is warm and dry  Capillary Refill: Capillary refill takes less than 2 seconds  Neurological:      General: No focal deficit present  Mental Status: She is alert and oriented to person, place, and time  Psychiatric:         Mood and Affect: Mood normal          Behavior: Behavior normal          Discussion/Summary:  1  Shortness of breath with exertion, On PE eu volemic, HR controlled, BP controlled + orthostatic BP, 6 minute walk test she did not desaturate  I have ordered a 2 D echocardiogram evaluate wall function and valvular function  Differential diagnosis CAD, COVID-19 lung scarring though less likely given CTA results, Anxiety, deconditioning  + PND, Overnight pulse oximetry evaluate need for oxygen during sleep     2  Orthostatic hypotension lying 128/60, sitting 102 42, standing 80/40 with complaints of lightheadedness  Instructed to hydrate, wear tomas LE compression stockings during the day and remove at night, do not limit sodium intake  3  Hypertension continue on Norvasc 5mg daily at bedtime  4   Hypokalemia persistent potassium 3 4 I will order K-Dur 10 mEq daily

## 2022-01-19 NOTE — PATIENT INSTRUCTIONS
Hydrate at least 2 liters daily  Wear lower extremity compression stockings during the day, remove at night    2 D echocardiogram  Overnight pulse oximetry

## 2022-01-19 NOTE — ED PROVIDER NOTES
History  Chief Complaint   Patient presents with    Weakness - Generalized     Pt recently hospitalized for COVID 3wks ago  Per EMS: pt has been feeling weak with SOB since discharge  "I'm not getting better or worse"  HPI    Patient is a pleasant 80 yof who presents with shortness of breath, weakness and fatigue      + history of covid with recent hospitalization  No chest pain or sob  No vomiting  No focal neuro deficits  No chest pain  Patient is well appearing, notes she missed her BP meds last night  MDM patient feeling well, covid, discussed plan will dc home, strict return precautions  Prior to Admission Medications   Prescriptions Last Dose Informant Patient Reported? Taking?    Cholecalciferol (VITAMIN D PO)  Self Yes No   Sig: Take 1 tablet by mouth daily   FIBER PO  Self Yes No   Sig: Take by mouth   LORazepam (ATIVAN) 0 5 mg tablet  Self No No   Sig: Take 1/2 tablet to 1 tablet PO daily PRN   Psyllium (Metamucil) 0 36 g CAPS  Self Yes No   Sig: Take by mouth PRN   albuterol (PROVENTIL HFA,VENTOLIN HFA) 90 mcg/act inhaler  Self No No   Sig: INHALE 1 PUFF BY MOUTH EVERY 6 HOURS AS NEEDED FOR WHEEZE   amLODIPine (NORVASC) 5 mg tablet  Self No No   Sig: Take 1 tablet (5 mg total) by mouth daily   aspirin 81 mg chewable tablet  Self No No   Sig: Chew 1 tablet (81 mg total) daily   b complex vitamins capsule  Self Yes No   Sig: Take 1 capsule by mouth daily   cilostazol (PLETAL) 100 mg tablet  Self No No   Sig: Take 1 tablet (100 mg total) by mouth 2 (two) times a day   dicyclomine (BENTYL) 10 mg capsule  Self No No   Sig: Take 1 capsule (10 mg total) by mouth 3 (three) times a day as needed (abdominal cramping)   Patient not taking: Reported on 1/19/2022    famotidine (PEPCID) 40 MG tablet  Self No No   Sig: Take 1 tablet (40 mg total) by mouth daily   meclizine (ANTIVERT) 25 mg tablet  Self No No   Sig: Take 1 tablet (25 mg total) by mouth every 12 (twelve) hours as needed for dizziness   methimazole (TAPAZOLE) 5 mg tablet  Self No No   Si/2 tab daily   mometasone (Asmanex, 30 Metered Doses,) 110 MCG/INH AEPB inhaler  Self No No   Sig: Inhale 2 puffs 2 (two) times a day Rinse mouth after use    montelukast (SINGULAIR) 10 mg tablet  Self No No   Sig: Take 1 tablet (10 mg total) by mouth daily at bedtime   oxyCODONE-acetaminophen (PERCOCET) 5-325 mg per tablet  Self No No   Sig: Take 1 tablet by mouth 2 (two) times a day as needed for moderate painMax Daily Amount: 2 tablets   tiZANidine (ZANAFLEX) 2 mg tablet  Self No No   Sig: Take 1 tablet (2 mg total) by mouth 2 (two) times a day as needed for muscle spasms      Facility-Administered Medications: None       Past Medical History:   Diagnosis Date    Asthma     Chronic interstitial cystitis     Community acquired pneumonia     last assessed 10/4/13    Diabetes mellitus ([facility] [address] )     Disease of thyroid gland     Diverticulitis     Dizziness     GERD (gastroesophageal reflux disease)     Hyperlipidemia     Hypertension     Vertigo        Past Surgical History:   Procedure Laterality Date    ADENOIDECTOMY      CHOLECYSTECTOMY      COLON SURGERY      partial colectomy - sigmoid, diverticulitis    EYE SURGERY      HYSTERECTOMY      ovaries remain    TONSILLECTOMY      US GUIDED LYMPH NODE BIOPSY RIGHT  3/11/2020    US GUIDED THYROID BIOPSY  2019       Family History   Problem Relation Age of Onset    Coronary artery disease Mother     Other Father         MVA    Alcohol abuse Neg Hx     Depression Neg Hx     Drug abuse Neg Hx     Substance Abuse Neg Hx     Mental illness Neg Hx      I have reviewed and agree with the history as documented      E-Cigarette/Vaping    E-Cigarette Use Never User      E-Cigarette/Vaping Substances     Social History     Tobacco Use    Smoking status: Never Smoker    Smokeless tobacco: Never Used   Vaping Use    Vaping Use: Never used   Substance Use Topics    Alcohol use: No    Drug use: No       Review of Systems   Constitutional: Positive for fatigue  Respiratory: Positive for shortness of breath  All other systems reviewed and are negative  Physical Exam  Physical Exam  Vitals and nursing note reviewed  Constitutional:       Appearance: She is well-developed  HENT:      Head: Normocephalic and atraumatic  Right Ear: External ear normal       Left Ear: External ear normal    Eyes:      Conjunctiva/sclera: Conjunctivae normal    Neck:      Vascular: No JVD  Trachea: No tracheal deviation  Cardiovascular:      Rate and Rhythm: Normal rate and regular rhythm  Heart sounds: Normal heart sounds  Pulmonary:      Effort: Pulmonary effort is normal  No respiratory distress  Breath sounds: No wheezing or rales  Abdominal:      Palpations: Abdomen is soft  Tenderness: There is no abdominal tenderness  There is no guarding or rebound  Musculoskeletal:         General: No tenderness  Cervical back: Normal range of motion and neck supple  Skin:     General: Skin is warm and dry  Findings: No erythema or rash  Neurological:      General: No focal deficit present  Mental Status: She is alert and oriented to person, place, and time  Motor: No weakness  Psychiatric:         Behavior: Behavior normal          Thought Content:  Thought content normal          Vital Signs  ED Triage Vitals [01/19/22 0346]   Temperature Pulse Respirations Blood Pressure SpO2   97 9 °F (36 6 °C) 60 20 (!) 179/91 100 %      Temp Source Heart Rate Source Patient Position - Orthostatic VS BP Location FiO2 (%)   Oral Monitor Sitting Right arm --      Pain Score       --           Vitals:    01/19/22 0346 01/19/22 0415 01/19/22 0545 01/19/22 0630   BP: (!) 179/91 (!) 191/72 (!) 195/77 156/70   Pulse: 60 60 56 62   Patient Position - Orthostatic VS: Sitting Sitting Sitting Lying         Visual Acuity      ED Medications  Medications   iohexol (OMNIPAQUE) 350 MG/ML injection (SINGLE-DOSE) 100 mL (85 mL Intravenous Given 1/19/22 0523)   amLODIPine (NORVASC) tablet 5 mg (5 mg Oral Given 1/19/22 0625)   cilostazol (PLETAL) tablet 100 mg (100 mg Oral Given 1/19/22 0625)       Diagnostic Studies  Results Reviewed     Procedure Component Value Units Date/Time    Lactic acid 2 Hours [[id number]]  (Normal) Collected: 01/19/22 0616    Lab Status: Final result Specimen: Blood from Arm, Left Updated: 01/19/22 0713     LACTIC ACID 1 1 mmol/L     Narrative:      Result may be elevated if tourniquet was used during collection  HS Troponin I 2hr [[id number]] Collected: 01/19/22 0616    Lab Status: Final result Specimen: Blood from Arm, Left Updated: 01/19/22 0659     hs TnI 2hr 5 ng/L      Delta 2hr hsTnI 1 ng/L     Lactic acid [[id number]]  (Abnormal) Collected: 01/19/22 0411    Lab Status: Final result Specimen: Blood from Arm, Left Updated: 01/19/22 0535     LACTIC ACID 2 1 mmol/L     Narrative:      Result may be elevated if tourniquet was used during collection      Magnesium [[id number]]  (Normal) Collected: 01/19/22 0411    Lab Status: Final result Specimen: Blood from Arm, Left Updated: 01/19/22 0458     Magnesium 2 0 mg/dL     Phosphorus [[id number]]  (Normal) Collected: 01/19/22 0411    Lab Status: Final result Specimen: Blood from Arm, Left Updated: 01/19/22 0458     Phosphorus 2 6 mg/dL     NT-BNP PRO [[id number]]  (Normal) Collected: 01/19/22 0411    Lab Status: Final result Specimen: Blood from Arm, Left Updated: 01/19/22 0458     NT-proBNP 137 pg/mL     HS Troponin 0hr (reflex protocol) [[id number]]  (Normal) Collected: 01/19/22 0411    Lab Status: Final result Specimen: Blood from Arm, Left Updated: 01/19/22 0456     hs TnI 0hr 4 ng/L     Comprehensive metabolic panel [[id number]]  (Abnormal) Collected: 01/19/22 0411    Lab Status: Final result Specimen: Blood from Arm, Left Updated: 01/19/22 0450     Sodium 140 mmol/L      Potassium 3 4 mmol/L      Chloride 104 mmol/L      CO2 26 mmol/L      ANION GAP 10 mmol/L      BUN 12 mg/dL      Creatinine 1 11 mg/dL      Glucose 107 mg/dL      Calcium 9 5 mg/dL      AST 19 U/L      ALT 20 U/L      Alkaline Phosphatase 106 U/L      Total Protein 7 5 g/dL      Albumin 3 9 g/dL      Total Bilirubin 0 54 mg/dL      eGFR 43 ml/min/1 73sq m     Narrative:      National Kidney Disease Foundation guidelines for Chronic Kidney Disease (CKD):     Stage 1 with normal or high GFR (GFR > 90 mL/min/1 73 square meters)    Stage 2 Mild CKD (GFR = 60-89 mL/min/1 73 square meters)    Stage 3A Moderate CKD (GFR = 45-59 mL/min/1 73 square meters)    Stage 3B Moderate CKD (GFR = 30-44 mL/min/1 73 square meters)    Stage 4 Severe CKD (GFR = 15-29 mL/min/1 73 square meters)    Stage 5 End Stage CKD (GFR <15 mL/min/1 73 square meters)  Note: GFR calculation is accurate only with a steady state creatinine    D-Dimer [[id number]]  (Abnormal) Collected: 01/19/22 0411    Lab Status: Final result Specimen: Blood from Arm, Left Updated: 01/19/22 0450     D-Dimer, Quant 1 55 ug/ml FEU     Protime-INR [[id number]]  (Normal) Collected: 01/19/22 0411    Lab Status: Final result Specimen: Blood from Arm, Left Updated: 01/19/22 0435     Protime 12 5 seconds      INR 0 93    APTT [[id number]]  (Normal) Collected: 01/19/22 0411    Lab Status: Final result Specimen: Blood from Arm, Left Updated: 01/19/22 0435     PTT 33 seconds     CBC and differential [[id number]]  (Abnormal) Collected: 01/19/22 0411    Lab Status: Final result Specimen: Blood from Arm, Left Updated: 01/19/22 0423     WBC 4 90 Thousand/uL      RBC 4 51 Million/uL      Hemoglobin 13 5 g/dL      Hematocrit 39 9 %      MCV 89 fL      MCH 29 9 pg      MCHC 33 8 g/dL      RDW 13 7 %      MPV 8 8 fL      Platelets 247 Thousands/uL      nRBC 0 /100 WBCs      Neutrophils Relative 35 %      Immat GRANS % 0 %      Lymphocytes Relative 54 %      Monocytes Relative 11 %      Eosinophils Relative 0 % Basophils Relative 0 %      Neutrophils Absolute 1 71 Thousands/µL      Immature Grans Absolute 0 01 Thousand/uL      Lymphocytes Absolute 2 63 Thousands/µL      Monocytes Absolute 0 53 Thousand/µL      Eosinophils Absolute 0 00 Thousand/µL      Basophils Absolute 0 02 Thousands/µL                  CTA ED chest PE Study   Final Result by Maikel Skinner MD (01/19 1469)      No acute pathology  No pulmonary embolus  Unchanged sub-5 mm solid nodules, and 1 4 cm groundglass nodules  Based on current Fleischner Society 2017 Guidelines on incidental pulmonary nodule, followup noncontrast CT is recommended at 6-12 months from the initial examination to confirm    persistence; if stable at that time, additional followup CT is recommended for every 2 years until 5 years of stability is demonstrated  The study was marked in [facility] for immediate notification regarding the pulmonary nodules  Workstation performed: [id number]         XR chest 1 view portable   Final Result by Hieu Reyes MD (01/19 0919)      No acute cardiopulmonary disease  Workstation performed: [id number]                    Procedures  Procedures         ED Course  ED Course as of 01/23/22 0328   Wed Jan 19, 2022   0624 IMPRESSION:     No acute pathology  No pulmonary embolus      Unchanged sub-5 mm solid nodules, and 1 4 cm groundglass nodules  Based on current Fleischner Society 2017 Guidelines on incidental pulmonary nodule, followup noncontrast CT is recommended at 6-12 months from the initial examination to confirm   persistence; if stable at that time, additional followup CT is recommended for every 2 years until 5 years of stability is demonstrated         The study was marked in [employer] for immediate notification regarding the pulmonary nodules  0244 Patient resting comfortably in bed, discussed ct findings, discussed followup                                  SBIRT 22yo+      Most Recent Value   SBIRT (24 yo +) In order to provide better care to our patients, we are screening all of our patients for alcohol and drug use  Would it be okay to ask you these screening questions? Yes Filed at: 01/19/2022 0349   Initial Alcohol Screen: US AUDIT-C     1  How often do you have a drink containing alcohol? 0 Filed at: 01/19/2022 0349   2  How many drinks containing alcohol do you have on a typical day you are drinking? 0 Filed at: 01/19/2022 0349   3a  Male UNDER 65: How often do you have five or more drinks on one occasion? 0 Filed at: 01/19/2022 0349   3b  FEMALE Any Age, or MALE 65+: How often do you have 4 or more drinks on one occassion? 0 Filed at: 01/19/2022 0349   Audit-C Score 0 Filed at: 01/19/2022 6305   AGATHA: How many times in the past year have you    Used an illegal drug or used a prescription medication for non-medical reasons? Never Filed at: 01/19/2022 0349                    MDM    Disposition  Final diagnoses:   Shortness of breath     Time reflects when diagnosis was documented in both MDM as applicable and the Disposition within this note     Time User Action Codes Description Comment    1/19/2022  7:22 AM Migel Ren Add [R06 02] Shortness of breath       ED Disposition     ED Disposition Condition Date/Time Comment    Discharge Stable Wed Jan 19, 2022  7:25  [facility] discharge to home/self care              Follow-up Information     Follow up With Specialties Details Why Contact Info Additional Information    Rick Muhammad MD Internal Medicine In 1 day  [address]       [address] [facility] In 2 days  [address] [facility] [phone] [address] Cardiology [address] [facility], [address] [place], [place], [facility]          Discharge Medication List as of 1/19/2022  7:26 AM      CONTINUE these medications which have NOT CHANGED Details   albuterol (PROVENTIL HFA,VENTOLIN HFA) 90 mcg/act inhaler INHALE 1 PUFF BY MOUTH EVERY 6 HOURS AS NEEDED FOR WHEEZE, Normal      amLODIPine (NORVASC) 5 mg tablet Take 1 tablet (5 mg total) by mouth daily, Starting Wed 9/1/2021, Normal      aspirin 81 mg chewable tablet Chew 1 tablet (81 mg total) daily, Starting Sun 9/6/2020, Normal      b complex vitamins capsule Take 1 capsule by mouth daily, Historical Med      Cholecalciferol (VITAMIN D PO) Take 1 tablet by mouth daily, Historical Med      cilostazol (PLETAL) 100 mg tablet Take 1 tablet (100 mg total) by mouth 2 (two) times a day, Starting Tue 9/28/2021, Normal      dicyclomine (BENTYL) 10 mg capsule Take 1 capsule (10 mg total) by mouth 3 (three) times a day as needed (abdominal cramping), Starting Fri 2/12/2021, Normal      famotidine (PEPCID) 40 MG tablet Take 1 tablet (40 mg total) by mouth daily, Starting Wed 9/1/2021, Normal      FIBER PO Take by mouth, Historical Med      LORazepam (ATIVAN) 0 5 mg tablet Take 1/2 tablet to 1 tablet PO daily PRN, Normal      meclizine (ANTIVERT) 25 mg tablet Take 1 tablet (25 mg total) by mouth every 12 (twelve) hours as needed for dizziness, Starting Mon 10/4/2021, Normal      methimazole (TAPAZOLE) 5 mg tablet 1/2 tab daily, No Print      mometasone (Asmanex, 30 Metered Doses,) 110 MCG/INH AEPB inhaler Inhale 2 puffs 2 (two) times a day Rinse mouth after use , Starting Thu 1/13/2022, Sample      montelukast (SINGULAIR) 10 mg tablet Take 1 tablet (10 mg total) by mouth daily at bedtime, Starting Mon 8/16/2021, Normal      oxyCODONE-acetaminophen (PERCOCET) 5-325 mg per tablet Take 1 tablet by mouth 2 (two) times a day as needed for moderate painMax Daily Amount: 2 tablets, Starting Mon 6/7/2021, Normal      Psyllium (Metamucil) 0 36 g CAPS Take by mouth PRN, Historical Med      tiZANidine (ZANAFLEX) 2 mg tablet Take 1 tablet (2 mg total) by mouth 2 (two) times a day as needed for muscle spasms, Starting Thu 1/13/2022, Normal             No discharge procedures on file      PDMP Review       Value Time User    PDMP Reviewed  Yes 6/7/2021 11:14 AM Conner Dash MD          ED Provider  Electronically Signed by           Dereje Andres MD  01/23/22 0019

## 2022-01-21 ENCOUNTER — RA CDI HCC (OUTPATIENT)
Dept: OTHER | Facility: HOSPITAL | Age: 87
End: 2022-01-21

## 2022-01-21 ENCOUNTER — TELEPHONE (OUTPATIENT)
Dept: INTERNAL MEDICINE CLINIC | Facility: CLINIC | Age: 87
End: 2022-01-21

## 2022-01-21 NOTE — TELEPHONE ENCOUNTER
Pt states she still has slight SOB even while rest/ anxious   She has an Echo scheduled for 01/25  Someone is coming to bring her a pulse ox on 01/24 and then  on Friday

## 2022-01-21 NOTE — PROGRESS NOTES
[facility]  coding opportunities       Chart reviewed, no opportunity found: CHART REVIEWED, NO OPPORTUNITY FOUND                        Patients insurance company: [employer] No

## 2022-01-24 NOTE — TELEPHONE ENCOUNTER
Pt states overall feeling better   Still gets some SOB at night while lying down   OX has been 96 and higher

## 2022-01-25 ENCOUNTER — HOSPITAL ENCOUNTER (OUTPATIENT)
Dept: NON INVASIVE DIAGNOSTICS | Facility: CLINIC | Age: 87
Discharge: HOME | End: 2022-01-25
Payer: MEDICARE

## 2022-01-25 VITALS
HEIGHT: 60 IN | HEART RATE: 80 BPM | BODY MASS INDEX: 22.38 KG/M2 | DIASTOLIC BLOOD PRESSURE: 48 MMHG | WEIGHT: 114 LBS | SYSTOLIC BLOOD PRESSURE: 106 MMHG

## 2022-01-25 DIAGNOSIS — R06.02 SHORTNESS OF BREATH: ICD-10-CM

## 2022-01-25 LAB
AORTIC ROOT: 2.4 CM
APICAL FOUR CHAMBER EJECTION FRACTION: 60 %
AV PEAK GRADIENT: 13 MMHG
AV REGURGITATION PRESSURE HALF TIME: 500 MS
E WAVE DECELERATION TIME: 347 MS
FRACTIONAL SHORTENING: 35 % (ref 28–44)
INTERVENTRICULAR SEPTUM IN DIASTOLE (PARASTERNAL SHORT AXIS VIEW): 0.9 CM (ref 0.47–0.89)
LEFT ATRIUM AREA SYSTOLE SINGLE PLANE A4C: 9.7 CM2
LEFT ATRIUM SIZE: 2.8 CM
LEFT INTERNAL DIMENSION IN SYSTOLE: 2.4 CM (ref 2.1–4)
LEFT VENTRICULAR INTERNAL DIMENSION IN DIASTOLE: 3.7 CM (ref 3.63–5.4)
LEFT VENTRICULAR POSTERIOR WALL IN END DIASTOLE: 0.9 CM (ref 0.46–0.87)
LEFT VENTRICULAR STROKE VOLUME: 39 ML
MV E'TISSUE VEL-SEP: 9 CM/S
MV PEAK A VEL: 0.98 M/S
MV PEAK E VEL: 68 CM/S
MV STENOSIS PRESSURE HALF TIME: 0 MS
PULMONARY REGURGITATION LATE DIASTOLIC VELOCITY: 0.02 M/S
RIGHT ATRIUM AREA SYSTOLE A4C: 8.7 CM2
RIGHT VENTRICLE ID DIMENSION: 3.1 CM
SL CV AV DECELERATION TIME RETROGRADE: 1725 MS
SL CV AV PEAK GRADIENT RETROGRADE: 21 MMHG
SL CV LV EF: 60
SL CV PED ECHO LEFT VENTRICLE DIASTOLIC VOLUME (MOD BIPLANE) 2D: 58 ML
SL CV PED ECHO LEFT VENTRICLE SYSTOLIC VOLUME (MOD BIPLANE) 2D: 19 ML
TR MAX PG: 25 MMHG
TRICUSPID VALVE PEAK REGURGITATION VELOCITY: 2.52 M/S
Z-SCORE OF INTERVENTRICULAR SEPTUM IN END DIASTOLE: 2.08
Z-SCORE OF LEFT VENTRICULAR DIMENSION IN END SYSTOLE: -1.78
Z-SCORE OF LEFT VENTRICULAR POSTERIOR WALL IN END DIASTOLE: 2.19

## 2022-01-25 PROCEDURE — 93306 TTE W/DOPPLER COMPLETE: CPT | Performed by: INTERNAL MEDICINE

## 2022-01-25 PROCEDURE — 93306 TTE W/DOPPLER COMPLETE: CPT

## 2022-01-26 ENCOUNTER — LAB (OUTPATIENT)
Dept: LAB | Facility: CLINIC | Age: 87
End: 2022-01-26
Payer: MEDICARE

## 2022-01-26 DIAGNOSIS — E11.69 TYPE 2 DIABETES MELLITUS WITH OTHER SPECIFIED COMPLICATION, UNSPECIFIED WHETHER LONG TERM INSULIN USE (HCC): ICD-10-CM

## 2022-01-26 DIAGNOSIS — Z79.899 ENCOUNTER FOR LONG-TERM CURRENT USE OF MEDICATION: ICD-10-CM

## 2022-01-26 DIAGNOSIS — E05.90 PRETIBIAL MYXEDEMA: ICD-10-CM

## 2022-01-26 DIAGNOSIS — I10 ESSENTIAL HYPERTENSION: ICD-10-CM

## 2022-01-26 DIAGNOSIS — E55.9 VITAMIN D DEFICIENCY: ICD-10-CM

## 2022-01-26 DIAGNOSIS — E05.00 BASEDOW'S DISEASE: ICD-10-CM

## 2022-01-26 DIAGNOSIS — I65.21 CAROTID STENOSIS, RIGHT: ICD-10-CM

## 2022-01-26 LAB
25(OH)D3 SERPL-MCNC: 28.1 NG/ML (ref 30–100)
ALBUMIN SERPL BCP-MCNC: 3.8 G/DL (ref 3.5–5)
ALP SERPL-CCNC: 94 U/L (ref 46–116)
ALT SERPL W P-5'-P-CCNC: 16 U/L (ref 12–78)
ANION GAP SERPL CALCULATED.3IONS-SCNC: 9 MMOL/L (ref 4–13)
AST SERPL W P-5'-P-CCNC: 17 U/L (ref 5–45)
BASOPHILS # BLD AUTO: 0.03 THOUSANDS/ΜL (ref 0–0.1)
BASOPHILS NFR BLD AUTO: 1 % (ref 0–1)
BILIRUB SERPL-MCNC: 0.81 MG/DL (ref 0.2–1)
BUN SERPL-MCNC: 9 MG/DL (ref 5–25)
CALCIUM SERPL-MCNC: 9.3 MG/DL (ref 8.3–10.1)
CHLORIDE SERPL-SCNC: 102 MMOL/L (ref 100–108)
CHOLEST SERPL-MCNC: 224 MG/DL
CO2 SERPL-SCNC: 27 MMOL/L (ref 21–32)
CREAT SERPL-MCNC: 1.08 MG/DL (ref 0.6–1.3)
EOSINOPHIL # BLD AUTO: 0.03 THOUSAND/ΜL (ref 0–0.61)
EOSINOPHIL NFR BLD AUTO: 1 % (ref 0–6)
ERYTHROCYTE [DISTWIDTH] IN BLOOD BY AUTOMATED COUNT: 13.7 % (ref 11.6–15.1)
GFR SERPL CREATININE-BSD FRML MDRD: 45 ML/MIN/1.73SQ M
GLUCOSE P FAST SERPL-MCNC: 104 MG/DL (ref 65–99)
HCT VFR BLD AUTO: 39.8 % (ref 34.8–46.1)
HDLC SERPL-MCNC: 79 MG/DL
HGB BLD-MCNC: 13.2 G/DL (ref 11.5–15.4)
IMM GRANULOCYTES # BLD AUTO: 0.01 THOUSAND/UL (ref 0–0.2)
IMM GRANULOCYTES NFR BLD AUTO: 0 % (ref 0–2)
LDLC SERPL CALC-MCNC: 128 MG/DL (ref 0–100)
LYMPHOCYTES # BLD AUTO: 1.4 THOUSANDS/ΜL (ref 0.6–4.47)
LYMPHOCYTES NFR BLD AUTO: 35 % (ref 14–44)
MAGNESIUM SERPL-MCNC: 1.9 MG/DL (ref 1.6–2.6)
MCH RBC QN AUTO: 29.9 PG (ref 26.8–34.3)
MCHC RBC AUTO-ENTMCNC: 33.2 G/DL (ref 31.4–37.4)
MCV RBC AUTO: 90 FL (ref 82–98)
MONOCYTES # BLD AUTO: 0.41 THOUSAND/ΜL (ref 0.17–1.22)
MONOCYTES NFR BLD AUTO: 10 % (ref 4–12)
NEUTROPHILS # BLD AUTO: 2.18 THOUSANDS/ΜL (ref 1.85–7.62)
NEUTS SEG NFR BLD AUTO: 53 % (ref 43–75)
NONHDLC SERPL-MCNC: 145 MG/DL
NRBC BLD AUTO-RTO: 0 /100 WBCS
PHOSPHATE SERPL-MCNC: 4 MG/DL (ref 2.3–4.1)
PLATELET # BLD AUTO: 313 THOUSANDS/UL (ref 149–390)
PMV BLD AUTO: 9.3 FL (ref 8.9–12.7)
POTASSIUM SERPL-SCNC: 4.1 MMOL/L (ref 3.5–5.3)
PROT SERPL-MCNC: 7.4 G/DL (ref 6.4–8.2)
RBC # BLD AUTO: 4.41 MILLION/UL (ref 3.81–5.12)
SODIUM SERPL-SCNC: 138 MMOL/L (ref 136–145)
T4 FREE SERPL-MCNC: 0.92 NG/DL (ref 0.76–1.46)
TRIGL SERPL-MCNC: 86 MG/DL
TSH SERPL DL<=0.05 MIU/L-ACNC: 8.34 UIU/ML (ref 0.36–3.74)
WBC # BLD AUTO: 4.06 THOUSAND/UL (ref 4.31–10.16)

## 2022-01-26 PROCEDURE — 80061 LIPID PANEL: CPT

## 2022-01-26 PROCEDURE — 84443 ASSAY THYROID STIM HORMONE: CPT

## 2022-01-26 PROCEDURE — 82306 VITAMIN D 25 HYDROXY: CPT

## 2022-01-26 PROCEDURE — 84100 ASSAY OF PHOSPHORUS: CPT

## 2022-01-26 PROCEDURE — 83036 HEMOGLOBIN GLYCOSYLATED A1C: CPT

## 2022-01-26 PROCEDURE — 36415 COLL VENOUS BLD VENIPUNCTURE: CPT

## 2022-01-26 PROCEDURE — 82570 ASSAY OF URINE CREATININE: CPT | Performed by: INTERNAL MEDICINE

## 2022-01-26 PROCEDURE — 83735 ASSAY OF MAGNESIUM: CPT

## 2022-01-26 PROCEDURE — 82607 VITAMIN B-12: CPT

## 2022-01-26 PROCEDURE — 85025 COMPLETE CBC W/AUTO DIFF WBC: CPT

## 2022-01-26 PROCEDURE — 80053 COMPREHEN METABOLIC PANEL: CPT

## 2022-01-26 PROCEDURE — 84439 ASSAY OF FREE THYROXINE: CPT

## 2022-01-26 PROCEDURE — 82043 UR ALBUMIN QUANTITATIVE: CPT | Performed by: INTERNAL MEDICINE

## 2022-01-27 LAB
CREAT UR-MCNC: 82.9 MG/DL
EST. AVERAGE GLUCOSE BLD GHB EST-MCNC: 105 MG/DL
HBA1C MFR BLD: 5.3 %
MICROALBUMIN UR-MCNC: 8.4 MG/L (ref 0–20)
MICROALBUMIN/CREAT 24H UR: 10 MG/G CREATININE (ref 0–30)
VIT B12 SERPL-MCNC: 375 PG/ML (ref 100–900)

## 2022-01-28 DIAGNOSIS — E04.1 THYROID NODULE: Primary | ICD-10-CM

## 2022-01-28 DIAGNOSIS — R91.8 PULMONARY NODULES: ICD-10-CM

## 2022-01-29 DIAGNOSIS — J45.20 MILD INTERMITTENT ASTHMA WITHOUT COMPLICATION: ICD-10-CM

## 2022-01-31 RX ORDER — ALBUTEROL SULFATE 90 UG/1
AEROSOL, METERED RESPIRATORY (INHALATION)
Qty: 8.5 G | Refills: 1 | Status: SHIPPED | OUTPATIENT
Start: 2022-01-31 | End: 2022-05-11

## 2022-02-02 ENCOUNTER — OFFICE VISIT (OUTPATIENT)
Dept: CARDIOLOGY CLINIC | Facility: CLINIC | Age: 87
End: 2022-02-02
Payer: MEDICARE

## 2022-02-02 VITALS
HEART RATE: 82 BPM | HEIGHT: 61 IN | DIASTOLIC BLOOD PRESSURE: 46 MMHG | WEIGHT: 115.7 LBS | SYSTOLIC BLOOD PRESSURE: 106 MMHG | BODY MASS INDEX: 21.84 KG/M2 | OXYGEN SATURATION: 97 %

## 2022-02-02 DIAGNOSIS — R06.02 SHORTNESS OF BREATH: Primary | ICD-10-CM

## 2022-02-02 DIAGNOSIS — I95.1 ORTHOSTATIC HYPOTENSION: ICD-10-CM

## 2022-02-02 DIAGNOSIS — E87.6 HYPOKALEMIA: ICD-10-CM

## 2022-02-02 PROCEDURE — 99214 OFFICE O/P EST MOD 30 MIN: CPT | Performed by: NURSE PRACTITIONER

## 2022-02-02 NOTE — PROGRESS NOTES
Cardiology Follow Up    Yumiko Pearce  7/7/1931  [id number]  [address] [place] [address]  [facility] [address]  [address]  [phone]    1  Shortness of breath     2  Orthostatic hypotension     3  Hypokalemia         Interval History:   On 1/19/22 Ms Guillermo Sadler was seen in our office for shortness of breath with exertion  She was accompanied by her granddaughter  Christina Lomeli lives with her daughter  According to Christina Lomeli since her diagnosis of COVID-19 on 1/4/22 she has been experiencing shortness of breath with exertion, orthopnea and +PND  Last evening she became very short of breath, 911 was called  She was taken to [facility] ED  /91,   Lactic acid 2 1 potassium 3 4 D-dimer 1 55,  NT proBNP 137  Chest x-ray showed no acute pulmonary disease  CTA showed no PE,  No acute pathology unchanged sub 5 mm solid nodules and 1 4 cm ground-glass nodules  Unchanged ill-defined ground-glass densities in the right upper lobe laterally measuring proximally 1 4 cm  Coronary artery calcifications normal caliber thoracic aorta with mild atherosclerotic calcifications left-sided retrosternal thyroid nodule  She was discharged home  According to her granddaughter Christina Lomeli is not drinking or eating well  Christina Lomeli denies CP or palpitations  When she wakes up in the morning she denies shortness of breath  Her shortness of breath worsens throughout the day and is more prominent at night  2 Pillow orthopnea and PND  6 minute walk test done during this visit did not show desaturation  Positive orthostatic hypotension lying 120/60 sitting 102/42 standing 80/40  She was instructed to hydrate and wear lower extremity compression stockings during the day and remove at night  Sodium was not limited in her diet  She is instructed to continue with Norvasc 5 mg daily at bedtime  She was found to  have persistent hypokalemia     Potassium chloride 10 mEq daily was ordered  TTE ordered and overnight pulse oximetry ordered  Differential diagnosis CAD     1/25/22 TTE Showed  Left ventricle cavity size normal LVEF 63% systolic function normal wall motion normal diastolic function normal   Aortic valve trileaflet mildly thickened leaflets are moderately calcified mild regurgitation  The valve appeared sclerotic  Mild annular calcification of the mitral valve  Mild tricuspid valve regurgitation, mild pulmonic valve regurgitation  Overnight pulse oximetry did not show significant desaturation    1/26/22  Potassium 4 1    Ms Thania Grijalva presents to our office for a follow up visit  She is accompanied by her daughter  Jignesh Toscano admits to the need to take a deep breath upon waking up in the morning  She denies CP or palpitations  Jignesh Toscano admits to being a little off balance with walking  I have encouraged the use of a cane or walker  Orthostatic BP negative in the office today  She is not wearing tomas LE compression stockings  According to Jignesh Toscano the compression stockings cause her worsening leg pain             HPI:  Hypertension  Hyperlipidemia 1/26/22 , TG 86, HDL 79,  - not tolerant to statins   TIA   Vertebral artery stenosis  Bilateral carotid artery stenosis 9/10/21 right 50-69% ICA stenosis, left <50% ICA stenosis   Vertigo  Mild Asthma  Anxiety      Allergies: statins, Crestor causing visual hallucinations, lisinopril, Ezetimibe        11/12/20  Left ventricular systolic function normal, LVEF 20%, grade 1 diastolic dysfunction  Mild aortic valve regurgitation, trace tricuspid valve regurgitation    Patient Active Problem List   Diagnosis    Anxiety    Mild intermittent asthma without complication    Degeneration of intervertebral disc of lumbar region    Diverticulosis    Elevated serum alkaline phosphatase level    Esophageal reflux    Fatty liver    Hyperlipidemia    Essential hypertension    Hyperthyroidism    Insomnia  Irritable bowel syndrome    Spinal stenosis    Spondylolisthesis, grade 1    History of TIA (transient ischemic attack)    Type 2 diabetes mellitus (HCC)    Vitamin D deficiency    Muscle cramps    Ovarian cyst    CKD (chronic kidney disease), stage III (HCC)    Trigger finger of right hand    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 ([facility] [address] )    PAD (peripheral artery disease) (HCC)    Memory loss    Atherosclerosis 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 (HCC)    Thyroid nodule    Mass of right submandibular region    Abnormal finding on imaging    Rotator cuff disorder, right    Embolism and thrombosis of arteries of the lower extremities (HCC)    Carotid stenosis, right    Vertebral artery stenosis    Vision abnormalities    Vertebral artery dissection (HCC)    Other constipation    COVID-19 virus infection    COVID-19    Pulmonary nodules     Past Medical History:   Diagnosis Date    Asthma     Chronic interstitial cystitis     Community acquired pneumonia     last assessed 10/4/13    [facility]     Disease of thyroid gland     Diverticulitis     Dizziness     GERD (gastroesophageal reflux disease)     Hyperlipidemia     Hypertension     Vertigo      Social History     Socioeconomic History    Marital status:       Spouse name: Not on file    Number of children: 3    Years of education: Not on file    Highest education level: Not on file   Occupational History     Comment: retired   Tobacco Use    Smoking status: Never Smoker    Smokeless tobacco: Never Used   Vaping Use    Vaping Use: Never used   Substance and Sexual Activity    Alcohol use: No    Drug use: No    Sexual activity: Not on file   Other Topics Concern    Not on file   Social History Narrative    Lives alone    Daughter in the area     Social Determinants of Health     Financial Resource Strain: Not on file Food Insecurity: Not on file   Transportation Needs: Not on file   Physical Activity: Not on file   Stress: Not on file   Social Connections: Not on file   Intimate Partner Violence: Not on file   Housing Stability: Not on file      Family History   Problem Relation Age of Onset    Coronary artery disease Mother     Other Father         MVA    Alcohol abuse Neg Hx     Depression Neg Hx     Drug abuse Neg Hx     Substance Abuse Neg Hx     Mental illness Neg Hx      Past Surgical History:   Procedure Laterality Date    ADENOIDECTOMY      CHOLECYSTECTOMY      COLON SURGERY      partial colectomy - sigmoid, diverticulitis    EYE SURGERY      HYSTERECTOMY      ovaries remain    TONSILLECTOMY      US GUIDED LYMPH NODE BIOPSY RIGHT  3/11/2020    US GUIDED THYROID BIOPSY  11/13/2019       Current Outpatient Medications:     albuterol (PROVENTIL HFA,VENTOLIN HFA) 90 mcg/act inhaler, INHALE 1 PUFF BY MOUTH EVERY 6 HOURS AS NEEDED FOR WHEEZE, Disp: 8 5 g, Rfl: 1    amLODIPine (NORVASC) 5 mg tablet, Take 1 tablet (5 mg total) by mouth daily, Disp: 90 tablet, Rfl: 1    aspirin 81 mg chewable tablet, Chew 1 tablet (81 mg total) daily, Disp: 30 tablet, Rfl: 0    b complex vitamins capsule, Take 1 capsule by mouth daily, Disp: , Rfl:     Cholecalciferol (VITAMIN D PO), Take 1 tablet by mouth daily, Disp: , Rfl:     cilostazol (PLETAL) 100 mg tablet, Take 1 tablet (100 mg total) by mouth 2 (two) times a day, Disp: 180 tablet, Rfl: 3    dicyclomine (BENTYL) 10 mg capsule, Take 1 capsule (10 mg total) by mouth 3 (three) times a day as needed (abdominal cramping) (Patient not taking: Reported on 1/19/2022 ), Disp: 60 capsule, Rfl: 0    famotidine (PEPCID) 40 MG tablet, Take 1 tablet (40 mg total) by mouth daily, Disp: 90 tablet, Rfl: 1    FIBER PO, Take by mouth, Disp: , Rfl:     LORazepam (ATIVAN) 0 5 mg tablet, Take 1/2 tablet to 1 tablet PO daily PRN, Disp: 30 tablet, Rfl: 0    meclizine (ANTIVERT) 25 mg tablet, Take 1 tablet (25 mg total) by mouth every 12 (twelve) hours as needed for dizziness, Disp: 180 tablet, Rfl: 0    methimazole (TAPAZOLE) 5 mg tablet, 1/2 tab daily, Disp: 30 tablet, Rfl: 0    mometasone (Asmanex, 30 Metered Doses,) 110 MCG/INH AEPB inhaler, Inhale 2 puffs 2 (two) times a day Rinse mouth after use , Disp: 1 each, Rfl: 0    montelukast (SINGULAIR) 10 mg tablet, Take 1 tablet (10 mg total) by mouth daily at bedtime, Disp: 90 tablet, Rfl: 1    oxyCODONE-acetaminophen (PERCOCET) 5-325 mg per tablet, Take 1 tablet by mouth 2 (two) times a day as needed for moderate painMax Daily Amount: 2 tablets, Disp: 60 tablet, Rfl: 0    potassium chloride (K-DUR,KLOR-CON) 10 mEq tablet, Take 1 tablet (10 mEq total) by mouth daily, Disp: 90 tablet, Rfl: 3    Psyllium (Metamucil) 0 36 g CAPS, Take by mouth PRN, Disp: , Rfl:     tiZANidine (ZANAFLEX) 2 mg tablet, Take 1 tablet (2 mg total) by mouth 2 (two) times a day as needed for muscle spasms, Disp: 60 tablet, Rfl: 0  Allergies   Allergen Reactions    Haloperidol      Confusion /AMS  1 ep on 09/04/2020  AGGRESSIVENESS  AGITATION    Triazolam Other (See Comments)     Confusion, and aggressivenss as well      Bactrim [Sulfamethoxazole-Trimethoprim]     Ezetimibe     Gabapentin     Lisinopril     Naproxen Nausea Only    Statins        Labs:  Lab on 01/26/2022   Component Date Value    Vitamin B-12 01/26/2022 375     Cholesterol 01/26/2022 224*    Triglycerides 01/26/2022 86     HDL, Direct 01/26/2022 79     LDL Calculated 01/26/2022 128*    Non-HDL-Chol (CHOL-HDL) 01/26/2022 145     Sodium 01/26/2022 138     Potassium 01/26/2022 4 1     Chloride 01/26/2022 102     CO2 01/26/2022 27     ANION GAP 01/26/2022 9     BUN 01/26/2022 9     Creatinine 01/26/2022 1 08     Glucose, Fasting 01/26/2022 104*    Calcium 01/26/2022 9 3     AST 01/26/2022 17     ALT 01/26/2022 16     Alkaline Phosphatase 01/26/2022 94     Total Protein 01/26/2022 7  4     Albumin 01/26/2022 3 8     Total Bilirubin 01/26/2022 0 81     eGFR 01/26/2022 45     WBC 01/26/2022 4 06*    RBC 01/26/2022 4 41     Hemoglobin 01/26/2022 13 2     Hematocrit 01/26/2022 39 8     MCV 01/26/2022 90     MCH 01/26/2022 29 9     MCHC 01/26/2022 33 2     RDW 01/26/2022 13 7     MPV 01/26/2022 9 3     Platelets 00/94/4330 313     nRBC 01/26/2022 0     Neutrophils Relative 01/26/2022 53     Immat GRANS % 01/26/2022 0     Lymphocytes Relative 01/26/2022 35     Monocytes Relative 01/26/2022 10     Eosinophils Relative 01/26/2022 1     Basophils Relative 01/26/2022 1     Neutrophils Absolute 01/26/2022 2 18     Immature Grans Absolute 01/26/2022 0 01     Lymphocytes Absolute 01/26/2022 1 40     Monocytes Absolute 01/26/2022 0 41     Eosinophils Absolute 01/26/2022 0 03     Basophils Absolute 01/26/2022 0 03     Vit D, 25-Hydroxy 01/26/2022 28 1*    TSH 3RD GENERATON 01/26/2022 8 340*    Free T4 01/26/2022 0 92     Hemoglobin A1C 01/26/2022 5 3     EAG 01/26/2022 105     Magnesium 01/26/2022 1 9     Phosphorus 01/26/2022 4 0    Hospital Outpatient Visit on 01/25/2022   Component Date Value    A4C EF 01/25/2022 60     LVIDd 01/25/2022 3 70     LVIDS 01/25/2022 2 40     IVSd 01/25/2022 0 90     LVPWd 01/25/2022 0 90     FS 01/25/2022 35     MV E' Tissue Velocity Se* 01/25/2022 9     E wave deceleration time 01/25/2022 347     MV Peak E Jose 01/25/2022 68     MV Peak A Jose 01/25/2022 0 98     RVID d 01/25/2022 3 1     LA size 01/25/2022 2 8     ALAN A4C 01/25/2022 9 7     RAA A4C 01/25/2022 8 7     AV peak gradient 01/25/2022 13     AV peak gradient 01/25/2022 21     AV Deceleration Time 01/25/2022 1,725     AV regurgitation pressur* 01/25/2022 500     MV stenosis pressure 1/2* 01/25/2022 0     Triscuspid Valve Regurgi* 01/25/2022 25 0     Ao root 01/25/2022 2 40     Pulmonary regurgitation * 01/25/2022 0 02     Tricuspid valve peak reg* 01/25/2022 2 52     Left ventricular stroke * 01/25/2022 39 00     LEFT VENTRICLE SYSTOLIC * 99/26/1716 19     LV DIASTOLIC VOLUME (MOD* 80/39/8288 58     ZLVPWD 01/25/2022 2 19     ZLVIDS 01/25/2022 -1 78     ZIVSD 01/25/2022 2 08     LV EF 01/25/2022 60    Admission on 01/19/2022, Discharged on 01/19/2022   Component Date Value    Sodium 01/19/2022 140     Potassium 01/19/2022 3 4*    Chloride 01/19/2022 104     CO2 01/19/2022 26     ANION GAP 01/19/2022 10     BUN 01/19/2022 12     Creatinine 01/19/2022 1 11     Glucose 01/19/2022 107     Calcium 01/19/2022 9 5     AST 01/19/2022 19     ALT 01/19/2022 20     Alkaline Phosphatase 01/19/2022 106     Total Protein 01/19/2022 7 5     Albumin 01/19/2022 3 9     Total Bilirubin 01/19/2022 0 54     eGFR 01/19/2022 43     WBC 01/19/2022 4 90     RBC 01/19/2022 4 51     Hemoglobin 01/19/2022 13 5     Hematocrit 01/19/2022 39 9     MCV 01/19/2022 89     MCH 01/19/2022 29 9     MCHC 01/19/2022 33 8     RDW 01/19/2022 13 7     MPV 01/19/2022 8 8*    Platelets 24/46/4005 332     nRBC 01/19/2022 0     Neutrophils Relative 01/19/2022 35*    Immat GRANS % 01/19/2022 0     Lymphocytes Relative 01/19/2022 54*    Monocytes Relative 01/19/2022 11     Eosinophils Relative 01/19/2022 0     Basophils Relative 01/19/2022 0     Neutrophils Absolute 01/19/2022 1 71*    Immature Grans Absolute 01/19/2022 0 01     Lymphocytes Absolute 01/19/2022 2 63     Monocytes Absolute 01/19/2022 0 53     Eosinophils Absolute 01/19/2022 0 00     Basophils Absolute 01/19/2022 0 02     Protime 01/19/2022 12 5     INR 01/19/2022 0 93     PTT 01/19/2022 33     LACTIC ACID 01/19/2022 2 1*    Magnesium 01/19/2022 2 0     Phosphorus 01/19/2022 2 6     NT-proBNP 01/19/2022 137     hs TnI 0hr 01/19/2022 4     D-Dimer, Quant 01/19/2022 1 55*    hs TnI 2hr 01/19/2022 5     Delta 2hr hsTnI 01/19/2022 1     LACTIC ACID 01/19/2022 1 1     Ventricular Rate 01/19/2022 58  Atrial Rate 01/19/2022 60     OR Interval 01/19/2022 174     QRSD Interval 01/19/2022 68     QT Interval 01/19/2022 422     QTC Interval 01/19/2022 415     P Axis 01/19/2022 72     QRS Axis 01/19/2022 27     T Wave [place] 01/19/2022 70    Admission on 01/04/2022, Discharged on 01/04/2022   Component Date Value    WBC 01/04/2022 3 15*    RBC 01/04/2022 4 51     Hemoglobin 01/04/2022 13 5     Hematocrit 01/04/2022 39 4     MCV 01/04/2022 87     MCH 01/04/2022 29 9     MCHC 01/04/2022 34 3     RDW 01/04/2022 13 4     MPV 01/04/2022 9 4     Platelets 51/64/0490 266     nRBC 01/04/2022 0     Neutrophils Relative 01/04/2022 53     Immat GRANS % 01/04/2022 0     Lymphocytes Relative 01/04/2022 36     Monocytes Relative 01/04/2022 11     Eosinophils Relative 01/04/2022 0     Basophils Relative 01/04/2022 0     Neutrophils Absolute 01/04/2022 1 65*    Immature Grans Absolute 01/04/2022 0 01     Lymphocytes Absolute 01/04/2022 1 13     Monocytes Absolute 01/04/2022 0 35     Eosinophils Absolute 01/04/2022 0 00     Basophils Absolute 01/04/2022 0 01     Sodium 01/04/2022 140     Potassium 01/04/2022 3 4*    Chloride 01/04/2022 103     CO2 01/04/2022 26     ANION GAP 01/04/2022 11     BUN 01/04/2022 7     Creatinine 01/04/2022 0 95     Glucose 01/04/2022 121     Calcium 01/04/2022 8 7     AST 01/04/2022 20     ALT 01/04/2022 13     Alkaline Phosphatase 01/04/2022 94     Total Protein 01/04/2022 7 4     Albumin 01/04/2022 3 9     Total Bilirubin 01/04/2022 0 76     eGFR 01/04/2022 52     hs TnI 0hr 01/04/2022 4     NT-proBNP 01/04/2022 50     SARS-CoV-2 01/04/2022 Positive*    INFLUENZA A PCR 01/04/2022 Negative     INFLUENZA B PCR 01/04/2022 Negative     RSV PCR 01/04/2022 Negative     D-Dimer, Quant 01/04/2022 1 30*    hs TnI 2hr 01/04/2022 4     Delta 2hr hsTnI 01/04/2022 0     Ventricular Rate 01/04/2022 75     Atrial Rate 01/04/2022 75     OR Interval 01/04/2022 150     QRSD Interval 01/04/2022 68     QT Interval 01/04/2022 372     QTC Interval 01/04/2022 415     P Axis 01/04/2022 58     QRS Axis 01/04/2022 32     T Wave Axis 01/04/2022 64     Ventricular Rate 01/04/2022 67     Atrial Rate 01/04/2022 67     CO Interval 01/04/2022 152     QRSD Interval 01/04/2022 78     QT Interval 01/04/2022 396     QTC Interval 01/04/2022 418     P Axis 01/04/2022 61     QRS Axis 01/04/2022 36     T Wave Axis 01/04/2022 74      Imaging: XR chest 1 view portable    Result Date: 1/19/2022  Narrative: CHEST INDICATION:   sob  COMPARISON:  Chest radiograph dated 1/4/2022  EXAM PERFORMED/VIEWS:  XR CHEST PORTABLE FINDINGS: Cardiomediastinal silhouette appears unremarkable  No focal airspace consolidation  No pneumothorax or pleural effusion  Osseous structures appear within normal limits for patient age  Impression: No acute cardiopulmonary disease  Workstation performed: [id number]     XR chest 1 view portable    Result Date: 1/4/2022  Narrative: CHEST INDICATION:   Cough, SOB, COVID  Patient has confirmed COVID-19  Tested positive with a home test 6 days ago  COMPARISON:  Chest radiograph from 9/3/2020  EXAM PERFORMED/VIEWS:  XR CHEST PORTABLE FINDINGS: Cardiomediastinal silhouette appears unremarkable  The lungs are clear  No pneumothorax or pleural effusion  Osseous structures appear within normal limits for patient age  Cholecystectomy  Impression: No acute cardiopulmonary disease  Workstation performed: [id number]     CTA ED chest PE Study    Result Date: 1/19/2022  Narrative: CTA - CHEST WITH IV CONTRAST - PULMONARY ANGIOGRAM INDICATION:   shortness of breath  COMPARISON: CT 1/4/2022  TECHNIQUE: CTA examination of the chest was performed using angiographic technique according to a protocol specifically tailored to evaluate for pulmonary embolism    Axial, sagittal, and coronal 2D reformatted images were created from the source data and  submitted for interpretation  In addition, coronal 3D MIP postprocessing was performed on the acquisition scanner  Radiation dose length product (DLP) for this visit:  348 mGy-cm   This examination, like all CT scans performed in the [facility], was performed utilizing techniques to minimize radiation dose exposure, including the use of iterative reconstruction and automated exposure control  IV Contrast:  85 mL of iohexol (OMNIPAQUE)  FINDINGS: PULMONARY ARTERIAL TREE:  No pulmonary embolus is seen  LUNGS:  Unchanged pleural-based nodules in the posterior left lower lobe, measuring 3 mm on image 3/68 and 5 mm on image 3/70  Unchanged ill-defined groundglass densities in the right upper lobe laterally measuring approximately 1 4 cm on image 3/51, and in the lingula measuring 1 4 cm on image 3/57  There is no tracheal or endobronchial lesion  PLEURA:  Unremarkable  HEART/GREAT VESSELS:  Normal heart size  Coronary artery calcifications  Normal caliber thoracic aorta with mild atherosclerotic calcifications  MEDIASTINUM AND DEREK:  Unremarkable  CHEST WALL AND LOWER NECK:   Left-sided retrosternal thyroid nodule again identified  VISUALIZED STRUCTURES IN THE UPPER ABDOMEN:  Cholecystectomy  OSSEOUS STRUCTURES:  No acute fracture or destructive osseous lesion  Impression: No acute pathology  No pulmonary embolus  Unchanged sub-5 mm solid nodules, and 1 4 cm groundglass nodules  Based on current Fleischner Society 2017 Guidelines on incidental pulmonary nodule, followup noncontrast CT is recommended at 6-12 months from the initial examination to confirm persistence; if stable at that time, additional followup CT is recommended for every 2 years until 5 years of stability is demonstrated  The study was marked in [facility] for immediate notification regarding the pulmonary nodules   Workstation performed: [id number]     CTA ED chest PE study    Addendum Date: 1/4/2022 Addendum:   ADDENDUM: 3 x 1 8 cm focal density extending from the thyroid into the retrosternal region through the thoracic inlet likely represents a exophytic thyroid nodule  Consider nonemergent evaluation with ultrasound    A significant finding notification has been created for this addendum    Result Date: 1/4/2022  Narrative: CTA - CHEST WITH IV CONTRAST - PULMONARY ANGIOGRAM INDICATION:   SOB, COVID, Cough, Elevated D-Dimer  COMPARISON: None  TECHNIQUE: CTA examination of the chest was performed using angiographic technique according to a protocol specifically tailored to evaluate for pulmonary embolism  Axial, sagittal, and coronal 2D reformatted images were created from the source data and  submitted for interpretation  In addition, coronal 3D MIP postprocessing was performed on the acquisition scanner  Radiation dose length product (DLP) for this visit:  134 mGy-cm   This examination, like all CT scans performed in the [facility], was performed utilizing techniques to minimize radiation dose exposure, including the use of iterative reconstruction and automated exposure control  IV Contrast:  85 mL of iohexol (OMNIPAQUE)  FINDINGS: PULMONARY ARTERIAL TREE:  No pulmonary embolus is seen  LUNGS:  A granuloma seen left upper lung A noncalcified nodule seen left upper lung, measuring 3 mm in image 57 series 2 An additional nodule seen left apex medially measuring about 2 mm A subpleural nodule seen in left lower lobe superior segment, measuring about 4 mm in image 131 series 2 There are a few scattered areas of  groundglass density some of which are peripherally  distributed and the others have nodular configuration, seen in image 69 series 2 in the right lung, image 97 series 2 in the left lung and in image 113 series 2 in the right lung and in image 151 series 2 in the left lower lobe No acute airspace consolidation seen PLEURA:  Unremarkable  HEART/GREAT VESSELS:  Unremarkable for patient's age   MEDIASTINUM AND DEREK: Unremarkable  CHEST WALL AND LOWER NECK:   A rounded enhancing density seen adjacent to the esophagus extending from the inferior aspect of the thyroid and extending into the upper thorax, measuring 3 cm x 1 8 cm likely represents a exophytic nodule from the inferior left pole VISUALIZED STRUCTURES IN THE UPPER ABDOMEN:  Spleen, right adrenal gland appear unremarkable Mild nodularity of the left adrenal gland seen  The liver appear unremarkable no acute compression collapse vertebra No gross lytic lesion seen OSSEOUS STRUCTURES:  No acute compression collapse of  the vertebra     Impression: No pulmonary embolism No acute airspace consolidation Scattered areas of peripheral  to nodular groundglass opacity in the both lungs, may be Covid related infiltrate  Short interval follow-up at 3 months suggested to demonstrate resolution and for complete characterization Incidentally detected left lung nodules, measuring less than 6 mm  Based on current Fleischner Society 2017 Guidelines on incidental pulmonary nodule, no routine follow-up is needed if the patient is considered low risk for lung cancer  If the patient is considered high risk for lung cancer, 12 month follow-up non-contrast chest CT is recommended  If patient is at risk for any metastatic disease short interval follow-up at 3 months can be considered The study was marked in [facility] for immediate notification  Follow-up notification has been created Workstation performed: [id number]     Echo complete w/ contrast if indicated    Result Date: 1/25/2022  Narrative: Gabriele Casanova  Left Ventricle: Left ventricular cavity size is normal  The left ventricular ejection fraction is 60%  Systolic function is normal  Wall motion is normal  Diastolic function is normal    Aortic Valve: The aortic valve is trileaflet  The leaflets are mildly thickened  The leaflets are moderately calcified  The leaflets exhibit normal mobility  There is mild regurgitation   The valve appears sclerotic    Mitral Valve: There is mild annular calcification    Tricuspid Valve: There is mild regurgitation    Pulmonic Valve: There is mild regurgitation  Review of Systems:  Review of Systems   Respiratory: Positive for shortness of breath  Musculoskeletal: Positive for arthralgias and myalgias  All other systems reviewed and are negative  Physical Exam:  Physical Exam  Vitals reviewed  Constitutional:       Appearance: Normal appearance  HENT:      Head: Normocephalic  Eyes:      Pupils: Pupils are equal, round, and reactive to light  Cardiovascular:      Rate and Rhythm: Normal rate and regular rhythm  Pulses: Normal pulses  Heart sounds: Normal heart sounds  Pulmonary:      Effort: Pulmonary effort is normal       Breath sounds: Normal breath sounds  Abdominal:      General: Bowel sounds are normal       Palpations: Abdomen is soft  Musculoskeletal:         General: Normal range of motion  Cervical back: Normal range of motion  Right lower leg: No edema  Left lower leg: No edema  Skin:     General: Skin is warm and dry  Capillary Refill: Capillary refill takes less than 2 seconds  Neurological:      General: No focal deficit present  Mental Status: She is alert and oriented to person, place, and time  Psychiatric:         Mood and Affect: Mood normal          Behavior: Behavior normal          Discussion/Summary:  1  Shortness of breath appears to have improved  Tianacrystal Guevaran states she feels the need to take a deep breath upon waking up in am   Discussed possible CAD contributing to symptoms  She is refusing further cardiac testing at this time  Follow up with PCP  2  Orthostatic hypotension resolved, continue to ensure hydrating 1500 cc fluid daily   3   Hypokalemia resolved, 1/26/22  Potassium 4 1, continue on K dur 10meq daily

## 2022-02-03 DIAGNOSIS — R42 VERTIGO: ICD-10-CM

## 2022-02-03 DIAGNOSIS — J45.30 MILD PERSISTENT ASTHMA WITHOUT COMPLICATION: ICD-10-CM

## 2022-02-03 RX ORDER — MONTELUKAST SODIUM 10 MG/1
10 TABLET ORAL
Qty: 90 TABLET | Refills: 1 | Status: SHIPPED | OUTPATIENT
Start: 2022-02-03 | End: 2022-05-02 | Stop reason: SDUPTHER

## 2022-02-03 RX ORDER — MECLIZINE HYDROCHLORIDE 25 MG/1
25 TABLET ORAL EVERY 12 HOURS PRN
Qty: 180 TABLET | Refills: 0 | Status: SHIPPED | OUTPATIENT
Start: 2022-02-03 | End: 2022-05-02 | Stop reason: SDUPTHER

## 2022-02-05 DIAGNOSIS — M54.9 UPPER BACK PAIN: ICD-10-CM

## 2022-02-07 RX ORDER — TIZANIDINE 2 MG/1
2 TABLET ORAL 2 TIMES DAILY PRN
Qty: 60 TABLET | Refills: 0 | Status: SHIPPED | OUTPATIENT
Start: 2022-02-07 | End: 2022-03-02

## 2022-03-21 DIAGNOSIS — M51.36 DEGENERATION OF INTERVERTEBRAL DISC OF LUMBAR REGION: ICD-10-CM

## 2022-03-21 DIAGNOSIS — F41.9 ANXIETY: ICD-10-CM

## 2022-03-22 RX ORDER — LORAZEPAM 0.5 MG/1
TABLET ORAL
Qty: 30 TABLET | Refills: 0 | Status: SHIPPED | OUTPATIENT
Start: 2022-03-22 | End: 2022-05-25 | Stop reason: SDUPTHER

## 2022-03-22 RX ORDER — OXYCODONE HYDROCHLORIDE AND ACETAMINOPHEN 5; 325 MG/1; MG/1
1 TABLET ORAL 2 TIMES DAILY PRN
Qty: 60 TABLET | Refills: 0 | Status: SHIPPED | OUTPATIENT
Start: 2022-03-22

## 2022-03-28 ENCOUNTER — HOSPITAL ENCOUNTER (OUTPATIENT)
Dept: NON INVASIVE DIAGNOSTICS | Facility: CLINIC | Age: 87
Discharge: HOME | End: 2022-03-28
Payer: MEDICARE

## 2022-03-28 DIAGNOSIS — M54.9 UPPER BACK PAIN: ICD-10-CM

## 2022-03-28 DIAGNOSIS — I65.21 STENOSIS OF RIGHT CAROTID ARTERY: ICD-10-CM

## 2022-03-28 DIAGNOSIS — I65.09 VERTEBRAL ARTERY STENOSIS: ICD-10-CM

## 2022-03-28 PROCEDURE — 93880 EXTRACRANIAL BILAT STUDY: CPT

## 2022-03-28 PROCEDURE — 93880 EXTRACRANIAL BILAT STUDY: CPT | Performed by: SURGERY

## 2022-03-28 RX ORDER — TIZANIDINE 2 MG/1
2 TABLET ORAL 2 TIMES DAILY PRN
Qty: 60 TABLET | Refills: 1 | Status: SHIPPED | OUTPATIENT
Start: 2022-03-28 | End: 2022-04-25

## 2022-03-30 ENCOUNTER — OFFICE VISIT (OUTPATIENT)
Dept: INTERNAL MEDICINE CLINIC | Facility: CLINIC | Age: 87
End: 2022-03-30
Payer: MEDICARE

## 2022-03-30 ENCOUNTER — TELEPHONE (OUTPATIENT)
Dept: NEUROLOGY | Facility: CLINIC | Age: 87
End: 2022-03-30

## 2022-03-30 VITALS
OXYGEN SATURATION: 95 % | HEIGHT: 61 IN | SYSTOLIC BLOOD PRESSURE: 126 MMHG | HEART RATE: 84 BPM | WEIGHT: 115 LBS | DIASTOLIC BLOOD PRESSURE: 60 MMHG | BODY MASS INDEX: 21.71 KG/M2 | TEMPERATURE: 99.1 F

## 2022-03-30 DIAGNOSIS — E04.1 THYROID NODULE: ICD-10-CM

## 2022-03-30 DIAGNOSIS — I73.9 PAD (PERIPHERAL ARTERY DISEASE) (HCC): ICD-10-CM

## 2022-03-30 DIAGNOSIS — I12.9 TYPE 2 DIABETES MELLITUS WITH STAGE 3 CHRONIC KIDNEY DISEASE AND HYPERTENSION (HCC): ICD-10-CM

## 2022-03-30 DIAGNOSIS — E78.2 MIXED HYPERLIPIDEMIA: ICD-10-CM

## 2022-03-30 DIAGNOSIS — K59.09 OTHER CONSTIPATION: ICD-10-CM

## 2022-03-30 DIAGNOSIS — Z00.00 HEALTH MAINTENANCE EXAMINATION: ICD-10-CM

## 2022-03-30 DIAGNOSIS — E05.90 HYPERTHYROIDISM: ICD-10-CM

## 2022-03-30 DIAGNOSIS — I65.09 VERTEBRAL ARTERY STENOSIS, UNSPECIFIED LATERALITY: ICD-10-CM

## 2022-03-30 DIAGNOSIS — N18.30 TYPE 2 DIABETES MELLITUS WITH STAGE 3 CHRONIC KIDNEY DISEASE AND HYPERTENSION (HCC): ICD-10-CM

## 2022-03-30 DIAGNOSIS — F41.9 ANXIETY: ICD-10-CM

## 2022-03-30 DIAGNOSIS — I10 ESSENTIAL HYPERTENSION: ICD-10-CM

## 2022-03-30 DIAGNOSIS — E11.22 TYPE 2 DIABETES MELLITUS WITH STAGE 3 CHRONIC KIDNEY DISEASE AND HYPERTENSION (HCC): ICD-10-CM

## 2022-03-30 DIAGNOSIS — N18.31 STAGE 3A CHRONIC KIDNEY DISEASE (HCC): Primary | ICD-10-CM

## 2022-03-30 DIAGNOSIS — M51.36 DEGENERATION OF INTERVERTEBRAL DISC OF LUMBAR REGION: ICD-10-CM

## 2022-03-30 DIAGNOSIS — E53.8 VITAMIN B12 DEFICIENCY: ICD-10-CM

## 2022-03-30 PROBLEM — U07.1 COVID-19 VIRUS INFECTION: Status: RESOLVED | Noted: 2022-01-05 | Resolved: 2022-03-30

## 2022-03-30 PROBLEM — U07.1 COVID-19: Status: RESOLVED | Noted: 2022-01-05 | Resolved: 2022-03-30

## 2022-03-30 PROBLEM — R25.2 MUSCLE CRAMPS: Status: RESOLVED | Noted: 2017-11-14 | Resolved: 2022-03-30

## 2022-03-30 PROCEDURE — 1123F ACP DISCUSS/DSCN MKR DOCD: CPT | Performed by: INTERNAL MEDICINE

## 2022-03-30 PROCEDURE — G0439 PPPS, SUBSEQ VISIT: HCPCS | Performed by: INTERNAL MEDICINE

## 2022-03-30 PROCEDURE — 99214 OFFICE O/P EST MOD 30 MIN: CPT | Performed by: INTERNAL MEDICINE

## 2022-03-30 RX ORDER — METHIMAZOLE 5 MG/1
TABLET ORAL
Qty: 30 TABLET | Refills: 0
Start: 2022-03-30

## 2022-03-30 NOTE — TELEPHONE ENCOUNTER
Called patient to offer assistance with scheduling  Conference call made to central scheduling, spoke with Dee Flores and scheduled repeat doppler 9/30/22  Provided patient the phone number to Vascular surgery

## 2022-03-30 NOTE — PROGRESS NOTES
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)  Lab Results   Component Value Date    EGFR 45 01/26/2022    EGFR 43 01/19/2022    EGFR 52 01/04/2022    CREATININE 1 08 01/26/2022    CREATININE 1 11 01/19/2022    CREATININE 0 95 01/04/2022     Stable  Stopped K supplements over a week ago, recheck BMP today  Hyperthyroidism  On methimazole, per Endo  Esophageal reflux  No recent symptoms  Vitamin B12 deficiency  Taking Metanx daily  Degeneration of intervertebral disc of lumbar region  Stable, takes Percocet prn  Other constipation  Again, discussed importance of adequate fluid and fiber intake daily  Diagnoses and all orders for this visit:    Stage 3a chronic kidney disease ([facility] [address] )  -     Basic metabolic panel;  Future    Essential hypertension    Mixed hyperlipidemia    Hyperthyroidism  -     methimazole (TAPAZOLE) 5 mg tablet; 1/2 tab MWF    PAD (peripheral artery disease) (HCC)    Thyroid nodule    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 (HCC)    Vertebral artery stenosis, unspecified laterality    Vitamin B12 deficiency    Degeneration of intervertebral disc of lumbar region    Anxiety    Other constipation    Health maintenance examination  Comments:  No further screening  Declined vaccinations  Follow up in 4 months or as needed  Subjective:      Patient ID: Gayle Robertson is a 80 y o  female here for a follow up  She is here with her daughter today  She continues to experience occasional dizziness with certain movements or just when seated  She reports breathing has significantly improved, denies any cough or wheezing  She has not used her rescue inhaler  She moves her bowels at least once a week  She admits not drinking a lot of fluids regularly, does not eat a lot of fiber  She does take fiber gummies once a day  Daughter reports that she did not feel well when taking the cholesterol medication, stopped taking it  She stopped taking her potassium pills about a week ago because it was too big  She saw her Endocrinologist recently, unsure if she did the thyroid ultrasound  The following portions of the patient's history were reviewed and updated as appropriate: allergies, current medications, past medical history, past social history and problem list     Review of Systems   Constitutional: Negative for activity change, appetite change and fatigue  HENT: Negative for congestion  Respiratory: Negative for cough, chest tightness, shortness of breath and wheezing  Cardiovascular: Negative for chest pain  Gastrointestinal: Positive for constipation  Musculoskeletal: Positive for arthralgias  Negative for gait problem and joint swelling  Neurological: Positive for dizziness and light-headedness  Negative for numbness and headaches  Psychiatric/Behavioral: The patient is not nervous/anxious  Objective:      /60   Pulse 84   Temp 99 1 °F (37 3 °C)   Ht 5' 1" (1 549 m)   Wt 52 2 kg (115 lb)   SpO2 95%   BMI 21 73 kg/m²          Physical Exam  Vitals and nursing note reviewed  Constitutional:       General: She is not in acute distress       Appearance: She is well-developed  HENT:      Head: Normocephalic and atraumatic  Eyes:      Pupils: Pupils are equal, round, and reactive to light  Cardiovascular:      Rate and Rhythm: Normal rate and regular rhythm  Heart sounds: Normal heart sounds  Pulmonary:      Effort: Pulmonary effort is normal       Breath sounds: Normal breath sounds  No wheezing  Abdominal:      General: Bowel sounds are normal       Palpations: Abdomen is soft  Musculoskeletal:         General: No swelling  Right lower leg: No edema  Left lower leg: No edema  Skin:     General: Skin is warm  Findings: No rash  Neurological:      General: No focal deficit present  Mental Status: She is alert and oriented to person, place, and time  Psychiatric:         Mood and Affect: Mood and affect normal  Mood is not anxious or depressed  Behavior: Behavior normal          Labs & imaging results reviewed with patient

## 2022-03-30 NOTE — PROGRESS NOTES
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 ([facility] [address] )       Lab Results   Component Value Date    HGBA1C 5 3 01/26/2022     Normal A1c  Sees Endo  Cardiovascular and Mediastinum    Essential hypertension    PAD (peripheral artery disease) (HCC)    Vertebral artery stenosis     Intermittent symptoms  On ASA, Pletal    Encouraged to try low dose rosuvastatin again, 2-3 days a week  Follow up with vascular, reviewed recent ultrasound  Musculoskeletal and Integument    Degeneration of intervertebral disc of lumbar region     Stable, takes Percocet prn  Genitourinary    CKD (chronic kidney disease), stage III [facility]) - Primary     Lab Results   Component Value Date    EGFR 45 01/26/2022    EGFR 43 01/19/2022    EGFR 52 01/04/2022    CREATININE 1 08 01/26/2022    CREATININE 1 11 01/19/2022    CREATININE 0 95 01/04/2022     Stable  Stopped K supplements over a week ago, recheck BMP today  Relevant Orders    Basic metabolic panel       Other    Anxiety     Stable, taking lorazepam prn only  Hyperlipidemia    Other constipation     Again, discussed importance of adequate fluid and fiber intake daily  Vitamin B12 deficiency     Taking Metanx daily  Other Visit Diagnoses     Health maintenance examination        No further screening  Declined vaccinations  Depression Screening and Follow-up Plan: Patient was screened for depression during today's encounter  They screened negative with a PHQ-2 score of 0        Preventive health issues were discussed with patient, and age appropriate screening tests were ordered as noted in patient's After Visit Summary  Personalized health advice and appropriate referrals for health education or preventive services given if needed, as noted in patient's After Visit Summary       History of Present Illness:     Patient presents for Medicare Annual Wellness visit    Patient Care Team:  Trena Rodriguez MD as PCP - Luster Scheuermann, MD Haidee Crome, MD Trena Caldera MD     Problem List:     Patient Active Problem List   Diagnosis    Anxiety    Mild intermittent asthma without complication    Degeneration of intervertebral disc of lumbar region    Diverticulosis    Elevated serum alkaline phosphatase level    Esophageal reflux    Fatty liver    Hyperlipidemia    Essential hypertension    Hyperthyroidism    Insomnia    Irritable bowel syndrome    Spinal stenosis    Spondylolisthesis, grade 1    History of TIA (transient ischemic attack)    Type 2 diabetes mellitus ([address] )    Vitamin D deficiency    Ovarian cyst    CKD (chronic kidney disease), stage III ([address] )    Trigger finger of right hand    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 ([address]    PAD (peripheral artery disease) ([address] )    Memory loss    Atherosclerosis 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 (HCC)    Thyroid nodule    Mass of right submandibular region    Abnormal finding on imaging    Rotator cuff disorder, right    Embolism and thrombosis of arteries of the lower extremities ([address] )    Carotid stenosis, right    Vertebral artery stenosis    Vision abnormalities    Vertebral artery dissection (HCC)    Other constipation    Pulmonary nodules    Vitamin B12 deficiency      Past Medical and Surgical History:     Past Medical History:   Diagnosis Date    Asthma     Chronic interstitial cystitis     Community acquired pneumonia     last assessed 10/4/13    Diabetes mellitus ([address] )     Disease of thyroid gland     Diverticulitis     Dizziness     GERD (gastroesophageal reflux disease)     Hyperlipidemia     Hypertension     Vertigo      Past Surgical History:   Procedure Laterality Date    ADENOIDECTOMY      CHOLECYSTECTOMY      COLON SURGERY      partial colectomy - sigmoid, diverticulitis    EYE SURGERY      HYSTERECTOMY      ovaries remain    TONSILLECTOMY      US GUIDED LYMPH NODE BIOPSY RIGHT  3/11/2020    US GUIDED THYROID BIOPSY  11/13/2019      Family History:     Family History   Problem Relation Age of Onset    Coronary artery disease Mother     Other Father         MVA    Alcohol abuse Neg Hx     Depression Neg Hx     Drug abuse Neg Hx     Substance Abuse Neg Hx     Mental illness Neg Hx       Social History:     Social History     Socioeconomic History    Marital status:       Spouse name: None    Number of children: 3    Years of education: None    Highest education level: None   Occupational History     Comment: retired   Tobacco Use    Smoking status: Never Smoker    Smokeless tobacco: Never Used   Vaping Use    Vaping Use: Never used   Substance and Sexual Activity    Alcohol use: No    Drug use: No    Sexual activity: None   Other Topics Concern    None   Social History Narrative    Lives alone    Daughter in the area     Social Determinants of Health     Financial Resource Strain: Not on file   Food Insecurity: Not on file   Transportation Needs: Not on file   Physical Activity: Not on file   Stress: Not on file   Social Connections: Not on file   Intimate Partner Violence: Not on file   Housing Stability: Not on file      Medications and Allergies:     Current Outpatient Medications   Medication Sig Dispense Refill    albuterol (PROVENTIL HFA,VENTOLIN HFA) 90 mcg/act inhaler INHALE 1 PUFF BY MOUTH EVERY 6 HOURS AS NEEDED FOR WHEEZE 8 5 g 1    amLODIPine (NORVASC) 5 mg tablet Take 1 tablet (5 mg total) by mouth daily 90 tablet 1    aspirin 81 mg chewable tablet Chew 1 tablet (81 mg total) daily 30 tablet 0    b complex vitamins capsule Take 1 capsule by mouth daily      Cholecalciferol (VITAMIN D PO) Take 1 tablet by mouth daily      cilostazol (PLETAL) 100 mg tablet Take 1 tablet (100 mg total) by mouth 2 (two) times a day 180 tablet 3    famotidine (PEPCID) 40 MG tablet Take 1 tablet (40 mg total) by mouth daily 90 tablet 1    FIBER PO Take by mouth      LORazepam (ATIVAN) 0 5 mg tablet Take 1/2 tablet to 1 tablet PO daily PRN 30 tablet 0    meclizine (ANTIVERT) 25 mg tablet Take 1 tablet (25 mg total) by mouth every 12 (twelve) hours as needed for dizziness 180 tablet 0    methimazole (TAPAZOLE) 5 mg tablet 1/2 tab MWF 30 tablet 0    montelukast (SINGULAIR) 10 mg tablet Take 1 tablet (10 mg total) by mouth daily at bedtime 90 tablet 1    oxyCODONE-acetaminophen (PERCOCET) 5-325 mg per tablet Take 1 tablet by mouth 2 (two) times a day as needed for moderate pain Max Daily Amount: 2 tablets 60 tablet 0    potassium chloride (K-DUR,KLOR-CON) 10 mEq tablet Take 1 tablet (10 mEq total) by mouth daily 90 tablet 3    Psyllium (Metamucil) 0 36 g CAPS Take by mouth PRN      tiZANidine (ZANAFLEX) 2 mg tablet TAKE 1 TABLET (2 MG TOTAL) BY MOUTH 2 (TWO) TIMES A DAY AS NEEDED FOR MUSCLE SPASMS 60 tablet 1     No current facility-administered medications for this visit  Allergies   Allergen Reactions    Haloperidol      Confusion /AMS  1 ep on 09/04/2020  AGGRESSIVENESS  AGITATION    Triazolam Other (See Comments)     Confusion, and aggressivenss as well      Bactrim [Sulfamethoxazole-Trimethoprim]     Ezetimibe     Gabapentin     Lisinopril     Naproxen Nausea Only    Statins       Immunizations:     Immunization History   Administered Date(s) Administered    H1N1, All Formulations 10/18/2002, 12/07/2006    Influenza Quadrivalent Preservative Free 3 years and older IM 07/26/2016    Pneumococcal Conjugate 13-Valent 03/16/2015    Pneumococcal Polysaccharide PPV23 02/22/1999, 09/23/2011    Tdap 09/05/2019      Health Maintenance: There are no preventive care reminders to display for this patient  Topic Date Due    COVID-19 Vaccine (1) Never done      Medicare Health Risk Assessment:     /60   Pulse 84   Temp 99 1 °F (37 3 °C)   Ht 5' 1" (1 549 m)   Wt 52 2 kg (115 lb)   SpO2 95%   BMI 21 73 kg/m²      Elizabeth Zelaya is here for her Subsequent Wellness visit  Last Medicare Wellness visit information reviewed, patient interviewed and updates made to the record today  Health Risk Assessment:   Patient rates overall health as fair  Patient feels that their physical health rating is same  Patient is satisfied with their life  Eyesight was rated as same  Hearing was rated as same  Patient feels that their emotional and mental health rating is same  Patients states they are never, rarely angry  Patient states they are never, rarely unusually tired/fatigued  Pain experienced in the last 7 days has been none  Patient states that she has experienced no weight loss or gain in last 6 months  Depression Screening:   PHQ-2 Score: 0      Fall Risk Screening: In the past year, patient has experienced: history of falling in past year    Number of falls: 1  Injured during fall?: No    Feels unsteady when standing or walking?: No    Worried about falling?: No      Home Safety:  Patient does not have trouble with stairs inside or outside of their home  Patient has working smoke alarms and has working carbon monoxide detector  Home safety hazards include: none  Nutrition:   Current diet is Regular  Medications:   Patient is currently taking over-the-counter supplements  OTC medications include: see medication list  Patient is able to manage medications       Activities of Daily Living (ADLs)/Instrumental Activities of Daily Living (IADLs):   Walk and transfer into and out of bed and chair?: Yes  Dress and groom yourself?: Yes Bathe or shower yourself?: Yes    Feed yourself? Yes  Do your laundry/housekeeping?: Yes  Manage your money, pay your bills and track your expenses?: Yes  Make your own meals?: Yes    Do your own shopping?: Yes    Previous Hospitalizations:   Any hospitalizations or ED visits within the last 12 months?: Yes    How many hospitalizations have you had in the last year?: 1-2    Advance Care Planning:   Living will: No    Durable POA for healthcare: No    Advanced directive: Yes    End of Life Decisions reviewed with patient: Yes      Cognitive Screening:   Provider or family/friend/caregiver concerned regarding cognition?: No    PREVENTIVE SCREENINGS      Cardiovascular Screening:    General: History Lipid Disorder and Screening Current      Diabetes Screening:     General: History Diabetes and Screening Current      Colorectal Cancer Screening:     General: Screening Not Indicated      Breast Cancer Screening:     General: Patient Declines      Cervical Cancer Screening:    General: Screening Not Indicated      Osteoporosis Screening:    General: Patient Declines      Abdominal Aortic Aneurysm (AAA) Screening:        General: Screening Not Indicated      Lung Cancer Screening:     General: Screening Not Indicated      Hepatitis C Screening:    General: Screening Not Indicated    Screening, Brief Intervention, and Referral to Treatment (SBIRT)    Screening  Typical number of drinks in a day: 0  Typical number of drinks in a week: 0  Interpretation: Low risk drinking behavior  Single Item Drug Screening:  How often have you used an illegal drug (including marijuana) or a prescription medication for non-medical reasons in the past year? never    Single Item Drug Screen Score: 0  Interpretation: Negative screen for possible drug use disorder    Review of Current Opioid Use    Opioid Risk Tool (ORT) Interpretation: Complete Opioid Risk Tool (ORT)    Other Counseling Topics:   Regular weightbearing exercise         Chelsey Martinez Luis Giron MD

## 2022-03-30 NOTE — PATIENT INSTRUCTIONS
Vitamin B12 1000 mcg daily  Vitamin D3 2000 units daily  Start cholesterol medication 2 or 3 times a week

## 2022-03-30 NOTE — TELEPHONE ENCOUNTER
----- Message from Cortes Esteban PA-C sent at 3/30/2022 11:45 AM EDT -----  Called pt with result of the study  She needs follow-up with Vascular Surgery  She has seen Dr Sabrina Talley in the past  She also needs a repeat carotid doppler in 6 months  Both orders entered  Thanks

## 2022-03-30 NOTE — ASSESSMENT & PLAN NOTE
Lab Results   Component Value Date    EGFR 45 01/26/2022    EGFR 43 01/19/2022    EGFR 52 01/04/2022    CREATININE 1 08 01/26/2022    CREATININE 1 11 01/19/2022    CREATININE 0 95 01/04/2022     Stable  Stopped K supplements over a week ago, recheck BMP today

## 2022-03-30 NOTE — PROGRESS NOTES
Diabetic Foot Exam    Patient's shoes and socks removed  Right Foot/Ankle   Right Foot Inspection  Skin Exam: skin normal and skin intact  No dry skin, no warmth, no callus, no erythema, no maceration, no abnormal color, no pre-ulcer, no ulcer and no callus  Toe Exam: ROM and strength within normal limits  Sensory   Monofilament testing: intact    Vascular  The right DP pulse is 2+  Left Foot/Ankle  Left Foot Inspection  Skin Exam: skin normal and skin intact  No dry skin, no warmth, no erythema, no maceration, normal color, no pre-ulcer, no ulcer and no callus  Toe Exam: ROM and strength within normal limits  Sensory   Monofilament testing: intact    Vascular  The left DP pulse is 2+       Assign Risk Category  No deformity present  No loss of protective sensation  No weak pulses  Risk: 0

## 2022-03-30 NOTE — ASSESSMENT & PLAN NOTE
Intermittent symptoms  On ASA, Pletal    Encouraged to try low dose rosuvastatin again, 2-3 days a week  Follow up with vascular, reviewed recent ultrasound

## 2022-04-14 ENCOUNTER — OFFICE VISIT (OUTPATIENT)
Dept: VASCULAR SURGERY | Facility: CLINIC | Age: 87
End: 2022-04-14
Payer: MEDICARE

## 2022-04-14 VITALS
DIASTOLIC BLOOD PRESSURE: 70 MMHG | HEART RATE: 85 BPM | BODY MASS INDEX: 21.83 KG/M2 | WEIGHT: 115.6 LBS | HEIGHT: 61 IN | SYSTOLIC BLOOD PRESSURE: 120 MMHG

## 2022-04-14 DIAGNOSIS — I65.21 CAROTID STENOSIS, RIGHT: Primary | ICD-10-CM

## 2022-04-14 DIAGNOSIS — I65.21 STENOSIS OF RIGHT CAROTID ARTERY: ICD-10-CM

## 2022-04-14 PROCEDURE — 99213 OFFICE O/P EST LOW 20 MIN: CPT | Performed by: SURGERY

## 2022-04-14 NOTE — PROGRESS NOTES
Assessment/Plan:    Follow-up of carotid Doppler 6 months ago  Her current Doppler does show some progression of disease both on the left and the right however she remains completely asymptomatic with no upper lower extremity weakness no amaurosis fugax no problems with speech or swallowing  Both sides have increased in stenosis however reviewing the study in depth she is likely 65% on the right and likely around 50% on the left  Plan:  Follow-up carotid Doppler in 6 months       Diagnoses and all orders for this visit:    Carotid stenosis, right        Subjective:      Patient ID: Radha Lund is a 80 y o  female  Pt is here to rev CV 3/28/22  Pt c/o occ dizziness but other TIA stroke symptoms  Pt is taking ASA and Pletal    HPI    The following portions of the patient's history were reviewed and updated as appropriate: allergies, current medications, past family history, past medical history, past social history, past surgical history and problem list     Review of Systems   Constitutional: Negative  HENT: Negative  Eyes: Negative  Respiratory: Negative  Cardiovascular: Negative  Gastrointestinal: Negative  Endocrine: Negative  Genitourinary: Negative  Musculoskeletal: Negative  Skin: Negative  Allergic/Immunologic: Negative  Neurological: Positive for dizziness  Hematological: Negative  Psychiatric/Behavioral: Negative  Objective: There were no vitals taken for this visit  Physical Exam  carotid pulses equal bilaterally no bruits heard  Chest lungs clear heart regular rhythm, no calf or ankle edema    I have reviewed and made appropriate changes to the review of systems input by the medical assistant      Vitals:    04/14/22 1353 04/14/22 1355   BP: 122/70 120/70   BP Location: Right arm Left arm   Patient Position: Sitting Sitting   Cuff Size: Standard Standard   Pulse: 85    Weight: 52 4 kg (115 lb 9 6 oz)    Height: 5' 1" (1 549 m) Patient Active Problem List   Diagnosis    Anxiety    Mild intermittent asthma without complication    Degeneration of intervertebral disc of lumbar region    Diverticulosis    Elevated serum alkaline phosphatase level    Esophageal reflux    Fatty liver    Hyperlipidemia    Essential hypertension    Hyperthyroidism    Insomnia    Irritable bowel syndrome    Spinal stenosis    Spondylolisthesis, grade 1    History of TIA (transient ischemic attack)    Type 2 diabetes mellitus (HCC)    Vitamin D deficiency    Ovarian cyst    CKD (chronic kidney disease), stage III (HCC)    Trigger finger of right hand    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 ([address] )    PAD (peripheral artery disease) (HCC)    Memory loss    Atherosclerosis 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 (HCC)    Thyroid nodule    Mass of right submandibular region    Abnormal finding on imaging    Rotator cuff disorder, right    Embolism and thrombosis of arteries of the lower extremities ([address] )    Carotid stenosis, right    Vertebral artery stenosis    Vision abnormalities    Vertebral artery dissection (HCC)    Other constipation    Pulmonary nodules    Vitamin B12 deficiency       Past Surgical History:   Procedure Laterality Date    ADENOIDECTOMY      CHOLECYSTECTOMY      COLON SURGERY      partial colectomy - sigmoid, diverticulitis    EYE SURGERY      HYSTERECTOMY      ovaries remain    TONSILLECTOMY      US GUIDED LYMPH NODE BIOPSY RIGHT  3/11/2020    US GUIDED THYROID BIOPSY  11/13/2019       Family History   Problem Relation Age of Onset    Coronary artery disease Mother     Other Father         MVA    Alcohol abuse Neg Hx     Depression Neg Hx     Drug abuse Neg Hx     Substance Abuse Neg Hx     Mental illness Neg Hx        Social History     Socioeconomic History    Marital status:       Spouse name: Not on file  Number of children: 3    Years of education: Not on file    Highest education level: Not on file   Occupational History     Comment: retired   Tobacco Use    Smoking status: Never Smoker    Smokeless tobacco: Never Used   Vaping Use    Vaping Use: Never used   Substance and Sexual Activity    Alcohol use: No    Drug use: No    Sexual activity: Not on file   Other Topics Concern    Not on file   Social History Narrative    Lives alone    Daughter in the area     Social Determinants of Health     Financial Resource Strain: Not on file   Food Insecurity: Not on file   Transportation Needs: Not on file   Physical Activity: Not on file   Stress: Not on file   Social Connections: Not on file   Intimate Partner Violence: Not on file   Housing Stability: Not on file       Allergies   Allergen Reactions    Haloperidol      Confusion /AMS  1 ep on 09/04/2020  AGGRESSIVENESS  AGITATION    Triazolam Other (See Comments)     Confusion, and aggressivenss as well      Bactrim [Sulfamethoxazole-Trimethoprim]     Ezetimibe     Gabapentin     Lisinopril     Naproxen Nausea Only    Statins          Current Outpatient Medications:     amLODIPine (NORVASC) 5 mg tablet, Take 1 tablet (5 mg total) by mouth daily, Disp: 90 tablet, Rfl: 1    aspirin 81 mg chewable tablet, Chew 1 tablet (81 mg total) daily, Disp: 30 tablet, Rfl: 0    b complex vitamins capsule, Take 1 capsule by mouth daily, Disp: , Rfl:     Cholecalciferol (VITAMIN D PO), Take 1 tablet by mouth daily, Disp: , Rfl:     cilostazol (PLETAL) 100 mg tablet, Take 1 tablet (100 mg total) by mouth 2 (two) times a day, Disp: 180 tablet, Rfl: 3    famotidine (PEPCID) 40 MG tablet, Take 1 tablet (40 mg total) by mouth daily, Disp: 90 tablet, Rfl: 1    FIBER PO, Take by mouth, Disp: , Rfl:     meclizine (ANTIVERT) 25 mg tablet, Take 1 tablet (25 mg total) by mouth every 12 (twelve) hours as needed for dizziness, Disp: 180 tablet, Rfl: 0    methimazole (TAPAZOLE) 5 mg tablet, 1/2 tab MWF, Disp: 30 tablet, Rfl: 0    montelukast (SINGULAIR) 10 mg tablet, Take 1 tablet (10 mg total) by mouth daily at bedtime, Disp: 90 tablet, Rfl: 1    tiZANidine (ZANAFLEX) 2 mg tablet, TAKE 1 TABLET (2 MG TOTAL) BY MOUTH 2 (TWO) TIMES A DAY AS NEEDED FOR MUSCLE SPASMS, Disp: 60 tablet, Rfl: 1    albuterol (PROVENTIL HFA,VENTOLIN HFA) 90 mcg/act inhaler, INHALE 1 PUFF BY MOUTH EVERY 6 HOURS AS NEEDED FOR WHEEZE (Patient not taking: Reported on 4/14/2022), Disp: 8 5 g, Rfl: 1    LORazepam (ATIVAN) 0 5 mg tablet, Take 1/2 tablet to 1 tablet PO daily PRN (Patient not taking: Reported on 4/14/2022 ), Disp: 30 tablet, Rfl: 0    oxyCODONE-acetaminophen (PERCOCET) 5-325 mg per tablet, Take 1 tablet by mouth 2 (two) times a day as needed for moderate pain Max Daily Amount: 2 tablets (Patient not taking: Reported on 4/14/2022 ), Disp: 60 tablet, Rfl: 0    potassium chloride (K-DUR,KLOR-CON) 10 mEq tablet, Take 1 tablet (10 mEq total) by mouth daily (Patient not taking: Reported on 4/14/2022 ), Disp: 90 tablet, Rfl: 3    Psyllium (Metamucil) 0 36 g CAPS, Take by mouth PRN (Patient not taking: Reported on 4/14/2022 ), Disp: , Rfl:

## 2022-04-14 NOTE — PATIENT INSTRUCTIONS
Follow-up of carotid Doppler 6 months ago  Her current Doppler does show some progression of disease both on the left and the right however she remains completely asymptomatic with no upper lower extremity weakness no amaurosis fugax no problems with speech or swallowing  Both sides have increased in stenosis however reviewing the study in depth she is likely 65% on the right and likely around 50% on the left      Plan:  Follow-up carotid Doppler in 6 months

## 2022-04-14 NOTE — LETTER
April 14, 2022     Blayne Loya MD  [address]  [address] [address] [facility]    Patient: Diana Aguillon   YOB: 1931   Date of Visit: 4/14/2022       Dear Dr Maryanne Massey: Thank you for referring Tony Yousif to me for evaluation  Below are my notes for this consultation  If you have questions, please do not hesitate to call me  I look forward to following your patient along with you           Sincerely,        Iram Short MD        CC: No Recipients

## 2022-05-02 DIAGNOSIS — R42 VERTIGO: ICD-10-CM

## 2022-05-02 DIAGNOSIS — J45.30 MILD PERSISTENT ASTHMA WITHOUT COMPLICATION: ICD-10-CM

## 2022-05-02 RX ORDER — MECLIZINE HYDROCHLORIDE 25 MG/1
25 TABLET ORAL EVERY 12 HOURS PRN
Qty: 180 TABLET | Refills: 1 | Status: SHIPPED | OUTPATIENT
Start: 2022-05-02 | End: 2022-06-30

## 2022-05-02 RX ORDER — MONTELUKAST SODIUM 10 MG/1
10 TABLET ORAL
Qty: 90 TABLET | Refills: 1 | Status: SHIPPED | OUTPATIENT
Start: 2022-05-02

## 2022-05-11 DIAGNOSIS — J45.20 MILD INTERMITTENT ASTHMA WITHOUT COMPLICATION: ICD-10-CM

## 2022-05-11 RX ORDER — ALBUTEROL SULFATE 90 UG/1
AEROSOL, METERED RESPIRATORY (INHALATION)
Qty: 8.5 G | Refills: 1 | Status: SHIPPED | OUTPATIENT
Start: 2022-05-11 | End: 2022-06-08 | Stop reason: SDUPTHER

## 2022-05-25 DIAGNOSIS — M54.9 UPPER BACK PAIN: ICD-10-CM

## 2022-05-25 DIAGNOSIS — I10 ESSENTIAL HYPERTENSION: ICD-10-CM

## 2022-05-25 DIAGNOSIS — F41.9 ANXIETY: ICD-10-CM

## 2022-05-25 RX ORDER — LORAZEPAM 0.5 MG/1
TABLET ORAL
Qty: 30 TABLET | Refills: 0 | Status: SHIPPED | OUTPATIENT
Start: 2022-05-25

## 2022-05-25 RX ORDER — TIZANIDINE 2 MG/1
2 TABLET ORAL 2 TIMES DAILY PRN
Qty: 60 TABLET | Refills: 1 | Status: SHIPPED | OUTPATIENT
Start: 2022-05-25 | End: 2022-06-20

## 2022-05-25 RX ORDER — AMLODIPINE BESYLATE 5 MG/1
5 TABLET ORAL DAILY
Qty: 90 TABLET | Refills: 1 | Status: ON HOLD | OUTPATIENT
Start: 2022-05-25 | End: 2022-06-30 | Stop reason: SDUPTHER

## 2022-06-08 DIAGNOSIS — J45.20 MILD INTERMITTENT ASTHMA WITHOUT COMPLICATION: ICD-10-CM

## 2022-06-08 RX ORDER — ALBUTEROL SULFATE 90 UG/1
1 AEROSOL, METERED RESPIRATORY (INHALATION) EVERY 6 HOURS PRN
Qty: 8.5 G | Refills: 1 | Status: SHIPPED | OUTPATIENT
Start: 2022-06-08

## 2022-06-09 NOTE — TELEPHONE ENCOUNTER
Please send steroid inhaler      Pt will check with pharmacy then to see how much it is last time was like $200

## 2022-06-10 RX ORDER — FLUTICASONE PROPIONATE 110 UG/1
2 AEROSOL, METERED RESPIRATORY (INHALATION) 2 TIMES DAILY
Qty: 12 G | Refills: 0 | Status: SHIPPED | OUTPATIENT
Start: 2022-06-10

## 2022-06-20 ENCOUNTER — APPOINTMENT (OUTPATIENT)
Dept: LAB | Facility: CLINIC | Age: 87
End: 2022-06-20
Payer: MEDICARE

## 2022-06-20 DIAGNOSIS — E11.9 DIABETES MELLITUS WITHOUT COMPLICATION (HCC): ICD-10-CM

## 2022-06-20 DIAGNOSIS — E06.3 CHRONIC LYMPHOCYTIC THYROIDITIS: ICD-10-CM

## 2022-06-20 DIAGNOSIS — N18.31 STAGE 3A CHRONIC KIDNEY DISEASE (HCC): ICD-10-CM

## 2022-06-20 DIAGNOSIS — E05.00 BASEDOW'S DISEASE: ICD-10-CM

## 2022-06-20 DIAGNOSIS — E05.90 PRETIBIAL MYXEDEMA: ICD-10-CM

## 2022-06-20 LAB
ALBUMIN SERPL BCP-MCNC: 4.1 G/DL (ref 3.5–5)
ALP SERPL-CCNC: 87 U/L (ref 34–104)
ALT SERPL W P-5'-P-CCNC: 13 U/L (ref 7–52)
ANION GAP SERPL CALCULATED.3IONS-SCNC: 8 MMOL/L (ref 4–13)
AST SERPL W P-5'-P-CCNC: 19 U/L (ref 13–39)
BASOPHILS # BLD AUTO: 0.02 THOUSANDS/ΜL (ref 0–0.1)
BASOPHILS NFR BLD AUTO: 1 % (ref 0–1)
BILIRUB SERPL-MCNC: 1.02 MG/DL (ref 0.2–1)
BUN SERPL-MCNC: 9 MG/DL (ref 5–25)
CALCIUM SERPL-MCNC: 9.4 MG/DL (ref 8.4–10.2)
CHLORIDE SERPL-SCNC: 103 MMOL/L (ref 96–108)
CO2 SERPL-SCNC: 27 MMOL/L (ref 21–32)
CREAT SERPL-MCNC: 0.83 MG/DL (ref 0.6–1.3)
EOSINOPHIL # BLD AUTO: 0.01 THOUSAND/ΜL (ref 0–0.61)
EOSINOPHIL NFR BLD AUTO: 0 % (ref 0–6)
ERYTHROCYTE [DISTWIDTH] IN BLOOD BY AUTOMATED COUNT: 13.1 % (ref 11.6–15.1)
GFR SERPL CREATININE-BSD FRML MDRD: 62 ML/MIN/1.73SQ M
GLUCOSE P FAST SERPL-MCNC: 122 MG/DL (ref 65–99)
HCT VFR BLD AUTO: 37.9 % (ref 34.8–46.1)
HGB BLD-MCNC: 12.5 G/DL (ref 11.5–15.4)
IMM GRANULOCYTES # BLD AUTO: 0.01 THOUSAND/UL (ref 0–0.2)
IMM GRANULOCYTES NFR BLD AUTO: 0 % (ref 0–2)
LYMPHOCYTES # BLD AUTO: 1 THOUSANDS/ΜL (ref 0.6–4.47)
LYMPHOCYTES NFR BLD AUTO: 31 % (ref 14–44)
MAGNESIUM SERPL-MCNC: 2 MG/DL (ref 1.9–2.7)
MCH RBC QN AUTO: 29.3 PG (ref 26.8–34.3)
MCHC RBC AUTO-ENTMCNC: 33 G/DL (ref 31.4–37.4)
MCV RBC AUTO: 89 FL (ref 82–98)
MONOCYTES # BLD AUTO: 0.39 THOUSAND/ΜL (ref 0.17–1.22)
MONOCYTES NFR BLD AUTO: 12 % (ref 4–12)
NEUTROPHILS # BLD AUTO: 1.83 THOUSANDS/ΜL (ref 1.85–7.62)
NEUTS SEG NFR BLD AUTO: 56 % (ref 43–75)
NRBC BLD AUTO-RTO: 0 /100 WBCS
PHOSPHATE SERPL-MCNC: 3.4 MG/DL (ref 2.3–4.1)
PLATELET # BLD AUTO: 330 THOUSANDS/UL (ref 149–390)
PMV BLD AUTO: 9.3 FL (ref 8.9–12.7)
POTASSIUM SERPL-SCNC: 4.1 MMOL/L (ref 3.5–5.3)
PROT SERPL-MCNC: 6.9 G/DL (ref 6.4–8.4)
RBC # BLD AUTO: 4.26 MILLION/UL (ref 3.81–5.12)
SODIUM SERPL-SCNC: 138 MMOL/L (ref 135–147)
T4 FREE SERPL-MCNC: 2.06 NG/DL (ref 0.76–1.46)
TSH SERPL DL<=0.05 MIU/L-ACNC: <0.01 UIU/ML (ref 0.45–4.5)
WBC # BLD AUTO: 3.26 THOUSAND/UL (ref 4.31–10.16)

## 2022-06-20 PROCEDURE — 85025 COMPLETE CBC W/AUTO DIFF WBC: CPT

## 2022-06-20 PROCEDURE — 84100 ASSAY OF PHOSPHORUS: CPT

## 2022-06-20 PROCEDURE — 36415 COLL VENOUS BLD VENIPUNCTURE: CPT

## 2022-06-20 PROCEDURE — 83735 ASSAY OF MAGNESIUM: CPT

## 2022-06-20 PROCEDURE — 84443 ASSAY THYROID STIM HORMONE: CPT

## 2022-06-20 PROCEDURE — 80053 COMPREHEN METABOLIC PANEL: CPT

## 2022-06-20 PROCEDURE — 84439 ASSAY OF FREE THYROXINE: CPT

## 2022-06-20 PROCEDURE — 83036 HEMOGLOBIN GLYCOSYLATED A1C: CPT

## 2022-06-21 LAB
EST. AVERAGE GLUCOSE BLD GHB EST-MCNC: 103 MG/DL
HBA1C MFR BLD: 5.2 %

## 2022-06-23 ENCOUNTER — TELEPHONE (OUTPATIENT)
Dept: INTERNAL MEDICINE CLINIC | Facility: CLINIC | Age: 87
End: 2022-06-23

## 2022-06-23 NOTE — TELEPHONE ENCOUNTER
Pt  Started with burning w/ urination several days ago  No fever, no blood in urine, some pelvic pain when she goes to the bathroom  Would like an antbx  Send to [facility] on [address]

## 2022-06-23 NOTE — TELEPHONE ENCOUNTER
I sent in keflex every 6 hours for 5 days, please do urine test at the lab prior to starting antibiotics  Urine order is in

## 2022-06-24 ENCOUNTER — APPOINTMENT (OUTPATIENT)
Dept: LAB | Facility: CLINIC | Age: 87
End: 2022-06-24
Payer: MEDICARE

## 2022-06-24 ENCOUNTER — TELEPHONE (OUTPATIENT)
Dept: INTERNAL MEDICINE CLINIC | Facility: CLINIC | Age: 87
End: 2022-06-24

## 2022-06-24 DIAGNOSIS — R39.9 UTI SYMPTOMS: ICD-10-CM

## 2022-06-24 LAB
BACTERIA UR QL AUTO: ABNORMAL /HPF
BILIRUB UR QL STRIP: NEGATIVE
CLARITY UR: CLEAR
COLOR UR: ABNORMAL
GLUCOSE UR STRIP-MCNC: NEGATIVE MG/DL
HGB UR QL STRIP.AUTO: NEGATIVE
KETONES UR STRIP-MCNC: NEGATIVE MG/DL
LEUKOCYTE ESTERASE UR QL STRIP: ABNORMAL
NITRITE UR QL STRIP: POSITIVE
NON-SQ EPI CELLS URNS QL MICRO: ABNORMAL /HPF
OTHER STN SPEC: ABNORMAL
PH UR STRIP.AUTO: 6.5 [PH]
PROT UR STRIP-MCNC: NEGATIVE MG/DL
RBC #/AREA URNS AUTO: ABNORMAL /HPF
SP GR UR STRIP.AUTO: <=1.005 (ref 1–1.03)
UROBILINOGEN UR QL STRIP.AUTO: 0.2 E.U./DL
WBC #/AREA URNS AUTO: ABNORMAL /HPF

## 2022-06-24 PROCEDURE — 87186 SC STD MICRODIL/AGAR DIL: CPT

## 2022-06-24 PROCEDURE — 87077 CULTURE AEROBIC IDENTIFY: CPT

## 2022-06-24 PROCEDURE — 81001 URINALYSIS AUTO W/SCOPE: CPT

## 2022-06-24 PROCEDURE — 87086 URINE CULTURE/COLONY COUNT: CPT

## 2022-06-26 LAB — BACTERIA UR CULT: ABNORMAL

## 2022-06-28 ENCOUNTER — HOSPITAL ENCOUNTER (INPATIENT)
Facility: HOSPITAL | Age: 87
LOS: 1 days | Discharge: HOME | DRG: 871 | End: 2022-06-30
Attending: EMERGENCY MEDICINE | Admitting: INTERNAL MEDICINE
Payer: MEDICARE

## 2022-06-28 DIAGNOSIS — I10 ESSENTIAL HYPERTENSION: ICD-10-CM

## 2022-06-28 DIAGNOSIS — R50.9 FEVER: ICD-10-CM

## 2022-06-28 DIAGNOSIS — N39.0 UTI (URINARY TRACT INFECTION): Primary | ICD-10-CM

## 2022-06-28 PROBLEM — A41.9 SEPSIS (HCC): Status: ACTIVE | Noted: 2022-06-28

## 2022-06-28 PROBLEM — H53.8 BLURRY VISION, BILATERAL: Status: ACTIVE | Noted: 2022-06-28

## 2022-06-28 LAB
ANION GAP SERPL CALCULATED.3IONS-SCNC: 8 MMOL/L (ref 4–13)
BASOPHILS # BLD MANUAL: 0 THOUSAND/UL (ref 0–0.1)
BASOPHILS NFR MAR MANUAL: 0 % (ref 0–1)
BUN SERPL-MCNC: 16 MG/DL (ref 5–25)
CALCIUM SERPL-MCNC: 8.8 MG/DL (ref 8.4–10.2)
CHLORIDE SERPL-SCNC: 106 MMOL/L (ref 96–108)
CO2 SERPL-SCNC: 23 MMOL/L (ref 21–32)
CREAT SERPL-MCNC: 0.91 MG/DL (ref 0.6–1.3)
EOSINOPHIL # BLD MANUAL: 0 THOUSAND/UL (ref 0–0.4)
EOSINOPHIL NFR BLD MANUAL: 0 % (ref 0–6)
ERYTHROCYTE [DISTWIDTH] IN BLOOD BY AUTOMATED COUNT: 13.2 % (ref 11.6–15.1)
GFR SERPL CREATININE-BSD FRML MDRD: 55 ML/MIN/1.73SQ M
GLUCOSE SERPL-MCNC: 115 MG/DL (ref 65–140)
HCT VFR BLD AUTO: 32.3 % (ref 34.8–46.1)
HGB BLD-MCNC: 10.7 G/DL (ref 11.5–15.4)
LACTATE SERPL-SCNC: 1 MMOL/L (ref 0.5–2)
LG PLATELETS BLD QL SMEAR: PRESENT
LYMPHOCYTES # BLD AUTO: 0 % (ref 14–44)
LYMPHOCYTES # BLD AUTO: 0 THOUSAND/UL (ref 0.6–4.47)
MCH RBC QN AUTO: 29.2 PG (ref 26.8–34.3)
MCHC RBC AUTO-ENTMCNC: 33.1 G/DL (ref 31.4–37.4)
MCV RBC AUTO: 88 FL (ref 82–98)
MONOCYTES # BLD AUTO: 0.04 THOUSAND/UL (ref 0–1.22)
MONOCYTES NFR BLD: 1 % (ref 4–12)
NEUTROPHILS # BLD MANUAL: 4.11 THOUSAND/UL (ref 1.85–7.62)
NEUTS BAND NFR BLD MANUAL: 14 % (ref 0–8)
NEUTS SEG NFR BLD AUTO: 85 % (ref 43–75)
PLATELET # BLD AUTO: 236 THOUSANDS/UL (ref 149–390)
PLATELET BLD QL SMEAR: ADEQUATE
PMV BLD AUTO: 8.9 FL (ref 8.9–12.7)
POTASSIUM SERPL-SCNC: 3.3 MMOL/L (ref 3.5–5.3)
RBC # BLD AUTO: 3.67 MILLION/UL (ref 3.81–5.12)
RBC MORPH BLD: NORMAL
SODIUM SERPL-SCNC: 137 MMOL/L (ref 135–147)
WBC # BLD AUTO: 4.15 THOUSAND/UL (ref 4.31–10.16)

## 2022-06-28 PROCEDURE — 80048 BASIC METABOLIC PNL TOTAL CA: CPT | Performed by: EMERGENCY MEDICINE

## 2022-06-28 PROCEDURE — 85027 COMPLETE CBC AUTOMATED: CPT | Performed by: EMERGENCY MEDICINE

## 2022-06-28 PROCEDURE — 99284 EMERGENCY DEPT VISIT MOD MDM: CPT | Performed by: EMERGENCY MEDICINE

## 2022-06-28 PROCEDURE — 96368 THER/DIAG CONCURRENT INF: CPT

## 2022-06-28 PROCEDURE — 99285 EMERGENCY DEPT VISIT HI MDM: CPT

## 2022-06-28 PROCEDURE — 85007 BL SMEAR W/DIFF WBC COUNT: CPT | Performed by: EMERGENCY MEDICINE

## 2022-06-28 PROCEDURE — 96365 THER/PROPH/DIAG IV INF INIT: CPT

## 2022-06-28 PROCEDURE — 87040 BLOOD CULTURE FOR BACTERIA: CPT | Performed by: EMERGENCY MEDICINE

## 2022-06-28 PROCEDURE — 83605 ASSAY OF LACTIC ACID: CPT | Performed by: EMERGENCY MEDICINE

## 2022-06-28 PROCEDURE — 36415 COLL VENOUS BLD VENIPUNCTURE: CPT | Performed by: EMERGENCY MEDICINE

## 2022-06-28 RX ORDER — ONDANSETRON 2 MG/ML
4 INJECTION INTRAMUSCULAR; INTRAVENOUS EVERY 6 HOURS PRN
Status: DISCONTINUED | OUTPATIENT
Start: 2022-06-28 | End: 2022-06-30 | Stop reason: HOSPADM

## 2022-06-28 RX ORDER — ENOXAPARIN SODIUM 100 MG/ML
40 INJECTION SUBCUTANEOUS DAILY
Status: DISCONTINUED | OUTPATIENT
Start: 2022-06-29 | End: 2022-06-28 | Stop reason: DRUGHIGH

## 2022-06-28 RX ORDER — METHIMAZOLE 5 MG/1
2.5 TABLET ORAL
Status: DISCONTINUED | OUTPATIENT
Start: 2022-06-29 | End: 2022-06-30 | Stop reason: HOSPADM

## 2022-06-28 RX ORDER — ACETAMINOPHEN 325 MG/1
650 TABLET ORAL EVERY 6 HOURS PRN
Status: DISCONTINUED | OUTPATIENT
Start: 2022-06-28 | End: 2022-06-30 | Stop reason: HOSPADM

## 2022-06-28 RX ORDER — ALBUTEROL SULFATE 90 UG/1
1 AEROSOL, METERED RESPIRATORY (INHALATION) EVERY 6 HOURS PRN
Status: DISCONTINUED | OUTPATIENT
Start: 2022-06-28 | End: 2022-06-30 | Stop reason: HOSPADM

## 2022-06-28 RX ORDER — LORAZEPAM 0.5 MG/1
0.5 TABLET ORAL
Status: DISCONTINUED | OUTPATIENT
Start: 2022-06-28 | End: 2022-06-30 | Stop reason: HOSPADM

## 2022-06-28 RX ORDER — MONTELUKAST SODIUM 10 MG/1
10 TABLET ORAL
Status: DISCONTINUED | OUTPATIENT
Start: 2022-06-28 | End: 2022-06-30 | Stop reason: HOSPADM

## 2022-06-28 RX ORDER — MAGNESIUM HYDROXIDE/ALUMINUM HYDROXICE/SIMETHICONE 120; 1200; 1200 MG/30ML; MG/30ML; MG/30ML
30 SUSPENSION ORAL EVERY 6 HOURS PRN
Status: DISCONTINUED | OUTPATIENT
Start: 2022-06-28 | End: 2022-06-30 | Stop reason: HOSPADM

## 2022-06-28 RX ORDER — ACETAMINOPHEN 325 MG/1
650 TABLET ORAL ONCE
Status: COMPLETED | OUTPATIENT
Start: 2022-06-28 | End: 2022-06-28

## 2022-06-28 RX ORDER — CILOSTAZOL 100 MG/1
100 TABLET ORAL 2 TIMES DAILY
Status: DISCONTINUED | OUTPATIENT
Start: 2022-06-28 | End: 2022-06-30 | Stop reason: HOSPADM

## 2022-06-28 RX ORDER — FLUTICASONE PROPIONATE 110 UG/1
2 AEROSOL, METERED RESPIRATORY (INHALATION) 2 TIMES DAILY
Status: DISCONTINUED | OUTPATIENT
Start: 2022-06-28 | End: 2022-06-30 | Stop reason: HOSPADM

## 2022-06-28 RX ORDER — ONDANSETRON 2 MG/ML
1 INJECTION INTRAMUSCULAR; INTRAVENOUS ONCE
Status: COMPLETED | OUTPATIENT
Start: 2022-06-28 | End: 2022-06-28

## 2022-06-28 RX ORDER — MELATONIN
1000 DAILY
Status: DISCONTINUED | OUTPATIENT
Start: 2022-06-29 | End: 2022-06-30 | Stop reason: HOSPADM

## 2022-06-28 RX ORDER — ENOXAPARIN SODIUM 100 MG/ML
30 INJECTION SUBCUTANEOUS DAILY
Status: DISCONTINUED | OUTPATIENT
Start: 2022-06-29 | End: 2022-06-30 | Stop reason: HOSPADM

## 2022-06-28 RX ORDER — AMLODIPINE BESYLATE 5 MG/1
5 TABLET ORAL DAILY
Status: DISCONTINUED | OUTPATIENT
Start: 2022-06-29 | End: 2022-06-30 | Stop reason: HOSPADM

## 2022-06-28 RX ORDER — ASPIRIN 81 MG/1
81 TABLET, CHEWABLE ORAL DAILY
Status: DISCONTINUED | OUTPATIENT
Start: 2022-06-29 | End: 2022-06-30 | Stop reason: HOSPADM

## 2022-06-28 RX ORDER — FAMOTIDINE 20 MG/1
40 TABLET, FILM COATED ORAL DAILY
Status: DISCONTINUED | OUTPATIENT
Start: 2022-06-29 | End: 2022-06-30 | Stop reason: HOSPADM

## 2022-06-28 RX ORDER — POTASSIUM CHLORIDE 20 MEQ/1
40 TABLET, EXTENDED RELEASE ORAL ONCE
Status: COMPLETED | OUTPATIENT
Start: 2022-06-28 | End: 2022-06-28

## 2022-06-28 RX ORDER — OXYCODONE HYDROCHLORIDE AND ACETAMINOPHEN 5; 325 MG/1; MG/1
1 TABLET ORAL 2 TIMES DAILY PRN
Status: DISCONTINUED | OUTPATIENT
Start: 2022-06-28 | End: 2022-06-30 | Stop reason: HOSPADM

## 2022-06-28 RX ADMIN — FLUTICASONE PROPIONATE 2 PUFF: 110 AEROSOL, METERED RESPIRATORY (INHALATION) at 22:52

## 2022-06-28 RX ADMIN — MONTELUKAST 10 MG: 10 TABLET, FILM COATED ORAL at 22:48

## 2022-06-28 RX ADMIN — CILOSTAZOL 100 MG: 100 TABLET ORAL at 22:48

## 2022-06-28 RX ADMIN — CEFEPIME HYDROCHLORIDE 2000 MG: 2 INJECTION, POWDER, FOR SOLUTION INTRAVENOUS at 19:45

## 2022-06-28 RX ADMIN — ACETAMINOPHEN 650 MG: 325 TABLET, FILM COATED ORAL at 21:20

## 2022-06-28 RX ADMIN — POTASSIUM CHLORIDE 40 MEQ: 1500 TABLET, EXTENDED RELEASE ORAL at 21:20

## 2022-06-28 RX ADMIN — SODIUM CHLORIDE, SODIUM LACTATE, POTASSIUM CHLORIDE, AND CALCIUM CHLORIDE 500 ML: .6; .31; .03; .02 INJECTION, SOLUTION INTRAVENOUS at 19:43

## 2022-06-29 LAB
ANION GAP SERPL CALCULATED.3IONS-SCNC: 6 MMOL/L (ref 4–13)
BASOPHILS # BLD MANUAL: 0 THOUSAND/UL (ref 0–0.1)
BASOPHILS NFR MAR MANUAL: 0 % (ref 0–1)
BUN SERPL-MCNC: 19 MG/DL (ref 5–25)
CALCIUM SERPL-MCNC: 8.6 MG/DL (ref 8.4–10.2)
CHLORIDE SERPL-SCNC: 107 MMOL/L (ref 96–108)
CO2 SERPL-SCNC: 22 MMOL/L (ref 21–32)
CREAT SERPL-MCNC: 1.02 MG/DL (ref 0.6–1.3)
EOSINOPHIL # BLD MANUAL: 0 THOUSAND/UL (ref 0–0.4)
EOSINOPHIL NFR BLD MANUAL: 0 % (ref 0–6)
ERYTHROCYTE [DISTWIDTH] IN BLOOD BY AUTOMATED COUNT: 13.4 % (ref 11.6–15.1)
GFR SERPL CREATININE-BSD FRML MDRD: 48 ML/MIN/1.73SQ M
GLUCOSE SERPL-MCNC: 107 MG/DL (ref 65–140)
GLUCOSE SERPL-MCNC: 129 MG/DL (ref 65–140)
HCT VFR BLD AUTO: 35.4 % (ref 34.8–46.1)
HGB BLD-MCNC: 11.7 G/DL (ref 11.5–15.4)
LYMPHOCYTES # BLD AUTO: 0 % (ref 14–44)
LYMPHOCYTES # BLD AUTO: 0 THOUSAND/UL (ref 0.6–4.47)
MCH RBC QN AUTO: 29.4 PG (ref 26.8–34.3)
MCHC RBC AUTO-ENTMCNC: 33.1 G/DL (ref 31.4–37.4)
MCV RBC AUTO: 89 FL (ref 82–98)
MONOCYTES # BLD AUTO: 0.12 THOUSAND/UL (ref 0–1.22)
MONOCYTES NFR BLD: 2 % (ref 4–12)
NEUTROPHILS # BLD MANUAL: 6.03 THOUSAND/UL (ref 1.85–7.62)
NEUTS BAND NFR BLD MANUAL: 27 % (ref 0–8)
NEUTS SEG NFR BLD AUTO: 71 % (ref 43–75)
PLATELET # BLD AUTO: 243 THOUSANDS/UL (ref 149–390)
PLATELET BLD QL SMEAR: ADEQUATE
PMV BLD AUTO: 9.5 FL (ref 8.9–12.7)
POTASSIUM SERPL-SCNC: 4.5 MMOL/L (ref 3.5–5.3)
RBC # BLD AUTO: 3.98 MILLION/UL (ref 3.81–5.12)
RBC MORPH BLD: NORMAL
SODIUM SERPL-SCNC: 135 MMOL/L (ref 135–147)
WBC # BLD AUTO: 6.15 THOUSAND/UL (ref 4.31–10.16)

## 2022-06-29 PROCEDURE — 85007 BL SMEAR W/DIFF WBC COUNT: CPT

## 2022-06-29 PROCEDURE — 82948 REAGENT STRIP/BLOOD GLUCOSE: CPT

## 2022-06-29 PROCEDURE — 85027 COMPLETE CBC AUTOMATED: CPT

## 2022-06-29 PROCEDURE — 80048 BASIC METABOLIC PNL TOTAL CA: CPT

## 2022-06-29 PROCEDURE — 99223 1ST HOSP IP/OBS HIGH 75: CPT | Performed by: INTERNAL MEDICINE

## 2022-06-29 RX ORDER — SODIUM CHLORIDE 9 MG/ML
100 INJECTION, SOLUTION INTRAVENOUS CONTINUOUS
Status: DISCONTINUED | OUTPATIENT
Start: 2022-06-29 | End: 2022-06-30

## 2022-06-29 RX ADMIN — SODIUM CHLORIDE 100 ML/HR: 0.9 INJECTION, SOLUTION INTRAVENOUS at 13:00

## 2022-06-29 RX ADMIN — FAMOTIDINE 40 MG: 20 TABLET ORAL at 08:44

## 2022-06-29 RX ADMIN — B-COMPLEX W/ C & FOLIC ACID TAB 1 TABLET: TAB at 08:44

## 2022-06-29 RX ADMIN — MONTELUKAST 10 MG: 10 TABLET, FILM COATED ORAL at 21:48

## 2022-06-29 RX ADMIN — FLUTICASONE PROPIONATE 2 PUFF: 110 AEROSOL, METERED RESPIRATORY (INHALATION) at 08:53

## 2022-06-29 RX ADMIN — Medication 1000 UNITS: at 08:44

## 2022-06-29 RX ADMIN — SODIUM CHLORIDE 100 ML/HR: 0.9 INJECTION, SOLUTION INTRAVENOUS at 16:30

## 2022-06-29 RX ADMIN — METHIMAZOLE 2.5 MG: 5 TABLET ORAL at 08:46

## 2022-06-29 RX ADMIN — CILOSTAZOL 100 MG: 100 TABLET ORAL at 17:10

## 2022-06-29 RX ADMIN — CILOSTAZOL 100 MG: 100 TABLET ORAL at 08:46

## 2022-06-29 RX ADMIN — CEFEPIME HYDROCHLORIDE 1000 MG: 1 INJECTION, POWDER, FOR SOLUTION INTRAMUSCULAR; INTRAVENOUS at 19:39

## 2022-06-29 RX ADMIN — CEFEPIME HYDROCHLORIDE 1000 MG: 1 INJECTION, POWDER, FOR SOLUTION INTRAMUSCULAR; INTRAVENOUS at 10:51

## 2022-06-29 RX ADMIN — ONDANSETRON 4 MG: 2 INJECTION INTRAMUSCULAR; INTRAVENOUS at 19:40

## 2022-06-29 RX ADMIN — SODIUM CHLORIDE 500 ML: 0.9 INJECTION, SOLUTION INTRAVENOUS at 08:41

## 2022-06-29 RX ADMIN — Medication 1 PACKET: at 08:52

## 2022-06-29 RX ADMIN — FLUTICASONE PROPIONATE 2 PUFF: 110 AEROSOL, METERED RESPIRATORY (INHALATION) at 17:10

## 2022-06-29 RX ADMIN — ASPIRIN 81 MG CHEWABLE TABLET 81 MG: 81 TABLET CHEWABLE at 08:46

## 2022-06-29 RX ADMIN — ENOXAPARIN SODIUM 30 MG: 30 INJECTION SUBCUTANEOUS at 08:43

## 2022-06-29 NOTE — ASSESSMENT & PLAN NOTE
· Symptoms began 6/23 with dysuria, prescribed Keflex but then culture came back resistant E coli, switched to [facility] 6/27  · POA- worsening symptoms: chills, nausea, vomiting, back pain  · Given cefepime in ED  · HR >90, bandemia 14%--> 27% on 6/29 AM  · Urine culture shows: multi drug resistant E  Coli  · Of note, urine cultures collected after 1 dose of cefepime     Plan:  · Continue Cefepime  · Monitor fever, wbc, bands

## 2022-06-29 NOTE — ASSESSMENT & PLAN NOTE
· Intermittent, bilateral blurry vision  · She thinks it is related to her antivert, has been holding for 2 days  · Known macular degeneration on left eye  · Known carotid stenosis on right  · No unilateral sudden vision loss  · Last carotid study 3/22, due for repeat study in September  · On cilastazol  · Currently with clear vision    Plan:  · Monitor for now  · Holding antivert home medication  · Consider repeating carotid ultrasound

## 2022-06-29 NOTE — ASSESSMENT & PLAN NOTE
· Symptoms began 6/23 with dysuria, prescribed Keflex but then culture came back resistant E coli, switched to [facility] 6/27  · Today with worsening symptoms: chills, nausea, vomiting, back pain  · Given cefepime in ED  · HR >90, bandemia 14%-- qualifies for sepsis  · Blood cultures collected  · New UA ordered    Plan:  Follow blood and urine cultures (urine collected after 1 dose cefepime)  Monitor fever, wbc, bands  Continue cefepime

## 2022-06-29 NOTE — CASE MANAGEMENT
Case Management Assessment & Discharge Planning Note    Patient name Citlali Martin  Location [address] [id number] MRN [id number]  : 1931 Date 2022       Current Admission Date: 2022  Current Admission Diagnosis:UTI (urinary tract infection)   Patient Active Problem List    Diagnosis Date Noted    Sepsis ([facility] ) 2022    UTI (urinary tract infection) 2022    Blurry vision, bilateral 2022    Vitamin B12 deficiency 2022    Pulmonary nodules 2022    Other constipation 2021    Vertebral artery dissection ([facility] ) 2020    Carotid stenosis, right 2020    Vertebral artery stenosis 2020    Vision abnormalities 2020    Hypokalemia 2020    Embolism and thrombosis of arteries of the lower extremities ([facility] ) 2020    Rotator cuff disorder, right 2020    Abnormal finding on imaging 2019    Thyroid nodule 2019    Mass of right submandibular region 2019    Atherosclerosis 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 ([facility] ) 2019    PAD (peripheral artery disease) ([facility] ) 2019    Memory loss 2019    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 ([facility] ) 2019    Trigger finger of right hand 2018    CKD (chronic kidney disease), stage III ([facility] ) 2018    Degeneration of intervertebral disc of lumbar region 06/10/2016    Spondylolisthesis, grade 1 06/10/2016    Hyperthyroidism 2014    Diverticulosis 2014    Irritable bowel syndrome 2014    Ovarian cyst 2014    Fatty liver 2013    History of TIA (transient ischemic attack) 2013    Anxiety 2012    Elevated serum alkaline phosphatase level 2012    Vitamin D deficiency 2012    Mild intermittent asthma without complication     Esophageal reflux 2012    Hyperlipidemia 2012    Essential hypertension 09/20/2012    Insomnia 09/20/2012    Spinal stenosis 09/20/2012    Type 2 diabetes mellitus ([facility] ) 09/20/2012      LOS (days): 0  Geometric Mean LOS (GMLOS) (days): 3 50  Days to GMLOS:3 4     OBJECTIVE:    Risk of Unplanned Readmission Score: 15 07         Current admission status: Inpatient       Preferred Pharmacy:   [facility], [address] [place] [id number]  Phone: [phone] Fax: [phone]    Primary Care Provider: Dangelo Bazan MD    Primary Insurance: MEDICARE  Secondary Insurance: BLUE CROSS    ASSESSMENT:  Jori Patrick Proxies    There are no active Health Care Proxies on file  Patient Information  Admitted from[de-identified] Home  Mental Status: Alert  During Assessment patient was accompanied by: Daughter, Other-Comment (granddaughter)  Assessment information provided by[de-identified] Patient  Primary Caregiver: Self  Support Systems: Self, Children, Family members  [place] of Residence: [facility] entry access options   Select all that apply : No steps to enter home  Type of Current Residence: [facility]  In the last 12 months, was there a time when you were not able to pay the mortgage or rent on time?: No  In the last 12 months, how many places have you lived?: 1  In the last 12 months, was there a time when you did not have a steady place to sleep or slept in a shelter (including now)?: No  Homeless/housing insecurity resource given?: N/A  Living Arrangements: Lives w/ Daughter    Activities of Daily Living Prior to Admission  Functional Status: Independent  Completes ADLs independently?: Yes  Ambulates independently?: Yes  Does patient use assisted devices?: No  Does patient currently own DME?: Yes  What DME does the patient currently own?: Karrie Hernandez, [employer]  Does patient have a history of Outpatient Therapy (PT/OT)?: Yes (very long time ago)  Does the patient have a history of Short-Term Rehab?: No  Does patient have a history of HHC?: No  Does patient currently have [facility]?: No         Patient Information Continued  Income Source: Pension/assisted  Does patient have prescription coverage?: Yes  Within the past 12 months, you worried that your food would run out before you got the money to buy more : Never true  Within the past 12 months, the food you bought just didn't last and you didn't have money to get more : Never true  Food insecurity resource given?: N/A  Does patient receive dialysis treatments?: No  Does patient have a history of substance abuse?: No  Does patient have a history of Mental Health Diagnosis?: No         Means of Transportation  Means of Transport to Appts[de-identified] Family transport (still has a license, but family usually drives)  In the past 12 months, has lack of transportation kept you from medical appointments or from getting medications?: No  In the past 12 months, has lack of transportation kept you from meetings, work, or from getting things needed for daily living?: No  Was application for public transport provided?: N/A        DISCHARGE DETAILS:    Discharge planning discussed with[de-identified] patient and family (two daughters and a granddaughter) at bedside        CM contacted family/caregiver?: Yes  Were Treatment Team discharge recommendations reviewed with patient/caregiver?: Yes  Did patient/caregiver verbalize understanding of patient care needs?: Yes  Were patient/caregiver advised of the risks associated with not following Treatment Team discharge recommendations?: Yes    Contacts  Patient Contacts: daughterRadha Prime  Relationship to Patient[de-identified] Family  Reason/Outcome: Emergency Contact              Other Referral/Resources/Interventions Provided:  Interventions: None Indicated    Would you like to participate in our [address] service program?  : No - Declined    Treatment Team Recommendation: Home     Transport at Discharge : Family                             IMM Given (Date):: 06/29/22  IMM Given to[de-identified] Patient     Additional Comments: Patient admitted due to UTI, currently on IV abx; cultures pending  Met with patient, two (of four) daughters, and granddaughter at bedside to complete assessment  Patient reports that she lives with her daughter, Gila Levine, in a ranch home  Reports that only stairs are to the basement, which she does not have to go up/down  States that she's independent with ADLs and ambulation  Has a walker to use if needed, but typically walks without device  Patient has history of out-patient therapy from long ago  No history of rehab or home care services  Patient states she still has her license, however family reports she has not driven in a long time  Family usually transports to and from appointments; will pick-up patient at discharge  PCP is Dr Ivonne Bynum and primary pharmacy is [address]  Patient has no POA or advanced directives; reports she only has a will and isn't interested in anything else  No needs currently anticipated at this time  Will continue to follow for any recommendations closer to d/c

## 2022-06-29 NOTE — ASSESSMENT & PLAN NOTE
· Intermittent, bilateral blurry vision in setting of known macular degeneration on left eye, known carotid stenosis on right  No unilateral sudden vision loss  She thinks it is related to her antivert, has been holding for 2 days  · Last carotid study 3/22, due for repeat study in September  · On cilastazol  · Peripheral vision intact in both eyes to moving object b/l  Finger discrimination impaired in all 4 quadrants b/l       Plan:  · Monitor for now  · Holding antivert home medication  · Consider optho consult upon discharge for outpatient f/u

## 2022-06-29 NOTE — ASSESSMENT & PLAN NOTE
Meets 2/4 SIRS criteria with source of UTI  HR >90, 14% bands on presentation, 27% on 6/29     Lab Results   Component Value Date    WBC 6 15 06/29/2022    WBC 4 15 (L) 06/28/2022    WBC 3 26 (L) 06/20/2022     · Etiology: Likely 2/2 UTI  · Urine culture shows multidrug resistant E Coli       Plan:  · Continue cefepime 1g Q12h   · Follow up final blood cultures    · 500 cc bolus given 6/29 AM due to low MAPs   · IVF: 100 cc/hr  · Maintain MAP > 70    · Monitor hemodynamics, fever curves, WBCs

## 2022-06-29 NOTE — ASSESSMENT & PLAN NOTE
Meets 2/4 SIRS criteria with source of UTI  HR >90, 14% bands    Plan:  Continue cefepime  Follow cultures

## 2022-06-29 NOTE — PROGRESS NOTES
Pharmacy-Driven Renal Dosing Protocol    Per [employer] approved Pharmacy-Driven Renal Dosing Protocol, enoxaparin (Lovenox) 40 mg SQ daily has been reduced to enoxaparin (Lovenox) 30 mg SQ daily for a CrCl < 30 mL/min      Niall Ball, PharmD  Pharmacist

## 2022-06-29 NOTE — PROGRESS NOTES
[facility]  Progress Note - Yumiko Pearce 7/7/1931, 80 y o  female MRN: [id number]  Unit/Bed#: W -53 Encounter: [id number]  Primary Care Provider: Amarilis Hinkle MD   Date and time admitted to hospital: 6/28/2022  6:21 PM    Sepsis [facility])  Assessment & Plan  Meets 2/4 SIRS criteria with source of UTI  HR >90, 14% bands on presentation, 27% on 6/29     Lab Results   Component Value Date    WBC 6 15 06/29/2022    WBC 4 15 (L) 06/28/2022    WBC 3 26 (L) 06/20/2022     · Etiology: Likely 2/2 UTI  · Urine culture shows multidrug resistant E Coli  Plan:  · Continue cefepime 1g Q12h   · Follow up final blood cultures    · 500 cc bolus given 6/29 AM due to low MAPs   · IVF: 100 cc/hr  · Maintain MAP > 70    · Monitor hemodynamics, fever curves, WBCs     * UTI (urinary tract infection)  Assessment & Plan  · Symptoms began 6/23 with dysuria, prescribed Keflex but then culture came back resistant E coli, switched to [facility] 6/27  · POA- worsening symptoms: chills, nausea, vomiting, back pain  · Given cefepime in ED  · HR >90, bandemia 14%--> 27% on 6/29 AM  · Urine culture shows: multi drug resistant E  Coli  · Of note, urine cultures collected after 1 dose of cefepime     Plan:  · Continue Cefepime  · Monitor fever, wbc, bands    Blurry vision, bilateral  Assessment & Plan  · Intermittent, bilateral blurry vision in setting of known macular degeneration on left eye, known carotid stenosis on right  No unilateral sudden vision loss  She thinks it is related to her antivert, has been holding for 2 days  · Last carotid study 3/22, due for repeat study in September  · On cilastazol  · Peripheral vision intact in both eyes to moving object b/l  Finger discrimination impaired in all 4 quadrants b/l       Plan:  · Monitor for now  · Holding antivert home medication  · Consider optho consult upon discharge for outpatient f/u     Hypokalemia  Assessment & Plan  Recent Labs     06/28/22 1930 22  0533   K 3 3* 4 5     · Monitor e-lytes and replete as appropriate  Hyperthyroidism  Assessment & Plan  · Continue methimazole 2 5 mg MWF    Essential hypertension  Assessment & Plan  · Continue amlodipine        VTE Pharmacologic Prophylaxis: VTE Score: 4 Moderate Risk (Score 3-4) - Pharmacological DVT Prophylaxis Ordered: enoxaparin (Lovenox)  Patient Centered Rounds: I performed bedside rounds with nursing staff today  Discussions with Specialists or Other Care Team Provider: none     Education and Discussions with Family / Patient: Updated  (daughter) at bedside  Current Length of Stay: 0 day(s)  Current Patient Status: Inpatient   Discharge Plan: Anticipate discharge in 48-72 hrs to home  lives with children  Code Status: Level 1 - Full Code    Subjective:   Patient seen and examined at bedside  No significant events overnight  Denies chest pain, pnd, orthopnea, abdominal pain, nausea vomiting, fever, chills, headache, dizziness or palpitations  Objective:     Vitals:   Temp (24hrs), Av 5 °F (36 9 °C), Min:97 6 °F (36 4 °C), Max:99 8 °F (37 7 °C)    Temp:  [97 6 °F (36 4 °C)-99 8 °F (37 7 °C)] 97 6 °F (36 4 °C)  HR:  [] 97  Resp:  [14-18] 18  BP: ()/(33-60) 122/44  SpO2:  [90 %-97 %] 95 %  Body mass index is 20 83 kg/m²  Input and Output Summary (last 24 hours): Intake/Output Summary (Last 24 hours) at 2022 1644  Last data filed at 2022 1300  Gross per 24 hour   Intake 940 ml   Output 50 ml   Net 890 ml       Physical Exam   Vitals reviewed  General Examination: Lying in bed, cooperative   HEENT: Normocephalic, Atraumatic  Extraocular movements intact, PERRLA  Peripheral vision grossly intact to moving object in both eyes  CVS: S1, S2 noted  Lungs: CTA b/l  Abdomen: Soft, normal bowel sounds  Non distended, non tender  Ext: No edema noted  Psych: Though Process - logical    Skin: No bleeding/bruising noted  Neuro: A, Ox3  Follows simple, 3 steps commands  Appropriate antigravity strength in all 4 extremities proximally, distally  Additional Data:     Labs:  Results from last 7 days   Lab Units 06/29/22  0533   WBC Thousand/uL 6 15   HEMOGLOBIN g/dL 11 7   HEMATOCRIT % 35 4   PLATELETS Thousands/uL 243   BANDS PCT % 27*   LYMPHO PCT % 0*   MONO PCT % 2*   EOS PCT % 0     Results from last 7 days   Lab Units 06/29/22  0533   SODIUM mmol/L 135   POTASSIUM mmol/L 4 5   CHLORIDE mmol/L 107   CO2 mmol/L 22   BUN mg/dL 19   CREATININE mg/dL 1 02   ANION GAP mmol/L 6   CALCIUM mg/dL 8 6   GLUCOSE RANDOM mg/dL 129                 Results from last 7 days   Lab Units 06/28/22  1930   LACTIC ACID mmol/L 1 0       Lines/Drains:  Invasive Devices  Report    Peripheral Intravenous Line  Duration           Peripheral IV 06/28/22 Dorsal (posterior); Left Hand 1 day                Imaging:   No orders to display         Recent Cultures (last 7 days):   Results from last 7 days   Lab Units 06/28/22  1930 06/24/22  1236   BLOOD CULTURE  Received in Microbiology Lab  Culture in Progress  Received in Microbiology Lab  Culture in Progress    --    URINE CULTURE   --  >100,000 cfu/ml Escherichia coli*       Last 24 Hours Medication List:   Current Facility-Administered Medications   Medication Dose Route Frequency Provider Last Rate    acetaminophen  650 mg Oral Q6H PRN Luisito Oquendo, DO      albuterol  1 puff Inhalation Q6H PRN Luisito Oquendo, DO      aluminum-magnesium hydroxide-simethicone  30 mL Oral Q6H PRN Luisito Oquendo, DO      amLODIPine  5 mg Oral Daily Helena Nolasco, DO      aspirin  81 mg Oral Daily Luisito Oquendo, DO      cefepime  1,000 mg Intravenous Q12H Helena Vermaundram, DO 1,000 mg (06/29/22 1051)    cholecalciferol  1,000 Units Oral Daily Luisito Oquendo, DO      cilostazol  100 mg Oral BID Luisito Oquendo, DO      enoxaparin  30 mg Subcutaneous Daily Luisito Oquendo, DO      famotidine  40 mg Oral Daily Gilbert Coyer, DO      fluticasone  2 puff Inhalation BID Manbert Coyer, DO      LORazepam  0 5 mg Oral HS PRN Haile Coyer, DO      methimazole  2 5 mg Oral Once per day on Mon Wed Fri Helena Kidd, DO      montelukast  10 mg Oral HS Gilbert Coyer, DO      multivitamin stress formula  1 tablet Oral Daily Manbert Coyer, DO      ondansetron  4 mg Intravenous Q6H PRN Haile Chi, DO      oxyCODONE-acetaminophen  1 tablet Oral BID PRN Haile Chi, DO      psyllium  1 packet Oral Daily Helenaradha Nolasco, DO      sodium chloride  100 mL/hr Intravenous Continuous Chandrakant Hylton  mL/hr (06/29/22 1630)        Today, Patient Was Seen By: Chandrakant Hylton MD    **Please Note: This note may have been constructed using a voice recognition system  **

## 2022-06-29 NOTE — ASSESSMENT & PLAN NOTE
Recent Labs     06/28/22 1930 06/29/22  0533   K 3 3* 4 5     · Monitor e-lytes and replete as appropriate

## 2022-06-29 NOTE — H&P
[facility]  [facility]- Jeet Encinas 7/7/1931, 80 y o  female MRN: [id number]  Unit/Bed#: W -59 Encounter: [id number]  Primary Care Provider: Wing Mt MD   Date and time admitted to hospital: 6/28/2022  6:21 PM    * UTI (urinary tract infection)  Assessment & Plan  · Symptoms began 6/23 with dysuria, prescribed Keflex but then culture came back resistant E coli, switched to [facility] 6/27  · Today with worsening symptoms: chills, nausea, vomiting, back pain  · Given cefepime in ED  · HR >90, bandemia 14%-- qualifies for sepsis  · Blood cultures collected  · New UA ordered    Plan:  Follow blood and urine cultures (urine collected after 1 dose cefepime)  Monitor fever, wbc, bands  Continue cefepime    Sepsis ([address] )  Assessment & Plan  Meets 2/4 SIRS criteria with source of UTI  HR >90, 14% bands    Plan:  Continue cefepime  Follow cultures    Blurry vision, bilateral  Assessment & Plan  · Intermittent, bilateral blurry vision  · She thinks it is related to her antivert, has been holding for 2 days  · Known macular degeneration on left eye  · Known carotid stenosis on right  · No unilateral sudden vision loss  · Last carotid study 3/22, due for repeat study in September  · On cilastazol  · Currently with clear vision    Plan:  · Monitor for now  · Holding antivert home medication  · Consider repeating carotid ultrasound    Hypokalemia  Assessment & Plan  Recent Labs     06/28/22  1930   K 3 3*     Repleting as necessary    Hyperthyroidism  Assessment & Plan  Continue methimazole 2 5 mg MWF    Essential hypertension  Assessment & Plan  Continue amlodipine    VTE Pharmacologic Prophylaxis: VTE Score: 4 Moderate Risk (Score 3-4) - Pharmacological DVT Prophylaxis Ordered: enoxaparin (Lovenox)  Code Status: Level 1 - Full Code   Discussion with family: Updated  (daughter) at bedside      Anticipated Length of Stay: Patient will be admitted on an observation basis with an anticipated length of stay of less than 2 midnights secondary to IV abx  Chief Complaint: dysuria, n/v, chills    History of Present Illness:  Earlis Sandhoff is a 80 y o  female with a PMH of hypertension, PAD, hyperthyroidism, mild intermittent asthma, carotid artery stenosis who presents with dysuria that began 6/23  She was prescribed Keflex on that day by her PCP however she got a urine culture on 06/24 that grew resistant  E coli, and thus was prescribed Macrobid on 06/27  However today she had worsening symptoms including nausea, vomiting, chills, back pain  She does have another complaint of intermittent blurry vision  She says when she wakes up, her vision is clear, but then after taking her morning medicines, she notes that her vision sometimes becomes blurry bilaterally  She has known macular degeneration in her left eye, as well as carotid stenosis on the right  She sees vascular for this outpatient, and is due for repeat carotid study in September  She thinks it might be due to her Antivert, so has been avoiding taking it for 2 days now  She does meet sepsis criteria in ED, and was started on cefepime  Review of Systems:  Review of Systems   Constitutional: Positive for chills  Negative for appetite change and fever  HENT: Negative for ear pain and sore throat  Eyes: Negative for visual disturbance  Respiratory: Negative for cough, chest tightness and shortness of breath  Cardiovascular: Negative for chest pain, palpitations and leg swelling  Gastrointestinal: Positive for nausea and vomiting  Negative for abdominal pain  Genitourinary: Positive for dysuria  Negative for hematuria  Musculoskeletal: Negative for arthralgias and back pain  Skin: Negative for color change and rash  Neurological: Negative for dizziness, seizures, syncope, light-headedness, numbness and headaches  All other systems reviewed and are negative        Past Medical and Surgical History:   Past Medical History:   Diagnosis Date    Asthma     Chronic interstitial cystitis     Community acquired pneumonia     last assessed 10/4/13    Diabetes mellitus ([facility] [address] )     Disease of thyroid gland     Diverticulitis     Dizziness     GERD (gastroesophageal reflux disease)     Hyperlipidemia     Hypertension     Vertigo        Past Surgical History:   Procedure Laterality Date    ADENOIDECTOMY      CHOLECYSTECTOMY      COLON SURGERY      partial colectomy - sigmoid, diverticulitis    EYE SURGERY      HYSTERECTOMY      ovaries remain    TONSILLECTOMY      US GUIDED LYMPH NODE BIOPSY RIGHT  3/11/2020    US GUIDED THYROID BIOPSY  11/13/2019       Meds/Allergies:  Prior to Admission medications    Medication Sig Start Date End Date Taking? Authorizing Provider   albuterol (PROVENTIL HFA,VENTOLIN HFA) 90 mcg/act inhaler Inhale 1 puff every 6 (six) hours as needed for wheezing 6/8/22   Radisson MD Sony   amLODIPine (NORVASC) 5 mg tablet Take 1 tablet (5 mg total) by mouth daily 5/25/22   Radisson MD Sony   aspirin 81 mg chewable tablet Chew 1 tablet (81 mg total) daily 9/6/20   Edwin Weldon MD   b complex vitamins capsule Take 1 capsule by mouth daily    Historical Provider, MD   Cholecalciferol (VITAMIN D PO) Take 1 tablet by mouth daily    Historical Provider, MD   cilostazol (PLETAL) 100 mg tablet Take 1 tablet (100 mg total) by mouth 2 (two) times a day 9/28/21   Jacqui Gutiérrez PA-C   famotidine (PEPCID) 40 MG tablet TAKE 1 TABLET BY MOUTH EVERY DAY 6/20/22   Radisson MD Sony   FIBER PO Take by mouth    Historical Provider, MD   fluticasone (FLOVENT HFA) 110 MCG/ACT inhaler Inhale 2 puffs 2 (two) times a day Rinse mouth after use   6/10/22   Radisson MD Sony   LORazepam (ATIVAN) 0 5 mg tablet Take 1/2 tablet to 1 tablet PO daily PRN 5/25/22   Radisson MD Sony   meclizine (ANTIVERT) 25 mg tablet Take 1 tablet (25 mg total) by mouth every 12 (twelve) hours as needed for dizziness 5/2/22   Amarilis Hinkle MD   methimazole (TAPAZOLE) 5 mg tablet 1/2 tab MWF 3/30/22   Amarilis Hinkle MD   montelukast (SINGULAIR) 10 mg tablet Take 1 tablet (10 mg total) by mouth daily at bedtime 5/2/22   Amarilis Hinkle MD   oxyCODONE-acetaminophen (PERCOCET) 5-325 mg per tablet Take 1 tablet by mouth 2 (two) times a day as needed for moderate pain Max Daily Amount: 2 tablets 3/22/22   Amarilis Hinkle MD   cephalexin [facility]) 250 mg capsule Take 1 capsule (250 mg total) by mouth every 6 (six) hours for 5 days 6/23/22 6/28/22  SILKE Day   nitrofurantoin (MACROBID) 100 mg capsule Take 1 capsule (100 mg total) by mouth 2 (two) times a day for 5 days 6/27/22 6/28/22  SILKE Day   potassium chloride (K-DUR,KLOR-CON) 10 mEq tablet Take 1 tablet (10 mEq total) by mouth daily  Patient not taking: No sig reported 1/19/22 6/28/22  SILKE Carrera   Psyllium (Metamucil) 0 36 g CAPS Take by mouth PRN  Patient not taking: No sig reported  6/28/22  Historical Provider, MD   tiZANidine (ZANAFLEX) 2 mg tablet TAKE 1 TABLET (2 MG TOTAL) BY MOUTH 2 (TWO) TIMES A DAY AS NEEDED FOR MUSCLE SPASMS 6/20/22 6/28/22  Amarilis Hinkle MD     I have reviewed home medications with patient personally  Allergies: Allergies   Allergen Reactions    Haloperidol      Confusion /AMS  1 ep on 09/04/2020  AGGRESSIVENESS  AGITATION    Triazolam Other (See Comments)     Confusion, and aggressivenss as well   Bactrim [Sulfamethoxazole-Trimethoprim]     Ezetimibe     Gabapentin     Lisinopril     Naproxen Nausea Only    Statins        Social History:  Marital Status:     Patient Pre-hospital Living Situation: Home  Patient Pre-hospital Level of Mobility: walks  Patient Pre-hospital Diet Restrictions: n/a  Substance Use History:   Social History     Substance and Sexual Activity   Alcohol Use No     Social History     Tobacco Use   Smoking Status Never Smoker   Smokeless Tobacco Never Used     Social History     Substance and Sexual Activity   Drug Use No       Family History:  Family History   Problem Relation Age of Onset    Coronary artery disease Mother     Other Father         MVA    Alcohol abuse Neg Hx     Depression Neg Hx     Drug abuse Neg Hx     Substance Abuse Neg Hx     Mental illness Neg Hx        Physical Exam:     Vitals:   Blood Pressure: (!) 112/44 (06/28/22 2213)  Pulse: 99 (06/28/22 2213)  Temperature: 98 6 °F (37 °C) (06/28/22 2213)  Temp Source: Oral (06/28/22 1823)  Respirations: 14 (06/28/22 2213)  Weight - Scale: 50 8 kg (112 lb) (06/28/22 1823)  SpO2: 94 % (06/28/22 2213)    Physical Exam  Vitals and nursing note reviewed  Constitutional:       General: She is not in acute distress  Appearance: She is well-developed  HENT:      Head: Normocephalic and atraumatic  Mouth/Throat:      Mouth: Mucous membranes are moist    Eyes:      Extraocular Movements: Extraocular movements intact  Conjunctiva/sclera: Conjunctivae normal       Pupils: Pupils are equal, round, and reactive to light  Cardiovascular:      Rate and Rhythm: Normal rate and regular rhythm  Pulses: Normal pulses  Heart sounds: Normal heart sounds  No murmur heard  Pulmonary:      Effort: Pulmonary effort is normal  No respiratory distress  Breath sounds: Normal breath sounds  Abdominal:      General: Abdomen is flat  Bowel sounds are normal  There is no distension  Palpations: Abdomen is soft  Tenderness: There is abdominal tenderness  There is no guarding  Musculoskeletal:         General: No swelling or tenderness  Cervical back: Neck supple  Right lower leg: No edema  Left lower leg: No edema  Skin:     General: Skin is warm and dry  Neurological:      Mental Status: She is alert and oriented to person, place, and time     Psychiatric:         Mood and Affect: Mood normal          Additional Data: Lab Results:  Results from last 7 days   Lab Units 06/28/22  1930   WBC Thousand/uL 4 15*   HEMOGLOBIN g/dL 10 7*   HEMATOCRIT % 32 3*   PLATELETS Thousands/uL 236   BANDS PCT % 14*   LYMPHO PCT % 0*   MONO PCT % 1*   EOS PCT % 0     Results from last 7 days   Lab Units 06/28/22  1930   SODIUM mmol/L 137   POTASSIUM mmol/L 3 3*   CHLORIDE mmol/L 106   CO2 mmol/L 23   BUN mg/dL 16   CREATININE mg/dL 0 91   ANION GAP mmol/L 8   CALCIUM mg/dL 8 8   GLUCOSE RANDOM mg/dL 115                 Results from last 7 days   Lab Units 06/28/22  1930   LACTIC ACID mmol/L 1 0       Imaging: No pertinent imaging reviewed  No orders to display       EKG and Other Studies Reviewed on Admission:   · EKG: No EKG obtained  tele while I examined her nsr [de-identified]    ** Please Note: This note has been constructed using a voice recognition system   **

## 2022-06-29 NOTE — PLAN OF CARE
Problem: MOBILITY - ADULT  Goal: Maintain or return to baseline ADL function  Description: INTERVENTIONS:  -  Assess patient's ability to carry out ADLs; assess patient's baseline for ADL function and identify physical deficits which impact ability to perform ADLs (bathing, care of mouth/teeth, toileting, grooming, dressing, etc )  - Assess/evaluate cause of self-care deficits   - Assess range of motion  - Assess patient's mobility; develop plan if impaired  - Assess patient's need for assistive devices and provide as appropriate  - Encourage maximum independence but intervene and supervise when necessary  - Involve family in performance of ADLs  - Assess for home care needs following discharge   - Consider OT consult to assist with ADL evaluation and planning for discharge  - Provide patient education as appropriate  Outcome: Progressing  Goal: Maintains/Returns to pre admission functional level  Description: INTERVENTIONS:  - Perform BMAT or MOVE assessment daily    - Set and communicate daily mobility goal to care team and patient/family/caregiver  - Collaborate with rehabilitation services on mobility goals if consulted  - Perform Range of Motion 2 times a day  - Reposition patient every 2 hours    - Dangle patient 2 times a day  - Stand patient 2 times a day  - Ambulate patient 2 times a day  - Out of bed to chair 2 times a day   - Out of bed for meals 2 times a day  - Out of bed for toileting  - Record patient progress and toleration of activity level   Outcome: Progressing     Problem: PAIN - ADULT  Goal: Verbalizes/displays adequate comfort level or baseline comfort level  Description: Interventions:  - Encourage patient to monitor pain and request assistance  - Assess pain using appropriate pain scale  - Administer analgesics based on type and severity of pain and evaluate response  - Implement non-pharmacological measures as appropriate and evaluate response  - Consider cultural and social influences on pain and pain management  - Notify physician/advanced practitioner if interventions unsuccessful or patient reports new pain  Outcome: Progressing     Problem: INFECTION - ADULT  Goal: Absence or prevention of progression during hospitalization  Description: INTERVENTIONS:  - Assess and monitor for signs and symptoms of infection  - Monitor lab/diagnostic results  - Monitor all insertion sites, i e  indwelling lines, tubes, and drains  - Monitor endotracheal if appropriate and nasal secretions for changes in amount and color  - [place] appropriate cooling/warming therapies per order  - Administer medications as ordered  - Instruct and encourage patient and family to use good hand hygiene technique  - Identify and instruct in appropriate isolation precautions for identified infection/condition  Outcome: Progressing  Goal: Absence of fever/infection during neutropenic period  Description: INTERVENTIONS:  - Monitor WBC    Outcome: Progressing     Problem: SAFETY ADULT  Goal: Maintain or return to baseline ADL function  Description: INTERVENTIONS:  -  Assess patient's ability to carry out ADLs; assess patient's baseline for ADL function and identify physical deficits which impact ability to perform ADLs (bathing, care of mouth/teeth, toileting, grooming, dressing, etc )  - Assess/evaluate cause of self-care deficits   - Assess range of motion  - Assess patient's mobility; develop plan if impaired  - Assess patient's need for assistive devices and provide as appropriate  - Encourage maximum independence but intervene and supervise when necessary  - Involve family in performance of ADLs  - Assess for home care needs following discharge   - Consider OT consult to assist with ADL evaluation and planning for discharge  - Provide patient education as appropriate  Outcome: Progressing  Goal: Maintains/Returns to pre admission functional level  Description: INTERVENTIONS:  - Perform BMAT or MOVE assessment daily    - Set and communicate daily mobility goal to care team and patient/family/caregiver  - Collaborate with rehabilitation services on mobility goals if consulted  - Perform Range of Motion 2 times a day  - Reposition patient every 2 hours    - Dangle patient 2 times a day  - Stand patient 2 times a day  - Ambulate patient 2 times a day  - Out of bed to chair 2 times a day   - Out of bed for meals 2 times a day  - Out of bed for toileting  - Record patient progress and toleration of activity level   Outcome: Progressing  Goal: Patient will remain free of falls  Description: INTERVENTIONS:  - Educate patient/family on patient safety including physical limitations  - Instruct patient to call for assistance with activity   - Consult OT/PT to assist with strengthening/mobility   - Keep Call bell within reach  - Keep bed low and locked with side rails adjusted as appropriate  - Keep care items and personal belongings within reach  - Initiate and maintain comfort rounds  - Make Fall Risk Sign visible to staff  - Offer Toileting every  Hours, in advance of need  - Initiate/Maintain alarm  - Obtain necessary fall risk management equipment:  - Apply yellow socks and bracelet for high fall risk patients  - Consider moving patient to room near nurses station  Outcome: Progressing     Problem: DISCHARGE PLANNING  Goal: Discharge to home or other facility with appropriate resources  Description: INTERVENTIONS:  - Identify barriers to discharge w/patient and caregiver  - Arrange for needed discharge resources and transportation as appropriate  - Identify discharge learning needs (meds, wound care, etc )  - Arrange for interpretive services to assist at discharge as needed  - Refer to Case Management Department for coordinating discharge planning if the patient needs post-hospital services based on physician/advanced practitioner order or complex needs related to functional status, cognitive ability, or social support system  Outcome: Progressing     Problem: Knowledge Deficit  Goal: Patient/family/caregiver demonstrates understanding of disease process, treatment plan, medications, and discharge instructions  Description: Complete learning assessment and assess knowledge base    Interventions:  - Provide teaching at level of understanding  - Provide teaching via preferred learning methods  Outcome: Progressing     Problem: GENITOURINARY - ADULT  Goal: Maintains or returns to baseline urinary function  Description: INTERVENTIONS:  - Assess urinary function  - Encourage oral fluids to ensure adequate hydration if ordered  - Administer IV fluids as ordered to ensure adequate hydration  - Administer ordered medications as needed  - Offer frequent toileting  - Follow urinary retention protocol if ordered  Outcome: Progressing  Goal: Absence of urinary retention  Description: INTERVENTIONS:  - Assess patients ability to void and empty bladder  - Monitor I/O  - Bladder scan as needed  - Discuss with physician/AP medications to alleviate retention as needed  - Discuss catheterization for long term situations as appropriate  Outcome: Progressing  Goal: Urinary catheter remains patent  Description: INTERVENTIONS:  - Assess patency of urinary catheter  - If patient has a chronic sun, consider changing catheter if non-functioning  - Follow guidelines for intermittent irrigation of non-functioning urinary catheter  Outcome: Progressing

## 2022-06-29 NOTE — SEPSIS NOTE
Sepsis Note   Ester Ordonez 80 y o  female MRN: [id number]  Unit/Bed#: ED 27 Encounter: [id number]       qSOFA     Row Name 06/28/22 1830 06/28/22 1826 06/28/22 1823          Altered mental status GCS < 15 -- -- --      Respiratory Rate > / =22 0 -- 0      Systolic BP < / =808 0 0 --      Q Sofa Score 0 0 --                 Initial Sepsis Screening     Row Name 06/28/22 2040                Is the patient's history suggestive of a new or worsening infection? No  -MB        Suspected source of infection urinary tract infection  -MB        Are two or more of the following signs & symptoms of infection both present and new to the patient? No  -MB        Indicate SIRS criteria Tachycardia > 90 bpm  -MB        If the answer is yes to both questions, suspicion of sepsis is present --        If severe sepsis is present AND tissue hypoperfusion perists in the hour after fluid resuscitation or lactate > 4, the patient meets criteria for SEPTIC SHOCK --        Are any of the following organ dysfunction criteria present within 6 hours of suspected infection and SIRS criteria that are NOT considered to be chronic conditions?  No  -MB        Organ dysfunction --        Date of presentation of severe sepsis --        Time of presentation of severe sepsis --        Tissue hypoperfusion persists in the hour after crystalloid fluid administration, evidenced, by either: --        Was hypotension present within one hour of the conclusion of crystalloid fluid administration? --        Date of presentation of septic shock --        Time of presentation of septic shock --              User Key  (r) = Recorded By, (t) = Taken By, (c) = Cosigned By    [address] Name Provider Type    ABBI Bojorquez MD Physician

## 2022-06-30 VITALS
DIASTOLIC BLOOD PRESSURE: 61 MMHG | OXYGEN SATURATION: 96 % | WEIGHT: 110.23 LBS | SYSTOLIC BLOOD PRESSURE: 95 MMHG | TEMPERATURE: 98.2 F | RESPIRATION RATE: 17 BRPM | BODY MASS INDEX: 20.81 KG/M2 | HEART RATE: 92 BPM | HEIGHT: 61 IN

## 2022-06-30 PROBLEM — A41.9 SEPSIS (HCC): Status: RESOLVED | Noted: 2022-06-28 | Resolved: 2022-06-30

## 2022-06-30 PROBLEM — E87.6 HYPOKALEMIA: Status: RESOLVED | Noted: 2020-09-03 | Resolved: 2022-06-30

## 2022-06-30 LAB
ANION GAP SERPL CALCULATED.3IONS-SCNC: 6 MMOL/L (ref 4–13)
ANISOCYTOSIS BLD QL SMEAR: PRESENT
BASOPHILS # BLD MANUAL: 0 THOUSAND/UL (ref 0–0.1)
BASOPHILS NFR MAR MANUAL: 0 % (ref 0–1)
BUN SERPL-MCNC: 17 MG/DL (ref 5–25)
CALCIUM SERPL-MCNC: 7.9 MG/DL (ref 8.4–10.2)
CHLORIDE SERPL-SCNC: 109 MMOL/L (ref 96–108)
CO2 SERPL-SCNC: 21 MMOL/L (ref 21–32)
CREAT SERPL-MCNC: 1.02 MG/DL (ref 0.6–1.3)
EOSINOPHIL # BLD MANUAL: 0 THOUSAND/UL (ref 0–0.4)
EOSINOPHIL NFR BLD MANUAL: 0 % (ref 0–6)
ERYTHROCYTE [DISTWIDTH] IN BLOOD BY AUTOMATED COUNT: 13.6 % (ref 11.6–15.1)
GFR SERPL CREATININE-BSD FRML MDRD: 48 ML/MIN/1.73SQ M
GLUCOSE SERPL-MCNC: 82 MG/DL (ref 65–140)
HCT VFR BLD AUTO: 32.3 % (ref 34.8–46.1)
HGB BLD-MCNC: 10.7 G/DL (ref 11.5–15.4)
LYMPHOCYTES # BLD AUTO: 0.36 THOUSAND/UL (ref 0.6–4.47)
LYMPHOCYTES # BLD AUTO: 11 % (ref 14–44)
MCH RBC QN AUTO: 29.3 PG (ref 26.8–34.3)
MCHC RBC AUTO-ENTMCNC: 33.1 G/DL (ref 31.4–37.4)
MCV RBC AUTO: 89 FL (ref 82–98)
MONOCYTES # BLD AUTO: 0.23 THOUSAND/UL (ref 0–1.22)
MONOCYTES NFR BLD: 7 % (ref 4–12)
NEUTROPHILS # BLD MANUAL: 2.68 THOUSAND/UL (ref 1.85–7.62)
NEUTS BAND NFR BLD MANUAL: 2 % (ref 0–8)
NEUTS SEG NFR BLD AUTO: 80 % (ref 43–75)
PLATELET # BLD AUTO: 195 THOUSANDS/UL (ref 149–390)
PLATELET BLD QL SMEAR: ADEQUATE
PMV BLD AUTO: 9.6 FL (ref 8.9–12.7)
POIKILOCYTOSIS BLD QL SMEAR: PRESENT
POTASSIUM SERPL-SCNC: 3.8 MMOL/L (ref 3.5–5.3)
RBC # BLD AUTO: 3.65 MILLION/UL (ref 3.81–5.12)
RBC MORPH BLD: PRESENT
SODIUM SERPL-SCNC: 136 MMOL/L (ref 135–147)
WBC # BLD AUTO: 3.27 THOUSAND/UL (ref 4.31–10.16)

## 2022-06-30 PROCEDURE — 85027 COMPLETE CBC AUTOMATED: CPT

## 2022-06-30 PROCEDURE — 85007 BL SMEAR W/DIFF WBC COUNT: CPT

## 2022-06-30 PROCEDURE — 99239 HOSP IP/OBS DSCHRG MGMT >30: CPT | Performed by: INTERNAL MEDICINE

## 2022-06-30 PROCEDURE — 80048 BASIC METABOLIC PNL TOTAL CA: CPT

## 2022-06-30 RX ORDER — AMLODIPINE BESYLATE 2.5 MG/1
2.5 TABLET ORAL DAILY
Qty: 30 TABLET | Refills: 0 | Status: SHIPPED | OUTPATIENT
Start: 2022-06-30 | End: 2022-07-18 | Stop reason: SDUPTHER

## 2022-06-30 RX ORDER — OLANZAPINE 10 MG/1
5 INJECTION, POWDER, LYOPHILIZED, FOR SOLUTION INTRAMUSCULAR ONCE
Status: DISCONTINUED | OUTPATIENT
Start: 2022-06-30 | End: 2022-06-30 | Stop reason: HOSPADM

## 2022-06-30 RX ORDER — NITROFURANTOIN 25; 75 MG/1; MG/1
100 CAPSULE ORAL 2 TIMES DAILY
Qty: 9 CAPSULE | Refills: 0 | Status: SHIPPED | OUTPATIENT
Start: 2022-06-30 | End: 2022-07-07

## 2022-06-30 RX ORDER — OLANZAPINE 10 MG/1
10 TABLET ORAL ONCE
Status: DISCONTINUED | OUTPATIENT
Start: 2022-06-30 | End: 2022-06-30

## 2022-06-30 RX ORDER — OLANZAPINE 2.5 MG/1
5 TABLET ORAL ONCE
Status: DISCONTINUED | OUTPATIENT
Start: 2022-06-30 | End: 2022-06-30

## 2022-06-30 RX ADMIN — FLUTICASONE PROPIONATE 2 PUFF: 110 AEROSOL, METERED RESPIRATORY (INHALATION) at 09:53

## 2022-06-30 RX ADMIN — ENOXAPARIN SODIUM 30 MG: 30 INJECTION SUBCUTANEOUS at 09:37

## 2022-06-30 RX ADMIN — Medication 1000 UNITS: at 09:37

## 2022-06-30 RX ADMIN — ASPIRIN 81 MG CHEWABLE TABLET 81 MG: 81 TABLET CHEWABLE at 09:36

## 2022-06-30 RX ADMIN — B-COMPLEX W/ C & FOLIC ACID TAB 1 TABLET: TAB at 09:48

## 2022-06-30 RX ADMIN — CEFEPIME HYDROCHLORIDE 1000 MG: 1 INJECTION, POWDER, FOR SOLUTION INTRAMUSCULAR; INTRAVENOUS at 09:34

## 2022-06-30 RX ADMIN — FAMOTIDINE 40 MG: 20 TABLET ORAL at 09:36

## 2022-06-30 RX ADMIN — OLANZAPINE 5 MG: 2.5 TABLET, FILM COATED ORAL at 04:51

## 2022-06-30 RX ADMIN — CILOSTAZOL 100 MG: 100 TABLET ORAL at 09:36

## 2022-06-30 NOTE — NURSING NOTE
Pt again climbing out of bed verbally aggressive  Stating she wants to leave and staff keep yelling  Pt  Has been told multple time to use call bell yet continue to violate bed alarm  Pt  Nearly fell once and has now pulled out her IV  Family against benzos, opitates, restraints, and mind altering medication  Pt  Initially refused neuro exam then partcipated  Family states she only does this when being awoke but pt  Has climbed out of bed while previously awake  This Arlette Loyola has spent about 1 hour 15 min dealing with bed alarms and general refusal to get into a bed or chair  Resident made aware of behaviors  Pt  Is oriented x4 but forgetful  Neuro assesment is otherwise within normal limits  Will continue to chart in room when not giving medications  Family also telling this Arlette Loyola how they reported staff to management before

## 2022-06-30 NOTE — NURSING NOTE
PT  Family allowing patient to constantly climb out of bed to bathroom and from bathroom to chair   Pt  Had

## 2022-06-30 NOTE — QUICK NOTE
Was notified by nursing that patient was angry and agitated despite orientation  Went to evaluate patient, she was sitting calmly at the edge of the bed with the nurse and PCA  Asked the patient what had happened, she told me she was trying to be helpful and not use the call bell to ask for assistance to the bathroom  However the bed alarm had been set due to the patient having tripped on the edge of the bed earlier in the night  Explained the bed alarm was for her safety, which she understood  Apparently after she had gotten out of bed and alarm rang, nursing went to see her and reminded her to use call bell and she became frustrated and called her daughter stating she "needed to get out of here " Daughter spoke with the nurse and stated she did not want the patient to receive medical or physical restraints  On my assessment, there was no need for any restraints, patient was calmed down after redirection, but will continue to reassess with nursing  Called daughter back and explained medications administered today and that hospital delirium is common in the elderly  Patient had experienced this in a prior admission as well  Patient's daughter is understanding now, after thorough understanding  Addendum: notified by nursing that patient was increasingly confused, agitated, continually getting out of bed without using call bed, nor responding to redirection by 3 nurses who were all in the room  Patient was reported to be twisted around IV pole, almost pulling out her IV again  When I got to the room to reassess, she had just calmed down and was back in bed, but still confused  Called daughter again, and reiterated the patient's safety is at risk, and we have tried all measures of redirection, dark room, etc  Virtual nor in person 1:1 not possible due to staffing   Daughter prefers chemical restraints vs physical  Ordered zyprexa 5mg one time dose, and informed her we would need to order [place] belt should the zyprexa not be effective  Discussed this plan with nursing as well as hospital supervisor

## 2022-06-30 NOTE — PLAN OF CARE
Problem: MOBILITY - ADULT  Goal: Maintain or return to baseline ADL function  Description: INTERVENTIONS:  -  Assess patient's ability to carry out ADLs; assess patient's baseline for ADL function and identify physical deficits which impact ability to perform ADLs (bathing, care of mouth/teeth, toileting, grooming, dressing, etc )  - Assess/evaluate cause of self-care deficits   - Assess range of motion  - Assess patient's mobility; develop plan if impaired  - Assess patient's need for assistive devices and provide as appropriate  - Encourage maximum independence but intervene and supervise when necessary  - Involve family in performance of ADLs  - Assess for home care needs following discharge   - Consider OT consult to assist with ADL evaluation and planning for discharge  - Provide patient education as appropriate  Outcome: Progressing  Goal: Maintains/Returns to pre admission functional level  Description: INTERVENTIONS:  - Perform BMAT or MOVE assessment daily    - Set and communicate daily mobility goal to care team and patient/family/caregiver  - Collaborate with rehabilitation services on mobility goals if consulted  - Perform Range of Motion  times a day  - Reposition patient every  hours    - Dangle patient  times a day  - Stand patient  times a day  - Ambulate patient  times a day  - Out of bed to chair  times a day   - Out of bed for meals  times a day  - Out of bed for toileting  - Record patient progress and toleration of activity level   Outcome: Progressing     Problem: PAIN - ADULT  Goal: Verbalizes/displays adequate comfort level or baseline comfort level  Description: Interventions:  - Encourage patient to monitor pain and request assistance  - Assess pain using appropriate pain scale  - Administer analgesics based on type and severity of pain and evaluate response  - Implement non-pharmacological measures as appropriate and evaluate response  - Consider cultural and social influences on pain and pain management  - Notify physician/advanced practitioner if interventions unsuccessful or patient reports new pain  Outcome: Progressing     Problem: INFECTION - ADULT  Goal: Absence or prevention of progression during hospitalization  Description: INTERVENTIONS:  - Assess and monitor for signs and symptoms of infection  - Monitor lab/diagnostic results  - Monitor all insertion sites, i e  indwelling lines, tubes, and drains  - Monitor endotracheal if appropriate and nasal secretions for changes in amount and color  - [place] appropriate cooling/warming therapies per order  - Administer medications as ordered  - Instruct and encourage patient and family to use good hand hygiene technique  - Identify and instruct in appropriate isolation precautions for identified infection/condition  Outcome: Progressing  Goal: Absence of fever/infection during neutropenic period  Description: INTERVENTIONS:  - Monitor WBC    Outcome: Progressing     Problem: SAFETY ADULT  Goal: Maintain or return to baseline ADL function  Description: INTERVENTIONS:  -  Assess patient's ability to carry out ADLs; assess patient's baseline for ADL function and identify physical deficits which impact ability to perform ADLs (bathing, care of mouth/teeth, toileting, grooming, dressing, etc )  - Assess/evaluate cause of self-care deficits   - Assess range of motion  - Assess patient's mobility; develop plan if impaired  - Assess patient's need for assistive devices and provide as appropriate  - Encourage maximum independence but intervene and supervise when necessary  - Involve family in performance of ADLs  - Assess for home care needs following discharge   - Consider OT consult to assist with ADL evaluation and planning for discharge  - Provide patient education as appropriate  Outcome: Progressing  Goal: Maintains/Returns to pre admission functional level  Description: INTERVENTIONS:  - Perform BMAT or MOVE assessment daily    - Set and communicate daily mobility goal to care team and patient/family/caregiver  - Collaborate with rehabilitation services on mobility goals if consulted  - Perform Range of Motion  times a day  - Reposition patient every  hours    - Dangle patient  times a day  - Stand patient  times a day  - Ambulate patient  times a day  - Out of bed to chair  times a day   - Out of bed for meals  times a day  - Out of bed for toileting  - Record patient progress and toleration of activity level   Outcome: Progressing  Goal: Patient will remain free of falls  Description: INTERVENTIONS:  - Educate patient/family on patient safety including physical limitations  - Instruct patient to call for assistance with activity   - Consult OT/PT to assist with strengthening/mobility   - Keep Call bell within reach  - Keep bed low and locked with side rails adjusted as appropriate  - Keep care items and personal belongings within reach  - Initiate and maintain comfort rounds  - Make Fall Risk Sign visible to staff  - Offer Toileting every  Hours, in advance of need  - Initiate/Maintain alarm  - Obtain necessary fall risk management equipment:   - Apply yellow socks and bracelet for high fall risk patients  - Consider moving patient to room near nurses station  Outcome: Progressing     Problem: DISCHARGE PLANNING  Goal: Discharge to home or other facility with appropriate resources  Description: INTERVENTIONS:  - Identify barriers to discharge w/patient and caregiver  - Arrange for needed discharge resources and transportation as appropriate  - Identify discharge learning needs (meds, wound care, etc )  - Arrange for interpretive services to assist at discharge as needed  - Refer to Case Management Department for coordinating discharge planning if the patient needs post-hospital services based on physician/advanced practitioner order or complex needs related to functional status, cognitive ability, or social support system  Outcome: Progressing Problem: Knowledge Deficit  Goal: Patient/family/caregiver demonstrates understanding of disease process, treatment plan, medications, and discharge instructions  Description: Complete learning assessment and assess knowledge base    Interventions:  - Provide teaching at level of understanding  - Provide teaching via preferred learning methods  Outcome: Progressing     Problem: GENITOURINARY - ADULT  Goal: Maintains or returns to baseline urinary function  Description: INTERVENTIONS:  - Assess urinary function  - Encourage oral fluids to ensure adequate hydration if ordered  - Administer IV fluids as ordered to ensure adequate hydration  - Administer ordered medications as needed  - Offer frequent toileting  - Follow urinary retention protocol if ordered  Outcome: Progressing  Goal: Absence of urinary retention  Description: INTERVENTIONS:  - Assess patients ability to void and empty bladder  - Monitor I/O  - Bladder scan as needed  - Discuss with physician/AP medications to alleviate retention as needed  - Discuss catheterization for long term situations as appropriate  Outcome: Progressing  Goal: Urinary catheter remains patent  Description: INTERVENTIONS:  - Assess patency of urinary catheter  - If patient has a chronic sun, consider changing catheter if non-functioning  - Follow guidelines for intermittent irrigation of non-functioning urinary catheter  Outcome: Progressing     Problem: Potential for Falls  Goal: Patient will remain free of falls  Description: INTERVENTIONS:  - Educate patient/family on patient safety including physical limitations  - Instruct patient to call for assistance with activity   - Consult OT/PT to assist with strengthening/mobility   - Keep Call bell within reach  - Keep bed low and locked with side rails adjusted as appropriate  - Keep care items and personal belongings within reach  - Initiate and maintain comfort rounds  - Make Fall Risk Sign visible to staff  - Offer Toileting every  Hours, in advance of need  - Initiate/Maintain alarm  - Obtain necessary fall risk management equipment  - Apply yellow socks and bracelet for high fall risk patients  - Consider moving patient to room near nurses station  Outcome: Progressing

## 2022-06-30 NOTE — ASSESSMENT & PLAN NOTE
Recent Labs     06/28/22  1930 06/29/22  0533 06/30/22  0639   K 3 3* 4 5 3 8     · Monitor e-lytes and replete as appropriate

## 2022-06-30 NOTE — NURSING NOTE
[x]Manual[]Template[]Copied    Added by:  [x]Kiesha Mccord RN      []Hover for details    Family found turning off IV pump instead of using call bell pump locked  Pt also allowing pt   To climb out bed leaving IV trailed behind

## 2022-06-30 NOTE — ASSESSMENT & PLAN NOTE
· Decrease dose of amlodipine-2 5 mg daily  · Follow-up with PCP within 7 days to adjust BP regimen upon discharge

## 2022-06-30 NOTE — DISCHARGE SUMMARY
[facility]  Discharge- Edel Terry 7/7/1931, 80 y o  female MRN: [id number]  Unit/Bed#: W -23 Encounter: [id number]  Primary Care Provider: Walker Paget, MD   Date and time admitted to hospital: 6/28/2022  6:21 PM    Sepsis (HCC)-resolved as of 6/30/2022  Assessment & Plan  Meets 2/4 SIRS criteria with source of UTI  HR >90, 14% bands on presentation, 27% on 6/29     Lab Results   Component Value Date    WBC 3 27 (L) 06/30/2022    WBC 6 15 06/29/2022    WBC 4 15 (L) 06/28/2022     · Etiology: Likely 2/2 UTI  · Urine culture shows multidrug resistant E Coli  Plan:  · Switched cefepime to oral Macrobid for 4 more days  · Follow up final blood cultures    · Discontinued IV fluids  · Follow-up with PCP within 1 week of discharge    * UTI (urinary tract infection)  Assessment & Plan  · Symptoms began 6/23 with dysuria, prescribed Keflex but then culture came back resistant E coli, switched to [facility] 6/27  · POA- worsening symptoms: chills, nausea, vomiting, back pain  · Given cefepime in ED  · HR >90, bandemia 14%--> 27% on 6/29 AM  · Urine culture shows: multi drug resistant E  Coli  · Of note, urine cultures collected after 1 dose of cefepime     Plan:  · Macrobid for 4 more days, follow-up with PCP within 7 days of discharge    Blurry vision, bilateral  Assessment & Plan  · Intermittent, bilateral blurry vision in setting of known macular degeneration on left eye, known carotid stenosis on right  No unilateral sudden vision loss  She thinks it is related to her antivert, has been holding for 2 days  · Last carotid study 3/22, due for repeat study in September  · On cilastazol  · Peripheral vision intact in both eyes to moving object b/l  Finger discrimination impaired in all 4 quadrants b/l       Plan:  · Follow-up with PCP  · Hold antivert home medication  · Consider optho consult upon discharge for outpatient f/u at the discretion of PCP; discussed with family  Hyperthyroidism  Assessment & Plan  · Continue methimazole 2 5 mg MWF    Essential hypertension  Assessment & Plan  · Decrease dose of amlodipine-2 5 mg daily  · Follow-up with PCP within 7 days to adjust BP regimen upon discharge    Hypokalemia-resolved as of 6/30/2022  Assessment & Plan  Recent Labs     06/28/22  1930 06/29/22  0533 06/30/22  0639   K 3 3* 4 5 3 8     · Monitor e-lytes and replete as appropriate  Medical Problems             Resolved Problems  Date Reviewed: 6/28/2022          Resolved    Hypokalemia 6/30/2022     Resolved by  Carey Esqueda MD    Sepsis [facility]) 6/30/2022     Resolved by  Carey Esqueda MD              Discharging Resident: Carey Esqueda MD  Discharging Attending: No att  providers found  PCP: Zulay Espino MD  Admission Date:   Admission Orders (From admission, onward)     Ordered        06/29/22 1403  Inpatient Admission  Once            06/28/22 2106  Place in Observation  Once                      Discharge Date: 06/30/22    Consultations During Hospital Stay:  · None    Procedures Performed:   · None    Significant Findings / Test Results:   · Urine culture shows: multi drug resistant E  Coli     Incidental Findings:      Test Results Pending at Discharge (will require follow up): · Follow-up final blood culture results at 48hrs      Outpatient Tests Requested:  · Follow-up with PCP    Complications:      Reason for Admission:  Resistant UTI    Hospital Course:   Tarah Bustamante is a 80 y o  female patient past medical history significant for hypertension, P 80, bilateral carotid stenosis, hyperthyroidism   who originally presented to the hospital on 6/28/2022 due to urinary symptoms, nausea vomiting and chills and was admitted with a diagnosis of urinary tract infection with resistant E coli  Patient apparently failed outpatient course of antibiotics  During the hospital stay, she was put on IV cefepime    Urine studies obtained in the hospital showed E coli susceptible to [facility] patient was discharged on for 4 more days  Of note, during the hospitalization, patient was noted by night team to become increasingly confused, agitated and getting out of bed without using call bed or responding to redirection  Due to this, her family wanted her to be out of the hospital as soon as possible  She was noted to have low normal blood pressures in amlodipine was decreased to 2 5 mg  Patient also complained of blurry vision with a history of macular degeneration on admission, which had not worsened during hospitalization  Patient and daughter are advised to follow-up with PCP within 7 days of discharge  Please see above list of diagnoses and related plan for additional information  Condition at Discharge: fair    Discharge Day Visit / Exam:   Subjective:  Patient seen and examined at bedside  She is sitting up in bed and able to talk to me appropriately during interview/exam   She denies chest pain, shortness of breath, abdominal pain, hematuria, new onset vision difficulties, new onset numbness or tingling or weakness  She tells me she had a very rough night and did not get much rest   She is also anxious to know about when she would be discharged home  Vitals: Blood Pressure: 95/61 (06/30/22 0910)  Pulse: 92 (06/30/22 0910)  Temperature: 98 2 °F (36 8 °C) (06/30/22 0910)  Temp Source: Oral (06/28/22 1823)  Respirations: 17 (06/30/22 0910)  Height: 5' 1" (154 9 cm) (06/28/22 2200)  Weight - Scale: 50 kg (110 lb 3 7 oz) (06/28/22 2200)  SpO2: 96 % (06/30/22 0910)    Physical Exam   Vitals reviewed  General Examination: Lying in bed, cooperative   HEENT: Normocephalic, Atraumatic  Extraocular movements intact, PERRLA  Peripheral vision grossly intact to moving object in both eyes  CVS: S1, S2 noted  Lungs: CTA b/l  Abdomen: Soft, normal bowel sounds  Non distended, non tender  Ext: No edema noted  Psych:  Though Process - logical    Skin: No bleeding/bruising noted  Neuro: A, Ox3  Follows simple, 3 steps commands  Appropriate antigravity strength in all 4 extremities proximally, distally       Discussion with Family: Medicine team updated family at bedside  Discharge instructions/Information to patient and family:   See after visit summary for information provided to patient and family  Provisions for Follow-Up Care:  See after visit summary for information related to follow-up care and any pertinent home health orders  Disposition:   Home  Planned Readmission:  None    Discharge Medications:  See after visit summary for reconciled discharge medications provided to patient and/or family        **Please Note: This note may have been constructed using a voice recognition system**

## 2022-06-30 NOTE — ASSESSMENT & PLAN NOTE
· Intermittent, bilateral blurry vision in setting of known macular degeneration on left eye, known carotid stenosis on right  No unilateral sudden vision loss  She thinks it is related to her antivert, has been holding for 2 days  · Last carotid study 3/22, due for repeat study in September  · On cilastazol  · Peripheral vision intact in both eyes to moving object b/l  Finger discrimination impaired in all 4 quadrants b/l  Plan:  · Follow-up with PCP  · Hold antivert home medication  · Consider optho consult upon discharge for outpatient f/u at the discretion of PCP; discussed with family

## 2022-06-30 NOTE — ASSESSMENT & PLAN NOTE
· Symptoms began 6/23 with dysuria, prescribed Keflex but then culture came back resistant E coli, switched to [facility] 6/27  · POA- worsening symptoms: chills, nausea, vomiting, back pain  · Given cefepime in ED  · HR >90, bandemia 14%--> 27% on 6/29 AM  · Urine culture shows: multi drug resistant E  Coli  · Of note, urine cultures collected after 1 dose of cefepime     Plan:  · Macrobid for 4 more days, follow-up with PCP within 7 days of discharge

## 2022-06-30 NOTE — DISCHARGE INSTR - AVS FIRST PAGE
Dear Citlali Martin,     It was our pleasure to care for you here at [facility]  It is our hope that we were always able to exceed the expected standards for your care during your stay  You were hospitalized due to urinary tract infection  You were cared for on the [address] [place] [address] 4th floor by Cathie Barnard MD under the service of Jerman Jensen MD with the [facility] who covers for your primary care physician (PCP), Taylor Fernandes MD, while you were hospitalized  If you have any questions or concerns related to this hospitalization, you may contact us at [phone]  For follow up as well as any medication refills, we recommend that you follow up with your primary care physician  A registered nurse will reach out to you by phone within a few days after your discharge to answer any additional questions that you may have after going home  However, at this time we provide for you here, the most important instructions / recommendations at discharge:     Notable Medication Adjustments -   Decreased BP medication - Amlodipine to 2 5 mg  Start taking Macrobid today evening and 4 more days  Testing Required after Discharge -   None  Important follow up information -   Please follow-up with your primary care doctor within 7 days of discharge  Other Instructions -   None  Please review this entire after visit summary as additional general instructions including medication list, appointments, activity, diet, any pertinent wound care, and other additional recommendations from your care team that may be provided for you        Sincerely,     Cathie Barnard MD

## 2022-07-01 ENCOUNTER — APPOINTMENT (EMERGENCY)
Dept: CT IMAGING | Facility: HOSPITAL | Age: 87
DRG: 871 | End: 2022-07-01
Payer: MEDICARE

## 2022-07-01 ENCOUNTER — TRANSITIONAL CARE MANAGEMENT (OUTPATIENT)
Dept: INTERNAL MEDICINE CLINIC | Facility: CLINIC | Age: 87
End: 2022-07-01

## 2022-07-01 ENCOUNTER — APPOINTMENT (EMERGENCY)
Dept: RADIOLOGY | Facility: HOSPITAL | Age: 87
DRG: 871 | End: 2022-07-01
Payer: MEDICARE

## 2022-07-01 ENCOUNTER — HOSPITAL ENCOUNTER (INPATIENT)
Facility: HOSPITAL | Age: 87
LOS: 6 days | Discharge: HOME | DRG: 871 | End: 2022-07-07
Attending: EMERGENCY MEDICINE | Admitting: GENERAL PRACTICE
Payer: MEDICARE

## 2022-07-01 DIAGNOSIS — A41.9 SEPSIS (HCC): Primary | ICD-10-CM

## 2022-07-01 DIAGNOSIS — N39.0 UTI (URINARY TRACT INFECTION): ICD-10-CM

## 2022-07-01 DIAGNOSIS — R50.9 FEVER: ICD-10-CM

## 2022-07-01 DIAGNOSIS — K58.9 IRRITABLE BOWEL SYNDROME: ICD-10-CM

## 2022-07-01 DIAGNOSIS — Z16.12 UTI DUE TO EXTENDED-SPECTRUM BETA LACTAMASE (ESBL) PRODUCING ESCHERICHIA COLI: ICD-10-CM

## 2022-07-01 DIAGNOSIS — B96.29 UTI DUE TO EXTENDED-SPECTRUM BETA LACTAMASE (ESBL) PRODUCING ESCHERICHIA COLI: ICD-10-CM

## 2022-07-01 DIAGNOSIS — N39.0 UTI DUE TO EXTENDED-SPECTRUM BETA LACTAMASE (ESBL) PRODUCING ESCHERICHIA COLI: ICD-10-CM

## 2022-07-01 PROBLEM — G93.41 METABOLIC ENCEPHALOPATHY: Status: ACTIVE | Noted: 2022-07-01

## 2022-07-01 PROBLEM — A41.51 SEPSIS DUE TO ESCHERICHIA COLI WITHOUT ACUTE ORGAN DYSFUNCTION (HCC): Status: ACTIVE | Noted: 2022-07-01

## 2022-07-01 LAB
ABO GROUP BLD: NORMAL
ALBUMIN SERPL BCP-MCNC: 3.4 G/DL (ref 3.5–5)
ALP SERPL-CCNC: 97 U/L (ref 34–104)
ALT SERPL W P-5'-P-CCNC: 31 U/L (ref 7–52)
ANION GAP SERPL CALCULATED.3IONS-SCNC: 10 MMOL/L (ref 4–13)
APTT PPP: 43 SECONDS (ref 23–37)
AST SERPL W P-5'-P-CCNC: 40 U/L (ref 13–39)
ATRIAL RATE: 80 BPM
BASO STIPL BLD QL SMEAR: PRESENT
BASOPHILS # BLD MANUAL: 0 THOUSAND/UL (ref 0–0.1)
BASOPHILS NFR MAR MANUAL: 0 % (ref 0–1)
BILIRUB SERPL-MCNC: 1.08 MG/DL (ref 0.2–1)
BILIRUB UR QL STRIP: NEGATIVE
BLD GP AB SCN SERPL QL: NEGATIVE
BUN SERPL-MCNC: 16 MG/DL (ref 5–25)
CALCIUM ALBUM COR SERPL-MCNC: 9 MG/DL (ref 8.3–10.1)
CALCIUM SERPL-MCNC: 8.5 MG/DL (ref 8.4–10.2)
CHLORIDE SERPL-SCNC: 103 MMOL/L (ref 96–108)
CLARITY UR: CLEAR
CO2 SERPL-SCNC: 22 MMOL/L (ref 21–32)
COLOR UR: NORMAL
CREAT SERPL-MCNC: 1.02 MG/DL (ref 0.6–1.3)
EOSINOPHIL # BLD MANUAL: 0 THOUSAND/UL (ref 0–0.4)
EOSINOPHIL NFR BLD MANUAL: 0 % (ref 0–6)
ERYTHROCYTE [DISTWIDTH] IN BLOOD BY AUTOMATED COUNT: 13.4 % (ref 11.6–15.1)
FLUAV RNA RESP QL NAA+PROBE: NEGATIVE
FLUBV RNA RESP QL NAA+PROBE: NEGATIVE
GFR SERPL CREATININE-BSD FRML MDRD: 48 ML/MIN/1.73SQ M
GLUCOSE SERPL-MCNC: 108 MG/DL (ref 65–140)
GLUCOSE UR STRIP-MCNC: NEGATIVE MG/DL
HCT VFR BLD AUTO: 33 % (ref 34.8–46.1)
HGB BLD-MCNC: 11.1 G/DL (ref 11.5–15.4)
HGB UR QL STRIP.AUTO: NEGATIVE
INR PPP: 0.92 (ref 0.84–1.19)
KETONES UR STRIP-MCNC: NEGATIVE MG/DL
LACTATE SERPL-SCNC: 1.1 MMOL/L (ref 0.5–2)
LEUKOCYTE ESTERASE UR QL STRIP: NEGATIVE
LG PLATELETS BLD QL SMEAR: PRESENT
LYMPHOCYTES # BLD AUTO: 0.25 THOUSAND/UL (ref 0.6–4.47)
LYMPHOCYTES # BLD AUTO: 6 % (ref 14–44)
MAGNESIUM SERPL-MCNC: 1.7 MG/DL (ref 1.9–2.7)
MCH RBC QN AUTO: 29.1 PG (ref 26.8–34.3)
MCHC RBC AUTO-ENTMCNC: 33.6 G/DL (ref 31.4–37.4)
MCV RBC AUTO: 86 FL (ref 82–98)
MONOCYTES # BLD AUTO: 0.13 THOUSAND/UL (ref 0–1.22)
MONOCYTES NFR BLD: 3 % (ref 4–12)
NEUTROPHILS # BLD MANUAL: 3.81 THOUSAND/UL (ref 1.85–7.62)
NEUTS BAND NFR BLD MANUAL: 10 % (ref 0–8)
NEUTS SEG NFR BLD AUTO: 81 % (ref 43–75)
NITRITE UR QL STRIP: NEGATIVE
P AXIS: 81 DEGREES
PH UR STRIP.AUTO: 6 [PH]
PLATELET # BLD AUTO: 203 THOUSANDS/UL (ref 149–390)
PLATELET BLD QL SMEAR: ADEQUATE
PMV BLD AUTO: 10 FL (ref 8.9–12.7)
POIKILOCYTOSIS BLD QL SMEAR: PRESENT
POLYCHROMASIA BLD QL SMEAR: PRESENT
POTASSIUM SERPL-SCNC: 3.4 MMOL/L (ref 3.5–5.3)
PR INTERVAL: 146 MS
PROCALCITONIN SERPL-MCNC: 5.48 NG/ML
PROT SERPL-MCNC: 5.6 G/DL (ref 6.4–8.4)
PROT UR STRIP-MCNC: NEGATIVE MG/DL
PROTHROMBIN TIME: 12.4 SECONDS (ref 11.6–14.5)
QRS AXIS: -7 DEGREES
QRSD INTERVAL: 78 MS
QT INTERVAL: 368 MS
QTC INTERVAL: 424 MS
RBC # BLD AUTO: 3.82 MILLION/UL (ref 3.81–5.12)
RH BLD: POSITIVE
RSV RNA RESP QL NAA+PROBE: NEGATIVE
SARS-COV-2 RNA RESP QL NAA+PROBE: NEGATIVE
SODIUM SERPL-SCNC: 135 MMOL/L (ref 135–147)
SP GR UR STRIP.AUTO: <=1.005 (ref 1–1.03)
SPECIMEN EXPIRATION DATE: NORMAL
T WAVE AXIS: 65 DEGREES
UROBILINOGEN UR QL STRIP.AUTO: 0.2 E.U./DL
VENTRICULAR RATE: 80 BPM
WBC # BLD AUTO: 4.19 THOUSAND/UL (ref 4.31–10.16)

## 2022-07-01 PROCEDURE — 96361 HYDRATE IV INFUSION ADD-ON: CPT

## 2022-07-01 PROCEDURE — 90715 TDAP VACCINE 7 YRS/> IM: CPT

## 2022-07-01 PROCEDURE — 86900 BLOOD TYPING SEROLOGIC ABO: CPT

## 2022-07-01 PROCEDURE — 90471 IMMUNIZATION ADMIN: CPT

## 2022-07-01 PROCEDURE — 72125 CT NECK SPINE W/O DYE: CPT

## 2022-07-01 PROCEDURE — 99285 EMERGENCY DEPT VISIT HI MDM: CPT

## 2022-07-01 PROCEDURE — 99222 1ST HOSP IP/OBS MODERATE 55: CPT | Performed by: GENERAL PRACTICE

## 2022-07-01 PROCEDURE — 71045 X-RAY EXAM CHEST 1 VIEW: CPT

## 2022-07-01 PROCEDURE — 86901 BLOOD TYPING SEROLOGIC RH(D): CPT

## 2022-07-01 PROCEDURE — 93010 ELECTROCARDIOGRAM REPORT: CPT | Performed by: INTERNAL MEDICINE

## 2022-07-01 PROCEDURE — 74176 CT ABD & PELVIS W/O CONTRAST: CPT

## 2022-07-01 PROCEDURE — 87154 CUL TYP ID BLD PTHGN 6+ TRGT: CPT

## 2022-07-01 PROCEDURE — 96367 TX/PROPH/DG ADDL SEQ IV INF: CPT

## 2022-07-01 PROCEDURE — 85027 COMPLETE CBC AUTOMATED: CPT

## 2022-07-01 PROCEDURE — 70450 CT HEAD/BRAIN W/O DYE: CPT

## 2022-07-01 PROCEDURE — 36415 COLL VENOUS BLD VENIPUNCTURE: CPT

## 2022-07-01 PROCEDURE — 85007 BL SMEAR W/DIFF WBC COUNT: CPT

## 2022-07-01 PROCEDURE — 0241U HB NFCT DS VIR RESP RNA 4 TRGT: CPT

## 2022-07-01 PROCEDURE — 85730 THROMBOPLASTIN TIME PARTIAL: CPT

## 2022-07-01 PROCEDURE — 86850 RBC ANTIBODY SCREEN: CPT

## 2022-07-01 PROCEDURE — 84145 PROCALCITONIN (PCT): CPT

## 2022-07-01 PROCEDURE — 87040 BLOOD CULTURE FOR BACTERIA: CPT

## 2022-07-01 PROCEDURE — 81003 URINALYSIS AUTO W/O SCOPE: CPT

## 2022-07-01 PROCEDURE — 71250 CT THORAX DX C-: CPT

## 2022-07-01 PROCEDURE — 85610 PROTHROMBIN TIME: CPT

## 2022-07-01 PROCEDURE — 96365 THER/PROPH/DIAG IV INF INIT: CPT

## 2022-07-01 PROCEDURE — 93005 ELECTROCARDIOGRAM TRACING: CPT

## 2022-07-01 PROCEDURE — 83735 ASSAY OF MAGNESIUM: CPT

## 2022-07-01 PROCEDURE — G1004 CDSM NDSC: HCPCS

## 2022-07-01 PROCEDURE — 83605 ASSAY OF LACTIC ACID: CPT

## 2022-07-01 PROCEDURE — 80053 COMPREHEN METABOLIC PANEL: CPT

## 2022-07-01 RX ORDER — POTASSIUM CHLORIDE 20 MEQ/1
20 TABLET, EXTENDED RELEASE ORAL ONCE
Status: COMPLETED | OUTPATIENT
Start: 2022-07-01 | End: 2022-07-01

## 2022-07-01 RX ORDER — MAGNESIUM SULFATE 1 G/100ML
1 INJECTION INTRAVENOUS ONCE
Status: COMPLETED | OUTPATIENT
Start: 2022-07-01 | End: 2022-07-01

## 2022-07-01 RX ORDER — OLANZAPINE 5 MG/1
2.5 TABLET, ORALLY DISINTEGRATING ORAL EVERY 8 HOURS PRN
Status: DISCONTINUED | OUTPATIENT
Start: 2022-07-01 | End: 2022-07-07 | Stop reason: HOSPADM

## 2022-07-01 RX ADMIN — TETANUS TOXOID, REDUCED DIPHTHERIA TOXOID AND ACELLULAR PERTUSSIS VACCINE, ADSORBED 0.5 ML: 5; 2.5; 8; 8; 2.5 SUSPENSION INTRAMUSCULAR at 16:44

## 2022-07-01 RX ADMIN — CEFEPIME HYDROCHLORIDE 2000 MG: 2 INJECTION, POWDER, FOR SOLUTION INTRAVENOUS at 16:44

## 2022-07-01 RX ADMIN — MAGNESIUM SULFATE HEPTAHYDRATE 1 G: 1 INJECTION, SOLUTION INTRAVENOUS at 18:35

## 2022-07-01 RX ADMIN — POTASSIUM CHLORIDE 20 MEQ: 1500 TABLET, EXTENDED RELEASE ORAL at 17:33

## 2022-07-01 RX ADMIN — SODIUM CHLORIDE 500 ML: 0.9 INJECTION, SOLUTION INTRAVENOUS at 17:33

## 2022-07-01 NOTE — TELEPHONE ENCOUNTER
Dr Shruti Borja had started pt on Cephalexin on Monday  Urine came came back with UTI and e-coli in urine  Started on Macrobid on Tuesday am   By the afternoon, patient was "freezing to death"  Went to ER - had IV Cephalexin  Had some anxiety and was fighting with nurses  D/'ed and prescribed Macrobid  This morning she has a fever of 102 and staying in bed  Daughter wants to know if they can have a higher dose of Cephalexin instead of Macrobid?

## 2022-07-01 NOTE — TELEPHONE ENCOUNTER
She should not be having a fever after all of the antibiotics  Macrobid is the appropriate dose  Would recommend going back to the ER as she may need further IV antibiotics

## 2022-07-01 NOTE — ED PROVIDER NOTES
Emergency Department Trauma Note  Dede Vyas 80 y o  female MRN: [id number]  Unit/Bed#: S /S -01 Encounter: [id number]      Trauma Alert: Trauma Acuity: C  Model of Arrival: Mode of Arrival: BLS via    Trauma Team: Current Providers  Attending Provider: Seema Perez MD  Attending Provider: Karin Byrnes DO  Attending Provider: Milo Johnson MD  Registered Nurse: Josue Bianchi RN  Nurse Practitioner: SILKE Sales  Registered Nurse: Jose Antonio Pennington RN  ED Technician: Vasyl Mendoza  Registered Nurse: Jose Antonio Pennington RN  Consulting Physician: Venetta Mohs, MD  Registered Nurse: Ellie Duron RN  Registered Nurse: James Bautista RN  Registered Nurse: Claudia Ford RN  Registered Nurse: Edel Brody RN  Patient Care Assistant: Candida Champion  Registered Nurse: Miles Anderson RN  Patient Care Assistant: Hema Bernard  Physician Assistant: James Jordan PA-C  Patient Care Assistant: Chastity Diallo  Registered Nurse: Katt Dennis RN   Consultants:     None      History of Present Illness     Chief Complaint:   Chief Complaint   Patient presents with    Fever - 76 years or older     Pt here by ems from home d/t fever  Pt recently d/c and dx with uti and yeast infection      HPI:  Dede Vyas is a 80 y o  female who presents with fall out of bed  Mechanism:           Patient is a 80year old female with a past medical history diabetes, asthma, returning to the emergency department for evaluation fever  Patient reports that she was recently admitted for UTI, and discharged yesterday  Patient returning at the request of family doctor for further evaluation as family reported a fever 102 at home treated with Tylenol prior to arrival   Patient is still complaining that she has burning with urination  Patient denies any coughing, chest pain, shortness of breath  Patient reports that she was, "peeing all night       Additionally, patient reports that she fell out of bed yesterday scratching her left back on a dresser  Patient also reports things the right side of her head  Patient has no hours side of head trauma  Patient denies LOC  Patient also called her daughter in the early hours of the morning, which is unusual for her  Patient reports that she takes an 81 mg aspirin daily  Patient denies any chest pain, shortness of breath, dizziness at that time  Patient has no midline C-spine tenderness  Patient has pain with palpation to the left of T4-5  Review of Systems   Constitutional: Negative  HENT: Negative  Eyes: Negative  Respiratory: Negative  Negative for chest tightness and shortness of breath  Cardiovascular: Negative for chest pain  Gastrointestinal: Negative  Negative for abdominal pain, diarrhea and nausea  Genitourinary: Positive for dysuria and frequency  Negative for decreased urine volume, difficulty urinating, dyspareunia, flank pain, genital sores, hematuria, menstrual problem, pelvic pain, urgency, vaginal bleeding, vaginal discharge and vaginal pain  Musculoskeletal: Negative  Skin: Negative  Neurological: Negative  Negative for dizziness, tremors, seizures, syncope, facial asymmetry, speech difficulty, weakness, light-headedness, numbness and headaches  Hematological: Negative  Psychiatric/Behavioral: Negative  All other systems reviewed and are negative        Historical Information     Immunizations:   Immunization History   Administered Date(s) Administered    H1N1, All Formulations 10/18/2002, 12/07/2006    Influenza Quadrivalent Preservative Free 3 years and older IM 07/26/2016    Pneumococcal Conjugate 13-Valent 03/16/2015    Pneumococcal Polysaccharide PPV23 02/22/1999, 09/23/2011    Tdap 09/05/2019, 07/01/2022       Past Medical History:   Diagnosis Date    Asthma     Chronic interstitial cystitis     Community acquired pneumonia     last assessed 10/4/13    Diabetes mellitus ([facility] [address] )  Disease of thyroid gland     Diverticulitis     Dizziness     GERD (gastroesophageal reflux disease)     Hyperlipidemia     Hypertension     Vertigo        Family History   Problem Relation Age of Onset    Coronary artery disease Mother     Other Father         MVA    Alcohol abuse Neg Hx     Depression Neg Hx     Drug abuse Neg Hx     Substance Abuse Neg Hx     Mental illness Neg Hx      Past Surgical History:   Procedure Laterality Date    ADENOIDECTOMY      CHOLECYSTECTOMY      COLON SURGERY      partial colectomy - sigmoid, diverticulitis    EYE SURGERY      HYSTERECTOMY      ovaries remain    TONSILLECTOMY      US GUIDED LYMPH NODE BIOPSY RIGHT  3/11/2020    US GUIDED THYROID BIOPSY  11/13/2019     Social History     Tobacco Use    Smoking status: Never Smoker    Smokeless tobacco: Never Used   Vaping Use    Vaping Use: Never used   Substance Use Topics    Alcohol use: Never    Drug use: No     E-Cigarette/Vaping    E-Cigarette Use Never User      E-Cigarette/Vaping Substances       Family History: non-contributory    Meds/Allergies   Prior to Admission Medications   Prescriptions Last Dose Informant Patient Reported? Taking?    FIBER PO  Self Yes No   Sig: Take by mouth   LORazepam (ATIVAN) 0 5 mg tablet   No No   Sig: Take 1/2 tablet to 1 tablet PO daily PRN   albuterol (PROVENTIL HFA,VENTOLIN HFA) 90 mcg/act inhaler   No No   Sig: Inhale 1 puff every 6 (six) hours as needed for wheezing   amLODIPine (NORVASC) 2 5 mg tablet   No No   Sig: Take 1 tablet (2 5 mg total) by mouth daily   aspirin 81 mg chewable tablet  Self No No   Sig: Chew 1 tablet (81 mg total) daily   b complex vitamins capsule  Self Yes No   Sig: Take 1 capsule by mouth daily   cilostazol (PLETAL) 100 mg tablet  Self No No   Sig: Take 1 tablet (100 mg total) by mouth 2 (two) times a day   famotidine (PEPCID) 40 MG tablet   No No   Sig: TAKE 1 TABLET BY MOUTH EVERY DAY   fluticasone (FLOVENT HFA) 110 MCG/ACT inhaler   No No   Sig: Inhale 2 puffs 2 (two) times a day Rinse mouth after use  methimazole (TAPAZOLE) 5 mg tablet  Self No No   Si/2 tab MWF   montelukast (SINGULAIR) 10 mg tablet   No No   Sig: Take 1 tablet (10 mg total) by mouth daily at bedtime   nitrofurantoin (MACROBID) 100 mg capsule   No No   Sig: Take 1 capsule (100 mg total) by mouth 2 (two) times a day for 9 doses   oxyCODONE-acetaminophen (PERCOCET) 5-325 mg per tablet  Self No No   Sig: Take 1 tablet by mouth 2 (two) times a day as needed for moderate pain Max Daily Amount: 2 tablets      Facility-Administered Medications: None       Allergies   Allergen Reactions    Haloperidol      Confusion /AMS  1 ep on 2020  AGGRESSIVENESS  AGITATION    Triazolam Other (See Comments)     Confusion, and aggressivenss as well   Bactrim [Sulfamethoxazole-Trimethoprim]     Ezetimibe     Gabapentin     Lisinopril     Naproxen Nausea Only    Statins        PHYSICAL EXAM    PE limited by: documented    Objective   Vitals:   First set: Temperature: 99 1 °F (37 3 °C) (22 1415)  Pulse: 86 (22 1422)  Respirations: 16 (22 1415)  Blood Pressure: 108/53 (22 1415)  SpO2: 93 % (22 1422)    Primary Survey:   (A) Airway: patent  (B) Breathing: lungs CTA b/l  (C) Circulation: Pulses:   normal  (D) Disabliity:  GCS Total:  15  (E) Expose:  Completed    Portable chest xray not obtained as there was no chest wall tenderness  Secondary Survey: (Click on Physical Exam tab above)  Physical Exam  Vitals and nursing note reviewed  Constitutional:       General: She is not in acute distress  Appearance: Normal appearance  She is normal weight  She is not ill-appearing or toxic-appearing  HENT:      Head: Normocephalic  Right Ear: External ear normal       Left Ear: External ear normal       Nose: Nose normal       Mouth/Throat:      Mouth: Mucous membranes are moist       Pharynx: Oropharynx is clear     Eyes:      Extraocular Movements: Extraocular movements intact  Conjunctiva/sclera: Conjunctivae normal       Pupils: Pupils are equal, round, and reactive to light  Cardiovascular:      Rate and Rhythm: Normal rate and regular rhythm  Pulses: Normal pulses  Heart sounds: Normal heart sounds  No murmur heard  Pulmonary:      Effort: Pulmonary effort is normal  No respiratory distress  Breath sounds: Normal breath sounds  Abdominal:      General: Abdomen is flat  Bowel sounds are normal  There is no distension  Palpations: Abdomen is soft  Musculoskeletal:         General: No swelling or tenderness  Normal range of motion  Cervical back: Normal range of motion  Skin:     General: Skin is warm  Capillary Refill: Capillary refill takes less than 2 seconds  Findings: Signs of injury and laceration present  No bruising, ecchymosis, lesion or rash  Neurological:      General: No focal deficit present  Mental Status: She is alert and oriented to person, place, and time  Mental status is at baseline  GCS: GCS eye subscore is 4  GCS verbal subscore is 5  GCS motor subscore is 6  Cranial Nerves: Cranial nerves are intact  No cranial nerve deficit or dysarthria  Sensory: Sensation is intact  Motor: Motor function is intact  No weakness or tremor  Coordination: Coordination is intact  Comments: 5/5 upper extremity strength, no numbness or tingling   5/5 lower extremity strength, no numbness or tingling         Cervical spine cleared by clinical criteria?  No (imaging required)      Invasive Devices  Report    Peripheral Intravenous Line  Duration           Peripheral IV 07/01/22 Left Antecubital 1 day                Lab Results:   Results Reviewed     Procedure Component Value Units Date/Time    Basic metabolic panel [[id number]]  (Abnormal) Collected: 07/02/22 0441    Lab Status: Final result Specimen: Blood from Arm, Left Updated: 07/02/22 0518     Sodium 136 mmol/L      Potassium 3 8 mmol/L      Chloride 107 mmol/L      CO2 23 mmol/L      ANION GAP 6 mmol/L      BUN 15 mg/dL      Creatinine 0 82 mg/dL      Glucose 89 mg/dL      Calcium 8 1 mg/dL      eGFR 63 ml/min/1 73sq m     Narrative:      [place] guidelines for Chronic Kidney Disease (CKD):     Stage 1 with normal or high GFR (GFR > 90 mL/min/1 73 square meters)    Stage 2 Mild CKD (GFR = 60-89 mL/min/1 73 square meters)    Stage 3A Moderate CKD (GFR = 45-59 mL/min/1 73 square meters)    Stage 3B Moderate CKD (GFR = 30-44 mL/min/1 73 square meters)    Stage 4 Severe CKD (GFR = 15-29 mL/min/1 73 square meters)    Stage 5 End Stage CKD (GFR <15 mL/min/1 73 square meters)  Note: GFR calculation is accurate only with a steady state creatinine    Phosphorus [[id number]]  (Normal) Collected: 07/02/22 0441    Lab Status: Final result Specimen: Blood from Arm, Left Updated: 07/02/22 0518     Phosphorus 2 7 mg/dL     Magnesium [[id number]]  (Normal) Collected: 07/02/22 0441    Lab Status: Final result Specimen: Blood from Arm, Left Updated: 07/02/22 0518     Magnesium 2 3 mg/dL     Procalcitonin [[id number]]  (Abnormal) Collected: 07/02/22 0441    Lab Status: Final result Specimen: Blood from Arm, Left Updated: 07/02/22 0517     Procalcitonin 3 23 ng/ml     CBC and differential [[id number]]  (Abnormal) Collected: 07/02/22 0441    Lab Status: Final result Specimen: Blood from Arm, Left Updated: 07/02/22 0448     WBC 2 55 Thousand/uL      RBC 3 85 Million/uL      Hemoglobin 11 2 g/dL      Hematocrit 33 5 %      MCV 87 fL      MCH 29 1 pg      MCHC 33 4 g/dL      RDW 13 5 %      MPV 9 0 fL      Platelets 828 Thousands/uL      nRBC 0 /100 WBCs      Neutrophils Relative 74 %      Immat GRANS % 1 %      Lymphocytes Relative 15 %      Monocytes Relative 10 %      Eosinophils Relative 0 %      Basophils Relative 0 %      Neutrophils Absolute 1 88 Thousands/µL      Immature Grans Absolute 0 02 Thousand/uL      Lymphocytes Absolute 0 38 Thousands/µL      Monocytes Absolute 0 25 Thousand/µL      Eosinophils Absolute 0 01 Thousand/µL      Basophils Absolute 0 01 Thousands/µL     Blood culture #1 [[id number]] Collected: 07/01/22 1548    Lab Status: Preliminary result Specimen: Blood from Arm, Left Updated: 07/01/22 2104     Blood Culture Received in Microbiology Lab  Culture in Progress  Blood culture #2 [[id number]] Collected: 07/01/22 1508    Lab Status: Preliminary result Specimen: Blood from Arm, Right Updated: 07/01/22 2004     Blood Culture Received in Microbiology Lab  Culture in Progress  UA w Reflex to Microscopic w Reflex to Culture [[id number]] Collected: 07/01/22 1923    Lab Status: Final result Specimen: Urine, Clean Catch Updated: 07/01/22 1942     Color, UA Light Yellow     Clarity, UA Clear     Specific Gravity, UA <=1 005     pH, UA 6 0     Leukocytes, UA Negative     Nitrite, UA Negative     Protein, UA Negative mg/dl      Glucose, UA Negative mg/dl      Ketones, UA Negative mg/dl      Urobilinogen, UA 0 2 E U /dl      Bilirubin, UA Negative     Occult Blood, UA Negative    COVID/FLU/RSV - 2 hour TAT [[id number]]  (Normal) Collected: 07/01/22 1644    Lab Status: Final result Specimen: Nares from Nose Updated: 07/01/22 1741     SARS-CoV-2 Negative     INFLUENZA A PCR Negative     INFLUENZA B PCR Negative     RSV PCR Negative    Narrative:      FOR PEDIATRIC PATIENTS - copy/paste COVID Guidelines URL to browser: [email or web address]    SARS-CoV-2 assay is a Nucleic Acid Amplification assay intended for the  qualitative detection of nucleic acid from SARS-CoV-2 in nasopharyngeal  swabs  Results are for the presumptive identification of SARS-CoV-2 RNA  Positive results are indicative of infection with SARS-CoV-2, the virus  causing COVID-19, but do not rule out bacterial infection or co-infection  with other viruses   Laboratories within the [facility] and its  territories are required to report all positive results to the appropriate  public health authorities  Negative results do not preclude SARS-CoV-2  infection and should not be used as the sole basis for treatment or other  patient management decisions  Negative results must be combined with  clinical observations, patient history, and epidemiological information  This test has not been FDA cleared or approved  This test has been authorized by FDA under an Emergency Use Authorization  (EUA)  This test is only authorized for the duration of time the  declaration that circumstances exist justifying the authorization of the  emergency use of an in vitro diagnostic tests for detection of SARS-CoV-2  virus and/or diagnosis of COVID-19 infection under section 564(b)(1) of  the Act, 21 U  S C  492PQB-3(Q)(5), unless the authorization is terminated  or revoked sooner  The test has been validated but independent review by FDA  and CLIA is pending  Test performed using [employer] GeneXpert: This RT-PCR assay targets N2,  a region unique to SARS-CoV-2  A conserved region in the E-gene was chosen  for pan-Sarbecovirus detection which includes SARS-CoV-2  CBC and differential [[id number]]  (Abnormal) Collected: 07/01/22 1508    Lab Status: Final result Specimen: Blood from Arm, Right Updated: 07/01/22 1607     WBC 4 19 Thousand/uL      RBC 3 82 Million/uL      Hemoglobin 11 1 g/dL      Hematocrit 33 0 %      MCV 86 fL      MCH 29 1 pg      MCHC 33 6 g/dL      RDW 13 4 %      MPV 10 0 fL      Platelets 671 Thousands/uL     Narrative: This is an appended report  These results have been appended to a previously verified report      Manual Differential(PHLEBS Do Not Order) [[id number]]  (Abnormal) Collected: 07/01/22 1508    Lab Status: Final result Specimen: Blood from Arm, Right Updated: 07/01/22 1607     Segmented % 81 %      Bands % 10 %      Lymphocytes % 6 %      Monocytes % 3 % Eosinophils, % 0 %      Basophils % 0 %      Absolute Neutrophils 3 81 Thousand/uL      Lymphocytes Absolute 0 25 Thousand/uL      Monocytes Absolute 0 13 Thousand/uL      Eosinophils Absolute 0 00 Thousand/uL      Basophils Absolute 0 00 Thousand/uL      Total Counted --     Basophilic Stippling Present     Poikilocytes Present     Polychromasia Present     Platelet Estimate Adequate     Large Platelet Present    Protime-INR [[id number]]  (Normal) Collected: 07/01/22 1508    Lab Status: Final result Specimen: Blood from Arm, Right Updated: 07/01/22 1605     Protime 12 4 seconds      INR 0 92    APTT [[id number]]  (Abnormal) Collected: 07/01/22 1508    Lab Status: Final result Specimen: Blood from Arm, Right Updated: 07/01/22 1605     PTT 43 seconds     Procalcitonin [[id number]]  (Abnormal) Collected: 07/01/22 1508    Lab Status: Final result Specimen: Blood from Arm, Right Updated: 07/01/22 1555     Procalcitonin 5 48 ng/ml     Lactic acid [[id number]]  (Normal) Collected: 07/01/22 1508    Lab Status: Final result Specimen: Blood from Arm, Right Updated: 07/01/22 1548     LACTIC ACID 1 1 mmol/L     Narrative:      Result may be elevated if tourniquet was used during collection      Comprehensive metabolic panel [[id number]]  (Abnormal) Collected: 07/01/22 1508    Lab Status: Final result Specimen: Blood from Arm, Right Updated: 07/01/22 1548     Sodium 135 mmol/L      Potassium 3 4 mmol/L      Chloride 103 mmol/L      CO2 22 mmol/L      ANION GAP 10 mmol/L      BUN 16 mg/dL      Creatinine 1 02 mg/dL      Glucose 108 mg/dL      Calcium 8 5 mg/dL      Corrected Calcium 9 0 mg/dL      AST 40 U/L      ALT 31 U/L      Alkaline Phosphatase 97 U/L      Total Protein 5 6 g/dL      Albumin 3 4 g/dL      Total Bilirubin 1 08 mg/dL      eGFR 48 ml/min/1 73sq m     Narrative:      [place] guidelines for Chronic Kidney Disease (CKD):     Stage 1 with normal or high GFR (GFR > 90 mL/min/1 73 square meters)    Stage 2 Mild CKD (GFR = 60-89 mL/min/1 73 square meters)    Stage 3A Moderate CKD (GFR = 45-59 mL/min/1 73 square meters)    Stage 3B Moderate CKD (GFR = 30-44 mL/min/1 73 square meters)    Stage 4 Severe CKD (GFR = 15-29 mL/min/1 73 square meters)    Stage 5 End Stage CKD (GFR <15 mL/min/1 73 square meters)  Note: GFR calculation is accurate only with a steady state creatinine    Magnesium [[id number]]  (Abnormal) Collected: 07/01/22 1508    Lab Status: Final result Specimen: Blood from Arm, Right Updated: 07/01/22 1548     Magnesium 1 7 mg/dL                  Imaging Studies:   Direct to CT: Yes  XR Trauma chest portable   Final Result by Sabrina Mg MD (07/01 1516)      No acute cardiopulmonary disease  Workstation performed: [id number]         TRAUMA - CT head wo contrast   Final Result by Sabrina Nunez MD (07/01 1521)      No acute intracranial abnormality  Chronic microangiopathy  Workstation performed: [id number]         TRAUMA - CT spine cervical wo contrast   Final Result by Sabrina Nunez MD (07/01 1657)      No cervical spine fracture or traumatic malalignment  Workstation performed: [id number]         CT chest abdomen pelvis wo contrast   Final Result by Sabrina Nunez MD (07/01 1540)      No acute intrathoracic or intra-abdominal pathology  Left adnexal cystic lesion measuring 3 8 cm is increased from 2 5 cm in 2018  Suggest follow-up pelvic ultrasound  Study marked in Epic for notification and follow-up        Workstation performed: [id number]               Procedures  ECG 12 Lead Documentation Only    Date/Time: 7/1/2022 3:22 PM  Performed by: Leny Ventura  Authorized by: SILKE Ventura     Indications / Diagnosis:  Sepsis  ECG reviewed by me, the ED Provider: yes    Patient location:  ED  Previous ECG:     Previous ECG:  Compared to current    Similarity:  No change    Comparison to cardiac monitor: No    Interpretation:     Interpretation: abnormal    Rate:     ECG rate:  80    ECG rate assessment: normal    Rhythm:     Rhythm: sinus rhythm    Ectopy:     Ectopy: none    QRS:     QRS axis:  Normal    QRS intervals:  Normal  ST segments:     ST segments:  Normal  T waves:     T waves: normal               ED Course  ED Course as of 07/02/22 1649   Fri Jul 01, 2022   1556 Procalcitonin(!): 5 48  Antibiotics initiated already           MDM  Number of Diagnoses or Management Options  Fever  Sepsis ([address] )  UTI (urinary tract infection)  Diagnosis management comments: DDx: Urosepsis, ICH, T-spine fracture  Patient made a level C trauma due to fall on aspirin  Patient has a superficial laceration scapula that does not require repair  Will update tetanus  In addition to evaluating for trauma after fall, will also evaluate for sepsis the patient is returning to the emergency department today for T-max 102° at home  Patient has no UTI that she was discharged on [address] for  Patient also reports no relief to burning with urination  While patient is AAO x4, she had an episode of find her daughter in early morning hours which is unusual for her  Will provide cefepime as showed sensitivity on last urine culture  Pt's BP at baseline  Will provide gentle hydration  No lactic acidosis appreciated  As documented patient procalcitonin of 5 48  Patient antibiotics initiated already  No leukocytosis noted  COVID/Flu/RSV/respiratory source ruled out  [facility] to [facility]  As of admission pt had not urinated, but due to recent discharge for UTI and continued urinary complaints, presumed urosepsis  Discussed incidental finding on CT abdomen/pelvis with pt and daughter  Aware to schedule an outpatient follow up for u/s and further evaluation of Left adnexal cystic lesion measuring 3 8 cm is increased from 2 5 cm in 2018   Aware that CT scan cannot rule out cancer and further evaluation must be performed to rule out  Pt evaluated by Dr Kya Ruano, ED attending  Amount and/or Complexity of Data Reviewed  Clinical lab tests: reviewed and ordered  Tests in the radiology section of CPT®: ordered and reviewed  Obtain history from someone other than the patient: (Daughter)    Risk of Complications, Morbidity, and/or Mortality  General comments: Pt admitted for sepsis, given monotherapy of cefepime after review of previous urine culture  Pt evaluated for fall with no acute injuries found  Patient Progress  Patient progress: stable          Disposition  [facility] Transfer: Yes  Final diagnoses:   Sepsis ([facility] [address] )   Fever   UTI (urinary tract infection)     Time reflects when diagnosis was documented in both MDM as applicable and the Disposition within this note     Time User Action Codes Description Comment    7/1/2022  6:51 PM Leldon Rusty Add [A41 9] Sepsis ([facility] [address] )     7/1/2022  6:51 PM Leldon Rusty Add [R50 9] Fever     7/1/2022  6:51 PM Leldon Rusty Add [N39 0] UTI (urinary tract infection)     7/1/2022  7:24 PM Brena Herron Add [N39 0,  B96 29,  Z16 12] UTI due to extended-spectrum beta lactamase (ESBL) producing Escherichia coli       ED Disposition     ED Disposition   Admit    Condition   Stable    Date/Time   Fri Jul 1, 2022  6:51 PM    Comment   Case was discussed with Elizabet Gould and the patient's admission status was agreed to be Admission Status: inpatient status to the service of Dr Elizabet Gould   Follow-up Information    None       Current Discharge Medication List      CONTINUE these medications which have NOT CHANGED    Details   albuterol (PROVENTIL HFA,VENTOLIN HFA) 90 mcg/act inhaler Inhale 1 puff every 6 (six) hours as needed for wheezing  Qty: 8 5 g, Refills: 1    Comments: Substitution to a formulary equivalent within the same pharmaceutical class is authorized    Associated Diagnoses: Mild intermittent asthma without complication      amLODIPine (NORVASC) 2 5 mg tablet Take 1 tablet (2 5 mg total) by mouth daily  Qty: 30 tablet, Refills: 0    Associated Diagnoses: Essential hypertension      aspirin 81 mg chewable tablet Chew 1 tablet (81 mg total) daily  Qty: 30 tablet, Refills: 0    Associated Diagnoses: Vertebral artery stenosis      b complex vitamins capsule Take 1 capsule by mouth daily      cilostazol (PLETAL) 100 mg tablet Take 1 tablet (100 mg total) by mouth 2 (two) times a day  Qty: 180 tablet, Refills: 3    Associated Diagnoses: Vertebral artery stenosis; Stenosis of right carotid artery      famotidine (PEPCID) 40 MG tablet TAKE 1 TABLET BY MOUTH EVERY DAY  Qty: 90 tablet, Refills: 1    Comments: DX Code J45 20  Associated Diagnoses: Gastroesophageal reflux disease      FIBER PO Take by mouth      fluticasone (FLOVENT HFA) 110 MCG/ACT inhaler Inhale 2 puffs 2 (two) times a day Rinse mouth after use  Qty: 12 g, Refills: 0    Associated Diagnoses: Mild intermittent asthma without complication      LORazepam (ATIVAN) 0 5 mg tablet Take 1/2 tablet to 1 tablet PO daily PRN  Qty: 30 tablet, Refills: 0    Associated Diagnoses: Anxiety      methimazole (TAPAZOLE) 5 mg tablet 1/2 tab MWF  Qty: 30 tablet, Refills: 0    Associated Diagnoses: Hyperthyroidism      montelukast (SINGULAIR) 10 mg tablet Take 1 tablet (10 mg total) by mouth daily at bedtime  Qty: 90 tablet, Refills: 1    Associated Diagnoses: Mild persistent asthma without complication      nitrofurantoin (MACROBID) 100 mg capsule Take 1 capsule (100 mg total) by mouth 2 (two) times a day for 9 doses  Qty: 9 capsule, Refills: 0    Associated Diagnoses: UTI (urinary tract infection)      oxyCODONE-acetaminophen (PERCOCET) 5-325 mg per tablet Take 1 tablet by mouth 2 (two) times a day as needed for moderate pain Max Daily Amount: 2 tablets  Qty: 60 tablet, Refills: 0    Associated Diagnoses: Degeneration of intervertebral disc of lumbar region           No discharge procedures on file      PDMP Review       Value Time User    PDMP Reviewed  Yes 5/25/2022  2:54 PM Teresita Gallegos MD          ED Provider  Electronically Signed by         Sage Marie  07/02/22 8741

## 2022-07-01 NOTE — H&P
[facility]  H&P- Earlis Sandhoff 7/7/1931, 80 y o  female MRN: [id number]  Unit/Bed#: ED 17 Encounter: [id number]  Primary Care Provider: Teresita Gallegos MD   Date and time admitted to hospital: 7/1/2022  2:10 PM    * Sepsis due to Escherichia coli without acute organ dysfunction ([address] )  Assessment & Plan  · POA - e/b fever at home of 102  · 2/2 UTI  · F/u B cx    Metabolic encephalopathy  Assessment & Plan  · Pt confused last night 2/2 fever  · Zydis prn worked last hospitalization  · Pt also takes low dose Ativan prn at home, which can be used 2nd line    UTI due to extended-spectrum beta lactamase (ESBL) producing Escherichia coli  Assessment & Plan  · Failed OP tx w/ Macrobid  · IV Cefepime  · ID consult    Atherosclerosis 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 (HCC)  Assessment & Plan  · ASA and Pletal      VTE Pharmacologic Prophylaxis: VTE Score: 4 Moderate Risk (Score 3-4) - Pharmacological DVT Prophylaxis Ordered: enoxaparin (Lovenox)  Code Status: Full  Discussion with family: Updated  (dtrs) at bedside  Anticipated Length of Stay: Patient will be admitted on an inpatient basis with an anticipated length of stay of greater than 2 midnights secondary to need for iv abx  Total Time for Visit, including Counseling / Coordination of Care: 45 minutes Greater than 50% of this total time spent on direct patient counseling and coordination of care  Chief Complaint: Fever    History of Present Illness:  Earlis Sandhoff is a 80 y o  female with a PMH of CKD who presents with fever last night  PT recently admitted for ESBL UTI and d/c on Macrobid based on Cx  Last night temp 102 and pt confused  At time of my encounter, pt c/o dysuria  But no n/v     Review of Systems:  Review of Systems   Constitutional: Positive for activity change and fever  HENT: Negative  Eyes: Negative  Respiratory: Negative      Cardiovascular: Negative  Gastrointestinal: Negative  Endocrine: Negative  Genitourinary: Positive for dysuria  Musculoskeletal: Negative  Skin: Negative  Allergic/Immunologic: Negative  Neurological: Negative  Hematological: Negative  Psychiatric/Behavioral: Negative  Past Medical and Surgical History:   Past Medical History:   Diagnosis Date    Asthma     Chronic interstitial cystitis     Community acquired pneumonia     last assessed 10/4/13    Diabetes mellitus ([facility] [address] )     Disease of thyroid gland     Diverticulitis     Dizziness     GERD (gastroesophageal reflux disease)     Hyperlipidemia     Hypertension     Vertigo        Past Surgical History:   Procedure Laterality Date    ADENOIDECTOMY      CHOLECYSTECTOMY      COLON SURGERY      partial colectomy - sigmoid, diverticulitis    EYE SURGERY      HYSTERECTOMY      ovaries remain    TONSILLECTOMY      US GUIDED LYMPH NODE BIOPSY RIGHT  3/11/2020    US GUIDED THYROID BIOPSY  11/13/2019       Meds/Allergies:  Prior to Admission medications    Medication Sig Start Date End Date Taking?  Authorizing Provider   albuterol (PROVENTIL HFA,VENTOLIN HFA) 90 mcg/act inhaler Inhale 1 puff every 6 (six) hours as needed for wheezing 6/8/22   Tiesha Vasquez MD   amLODIPine (NORVASC) 2 5 mg tablet Take 1 tablet (2 5 mg total) by mouth daily 6/30/22 7/30/22  Alla Graff MD   aspirin 81 mg chewable tablet Chew 1 tablet (81 mg total) daily 9/6/20   Edwin Chambers Cea, MD   b complex vitamins capsule Take 1 capsule by mouth daily    Historical Provider, MD   cilostazol (PLETAL) 100 mg tablet Take 1 tablet (100 mg total) by mouth 2 (two) times a day 9/28/21   Romayne Glass, PA-C   famotidine (PEPCID) 40 MG tablet TAKE 1 TABLET BY MOUTH EVERY DAY 6/20/22   Tiesha Vasquez MD   FIBER PO Take by mouth    Historical Provider, MD   fluticasone (FLOVENT HFA) 110 MCG/ACT inhaler Inhale 2 puffs 2 (two) times a day Rinse mouth after use  6/10/22   Eris Decker MD   LORazepam (ATIVAN) 0 5 mg tablet Take 1/2 tablet to 1 tablet PO daily PRN 5/25/22   Eris Decker MD   methimazole (TAPAZOLE) 5 mg tablet 1/2 tab MWF 3/30/22   Eris Decker MD   montelukast (SINGULAIR) 10 mg tablet Take 1 tablet (10 mg total) by mouth daily at bedtime 5/2/22   Eris Decker MD   nitrofurantoin (MACROBID) 100 mg capsule Take 1 capsule (100 mg total) by mouth 2 (two) times a day for 9 doses 6/30/22 7/5/22  Monique Joy MD   oxyCODONE-acetaminophen (PERCOCET) 5-325 mg per tablet Take 1 tablet by mouth 2 (two) times a day as needed for moderate pain Max Daily Amount: 2 tablets 3/22/22   Eris Decker MD   potassium chloride (K-DUR,KLOR-CON) 10 mEq tablet Take 1 tablet (10 mEq total) by mouth daily  Patient not taking: No sig reported 1/19/22 6/28/22  SILKE Evangelista   tiZANidine (ZANAFLEX) 2 mg tablet TAKE 1 TABLET (2 MG TOTAL) BY MOUTH 2 (TWO) TIMES A DAY AS NEEDED FOR MUSCLE SPASMS 6/20/22 6/28/22  Eris Decker MD     I have reviewed home medications using recent Epic encounter  Allergies: Allergies   Allergen Reactions    Haloperidol      Confusion /AMS  1 ep on 09/04/2020  AGGRESSIVENESS  AGITATION    Triazolam Other (See Comments)     Confusion, and aggressivenss as well   Bactrim [Sulfamethoxazole-Trimethoprim]     Ezetimibe     Gabapentin     Lisinopril     Naproxen Nausea Only    Statins        Social History:  Marital Status:       Patient Pre-hospital Living Situation: Home  Patient Pre-hospital Level of Mobility: walks    Substance Use History:   Social History     Substance and Sexual Activity   Alcohol Use Never     Social History     Tobacco Use   Smoking Status Never Smoker   Smokeless Tobacco Never Used     Social History     Substance and Sexual Activity   Drug Use No       Family History:  Family History   Problem Relation Age of Onset    Coronary artery disease Mother    Reji Castillo Other Father         MVA    Alcohol abuse Neg Hx     Depression Neg Hx     Drug abuse Neg Hx     Substance Abuse Neg Hx     Mental illness Neg Hx        Physical Exam:     Vitals:   Blood Pressure: 112/71 (07/01/22 1919)  Pulse: 82 (07/01/22 1919)  Temperature: 99 1 °F (37 3 °C) (07/01/22 1415)  Temp Source: Oral (07/01/22 1415)  Respirations: 18 (07/01/22 1919)  Weight - Scale: 52 5 kg (115 lb 11 9 oz) (07/01/22 1415)  SpO2: 96 % (07/01/22 1919)    Physical Exam  HENT:      Head: Normocephalic and atraumatic  Nose: Nose normal       Mouth/Throat:      Mouth: Mucous membranes are moist    Eyes:      Extraocular Movements: Extraocular movements intact  Conjunctiva/sclera: Conjunctivae normal    Cardiovascular:      Rate and Rhythm: Normal rate and regular rhythm  Pulmonary:      Effort: Pulmonary effort is normal       Breath sounds: Normal breath sounds  No wheezing or rales  Abdominal:      General: Bowel sounds are normal  There is no distension  Palpations: Abdomen is soft  Tenderness: There is no abdominal tenderness  Musculoskeletal:         General: Normal range of motion  Cervical back: Normal range of motion and neck supple  Right lower leg: No edema  Left lower leg: No edema  Skin:     General: Skin is warm and dry  Neurological:      Mental Status: She is alert and oriented to person, place, and time           Additional Data:     Lab Results:  Results from last 7 days   Lab Units 07/01/22  1508   WBC Thousand/uL 4 19*   HEMOGLOBIN g/dL 11 1*   HEMATOCRIT % 33 0*   PLATELETS Thousands/uL 203   BANDS PCT % 10*   LYMPHO PCT % 6*   MONO PCT % 3*   EOS PCT % 0     Results from last 7 days   Lab Units 07/01/22  1508   SODIUM mmol/L 135   POTASSIUM mmol/L 3 4*   CHLORIDE mmol/L 103   CO2 mmol/L 22   BUN mg/dL 16   CREATININE mg/dL 1 02   ANION GAP mmol/L 10   CALCIUM mg/dL 8 5   ALBUMIN g/dL 3 4*   TOTAL BILIRUBIN mg/dL 1 08*   ALK PHOS U/L 97   ALT U/L 31   AST U/L 40*   GLUCOSE RANDOM mg/dL 108     Results from last 7 days   Lab Units 07/01/22  1508   INR  0 92     Results from last 7 days   Lab Units 06/29/22  2038   POC GLUCOSE mg/dl 107         Results from last 7 days   Lab Units 07/01/22  1508 06/28/22  1930   LACTIC ACID mmol/L 1 1 1 0   PROCALCITONIN ng/ml 5 48*  --        Imaging: No pertinent imaging reviewed  XR Trauma chest portable   Final Result by Kera Mejia MD (07/01 1516)      No acute cardiopulmonary disease  Workstation performed: [id number]         TRAUMA - CT head wo contrast   Final Result by Miguel Lau MD (07/01 1521)      No acute intracranial abnormality  Chronic microangiopathy  Workstation performed: [id number]         TRAUMA - CT spine cervical wo contrast   Final Result by Migule Lau MD (07/01 1657)      No cervical spine fracture or traumatic malalignment  Workstation performed: [id number]         CT chest abdomen pelvis wo contrast   Final Result by Miguel Lau MD (07/01 1540)      No acute intrathoracic or intra-abdominal pathology  Left adnexal cystic lesion measuring 3 8 cm is increased from 2 5 cm in 2018  Suggest follow-up pelvic ultrasound  Study marked in Epic for notification and follow-up  Workstation performed: [id number]             EKG and Other Studies Reviewed on Admission:   · EKG: NSR     ** Please Note: This note has been constructed using a voice recognition system   **

## 2022-07-01 NOTE — ASSESSMENT & PLAN NOTE
· Pt confused last night 2/2 fever  · Zydis prn worked last hospitalization  · Pt also takes low dose Ativan prn at home, which can be used 2nd line

## 2022-07-02 PROBLEM — D72.819 LEUKOPENIA: Status: ACTIVE | Noted: 2022-07-01

## 2022-07-02 LAB
ANION GAP SERPL CALCULATED.3IONS-SCNC: 6 MMOL/L (ref 4–13)
BASOPHILS # BLD AUTO: 0.01 THOUSANDS/ΜL (ref 0–0.1)
BASOPHILS NFR BLD AUTO: 0 % (ref 0–1)
BUN SERPL-MCNC: 15 MG/DL (ref 5–25)
CALCIUM SERPL-MCNC: 8.1 MG/DL (ref 8.4–10.2)
CHLORIDE SERPL-SCNC: 107 MMOL/L (ref 96–108)
CO2 SERPL-SCNC: 23 MMOL/L (ref 21–32)
CREAT SERPL-MCNC: 0.82 MG/DL (ref 0.6–1.3)
EOSINOPHIL # BLD AUTO: 0.01 THOUSAND/ΜL (ref 0–0.61)
EOSINOPHIL NFR BLD AUTO: 0 % (ref 0–6)
ERYTHROCYTE [DISTWIDTH] IN BLOOD BY AUTOMATED COUNT: 13.5 % (ref 11.6–15.1)
GFR SERPL CREATININE-BSD FRML MDRD: 63 ML/MIN/1.73SQ M
GLUCOSE SERPL-MCNC: 127 MG/DL (ref 65–140)
GLUCOSE SERPL-MCNC: 130 MG/DL (ref 65–140)
GLUCOSE SERPL-MCNC: 89 MG/DL (ref 65–140)
HCT VFR BLD AUTO: 33.5 % (ref 34.8–46.1)
HGB BLD-MCNC: 11.2 G/DL (ref 11.5–15.4)
IMM GRANULOCYTES # BLD AUTO: 0.02 THOUSAND/UL (ref 0–0.2)
IMM GRANULOCYTES NFR BLD AUTO: 1 % (ref 0–2)
LYMPHOCYTES # BLD AUTO: 0.38 THOUSANDS/ΜL (ref 0.6–4.47)
LYMPHOCYTES NFR BLD AUTO: 15 % (ref 14–44)
MAGNESIUM SERPL-MCNC: 2.3 MG/DL (ref 1.9–2.7)
MCH RBC QN AUTO: 29.1 PG (ref 26.8–34.3)
MCHC RBC AUTO-ENTMCNC: 33.4 G/DL (ref 31.4–37.4)
MCV RBC AUTO: 87 FL (ref 82–98)
MONOCYTES # BLD AUTO: 0.25 THOUSAND/ΜL (ref 0.17–1.22)
MONOCYTES NFR BLD AUTO: 10 % (ref 4–12)
NEUTROPHILS # BLD AUTO: 1.88 THOUSANDS/ΜL (ref 1.85–7.62)
NEUTS SEG NFR BLD AUTO: 74 % (ref 43–75)
NRBC BLD AUTO-RTO: 0 /100 WBCS
PHOSPHATE SERPL-MCNC: 2.7 MG/DL (ref 2.3–4.1)
PLATELET # BLD AUTO: 175 THOUSANDS/UL (ref 149–390)
PMV BLD AUTO: 9 FL (ref 8.9–12.7)
POTASSIUM SERPL-SCNC: 3.8 MMOL/L (ref 3.5–5.3)
PROCALCITONIN SERPL-MCNC: 3.23 NG/ML
RBC # BLD AUTO: 3.85 MILLION/UL (ref 3.81–5.12)
SODIUM SERPL-SCNC: 136 MMOL/L (ref 135–147)
WBC # BLD AUTO: 2.55 THOUSAND/UL (ref 4.31–10.16)

## 2022-07-02 PROCEDURE — 99232 SBSQ HOSP IP/OBS MODERATE 35: CPT | Performed by: PHYSICIAN ASSISTANT

## 2022-07-02 PROCEDURE — 85025 COMPLETE CBC W/AUTO DIFF WBC: CPT | Performed by: GENERAL PRACTICE

## 2022-07-02 PROCEDURE — 83735 ASSAY OF MAGNESIUM: CPT | Performed by: GENERAL PRACTICE

## 2022-07-02 PROCEDURE — 99223 1ST HOSP IP/OBS HIGH 75: CPT | Performed by: INTERNAL MEDICINE

## 2022-07-02 PROCEDURE — 84100 ASSAY OF PHOSPHORUS: CPT | Performed by: GENERAL PRACTICE

## 2022-07-02 PROCEDURE — 80048 BASIC METABOLIC PNL TOTAL CA: CPT | Performed by: GENERAL PRACTICE

## 2022-07-02 PROCEDURE — 82948 REAGENT STRIP/BLOOD GLUCOSE: CPT

## 2022-07-02 PROCEDURE — 84145 PROCALCITONIN (PCT): CPT | Performed by: GENERAL PRACTICE

## 2022-07-02 RX ORDER — ALBUTEROL SULFATE 90 UG/1
1 AEROSOL, METERED RESPIRATORY (INHALATION) EVERY 6 HOURS PRN
Status: DISCONTINUED | OUTPATIENT
Start: 2022-07-02 | End: 2022-07-07 | Stop reason: HOSPADM

## 2022-07-02 RX ORDER — CILOSTAZOL 100 MG/1
100 TABLET ORAL 2 TIMES DAILY
Status: DISCONTINUED | OUTPATIENT
Start: 2022-07-02 | End: 2022-07-07 | Stop reason: HOSPADM

## 2022-07-02 RX ORDER — AMLODIPINE BESYLATE 2.5 MG/1
2.5 TABLET ORAL DAILY
Status: DISCONTINUED | OUTPATIENT
Start: 2022-07-02 | End: 2022-07-07 | Stop reason: HOSPADM

## 2022-07-02 RX ORDER — FAMOTIDINE 20 MG/1
10 TABLET, FILM COATED ORAL DAILY
Status: DISCONTINUED | OUTPATIENT
Start: 2022-07-02 | End: 2022-07-07 | Stop reason: HOSPADM

## 2022-07-02 RX ORDER — FLUTICASONE PROPIONATE 110 UG/1
2 AEROSOL, METERED RESPIRATORY (INHALATION) 2 TIMES DAILY
Status: DISCONTINUED | OUTPATIENT
Start: 2022-07-02 | End: 2022-07-07 | Stop reason: HOSPADM

## 2022-07-02 RX ORDER — ENOXAPARIN SODIUM 100 MG/ML
30 INJECTION SUBCUTANEOUS DAILY
Status: DISCONTINUED | OUTPATIENT
Start: 2022-07-02 | End: 2022-07-07 | Stop reason: HOSPADM

## 2022-07-02 RX ORDER — MONTELUKAST SODIUM 10 MG/1
10 TABLET ORAL
Status: DISCONTINUED | OUTPATIENT
Start: 2022-07-02 | End: 2022-07-07 | Stop reason: HOSPADM

## 2022-07-02 RX ORDER — ASPIRIN 81 MG/1
81 TABLET, CHEWABLE ORAL DAILY
Status: DISCONTINUED | OUTPATIENT
Start: 2022-07-02 | End: 2022-07-07 | Stop reason: HOSPADM

## 2022-07-02 RX ORDER — METHIMAZOLE 5 MG/1
2.5 TABLET ORAL 3 TIMES WEEKLY
Status: DISCONTINUED | OUTPATIENT
Start: 2022-07-04 | End: 2022-07-07 | Stop reason: HOSPADM

## 2022-07-02 RX ORDER — OXYCODONE HYDROCHLORIDE AND ACETAMINOPHEN 5; 325 MG/1; MG/1
1 TABLET ORAL 2 TIMES DAILY PRN
Status: DISCONTINUED | OUTPATIENT
Start: 2022-07-02 | End: 2022-07-07 | Stop reason: HOSPADM

## 2022-07-02 RX ORDER — ACETAMINOPHEN 325 MG/1
650 TABLET ORAL EVERY 6 HOURS PRN
Status: DISCONTINUED | OUTPATIENT
Start: 2022-07-02 | End: 2022-07-07 | Stop reason: HOSPADM

## 2022-07-02 RX ORDER — LORAZEPAM 0.5 MG/1
0.25 TABLET ORAL 2 TIMES DAILY PRN
Status: DISCONTINUED | OUTPATIENT
Start: 2022-07-02 | End: 2022-07-07 | Stop reason: HOSPADM

## 2022-07-02 RX ADMIN — CILOSTAZOL 100 MG: 100 TABLET ORAL at 17:43

## 2022-07-02 RX ADMIN — CILOSTAZOL 100 MG: 100 TABLET ORAL at 08:40

## 2022-07-02 RX ADMIN — FAMOTIDINE 10 MG: 20 TABLET ORAL at 08:40

## 2022-07-02 RX ADMIN — CEFEPIME HYDROCHLORIDE 1000 MG: 2 INJECTION, POWDER, FOR SOLUTION INTRAVENOUS at 12:24

## 2022-07-02 RX ADMIN — B-COMPLEX W/ C & FOLIC ACID TAB 1 TABLET: TAB at 08:40

## 2022-07-02 RX ADMIN — CEFEPIME HYDROCHLORIDE 1000 MG: 2 INJECTION, POWDER, FOR SOLUTION INTRAVENOUS at 21:40

## 2022-07-02 RX ADMIN — MONTELUKAST 10 MG: 10 TABLET, FILM COATED ORAL at 21:20

## 2022-07-02 RX ADMIN — ASPIRIN 81 MG CHEWABLE TABLET 81 MG: 81 TABLET CHEWABLE at 08:39

## 2022-07-02 RX ADMIN — AMLODIPINE BESYLATE 2.5 MG: 2.5 TABLET ORAL at 21:21

## 2022-07-02 RX ADMIN — ENOXAPARIN SODIUM 30 MG: 30 INJECTION SUBCUTANEOUS at 08:41

## 2022-07-02 NOTE — ASSESSMENT & PLAN NOTE
· Was hospitalized and subsequently discharged 06/30 secondary to UTI  Failed OP tx w/ Macrobid  She was brought back in with increased confusion, fever at home as well as chills    · Continue IV cefepime, increased to 1 g q 12   · ID is following

## 2022-07-02 NOTE — PROGRESS NOTES
[facility]  Progress Note - Ester Ordonez 7/7/1931, 80 y o  female MRN: [id number]  Unit/Bed#: S -01 Encounter: [id number]  Primary Care Provider: Lexie Jessica MD   Date and time admitted to hospital: 7/1/2022  2:10 PM    * UTI due to extended-spectrum beta lactamase (ESBL) producing Escherichia coli  Assessment & Plan  · Was hospitalized and subsequently discharged 06/30 secondary to UTI  Failed OP tx w/ Macrobid  She was brought back in with increased confusion, fever at home as well as chills  · Continue IV cefepime, increased to 1 g q 12   · ID is following    Metabolic encephalopathy  Assessment & Plan  · Pt at home secondary to fever and acute illness  · Zydis prn worked last hospitalization  · Pt also takes low dose Ativan prn at home, which can be used 2nd line  · Redirection and reorientation  · Monitor for worsening mental status while on cefepime  Leukopenia  Assessment & Plan  · Worsening today to 2 55, likely in the setting of acute infection  · No other sirs criteria met  She did reported fever at home but none documented  · Trend CBC with differential   · ID to send off tick-borne illness panels today  · CT abdomen pelvis with no acute findings  Atherosclerosis 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 (HCC)  Assessment & Plan  · ASA and Pletal    CKD (chronic kidney disease), stage III [facility])  Assessment & Plan  Lab Results   Component Value Date    EGFR 63 07/02/2022    EGFR 48 07/01/2022    EGFR 48 06/30/2022    CREATININE 0 82 07/02/2022    CREATININE 1 02 07/01/2022    CREATININE 1 02 06/30/2022   · Creatinine stable, monitor  Type 2 diabetes mellitus [facility])  Assessment & Plan  Lab Results   Component Value Date    HGBA1C 5 2 06/20/2022       Recent Labs     06/29/22 2038   POCGLU 107       Blood Sugar Average: Last 72 hrs:  ·  typically well controlled, not on any medications or insulin at home    · Continue Accu-Cheks  Essential hypertension  Assessment & Plan  · Continue amlodipine  · Monitor blood pressure  VTE Pharmacologic Prophylaxis: VTE Score: 4 Moderate Risk (Score 3-4) - Pharmacological DVT Prophylaxis Ordered: enoxaparin (Lovenox)  Patient Centered Rounds: I performed bedside rounds with nursing staff today  Discussions with Specialists or Other Care Team Provider: claudia THORNTON    Education and Discussions with Family / Patient: Updated  (daughter) at bedside  Time Spent for Care: 30 minutes  More than 50% of total time spent on counseling and coordination of care as described above  Current Length of Stay: 1 day(s)  Current Patient Status: Inpatient   Certification Statement: The patient will continue to require additional inpatient hospital stay due to IV abx for UTI  Discharge Plan: Anticipate discharge in 24-48 hrs to home with home services  Code Status: Level 1 - Full Code    Subjective:   Patient voices that she feels significantly better after admission  No fevers overnight  Daughter is at bedside who reports the patient's mental status appears to be stable and at baseline  No overnight events reported by nursing  Objective:     Vitals:   Temp (24hrs), Av 6 °F (37 °C), Min:97 8 °F (36 6 °C), Max:99 1 °F (37 3 °C)    Temp:  [97 8 °F (36 6 °C)-99 1 °F (37 3 °C)] 97 8 °F (36 6 °C)  HR:  [71-86] 86  Resp:  [16-18] 18  BP: (100-136)/(47-72) 108/68  SpO2:  [93 %-99 %] 95 %  Body mass index is 21 87 kg/m²  Input and Output Summary (last 24 hours): Intake/Output Summary (Last 24 hours) at 2022 1038  Last data filed at 2022 1935  Gross per 24 hour   Intake 650 ml   Output --   Net 650 ml       Physical Exam:   Physical Exam  Vitals and nursing note reviewed  Constitutional:       General: She is not in acute distress  Appearance: Normal appearance  HENT:      Head: Normocephalic        Mouth/Throat:      Mouth: Mucous membranes are moist  Eyes:      General: No scleral icterus  Pupils: Pupils are equal, round, and reactive to light  Cardiovascular:      Rate and Rhythm: Normal rate and regular rhythm  Heart sounds: No murmur heard  Pulmonary:      Effort: Pulmonary effort is normal  No respiratory distress  Breath sounds: Normal breath sounds  No wheezing, rhonchi or rales  Abdominal:      General: Bowel sounds are normal  There is no distension  Palpations: Abdomen is soft  Tenderness: There is no abdominal tenderness  Musculoskeletal:         General: No swelling  Right lower leg: No edema  Left lower leg: No edema  Skin:     Capillary Refill: Capillary refill takes less than 2 seconds  Neurological:      General: No focal deficit present  Mental Status: She is alert and oriented to person, place, and time  Mental status is at baseline           Additional Data:     Labs:  Results from last 7 days   Lab Units 07/02/22  0441 07/01/22  1508   WBC Thousand/uL 2 55* 4 19*   HEMOGLOBIN g/dL 11 2* 11 1*   HEMATOCRIT % 33 5* 33 0*   PLATELETS Thousands/uL 175 203   BANDS PCT %  --  10*   NEUTROS PCT % 74  --    LYMPHS PCT % 15  --    LYMPHO PCT %  --  6*   MONOS PCT % 10  --    MONO PCT %  --  3*   EOS PCT % 0 0     Results from last 7 days   Lab Units 07/02/22  0441 07/01/22  1508   SODIUM mmol/L 136 135   POTASSIUM mmol/L 3 8 3 4*   CHLORIDE mmol/L 107 103   CO2 mmol/L 23 22   BUN mg/dL 15 16   CREATININE mg/dL 0 82 1 02   ANION GAP mmol/L 6 10   CALCIUM mg/dL 8 1* 8 5   ALBUMIN g/dL  --  3 4*   TOTAL BILIRUBIN mg/dL  --  1 08*   ALK PHOS U/L  --  97   ALT U/L  --  31   AST U/L  --  40*   GLUCOSE RANDOM mg/dL 89 108     Results from last 7 days   Lab Units 07/01/22  1508   INR  0 92     Results from last 7 days   Lab Units 06/29/22  2038   POC GLUCOSE mg/dl 107         Results from last 7 days   Lab Units 07/02/22  0441 07/01/22  1508 06/28/22  1930   LACTIC ACID mmol/L  --  1 1 1 0   PROCALCITONIN ng/ml 3 23* 5 48*  --        Lines/Drains:  Invasive Devices  Report    Peripheral Intravenous Line  Duration           Peripheral IV 07/01/22 Left Antecubital <1 day                      Imaging: Reviewed radiology reports from this admission including: abdominal/pelvic CT    Recent Cultures (last 7 days):   Results from last 7 days   Lab Units 07/01/22  1548 07/01/22  1508 06/28/22  1930   BLOOD CULTURE  Received in Microbiology Lab  Culture in Progress  Received in Microbiology Lab  Culture in Progress  No Growth at 72 hrs  No Growth at 72 hrs  Last 24 Hours Medication List:   Current Facility-Administered Medications   Medication Dose Route Frequency Provider Last Rate    acetaminophen  650 mg Oral Q6H PRN Lonza Yasmine, DO      albuterol  1 puff Inhalation Q6H PRN Lonza Yasmine, DO      amLODIPine  2 5 mg Oral Daily Lonza Yasmine, DO      aspirin  81 mg Oral Daily Lonza Yasmine, DO      cefepime  1,000 mg Intravenous Q12H Rima Mcdonald MD      cilostazol  100 mg Oral BID Lonza Yasmine, DO      enoxaparin  30 mg Subcutaneous Daily Lonza Yasmine, DO      famotidine  10 mg Oral Daily Lonza Yasmine, DO      fluticasone  2 puff Inhalation BID Lonza Yasmine, DO      LORazepam  0 25 mg Oral BID PRN Lonza Yasmine, DO      [START ON 7/4/2022] methimazole  2 5 mg Oral Once per day on Mon Wed Fri Lonza Yasmine, DO      montelukast  10 mg Oral HS Lonza Yasmine, DO      multivitamin stress formula  1 tablet Oral Daily Lonza Yasmine, DO      OLANZapine  2 5 mg Oral Q8H PRN Lonza Yasmine, DO      oxyCODONE-acetaminophen  1 tablet Oral BID PRN Lonza Yasmine, DO          Today, Patient Was Seen By: Ray Aguilera PA-C    **Please Note: This note may have been constructed using a voice recognition system  **

## 2022-07-02 NOTE — ASSESSMENT & PLAN NOTE
Lab Results   Component Value Date    EGFR 63 07/02/2022    EGFR 48 07/01/2022    EGFR 48 06/30/2022    CREATININE 0 82 07/02/2022    CREATININE 1 02 07/01/2022    CREATININE 1 02 06/30/2022   · Creatinine stable, monitor

## 2022-07-02 NOTE — CONSULTS
Consultation - Infectious Disease   Emily Zapata 80 y o  female MRN: [id number]  Unit/Bed#: S -01 Encounter: [id number]      IMPRESSION & RECOMMENDATIONS:   1  Fever-possibly secondary to a resolving UTI all the repeat urinalysis is negative and the patient has received 3 days of antibiotics  Possibly drug reaction  Possibly a viral or tick-borne infection with the notable leukopenia and mild LFT abnormalities, although this is less likely as the patient does not spend much time outside   -continue cefepime for now but will increase the dose to 1 g IV q 12 hours  -no additional Macrobid in case this was causing a drug reaction  -follow-up blood cultures  -recheck CBC with diff and CMP  -if fever recurs check Anaplasma PCR, Lyme serology, and parasite smear  -if fever recurs check EBV and CMV serologies  -monitor temperature    2  E coli UTI-no radiographic evidence of a complication  Symptoms seemed to have resolved and the repeat urinalysis is negative    -antibiotics as above  -monitor symptomatology    3  Leukopenia-unclear etiology  Not overtly neutropenic   -recheck CBC with diff  -additional workup as above as needed    4  Mild transaminitis-unclear etiology  -recheck LFTs      5  Acute encephalopathy-in the setting of a reported fever  This is all in the setting of some mild cognitive impairment  Seems to be back to baseline  -monitor cognition  -additional workup as needed    Have discussed the above management plan in detail with the primary service    Extensive review of the medical records in epic including review of the notes, radiographs, and laboratory results     HISTORY OF PRESENT ILLNESS:  Reason for Consult:  Fever  HPI: Charles Marrero is a 80y o  year old female with chronic interstitial cystitis, diabetes mellitus, asthma admitted [facility] after recently being discharged in the setting of UTI who we are asked to assist with antibiotic management    The patient apparently had recently developed dysuria and presented 6/28/2022 when she had nausea vomiting and chills and was admitted for further management  Her urine cultures had grown a relatively resistant E coli  She had blood cultures obtained and was started on cefepime and clinically improved  She developed cognitive decline in the hospital in the family wish for her to be out of the hospital and therefore she was discharged on 6/30/2022 on Macrobid  However what home she spiked a high fever and fell out of her bed and therefore was brought back to the hospital for further evaluation  She was a trauma alert and underwent evaluation including extensive imaging which was all negative  She had blood cultures and repeat UA obtained and was restarted on cefepime  She currently is feeling much better  She denies any headache or stiff neck, denies any cough shortness of breath, denies any dysuria or hematuria, denies any new rash or skin lesions, denies any new joint or muscle pains  REVIEW OF SYSTEMS:  A complete review of systems is negative other than that noted in the HPI      PAST MEDICAL HISTORY:  Past Medical History:   Diagnosis Date    Asthma     Chronic interstitial cystitis     Community acquired pneumonia     last assessed 10/4/13    Diabetes mellitus ([address] )     Disease of thyroid gland     Diverticulitis     Dizziness     GERD (gastroesophageal reflux disease)     Hyperlipidemia     Hypertension     Vertigo      Past Surgical History:   Procedure Laterality Date    ADENOIDECTOMY      CHOLECYSTECTOMY      COLON SURGERY      partial colectomy - sigmoid, diverticulitis    EYE SURGERY      HYSTERECTOMY      ovaries remain    TONSILLECTOMY      US GUIDED LYMPH NODE BIOPSY RIGHT  3/11/2020    US GUIDED THYROID BIOPSY  11/13/2019       FAMILY HISTORY:  Non-contributory    SOCIAL HISTORY:  Social History   Social History     Substance and Sexual Activity   Alcohol Use Never     Social History     Substance and Sexual Activity   Drug Use No     Social History     Tobacco Use   Smoking Status Never Smoker   Smokeless Tobacco Never Used       ALLERGIES:  Allergies   Allergen Reactions    Haloperidol      Confusion /AMS  1 ep on 2020  AGGRESSIVENESS  AGITATION    Triazolam Other (See Comments)     Confusion, and aggressivenss as well   Bactrim [Sulfamethoxazole-Trimethoprim]     Ezetimibe     Gabapentin     Lisinopril     Naproxen Nausea Only    Statins        MEDICATIONS:  All current active medications have been reviewed  Antibiotics:  Cefepime 2    PHYSICAL EXAM:  Temp:  [97 8 °F (36 6 °C)-99 1 °F (37 3 °C)] 97 8 °F (36 6 °C)  HR:  [71-86] 86  Resp:  [16-18] 18  BP: (100-136)/(47-72) 108/68  SpO2:  [93 %-99 %] 95 %  Temp (24hrs), Av 6 °F (37 °C), Min:97 8 °F (36 6 °C), Max:99 1 °F (37 3 °C)  Current: Temperature: 97 8 °F (36 6 °C)    Intake/Output Summary (Last 24 hours) at 2022 0948  Last data filed at 2022 1935  Gross per 24 hour   Intake 650 ml   Output --   Net 650 ml       General Appearance:  Appearing well, nontoxic, and in no distress   Head:  Normocephalic, without obvious abnormality, atraumatic   Eyes:  Conjunctiva pink and sclera anicteric, both eyes   Nose: Nares normal, mucosa normal, no drainage   Throat: Oropharynx moist without lesions   Neck: Supple, symmetrical, no adenopathy, no tenderness/mass/nodules   Back:   Symmetric, no curvature, ROM normal, no CVA tenderness   Lungs:   Clear to auscultation bilaterally, respirations unlabored   Chest Wall:  No tenderness or deformity   Heart:  RRR; no murmur, rub or gallop   Abdomen:   Soft, non-tender, non-distended, positive bowel sounds    Extremities: No cyanosis, clubbing or edema   Skin: No rashes or lesions  No draining wounds noted     Lymph nodes: Cervical, supraclavicular nodes normal   Neurologic: Alert and oriented times 3, extremity strength 5/5 and symmetric       LABS, IMAGING, & OTHER STUDIES:  Lab Results:  I have personally reviewed pertinent labs  Results from last 7 days   Lab Units 07/02/22  0441 07/01/22  1508 06/30/22  0639   WBC Thousand/uL 2 55* 4 19* 3 27*   HEMOGLOBIN g/dL 11 2* 11 1* 10 7*   PLATELETS Thousands/uL 175 203 195     Results from last 7 days   Lab Units 07/02/22  0441 07/01/22  1508 06/30/22  0639   SODIUM mmol/L 136 135 136   POTASSIUM mmol/L 3 8 3 4* 3 8   CHLORIDE mmol/L 107 103 109*   CO2 mmol/L 23 22 21   BUN mg/dL 15 16 17   CREATININE mg/dL 0 82 1 02 1 02   EGFR ml/min/1 73sq m 63 48 48   CALCIUM mg/dL 8 1* 8 5 7 9*   AST U/L  --  40*  --    ALT U/L  --  31  --    ALK PHOS U/L  --  97  --      Results from last 7 days   Lab Units 07/01/22  1548 07/01/22  1508 06/28/22  1930   BLOOD CULTURE  Received in Microbiology Lab  Culture in Progress  Received in Microbiology Lab  Culture in Progress  No Growth at 72 hrs  No Growth at 72 hrs  Results from last 7 days   Lab Units 07/02/22  0441 07/01/22  1508   PROCALCITONIN ng/ml 3 23* 5 48*                   Imaging Studies:     CT chest-basilar atelectasis otherwise clear lungs    No acute intra-abdominal process    Images personally reviewed by me in PACS

## 2022-07-02 NOTE — ASSESSMENT & PLAN NOTE
· Worsening today to 2 55, likely in the setting of acute infection  · No other sirs criteria met  She did reported fever at home but none documented  · Trend CBC with differential   · ID to send off tick-borne illness panels today  · CT abdomen pelvis with no acute findings

## 2022-07-02 NOTE — PLAN OF CARE
Problem: Potential for Falls  Goal: Patient will remain free of falls  Description: INTERVENTIONS:  - Educate patient/family on patient safety including physical limitations  - Instruct patient to call for assistance with activity   - Consult OT/PT to assist with strengthening/mobility   - Keep Call bell within reach  - Keep bed low and locked with side rails adjusted as appropriate  - Keep care items and personal belongings within reach  - Initiate and maintain comfort rounds  - Apply yellow socks and bracelet for high fall risk patients  - Consider moving patient to room near nurses station  Outcome: Progressing     Problem: Prexisting or High Potential for Compromised Skin Integrity  Goal: Skin integrity is maintained or improved  Description: INTERVENTIONS:  - Identify patients at risk for skin breakdown  - Assess and monitor skin integrity  - Assess and monitor nutrition and hydration status  - Monitor labs   - Assess for incontinence   - Turn and reposition patient  - Assist with mobility/ambulation  - Relieve pressure over bony prominences  - Avoid friction and shearing  - Provide appropriate hygiene as needed including keeping skin clean and dry  - Evaluate need for skin moisturizer/barrier cream  - Collaborate with interdisciplinary team   - Patient/family teaching  - Consider wound care consult   Outcome: Progressing

## 2022-07-02 NOTE — ASSESSMENT & PLAN NOTE
· Pt at home secondary to fever and acute illness  · Zydis prn worked last hospitalization  · Pt also takes low dose Ativan prn at home, which can be used 2nd line  · Redirection and reorientation  · Monitor for worsening mental status while on cefepime

## 2022-07-02 NOTE — ASSESSMENT & PLAN NOTE
Lab Results   Component Value Date    HGBA1C 5 2 06/20/2022       Recent Labs     06/29/22 2038   POCGLU 107       Blood Sugar Average: Last 72 hrs:  ·  typically well controlled, not on any medications or insulin at home  · Continue Accu-Cheks

## 2022-07-03 PROBLEM — G93.41 METABOLIC ENCEPHALOPATHY: Status: RESOLVED | Noted: 2022-07-01 | Resolved: 2022-07-03

## 2022-07-03 PROBLEM — R78.81 GRAM-NEGATIVE BACTEREMIA: Status: ACTIVE | Noted: 2022-07-03

## 2022-07-03 LAB
ALBUMIN SERPL BCP-MCNC: 3.1 G/DL (ref 3.5–5)
ALP SERPL-CCNC: 89 U/L (ref 34–104)
ALT SERPL W P-5'-P-CCNC: 25 U/L (ref 7–52)
ANION GAP SERPL CALCULATED.3IONS-SCNC: 5 MMOL/L (ref 4–13)
AST SERPL W P-5'-P-CCNC: 31 U/L (ref 13–39)
BASOPHILS # BLD AUTO: 0.01 THOUSANDS/ΜL (ref 0–0.1)
BASOPHILS NFR BLD AUTO: 0 % (ref 0–1)
BILIRUB SERPL-MCNC: 0.77 MG/DL (ref 0.2–1)
BUN SERPL-MCNC: 13 MG/DL (ref 5–25)
CALCIUM ALBUM COR SERPL-MCNC: 8.9 MG/DL (ref 8.3–10.1)
CALCIUM SERPL-MCNC: 8.2 MG/DL (ref 8.4–10.2)
CHLORIDE SERPL-SCNC: 107 MMOL/L (ref 96–108)
CO2 SERPL-SCNC: 26 MMOL/L (ref 21–32)
CREAT SERPL-MCNC: 0.8 MG/DL (ref 0.6–1.3)
EOSINOPHIL # BLD AUTO: 0.02 THOUSAND/ΜL (ref 0–0.61)
EOSINOPHIL NFR BLD AUTO: 1 % (ref 0–6)
ERYTHROCYTE [DISTWIDTH] IN BLOOD BY AUTOMATED COUNT: 13.6 % (ref 11.6–15.1)
GFR SERPL CREATININE-BSD FRML MDRD: 65 ML/MIN/1.73SQ M
GLUCOSE SERPL-MCNC: 103 MG/DL (ref 65–140)
GLUCOSE SERPL-MCNC: 107 MG/DL (ref 65–140)
GLUCOSE SERPL-MCNC: 136 MG/DL (ref 65–140)
HCT VFR BLD AUTO: 33.2 % (ref 34.8–46.1)
HGB BLD-MCNC: 10.9 G/DL (ref 11.5–15.4)
IMM GRANULOCYTES # BLD AUTO: 0.01 THOUSAND/UL (ref 0–0.2)
IMM GRANULOCYTES NFR BLD AUTO: 0 % (ref 0–2)
LYMPHOCYTES # BLD AUTO: 0.99 THOUSANDS/ΜL (ref 0.6–4.47)
LYMPHOCYTES NFR BLD AUTO: 34 % (ref 14–44)
MCH RBC QN AUTO: 28.6 PG (ref 26.8–34.3)
MCHC RBC AUTO-ENTMCNC: 32.8 G/DL (ref 31.4–37.4)
MCV RBC AUTO: 87 FL (ref 82–98)
MONOCYTES # BLD AUTO: 0.33 THOUSAND/ΜL (ref 0.17–1.22)
MONOCYTES NFR BLD AUTO: 11 % (ref 4–12)
NEUTROPHILS # BLD AUTO: 1.53 THOUSANDS/ΜL (ref 1.85–7.62)
NEUTS SEG NFR BLD AUTO: 54 % (ref 43–75)
NRBC BLD AUTO-RTO: 0 /100 WBCS
PLATELET # BLD AUTO: 201 THOUSANDS/UL (ref 149–390)
PMV BLD AUTO: 9.5 FL (ref 8.9–12.7)
POTASSIUM SERPL-SCNC: 3.4 MMOL/L (ref 3.5–5.3)
PROT SERPL-MCNC: 5.5 G/DL (ref 6.4–8.4)
RBC # BLD AUTO: 3.81 MILLION/UL (ref 3.81–5.12)
SODIUM SERPL-SCNC: 138 MMOL/L (ref 135–147)
WBC # BLD AUTO: 2.89 THOUSAND/UL (ref 4.31–10.16)

## 2022-07-03 PROCEDURE — 80053 COMPREHEN METABOLIC PANEL: CPT | Performed by: INTERNAL MEDICINE

## 2022-07-03 PROCEDURE — 85025 COMPLETE CBC W/AUTO DIFF WBC: CPT | Performed by: INTERNAL MEDICINE

## 2022-07-03 PROCEDURE — 99232 SBSQ HOSP IP/OBS MODERATE 35: CPT | Performed by: PHYSICIAN ASSISTANT

## 2022-07-03 PROCEDURE — 99232 SBSQ HOSP IP/OBS MODERATE 35: CPT | Performed by: INTERNAL MEDICINE

## 2022-07-03 PROCEDURE — 82948 REAGENT STRIP/BLOOD GLUCOSE: CPT

## 2022-07-03 RX ORDER — POTASSIUM CHLORIDE 20 MEQ/1
40 TABLET, EXTENDED RELEASE ORAL DAILY
Status: DISCONTINUED | OUTPATIENT
Start: 2022-07-03 | End: 2022-07-03

## 2022-07-03 RX ORDER — POTASSIUM CHLORIDE 20 MEQ/1
40 TABLET, EXTENDED RELEASE ORAL ONCE
Status: COMPLETED | OUTPATIENT
Start: 2022-07-03 | End: 2022-07-03

## 2022-07-03 RX ADMIN — CILOSTAZOL 100 MG: 100 TABLET ORAL at 09:11

## 2022-07-03 RX ADMIN — ENOXAPARIN SODIUM 30 MG: 30 INJECTION SUBCUTANEOUS at 09:11

## 2022-07-03 RX ADMIN — MONTELUKAST 10 MG: 10 TABLET, FILM COATED ORAL at 22:06

## 2022-07-03 RX ADMIN — POTASSIUM CHLORIDE 40 MEQ: 1500 TABLET, EXTENDED RELEASE ORAL at 09:11

## 2022-07-03 RX ADMIN — FLUTICASONE PROPIONATE 2 PUFF: 110 AEROSOL, METERED RESPIRATORY (INHALATION) at 09:12

## 2022-07-03 RX ADMIN — CILOSTAZOL 100 MG: 100 TABLET ORAL at 17:08

## 2022-07-03 RX ADMIN — CEFEPIME HYDROCHLORIDE 1000 MG: 2 INJECTION, POWDER, FOR SOLUTION INTRAVENOUS at 09:14

## 2022-07-03 RX ADMIN — FLUTICASONE PROPIONATE 2 PUFF: 110 AEROSOL, METERED RESPIRATORY (INHALATION) at 17:09

## 2022-07-03 RX ADMIN — ASPIRIN 81 MG CHEWABLE TABLET 81 MG: 81 TABLET CHEWABLE at 09:11

## 2022-07-03 RX ADMIN — B-COMPLEX W/ C & FOLIC ACID TAB 1 TABLET: TAB at 09:11

## 2022-07-03 RX ADMIN — CEFEPIME HYDROCHLORIDE 1000 MG: 2 INJECTION, POWDER, FOR SOLUTION INTRAVENOUS at 22:06

## 2022-07-03 RX ADMIN — FAMOTIDINE 10 MG: 20 TABLET ORAL at 09:11

## 2022-07-03 RX ADMIN — AMLODIPINE BESYLATE 2.5 MG: 2.5 TABLET ORAL at 22:06

## 2022-07-03 NOTE — PROGRESS NOTES
Progress Note - Infectious Disease   Arnav Zapata 80 y o  female MRN: [id number]  Unit/Bed#: S -01 Encounter: [id number]      Impression/Plan:  1  Fever-Appears to be secondary to gram-negative bacteremia from a UTI  No other clear source appreciated  The patient has now remained afebrile and is hemodynamically stable   -continue cefepime  -follow up cultures and adjust antibiotics as needed  -recheck CBC with diff and CMP    2  Gram-negative bacteremia-unable to identify species on BCI although suspect it could be the E coli that was recently isolated in the urine   -antibiotics as above  -follow up ID and sensitivity and adjust antibiotics as needed  -additional workup as needed     3  E coli UTI-no radiographic evidence of a complication  Symptoms seemed to have resolved and the repeat urinalysis is negative    -antibiotics as above  -monitor symptomatology     4  Leukopenia-unclear etiology  Possibly all related to the acute infectious process  Not overtly neutropenic  White cell count has drifted down a bit  -recheck CBC with diff  -additional workup as above as needed     5  Mild transaminitis-unclear etiology  Resolved  -recheck LFTs       6  Acute encephalopathy-in the setting of a reported fever  This is all in the setting of some mild cognitive impairment  Seems to be back to baseline  -monitor cognition  -additional workup as needed    Discussed the above management plan with the primary service    Antibiotics:  Cefepime 3    Subjective:  Patient has no fever, chills, sweats; no nausea, vomiting, diarrhea; no cough, shortness of breath; no pain  No new symptoms  Patient is feeling much better today      Objective:  Vitals:  Temp:  [98 °F (36 7 °C)-98 9 °F (37 2 °C)] 98 °F (36 7 °C)  HR:  [74-86] 86  Resp:  [18] 18  BP: (138-157)/(63-67) 138/63  SpO2:  [96 %-97 %] 97 %  Temp (24hrs), Av 5 °F (36 9 °C), Min:98 °F (36 7 °C), Max:98 9 °F (37 2 °C)  Current: Temperature: 98 °F (36 7 °C)    Physical Exam:   General Appearance:  Alert, interactive, nontoxic, no acute distress  Throat: Oropharynx moist without lesions  Lungs:   Clear to auscultation bilaterally; no wheezes, rhonchi or rales; respirations unlabored   Heart:  RRR; no murmur, rub or gallop   Abdomen:   Soft, non-tender, non-distended, positive bowel sounds  Extremities: No clubbing, cyanosis or edema   Skin: No new rashes or lesions  No draining wounds noted  Labs, Imaging, & Other studies:   All pertinent labs and imaging studies were personally reviewed  Results from last 7 days   Lab Units 07/03/22 0411 07/02/22 0441 07/01/22  1508   WBC Thousand/uL 2 89* 2 55* 4 19*   HEMOGLOBIN g/dL 10 9* 11 2* 11 1*   PLATELETS Thousands/uL 201 175 203     Results from last 7 days   Lab Units 07/03/22  0411 07/02/22 0441 07/01/22  1508   SODIUM mmol/L 138 136 135   POTASSIUM mmol/L 3 4* 3 8 3 4*   CHLORIDE mmol/L 107 107 103   CO2 mmol/L 26 23 22   BUN mg/dL 13 15 16   CREATININE mg/dL 0 80 0 82 1 02   EGFR ml/min/1 73sq m 65 63 48   CALCIUM mg/dL 8 2* 8 1* 8 5   AST U/L 31  --  40*   ALT U/L 25  --  31   ALK PHOS U/L 89  --  97     Results from last 7 days   Lab Units 07/01/22  1548 07/01/22  1508 06/28/22  1930   BLOOD CULTURE  No Growth at 24 hrs   --  No Growth After 4 Days  No Growth After 4 Days     GRAM STAIN RESULT   --  Gram negative rods*  --      Results from last 7 days   Lab Units 07/02/22  0441 07/01/22  1508   PROCALCITONIN ng/ml 3 23* 5 48*

## 2022-07-03 NOTE — ASSESSMENT & PLAN NOTE
· Likely in the setting of acute infection/bacteremia  · No other SIRS criteria met  She did reported fever at home but none documented  · Trend CBC with differential   · CT abdomen pelvis with no acute findings

## 2022-07-03 NOTE — PROGRESS NOTES
[facility]  Progress Note - Lalo Yifan 7/7/1931, 80 y o  female MRN: [id number]  Unit/Bed#: S -Андрей Encounter: [id number]  Primary Care Provider: Jaja Cardoza MD   Date and time admitted to hospital: 7/1/2022  2:10 PM    * UTI due to extended-spectrum beta lactamase (ESBL) producing Escherichia coli  Assessment & Plan  · Was hospitalized and subsequently discharged 06/30 secondary to UTI  Failed OP tx w/ Macrobid  She was brought back in with increased confusion, fever at home as well as chills  · Continue IV cefepime  · ID is following  · Treatment of bacteremia as below  Gram-negative bacteremia  Assessment & Plan  · 1/2 BC on admission for GNR  · Likely due to UTI  · ID following, continue Cefepime  Metabolic encephalopathy  Assessment & Plan  · Pt at home secondary to fever and acute illness  · Zydis prn worked last hospitalization  · Pt also takes low dose Ativan prn at home, which can be used 2nd line  · Redirection and reorientation  · Monitor for worsening mental status while on cefepime  Leukopenia  Assessment & Plan  · Likely in the setting of acute infection/bacteremia  · No other SIRS criteria met  She did reported fever at home but none documented  · Trend CBC with differential   · CT abdomen pelvis with no acute findings  Atherosclerosis 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 (HCC)  Assessment & Plan  · ASA and Pletal    CKD (chronic kidney disease), stage III [facility])  Assessment & Plan  Lab Results   Component Value Date    EGFR 65 07/03/2022    EGFR 63 07/02/2022    EGFR 48 07/01/2022    CREATININE 0 80 07/03/2022    CREATININE 0 82 07/02/2022    CREATININE 1 02 07/01/2022   · Creatinine stable, monitor      Type 2 diabetes mellitus [facility])  Assessment & Plan  Lab Results   Component Value Date    HGBA1C 5 2 06/20/2022       Recent Labs     07/02/22  1617 07/02/22  2141   POCGLU 127 130       Blood Sugar Average: Last 72 hrs:  · (P) 128 5   · Typically well controlled, not on any medications or insulin at home  · Continue Accu-Cheks  Essential hypertension  Assessment & Plan  · Continue amlodipine  · Monitor blood pressure  VTE Pharmacologic Prophylaxis: VTE Score: 4 Moderate Risk (Score 3-4) - Pharmacological DVT Prophylaxis Ordered: enoxaparin (Lovenox)  Patient Centered Rounds: I performed bedside rounds with nursing staff today  Discussions with Specialists or Other Care Team Provider: CM, nursing    Education and Discussions with Family / Patient: Updated  (daughter) via phone  Time Spent for Care: 30 minutes  More than 50% of total time spent on counseling and coordination of care as described above  Current Length of Stay: 2 day(s)  Current Patient Status: Inpatient   Certification Statement: The patient will continue to require additional inpatient hospital stay due to IV abx for bacteremia  Discharge Plan: Anticipate discharge in 48-72 hrs to discharge location to be determined pending rehab evaluations  Code Status: Level 1 - Full Code    Subjective:   No fevers  No overnight events per nursing  Patient's mental status is stable  Denies chest pain or shortness of breath  Objective:     Vitals:   Temp (24hrs), Av 5 °F (36 9 °C), Min:98 °F (36 7 °C), Max:98 9 °F (37 2 °C)    Temp:  [98 °F (36 7 °C)-98 9 °F (37 2 °C)] 98 °F (36 7 °C)  HR:  [74-86] 86  Resp:  [18] 18  BP: (138-157)/(63-67) 138/63  SpO2:  [96 %-97 %] 97 %  Body mass index is 21 87 kg/m²  Input and Output Summary (last 24 hours): Intake/Output Summary (Last 24 hours) at 7/3/2022 1131  Last data filed at 2022 1300  Gross per 24 hour   Intake 50 ml   Output --   Net 50 ml       Physical Exam:   Physical Exam  Vitals and nursing note reviewed  Constitutional:       General: She is not in acute distress  Appearance: Normal appearance  HENT:      Head: Normocephalic  Mouth/Throat:      Mouth: Mucous membranes are moist    Eyes:      General: No scleral icterus  Pupils: Pupils are equal, round, and reactive to light  Cardiovascular:      Rate and Rhythm: Normal rate and regular rhythm  Heart sounds: No murmur heard  Pulmonary:      Effort: Pulmonary effort is normal  No respiratory distress  Breath sounds: Normal breath sounds  No wheezing, rhonchi or rales  Abdominal:      General: Bowel sounds are normal  There is no distension  Palpations: Abdomen is soft  Tenderness: There is no abdominal tenderness  Musculoskeletal:         General: No swelling  Right lower leg: No edema  Left lower leg: No edema  Skin:     Capillary Refill: Capillary refill takes less than 2 seconds  Neurological:      General: No focal deficit present  Mental Status: She is alert and oriented to person, place, and time  Mental status is at baseline  Additional Data:     Labs:  Results from last 7 days   Lab Units 07/03/22  0411 07/02/22  0441 07/01/22  1508   WBC Thousand/uL 2 89*   < > 4 19*   HEMOGLOBIN g/dL 10 9*   < > 11 1*   HEMATOCRIT % 33 2*   < > 33 0*   PLATELETS Thousands/uL 201   < > 203   BANDS PCT %  --   --  10*   NEUTROS PCT % 54   < >  --    LYMPHS PCT % 34   < >  --    LYMPHO PCT %  --   --  6*   MONOS PCT % 11   < >  --    MONO PCT %  --   --  3*   EOS PCT % 1   < > 0    < > = values in this interval not displayed       Results from last 7 days   Lab Units 07/03/22  0411   SODIUM mmol/L 138   POTASSIUM mmol/L 3 4*   CHLORIDE mmol/L 107   CO2 mmol/L 26   BUN mg/dL 13   CREATININE mg/dL 0 80   ANION GAP mmol/L 5   CALCIUM mg/dL 8 2*   ALBUMIN g/dL 3 1*   TOTAL BILIRUBIN mg/dL 0 77   ALK PHOS U/L 89   ALT U/L 25   AST U/L 31   GLUCOSE RANDOM mg/dL 103     Results from last 7 days   Lab Units 07/01/22  1508   INR  0 92     Results from last 7 days   Lab Units 07/02/22  2141 07/02/22  1617 06/29/22  2038   POC GLUCOSE mg/dl 130 127 107 Results from last 7 days   Lab Units 07/02/22  0441 07/01/22  1508 06/28/22  1930   LACTIC ACID mmol/L  --  1 1 1 0   PROCALCITONIN ng/ml 3 23* 5 48*  --        Lines/Drains:  Invasive Devices  Report    Peripheral Intravenous Line  Duration           Peripheral IV 07/01/22 Left Antecubital 1 day                      Imaging: No pertinent imaging reviewed  Recent Cultures (last 7 days):   Results from last 7 days   Lab Units 07/01/22  1548 07/01/22  1508 06/28/22  1930   BLOOD CULTURE  No Growth at 24 hrs   --  No Growth After 4 Days  No Growth After 4 Days  GRAM STAIN RESULT   --  Gram negative rods*  --        Last 24 Hours Medication List:   Current Facility-Administered Medications   Medication Dose Route Frequency Provider Last Rate    acetaminophen  650 mg Oral Q6H PRN Bay Nipper, DO      albuterol  1 puff Inhalation Q6H PRN Bay Nipper, DO      amLODIPine  2 5 mg Oral Daily Bay Nipper, DO      aspirin  81 mg Oral Daily Bay Nipper, DO      cefepime  1,000 mg Intravenous Q12H Melodie Sherman MD 1,000 mg (07/03/22 0914)    cilostazol  100 mg Oral BID Bay Nipper, DO      enoxaparin  30 mg Subcutaneous Daily Bay Nipper, DO      famotidine  10 mg Oral Daily Bay Nipper, DO      fluticasone  2 puff Inhalation BID Bay Nipper, DO      LORazepam  0 25 mg Oral BID PRN Bay Nipper, DO      [START ON 7/4/2022] methimazole  2 5 mg Oral Once per day on Mon Wed Fri Bay Nipper, DO      montelukast  10 mg Oral HS Bay Nipper, DO      multivitamin stress formula  1 tablet Oral Daily Bay Nipper, DO      OLANZapine  2 5 mg Oral Q8H PRN Bay Nipper, DO      oxyCODONE-acetaminophen  1 tablet Oral BID PRN Bay Nipper, DO          Today, Patient Was Seen By: Eva Fraga PA-C    **Please Note: This note may have been constructed using a voice recognition system  **

## 2022-07-03 NOTE — ASSESSMENT & PLAN NOTE
· Was hospitalized and subsequently discharged 06/30 secondary to UTI  Failed OP tx w/ Macrobid  She was brought back in with increased confusion, fever at home as well as chills  · Continue IV cefepime  · ID is following  · Treatment of bacteremia as below

## 2022-07-03 NOTE — ASSESSMENT & PLAN NOTE
Lab Results   Component Value Date    HGBA1C 5 2 06/20/2022       Recent Labs     07/02/22  1617 07/02/22  2141   POCGLU 127 130       Blood Sugar Average: Last 72 hrs:  · (P) 128 5   · Typically well controlled, not on any medications or insulin at home  · Continue Accu-Cheks

## 2022-07-03 NOTE — ED ATTENDING ATTESTATION
7/1/2022  IMicheal MD, saw and evaluated the patient  I have discussed the patient with the resident/non-physician practitioner and agree with the resident's/non-physician practitioner's findings, Plan of Care, and MDM as documented in the resident's/non-physician practitioner's note, except where noted  All available labs and Radiology studies were reviewed  I was present for key portions of any procedure(s) performed by the resident/non-physician practitioner and I was immediately available to provide assistance  At this point I agree with the current assessment done in the Emergency Department  I have conducted an independent evaluation of this patient a history and physical is as follows:    Patient is a 80-year-old female who presents to the emergency department feeling generally poorly  She experienced fevers overnight up to 102  She was hospitalized from June 28th through the 30th on IV antibiotics for UTI  She had previously been on 2 oral antibiotics prior to the hospitalization for the same UTI  Zollie Providence has been taken since discharge  She continues to experience dysuria  Overnight she rolled off bed scraping her left upper back on the furniture and hitting head on the ground  No LOC  She takes aspirin daily  Trauma C initiated  On exam patient is alert  She appears mildly malaised  Mucous membranes are moist   Heart sounds regular  Lungs clear to auscultation bilaterally  Abdomen is soft and nontender  Abrasion without active bleeding is appreciated over the left upper thoracic region  There is mild tenderness at this site as well as midline  The remainder of the back is nontender  No cervical spine tenderness  No external findings of head trauma  No facial asymmetry  Good strength in the upper and lower extremities  No arm or leg tenderness  Obtaining CT head C-spine & chest given fall with impact & current tenderness    With ongoing fever in the setting of recent UTI diagnosis obtaining CT scan of the abdomen/pelvis as well to assess for any possible ureteral stone or other complicating factor  Sepsis labs obtained  Restarting IV antibiotic    Anticipate admission  ED Course         Critical Care Time  Procedures

## 2022-07-03 NOTE — ASSESSMENT & PLAN NOTE
Lab Results   Component Value Date    EGFR 65 07/03/2022    EGFR 63 07/02/2022    EGFR 48 07/01/2022    CREATININE 0 80 07/03/2022    CREATININE 0 82 07/02/2022    CREATININE 1 02 07/01/2022   · Creatinine stable, monitor

## 2022-07-03 NOTE — PLAN OF CARE
Problem: Potential for Falls  Goal: Patient will remain free of falls  Description: INTERVENTIONS:  - Educate patient/family on patient safety including physical limitations  - Instruct patient to call for assistance with activity   - Consult OT/PT to assist with strengthening/mobility   - Keep Call bell within reach  - Keep bed low and locked with side rails adjusted as appropriate  - Keep care items and personal belongings within reach  - Initiate and maintain comfort rounds  - Make Fall Risk Sign visible to staff  - Offer Toileting every  Hours, in advance of need  - Initiate/Maintain alarm  - Obtain necessary fall risk management equipment:   - Apply yellow socks and bracelet for high fall risk patients  - Consider moving patient to room near nurses station  Outcome: Progressing     Problem: Prexisting or High Potential for Compromised Skin Integrity  Goal: Skin integrity is maintained or improved  Description: INTERVENTIONS:  - Identify patients at risk for skin breakdown  - Assess and monitor skin integrity  - Assess and monitor nutrition and hydration status  - Monitor labs   - Assess for incontinence   - Turn and reposition patient  - Assist with mobility/ambulation  - Relieve pressure over bony prominences  - Avoid friction and shearing  - Provide appropriate hygiene as needed including keeping skin clean and dry  - Evaluate need for skin moisturizer/barrier cream  - Collaborate with interdisciplinary team   - Patient/family teaching  - Consider wound care consult   Outcome: Progressing     Problem: MOBILITY - ADULT  Goal: Maintain or return to baseline ADL function  Description: INTERVENTIONS:  -  Assess patient's ability to carry out ADLs; assess patient's baseline for ADL function and identify physical deficits which impact ability to perform ADLs (bathing, care of mouth/teeth, toileting, grooming, dressing, etc )  - Assess/evaluate cause of self-care deficits   - Assess range of motion  - Assess patient's mobility; develop plan if impaired  - Assess patient's need for assistive devices and provide as appropriate  - Encourage maximum independence but intervene and supervise when necessary  - Involve family in performance of ADLs  - Assess for home care needs following discharge   - Consider OT consult to assist with ADL evaluation and planning for discharge  - Provide patient education as appropriate  Outcome: Progressing  Goal: Maintains/Returns to pre admission functional level  Description: INTERVENTIONS:  - Perform BMAT or MOVE assessment daily    - Set and communicate daily mobility goal to care team and patient/family/caregiver  - Collaborate with rehabilitation services on mobility goals if consulted  - Perform Range of Motion times a day  - Reposition patient every  hours    - Dangle patient  times a day  - Stand patient  times a day  - Ambulate patient  times a day  - Out of bed to chair  times a day   - Out of bed for meals times a day  - Out of bed for toileting  - Record patient progress and toleration of activity level   Outcome: Progressing

## 2022-07-04 LAB
ALBUMIN SERPL BCP-MCNC: 3.1 G/DL (ref 3.5–5)
ALP SERPL-CCNC: 79 U/L (ref 34–104)
ALT SERPL W P-5'-P-CCNC: 23 U/L (ref 7–52)
ANION GAP SERPL CALCULATED.3IONS-SCNC: 5 MMOL/L (ref 4–13)
AST SERPL W P-5'-P-CCNC: 27 U/L (ref 13–39)
BACTERIA BLD CULT: NORMAL
BACTERIA BLD CULT: NORMAL
BASOPHILS # BLD AUTO: 0.01 THOUSANDS/ΜL (ref 0–0.1)
BASOPHILS NFR BLD AUTO: 0 % (ref 0–1)
BILIRUB SERPL-MCNC: 0.73 MG/DL (ref 0.2–1)
BUN SERPL-MCNC: 12 MG/DL (ref 5–25)
CALCIUM ALBUM COR SERPL-MCNC: 9.1 MG/DL (ref 8.3–10.1)
CALCIUM SERPL-MCNC: 8.4 MG/DL (ref 8.4–10.2)
CHLORIDE SERPL-SCNC: 109 MMOL/L (ref 96–108)
CO2 SERPL-SCNC: 25 MMOL/L (ref 21–32)
CREAT SERPL-MCNC: 0.7 MG/DL (ref 0.6–1.3)
EOSINOPHIL # BLD AUTO: 0.02 THOUSAND/ΜL (ref 0–0.61)
EOSINOPHIL NFR BLD AUTO: 1 % (ref 0–6)
ERYTHROCYTE [DISTWIDTH] IN BLOOD BY AUTOMATED COUNT: 13.7 % (ref 11.6–15.1)
GFR SERPL CREATININE-BSD FRML MDRD: 76 ML/MIN/1.73SQ M
GLUCOSE SERPL-MCNC: 106 MG/DL (ref 65–140)
GLUCOSE SERPL-MCNC: 110 MG/DL (ref 65–140)
GLUCOSE SERPL-MCNC: 131 MG/DL (ref 65–140)
GLUCOSE SERPL-MCNC: 138 MG/DL (ref 65–140)
GLUCOSE SERPL-MCNC: 94 MG/DL (ref 65–140)
HCT VFR BLD AUTO: 31.8 % (ref 34.8–46.1)
HGB BLD-MCNC: 10.4 G/DL (ref 11.5–15.4)
IMM GRANULOCYTES # BLD AUTO: 0.01 THOUSAND/UL (ref 0–0.2)
IMM GRANULOCYTES NFR BLD AUTO: 0 % (ref 0–2)
LYMPHOCYTES # BLD AUTO: 1.54 THOUSANDS/ΜL (ref 0.6–4.47)
LYMPHOCYTES NFR BLD AUTO: 48 % (ref 14–44)
MCH RBC QN AUTO: 28.7 PG (ref 26.8–34.3)
MCHC RBC AUTO-ENTMCNC: 32.7 G/DL (ref 31.4–37.4)
MCV RBC AUTO: 88 FL (ref 82–98)
MONOCYTES # BLD AUTO: 0.34 THOUSAND/ΜL (ref 0.17–1.22)
MONOCYTES NFR BLD AUTO: 11 % (ref 4–12)
NEUTROPHILS # BLD AUTO: 1.3 THOUSANDS/ΜL (ref 1.85–7.62)
NEUTS SEG NFR BLD AUTO: 40 % (ref 43–75)
NRBC BLD AUTO-RTO: 0 /100 WBCS
PLATELET # BLD AUTO: 241 THOUSANDS/UL (ref 149–390)
PMV BLD AUTO: 9.8 FL (ref 8.9–12.7)
POTASSIUM SERPL-SCNC: 3.6 MMOL/L (ref 3.5–5.3)
PROT SERPL-MCNC: 5.4 G/DL (ref 6.4–8.4)
RBC # BLD AUTO: 3.63 MILLION/UL (ref 3.81–5.12)
SODIUM SERPL-SCNC: 139 MMOL/L (ref 135–147)
WBC # BLD AUTO: 3.22 THOUSAND/UL (ref 4.31–10.16)

## 2022-07-04 PROCEDURE — 99232 SBSQ HOSP IP/OBS MODERATE 35: CPT | Performed by: INTERNAL MEDICINE

## 2022-07-04 PROCEDURE — 80053 COMPREHEN METABOLIC PANEL: CPT | Performed by: PHYSICIAN ASSISTANT

## 2022-07-04 PROCEDURE — 99232 SBSQ HOSP IP/OBS MODERATE 35: CPT | Performed by: PHYSICIAN ASSISTANT

## 2022-07-04 PROCEDURE — 85025 COMPLETE CBC W/AUTO DIFF WBC: CPT | Performed by: PHYSICIAN ASSISTANT

## 2022-07-04 PROCEDURE — 82948 REAGENT STRIP/BLOOD GLUCOSE: CPT

## 2022-07-04 RX ADMIN — FAMOTIDINE 10 MG: 20 TABLET ORAL at 09:14

## 2022-07-04 RX ADMIN — CILOSTAZOL 100 MG: 100 TABLET ORAL at 09:14

## 2022-07-04 RX ADMIN — FLUTICASONE PROPIONATE 2 PUFF: 110 AEROSOL, METERED RESPIRATORY (INHALATION) at 09:16

## 2022-07-04 RX ADMIN — CEFEPIME HYDROCHLORIDE 1000 MG: 2 INJECTION, POWDER, FOR SOLUTION INTRAVENOUS at 09:17

## 2022-07-04 RX ADMIN — ASPIRIN 81 MG CHEWABLE TABLET 81 MG: 81 TABLET CHEWABLE at 09:14

## 2022-07-04 RX ADMIN — B-COMPLEX W/ C & FOLIC ACID TAB 1 TABLET: TAB at 09:14

## 2022-07-04 RX ADMIN — CEFEPIME HYDROCHLORIDE 1000 MG: 2 INJECTION, POWDER, FOR SOLUTION INTRAVENOUS at 22:24

## 2022-07-04 RX ADMIN — ENOXAPARIN SODIUM 30 MG: 30 INJECTION SUBCUTANEOUS at 09:14

## 2022-07-04 RX ADMIN — FLUTICASONE PROPIONATE 2 PUFF: 110 AEROSOL, METERED RESPIRATORY (INHALATION) at 17:56

## 2022-07-04 RX ADMIN — METHIMAZOLE 2.5 MG: 5 TABLET ORAL at 09:14

## 2022-07-04 RX ADMIN — MONTELUKAST 10 MG: 10 TABLET, FILM COATED ORAL at 22:24

## 2022-07-04 RX ADMIN — CILOSTAZOL 100 MG: 100 TABLET ORAL at 17:56

## 2022-07-04 NOTE — ASSESSMENT & PLAN NOTE
Lab Results   Component Value Date    EGFR 76 07/04/2022    EGFR 65 07/03/2022    EGFR 63 07/02/2022    CREATININE 0 70 07/04/2022    CREATININE 0 80 07/03/2022    CREATININE 0 82 07/02/2022   · Creatinine stable, monitor

## 2022-07-04 NOTE — ASSESSMENT & PLAN NOTE
· 1/2 BC on admission for GNR  · Likely due to UTI  · ID following, continue Cefepime  · Await final results

## 2022-07-04 NOTE — PLAN OF CARE
Problem: Potential for Falls  Goal: Patient will remain free of falls  Description: INTERVENTIONS:  - Educate patient/family on patient safety including physical limitations  - Instruct patient to call for assistance with activity   - Consult OT/PT to assist with strengthening/mobility   - Keep Call bell within reach  - Keep bed low and locked with side rails adjusted as appropriate  - Keep care items and personal belongings within reach  - Initiate and maintain comfort rounds  - Make Fall Risk Sign visible to staff  - Offer Toileting every  Hours, in advance of need  - Initiate/Maintain alarm  - Obtain necessary fall risk management equipment:  - Apply yellow socks and bracelet for high fall risk patients  - Consider moving patient to room near nurses station  7/4/2022 1008 by Kunal Lepe RN  Outcome: Progressing  7/4/2022 1008 by Kunal Lepe RN  Outcome: Progressing     Problem: Prexisting or High Potential for Compromised Skin Integrity  Goal: Skin integrity is maintained or improved  Description: INTERVENTIONS:  - Identify patients at risk for skin breakdown  - Assess and monitor skin integrity  - Assess and monitor nutrition and hydration status  - Monitor labs   - Assess for incontinence   - Turn and reposition patient  - Assist with mobility/ambulation  - Relieve pressure over bony prominences  - Avoid friction and shearing  - Provide appropriate hygiene as needed including keeping skin clean and dry  - Evaluate need for skin moisturizer/barrier cream  - Collaborate with interdisciplinary team   - Patient/family teaching  - Consider wound care consult   7/4/2022 1008 by Kunal Lepe RN  Outcome: Progressing  7/4/2022 1008 by Kunal Lepe RN  Outcome: Progressing     Problem: MOBILITY - ADULT  Goal: Maintain or return to baseline ADL function  Description: INTERVENTIONS:  -  Assess patient's ability to carry out ADLs; assess patient's baseline for ADL function and identify physical deficits which impact ability to perform ADLs (bathing, care of mouth/teeth, toileting, grooming, dressing, etc )  - Assess/evaluate cause of self-care deficits   - Assess range of motion  - Assess patient's mobility; develop plan if impaired  - Assess patient's need for assistive devices and provide as appropriate  - Encourage maximum independence but intervene and supervise when necessary  - Involve family in performance of ADLs  - Assess for home care needs following discharge   - Consider OT consult to assist with ADL evaluation and planning for discharge  - Provide patient education as appropriate  7/4/2022 1008 by Maral Garcia RN  Outcome: Progressing  7/4/2022 1008 by Maral Garcia RN  Outcome: Progressing  Goal: Maintains/Returns to pre admission functional level  Description: INTERVENTIONS:  - Perform BMAT or MOVE assessment daily    - Set and communicate daily mobility goal to care team and patient/family/caregiver  - Collaborate with rehabilitation services on mobility goals if consulted  - Perform Range of Motion  times a day  - Reposition patient every  hours    - Dangle patient  times a day  - Stand patient  times a day  - Ambulate patient  times a day  - Out of bed to chair  times a day   - Out of bed for meals  times a day  - Out of bed for toileting  - Record patient progress and toleration of activity level   7/4/2022 1008 by Maral Garcia RN  Outcome: Progressing  7/4/2022 1008 by Maral Garcia RN  Outcome: Progressing     Problem: INFECTION - ADULT  Goal: Absence or prevention of progression during hospitalization  Description: INTERVENTIONS:  - Assess and monitor for signs and symptoms of infection  - Monitor lab/diagnostic results  - Monitor all insertion sites, i e  indwelling lines, tubes, and drains  - Monitor endotracheal if appropriate and nasal secretions for changes in amount and color  - [place] appropriate cooling/warming therapies per order  - Administer medications as ordered  - Instruct and encourage patient and family to use good hand hygiene technique  - Identify and instruct in appropriate isolation precautions for identified infection/condition  Outcome: Progressing     Problem: DISCHARGE PLANNING  Goal: Discharge to home or other facility with appropriate resources  Description: INTERVENTIONS:  - Identify barriers to discharge w/patient and caregiver  - Arrange for needed discharge resources and transportation as appropriate  - Identify discharge learning needs (meds, wound care, etc )  - Arrange for interpretive services to assist at discharge as needed  - Refer to Case Management Department for coordinating discharge planning if the patient needs post-hospital services based on physician/advanced practitioner order or complex needs related to functional status, cognitive ability, or social support system  Outcome: Progressing     Problem: Knowledge Deficit  Goal: Patient/family/caregiver demonstrates understanding of disease process, treatment plan, medications, and discharge instructions  Description: Complete learning assessment and assess knowledge base    Interventions:  - Provide teaching at level of understanding  - Provide teaching via preferred learning methods  Outcome: Progressing

## 2022-07-04 NOTE — PROGRESS NOTES
[facility]  Progress Note - Sedrick Grant 7/7/1931, 80 y o  female MRN: [id number]  Unit/Bed#: S -01 Encounter: [id number]  Primary Care Provider: Stacey Solis MD   Date and time admitted to hospital: 7/1/2022  2:10 PM    * UTI due to extended-spectrum beta lactamase (ESBL) producing Escherichia coli  Assessment & Plan  · Was hospitalized and subsequently discharged 06/30 secondary to UTI  Failed OP tx w/ Macrobid  She was brought back in with increased confusion, fever at home as well as chills  · Continue IV cefepime day 4   · ID is following  · Treatment of bacteremia as below  Gram-negative bacteremia  Assessment & Plan  · 1/2 BC on admission for GNR  · Likely due to UTI  · ID following, continue Cefepime  · Await final results  Leukopenia  Assessment & Plan  · Likely in the setting of acute infection/bacteremia  · No other SIRS criteria met  She did reported fever at home but none documented  · Trend CBC with differential   · CT abdomen pelvis with no acute findings  Atherosclerosis 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 (HCC)  Assessment & Plan  · ASA and Pletal    CKD (chronic kidney disease), stage III [facility])  Assessment & Plan  Lab Results   Component Value Date    EGFR 76 07/04/2022    EGFR 65 07/03/2022    EGFR 63 07/02/2022    CREATININE 0 70 07/04/2022    CREATININE 0 80 07/03/2022    CREATININE 0 82 07/02/2022   · Creatinine stable, monitor  Type 2 diabetes mellitus [facility])  Assessment & Plan  Lab Results   Component Value Date    HGBA1C 5 2 06/20/2022       Recent Labs     07/02/22  2141 07/03/22  1620 07/03/22 2052 07/04/22  0756   POCGLU 130 107 136 106       Blood Sugar Average: Last 72 hrs:  · (P) 121 2   · Typically well controlled, not on any medications or insulin at home  · Continue Accu-Cheks  Essential hypertension  Assessment & Plan  · Continue amlodipine  · Monitor blood pressure        VTE Pharmacologic Prophylaxis: VTE Score: 4 Moderate Risk (Score 3-4) - Pharmacological DVT Prophylaxis Ordered: enoxaparin (Lovenox)  Patient Centered Rounds: I performed bedside rounds with nursing staff today  Discussions with Specialists or Other Care Team Provider: CM, nursing    Education and Discussions with Family / Patient: Updated  (daughter) via phone  Time Spent for Care: 30 minutes  More than 50% of total time spent on counseling and coordination of care as described above  Current Length of Stay: 3 day(s)  Current Patient Status: Inpatient   Certification Statement: The patient will continue to require additional inpatient hospital stay due to IV abx for bacteremia  Discharge Plan: Anticipate discharge in 24-48 hrs to home with home services  Code Status: Level 1 - Full Code    Subjective:   No overnight events reported by nursing  Patient is doing well in good spirits  No fevers  Has been ambulating around the room  Objective:     Vitals:   Temp (24hrs), Av 9 °F (36 6 °C), Min:97 6 °F (36 4 °C), Max:98 3 °F (36 8 °C)    Temp:  [97 6 °F (36 4 °C)-98 3 °F (36 8 °C)] 97 8 °F (36 6 °C)  HR:  [71-97] 97  Resp:  [16-18] 18  BP: ()/(51-87) 138/58  SpO2:  [96 %-97 %] 97 %  Body mass index is 21 87 kg/m²  Input and Output Summary (last 24 hours):   No intake or output data in the 24 hours ending 22 1004    Physical Exam:   Physical Exam  Vitals and nursing note reviewed  Constitutional:       General: She is not in acute distress  Appearance: Normal appearance  HENT:      Head: Normocephalic  Mouth/Throat:      Mouth: Mucous membranes are moist    Eyes:      General: No scleral icterus  Pupils: Pupils are equal, round, and reactive to light  Cardiovascular:      Rate and Rhythm: Normal rate and regular rhythm  Heart sounds: No murmur heard  Pulmonary:      Effort: Pulmonary effort is normal  No respiratory distress        Breath sounds: Normal breath sounds  No wheezing, rhonchi or rales  Abdominal:      General: Bowel sounds are normal  There is no distension  Palpations: Abdomen is soft  Tenderness: There is no abdominal tenderness  Musculoskeletal:         General: No swelling  Right lower leg: No edema  Left lower leg: No edema  Skin:     Capillary Refill: Capillary refill takes less than 2 seconds  Neurological:      General: No focal deficit present  Mental Status: She is alert and oriented to person, place, and time  Mental status is at baseline  Additional Data:     Labs:  Results from last 7 days   Lab Units 07/04/22  0517 07/02/22  0441 07/01/22  1508   WBC Thousand/uL 3 22*   < > 4 19*   HEMOGLOBIN g/dL 10 4*   < > 11 1*   HEMATOCRIT % 31 8*   < > 33 0*   PLATELETS Thousands/uL 241   < > 203   BANDS PCT %  --   --  10*   NEUTROS PCT % 40*   < >  --    LYMPHS PCT % 48*   < >  --    LYMPHO PCT %  --   --  6*   MONOS PCT % 11   < >  --    MONO PCT %  --   --  3*   EOS PCT % 1   < > 0    < > = values in this interval not displayed       Results from last 7 days   Lab Units 07/04/22  0517   SODIUM mmol/L 139   POTASSIUM mmol/L 3 6   CHLORIDE mmol/L 109*   CO2 mmol/L 25   BUN mg/dL 12   CREATININE mg/dL 0 70   ANION GAP mmol/L 5   CALCIUM mg/dL 8 4   ALBUMIN g/dL 3 1*   TOTAL BILIRUBIN mg/dL 0 73   ALK PHOS U/L 79   ALT U/L 23   AST U/L 27   GLUCOSE RANDOM mg/dL 94     Results from last 7 days   Lab Units 07/01/22  1508   INR  0 92     Results from last 7 days   Lab Units 07/04/22  0756 07/03/22  2052 07/03/22  1620 07/02/22  2141 07/02/22  1617 06/29/22  2038   POC GLUCOSE mg/dl 106 136 107 130 127 107         Results from last 7 days   Lab Units 07/02/22  0441 07/01/22  1508 06/28/22  1930   LACTIC ACID mmol/L  --  1 1 1 0   PROCALCITONIN ng/ml 3 23* 5 48*  --        Lines/Drains:  Invasive Devices  Report    Peripheral Intravenous Line  Duration           Peripheral IV 07/01/22 Left Antecubital 2 days Imaging: No pertinent imaging reviewed  Recent Cultures (last 7 days):   Results from last 7 days   Lab Units 07/01/22  1548 07/01/22  1508 06/28/22  1930   BLOOD CULTURE  No Growth at 48 hrs  --  No Growth After 5 Days  No Growth After 5 Days  GRAM STAIN RESULT   --  Gram negative rods*  --        Last 24 Hours Medication List:   Current Facility-Administered Medications   Medication Dose Route Frequency Provider Last Rate    acetaminophen  650 mg Oral Q6H PRN Gary Treasure, DO      albuterol  1 puff Inhalation Q6H PRN Gary Treasure, DO      amLODIPine  2 5 mg Oral Daily Gary Treasuer, DO      aspirin  81 mg Oral Daily Gary Treasure, DO      cefepime  1,000 mg Intravenous Q12H Paulino Rios MD 1,000 mg (07/04/22 0917)    cilostazol  100 mg Oral BID Gary Treasure, DO      enoxaparin  30 mg Subcutaneous Daily Gary Treasure, DO      famotidine  10 mg Oral Daily Gary Treasure, DO      fluticasone  2 puff Inhalation BID Gary Treasure, DO      LORazepam  0 25 mg Oral BID PRN Gary Treasure, DO      methimazole  2 5 mg Oral Once per day on Mon Wed Fri Gary Treasure, DO      montelukast  10 mg Oral HS Gary Treasure, DO      multivitamin stress formula  1 tablet Oral Daily Gary Treasure, DO      OLANZapine  2 5 mg Oral Q8H PRN Gary Treasure, DO      oxyCODONE-acetaminophen  1 tablet Oral BID PRN Gary Treasure, DO          Today, Patient Was Seen By: Tootie Patel PA-C    **Please Note: This note may have been constructed using a voice recognition system  **

## 2022-07-04 NOTE — ASSESSMENT & PLAN NOTE
Lab Results   Component Value Date    HGBA1C 5 2 06/20/2022       Recent Labs     07/02/22 2141 07/03/22  1620 07/03/22 2052 07/04/22  0756   POCGLU 130 107 136 106       Blood Sugar Average: Last 72 hrs:  · (P) 121 2   · Typically well controlled, not on any medications or insulin at home  · Continue Accu-Cheks

## 2022-07-04 NOTE — ASSESSMENT & PLAN NOTE
· Was hospitalized and subsequently discharged 06/30 secondary to UTI  Failed OP tx w/ Macrobid  She was brought back in with increased confusion, fever at home as well as chills  · Continue IV cefepime day 4   · ID is following  · Treatment of bacteremia as below

## 2022-07-04 NOTE — PLAN OF CARE
Problem: Potential for Falls  Goal: Patient will remain free of falls  Description: INTERVENTIONS:  - Educate patient/family on patient safety including physical limitations  - Instruct patient to call for assistance with activity   - Consult OT/PT to assist with strengthening/mobility   - Keep Call bell within reach  - Keep bed low and locked with side rails adjusted as appropriate  - Keep care items and personal belongings within reach  - Initiate and maintain comfort rounds  - Make Fall Risk Sign visible to staff  - Apply yellow socks and bracelet for high fall risk patients  - Consider moving patient to room near nurses station  Outcome: Progressing     Problem: Prexisting or High Potential for Compromised Skin Integrity  Goal: Skin integrity is maintained or improved  Description: INTERVENTIONS:  - Identify patients at risk for skin breakdown  - Assess and monitor skin integrity  - Assess and monitor nutrition and hydration status  - Monitor labs   - Assess for incontinence   - Turn and reposition patient  - Assist with mobility/ambulation  - Relieve pressure over bony prominences  - Avoid friction and shearing  - Provide appropriate hygiene as needed including keeping skin clean and dry  - Evaluate need for skin moisturizer/barrier cream  - Collaborate with interdisciplinary team   - Patient/family teaching  - Consider wound care consult   Outcome: Progressing     Problem: MOBILITY - ADULT  Goal: Maintain or return to baseline ADL function  Description: INTERVENTIONS:  -  Assess patient's ability to carry out ADLs; assess patient's baseline for ADL function and identify physical deficits which impact ability to perform ADLs (bathing, care of mouth/teeth, toileting, grooming, dressing, etc )  - Assess/evaluate cause of self-care deficits   - Assess range of motion  - Assess patient's mobility; develop plan if impaired  - Assess patient's need for assistive devices and provide as appropriate  - Encourage maximum independence but intervene and supervise when necessary  - Involve family in performance of ADLs  - Assess for home care needs following discharge   - Consider OT consult to assist with ADL evaluation and planning for discharge  - Provide patient education as appropriate  Outcome: Progressing  Goal: Maintains/Returns to pre admission functional level  Description: INTERVENTIONS:  - Perform BMAT or MOVE assessment daily    - Set and communicate daily mobility goal to care team and patient/family/caregiver     - Collaborate with rehabilitation services on mobility goals if consulted  - Out of bed for toileting  - Record patient progress and toleration of activity level   Outcome: Progressing

## 2022-07-05 LAB
BASOPHILS # BLD AUTO: 0.01 THOUSANDS/ΜL (ref 0–0.1)
BASOPHILS NFR BLD AUTO: 0 % (ref 0–1)
EOSINOPHIL # BLD AUTO: 0.03 THOUSAND/ΜL (ref 0–0.61)
EOSINOPHIL NFR BLD AUTO: 1 % (ref 0–6)
ERYTHROCYTE [DISTWIDTH] IN BLOOD BY AUTOMATED COUNT: 13.5 % (ref 11.6–15.1)
GLUCOSE SERPL-MCNC: 101 MG/DL (ref 65–140)
GLUCOSE SERPL-MCNC: 104 MG/DL (ref 65–140)
GLUCOSE SERPL-MCNC: 107 MG/DL (ref 65–140)
GLUCOSE SERPL-MCNC: 121 MG/DL (ref 65–140)
HCT VFR BLD AUTO: 31.6 % (ref 34.8–46.1)
HGB BLD-MCNC: 10.1 G/DL (ref 11.5–15.4)
IMM GRANULOCYTES # BLD AUTO: 0.02 THOUSAND/UL (ref 0–0.2)
IMM GRANULOCYTES NFR BLD AUTO: 1 % (ref 0–2)
LYMPHOCYTES # BLD AUTO: 1.53 THOUSANDS/ΜL (ref 0.6–4.47)
LYMPHOCYTES NFR BLD AUTO: 46 % (ref 14–44)
MCH RBC QN AUTO: 28.1 PG (ref 26.8–34.3)
MCHC RBC AUTO-ENTMCNC: 32 G/DL (ref 31.4–37.4)
MCV RBC AUTO: 88 FL (ref 82–98)
MONOCYTES # BLD AUTO: 0.39 THOUSAND/ΜL (ref 0.17–1.22)
MONOCYTES NFR BLD AUTO: 12 % (ref 4–12)
NEUTROPHILS # BLD AUTO: 1.34 THOUSANDS/ΜL (ref 1.85–7.62)
NEUTS SEG NFR BLD AUTO: 40 % (ref 43–75)
NRBC BLD AUTO-RTO: 0 /100 WBCS
PLATELET # BLD AUTO: 277 THOUSANDS/UL (ref 149–390)
PMV BLD AUTO: 9.6 FL (ref 8.9–12.7)
RBC # BLD AUTO: 3.6 MILLION/UL (ref 3.81–5.12)
WBC # BLD AUTO: 3.32 THOUSAND/UL (ref 4.31–10.16)

## 2022-07-05 PROCEDURE — 82948 REAGENT STRIP/BLOOD GLUCOSE: CPT

## 2022-07-05 PROCEDURE — 85025 COMPLETE CBC W/AUTO DIFF WBC: CPT | Performed by: PHYSICIAN ASSISTANT

## 2022-07-05 PROCEDURE — 99232 SBSQ HOSP IP/OBS MODERATE 35: CPT | Performed by: PHYSICIAN ASSISTANT

## 2022-07-05 PROCEDURE — 97162 PT EVAL MOD COMPLEX 30 MIN: CPT

## 2022-07-05 PROCEDURE — 99232 SBSQ HOSP IP/OBS MODERATE 35: CPT | Performed by: INTERNAL MEDICINE

## 2022-07-05 RX ADMIN — ASPIRIN 81 MG CHEWABLE TABLET 81 MG: 81 TABLET CHEWABLE at 08:31

## 2022-07-05 RX ADMIN — B-COMPLEX W/ C & FOLIC ACID TAB 1 TABLET: TAB at 08:30

## 2022-07-05 RX ADMIN — ENOXAPARIN SODIUM 30 MG: 30 INJECTION SUBCUTANEOUS at 08:32

## 2022-07-05 RX ADMIN — FLUTICASONE PROPIONATE 2 PUFF: 110 AEROSOL, METERED RESPIRATORY (INHALATION) at 08:32

## 2022-07-05 RX ADMIN — FAMOTIDINE 10 MG: 20 TABLET ORAL at 08:31

## 2022-07-05 RX ADMIN — CEFEPIME HYDROCHLORIDE 1000 MG: 2 INJECTION, POWDER, FOR SOLUTION INTRAVENOUS at 10:35

## 2022-07-05 RX ADMIN — MONTELUKAST 10 MG: 10 TABLET, FILM COATED ORAL at 21:33

## 2022-07-05 RX ADMIN — AMLODIPINE BESYLATE 2.5 MG: 2.5 TABLET ORAL at 21:33

## 2022-07-05 RX ADMIN — CEFEPIME HYDROCHLORIDE 1000 MG: 2 INJECTION, POWDER, FOR SOLUTION INTRAVENOUS at 21:33

## 2022-07-05 RX ADMIN — CILOSTAZOL 100 MG: 100 TABLET ORAL at 08:31

## 2022-07-05 RX ADMIN — CILOSTAZOL 100 MG: 100 TABLET ORAL at 17:15

## 2022-07-05 NOTE — ASSESSMENT & PLAN NOTE
Lab Results   Component Value Date    HGBA1C 5 2 06/20/2022       Recent Labs     07/04/22  1541 07/04/22 2025 07/05/22  0724 07/05/22  1137   POCGLU 131 138 101 121       Blood Sugar Average: Last 72 hrs:  · (P) 120 7   · Typically well controlled, not on any medications or insulin at home  · Continue Accu-Cheks

## 2022-07-05 NOTE — PHYSICAL THERAPY NOTE
PHYSICAL THERAPY EVALUATION  NAME:  Jameel Zapata  DATE: 07/05/22    AGE:   80 y o    Mrn:   [id number]  ADMIT DX:  UTI (urinary tract infection) [N39 0]  Fever [R50 9]  Sepsis ([address] ) [A41 9]  UTI due to extended-spectrum beta lactamase (ESBL) producing Escherichia coli [N39 0, B96 29, Z16 12]  Problem List:   Patient Active Problem List   Diagnosis    Anxiety    Mild intermittent asthma without complication    Degeneration of intervertebral disc of lumbar region    Diverticulosis    Elevated serum alkaline phosphatase level    Esophageal reflux    Fatty liver    Hyperlipidemia    Essential hypertension    Hyperthyroidism    Insomnia    Irritable bowel syndrome    Spinal stenosis    Spondylolisthesis, grade 1    History of TIA (transient ischemic attack)    Type 2 diabetes mellitus (HCC)    Vitamin D deficiency    Ovarian cyst    CKD (chronic kidney disease), stage III (HCC)    Trigger finger of right hand    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 (HCC)    PAD (peripheral artery disease) (HCC)    Memory loss    Atherosclerosis 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 (HCC)    Thyroid nodule    Mass of right submandibular region    Abnormal finding on imaging    Rotator cuff disorder, right    Embolism and thrombosis of arteries of the lower extremities (HCC)    Carotid stenosis, right    Vertebral artery stenosis    Vision abnormalities    Vertebral artery dissection (HCC)    Other constipation    Pulmonary nodules    Vitamin B12 deficiency    UTI due to extended-spectrum beta lactamase (ESBL) producing Escherichia coli    Blurry vision, bilateral    Leukopenia    Gram-negative bacteremia     Vitals:    07/04/22 1520 07/04/22 2024 07/05/22 0725 07/05/22 1625   BP: (!) 127/47 (!) 127/45 (!) 140/48 135/53   Pulse: 83 80 69 72   Resp: 16 16  16   Temp: 98 2 °F (36 8 °C) 98 2 °F (36 8 °C) 98 1 °F (36 7 °C) 98 4 °F (36 9 °C)   TempSrc:       SpO2: 97% 98% 98% 95%   Weight:           Length Of Stay: 4  Performed at least 2 patient identifiers during session: Name and Birthday  PHYSICAL THERAPY EVALUATION :    07/05/22 1742   PT Last Visit   PT Visit Date 07/05/22   Note Type   Note type Evaluation   Pain Assessment   Pain Assessment Tool 0-10   Pain Score No Pain   Home Living   Type of [address] One level  (1STE w/ railing)   Home Equipment   (none prior to admission)   Prior Function   Level of [place] Independent with ADLs and functional mobility   Lives With Family  (Daughter and son live with her)   ADL [address] in the last 6 months 1 to 4  (At bedside at home)   General   Family/Caregiver Present Yes  (Two daughters and granddaughter)   Cognition   Overall Cognitive Status WFL   Arousal/Participation Alert   Orientation Level Oriented X4   Memory Within functional limits   Following Commands Follows one step commands with increased time or repetition  (With MMT)   Subjective   Subjective Patient pleasant and cooperative, agreeable to participate    Family states that she sometimes has issues with her vision (which she attributes to macular degeneration), however both family and patient reports that it does not affect her mobility   RUE Assessment   RUE Assessment WFL   LUE Assessment   LUE Assessment WFL   Strength RLE   R Hip Flexion 4/5   R Knee Extension 4/5   R Ankle Dorsiflexion 4/5   Strength LLE   L Hip Flexion 4/5   L Knee Extension 4/5   L Ankle Dorsiflexion 4/5   Vision-Basic Assessment   Current Vision Wears glasses all the time   Visual History Macular degeneration   Light Touch   RLE Light Touch Grossly intact   LLE Light Touch Grossly intact   Bed Mobility   Supine to Sit 6  Modified independent   Additional items HOB elevated   Sit to Supine 6  Modified independent   Additional items HOB elevated   Transfers   Sit to Stand 6  Modified independent   Stand to Sit 6  Modified independent Ambulation/Elevation   Gait pattern Inconsistent matt  (But able to walk and talk during ambulation, no uncorrected loss of balance  mild slow gait speed, but age appropriate)   Gait Assistance 6  Modified independent   Additional items Assist x 1   Assistive Device None   Distance 100'x2   Stair Management Assistance 6  Modified independent   Stair Management Technique Backward; Foreward; Step to pattern; One rail R   Number of Stairs 2   Balance   Static Sitting Normal   Static Standing Good   Ambulatory Fair +   Endurance Deficit   Endurance Deficit No   Activity Tolerance   Activity Tolerance Patient tolerated treatment well   Nurse Made Aware Spoke to Melisa Blake RN   Assessment   Prognosis Good   Problem List Impaired balance;Decreased mobility; Impaired vision  (Gait deviations)   Assessment Assessment: Pt is a 80 y o  female seen for PT evaluation s/p admit to [facility] on 7/1/2022 w/ UTI due to extended-spectrum beta lactamase (ESBL) producing Escherichia coli  Order placed for PT  Prior to admission: Pt lives with family in a 1 story home with 1 vs 2 to CEE  No DME prior to admission  Upon evaluation: Pt needed no physical assistance for bed mobility, transfers, ambulation, nor on stairs  Pt's clinical presentation is currently evolving given the functional mobility deficits above, especially (but not limited to) gait deviations, coupled with fall risks including altered vision, and combined with medical complications of Reports of recent confusion  However, patient is acceptable for discharge as she did score better than cutoff score for the 4 point DGI, had no uncorrected losses of balance,  has an AMPAC score > /=16, and has family support upon discharge    No further IP PT indicated that this time   Barriers to Discharge   (Steps to enter)   Goals   Patient Goals To go home before my birthday   Autumn Frazier to nursing   PT Frequency Other (Comment)  (DC from IP PT) Recommendation   PT Discharge Recommendation   (Home with family support)   Equipment Recommended   (None)   AM-PAC Basic Mobility Inpatient   Turning in Bed Without Bedrails 4   Lying on Back to Sitting on Edge of Flat Bed 4   Moving Bed to Chair 4   Standing Up From Chair 4   Walk in Room 3   Climb 3-5 Stairs 3   Basic Mobility Inpatient Raw Score 22   Basic Mobility Standardized Score 47 4   Highest Level Of Mobility   [facility] Goal 7: Walk 25 feet or more   Dynamic Gait Index   Gait level surface  2   Change in gait speed 3   Gait with horizontal head turns  3   Gait with vertical head turns  (S)  3  (4 point DGI score 11/12)   End of Consult   Patient Position at End of Consult Supine; All needs within reach   (Please find full objective findings from PT assessment regarding body systems outlined above)  The patient's [facility] Basic Mobility Inpatient Short Form Raw Score is 22, Standardized Score is 47 4  A standardized score greater than 40 78 or Raw Score of 16 suggests the patient may benefit from discharge to home, which DOES coincide with CURRENT above PT recommendations  However please refer to therapist recommendation for discharge planning given other factors that may influence destination  Adapted from Riley Johnson of [facility] 6-Clicks Basic Mobility and Daily Activity Scores With Discharge Destination  Physical Therapy, 2021;101:1-9  DOI: 10 [id number]    Personal factors affecting pt at time of IE include: steps to enter environment, advanced age, behavioral pattern and recent fall(s)  Portions of the record may have been created with voice recognition software  Occasional wrong word or "sound a like" substitutions may have occurred due to the inherent limitations of voice recognition software    Read the chart carefully and recognize, using context, where substitutions have occurred

## 2022-07-05 NOTE — ASSESSMENT & PLAN NOTE
· Was hospitalized and subsequently discharged 06/30 secondary to UTI  Failed OP tx w/ Macrobid  She was brought back in with increased confusion, fever at home as well as chills  · Continue IV cefepime day 5   · ID is following  · Treatment of bacteremia as below  23:00

## 2022-07-05 NOTE — PROGRESS NOTES
[facility]  Progress Note - Jeet Ce 7/7/1931, [address] o  female MRN: [id number]  Unit/Bed#: S -Андрей Encounter: [id number]  Primary Care Provider: Wing Mt MD   Date and time admitted to hospital: 7/1/2022  2:10 PM    * UTI due to extended-spectrum beta lactamase (ESBL) producing Escherichia coli  Assessment & Plan  · Was hospitalized and subsequently discharged 06/30 secondary to UTI  Failed OP tx w/ Macrobid  She was brought back in with increased confusion, fever at home as well as chills  · Continue IV cefepime day 5   · ID is following  · Treatment of bacteremia as below  Gram-negative bacteremia  Assessment & Plan  · 1/2 BC on admission for GNR  · Likely due to UTI  · ID following, continue Cefepime  · Await final results  Leukopenia  Assessment & Plan  · Likely in the setting of acute infection/bacteremia  · No other SIRS criteria met  She did reported fever at home but none documented  · Trend CBC with differential   · CT abdomen pelvis with no acute findings  Atherosclerosis 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 (HCC)  Assessment & Plan  · ASA and Pletal    CKD (chronic kidney disease), stage III [facility])  Assessment & Plan  Lab Results   Component Value Date    EGFR 76 07/04/2022    EGFR 65 07/03/2022    EGFR 63 07/02/2022    CREATININE 0 70 07/04/2022    CREATININE 0 80 07/03/2022    CREATININE 0 82 07/02/2022   · Creatinine stable, monitor  Type 2 diabetes mellitus [facility])  Assessment & Plan  Lab Results   Component Value Date    HGBA1C 5 2 06/20/2022       Recent Labs     07/04/22  1541 07/04/22  2025 07/05/22  0724 07/05/22  1137   POCGLU 131 138 101 121       Blood Sugar Average: Last 72 hrs:  · (P) 120 7   · Typically well controlled, not on any medications or insulin at home  · Continue Accu-Cheks  Essential hypertension  Assessment & Plan  · Continue amlodipine  · Monitor blood pressure          VTE Pharmacologic Prophylaxis: VTE Score: 4 Moderate Risk (Score 3-4) - Pharmacological DVT Prophylaxis Ordered: enoxaparin (Lovenox)  Patient Centered Rounds: I performed bedside rounds with nursing staff today  Discussions with Specialists or Other Care Team Provider: CM, nursing     Education and Discussions with Family / Patient: Updated  (daughter) via phone  Time Spent for Care: 30 minutes  More than 50% of total time spent on counseling and coordination of care as described above  Current Length of Stay: 4 day(s)  Current Patient Status: Inpatient   Certification Statement: The patient will continue to require additional inpatient hospital stay due to [facility] bacteremia  Discharge Plan: Anticipate discharge tomorrow to home  Code Status: Level 1 - Full Code    Subjective:   No overnight events reported by nursing  No fevers  Awaiting final results of blood cultures  Patient is okay with staying an extra day in the hospital     Objective:     Vitals:   Temp (24hrs), Av 2 °F (36 8 °C), Min:98 1 °F (36 7 °C), Max:98 2 °F (36 8 °C)    Temp:  [98 1 °F (36 7 °C)-98 2 °F (36 8 °C)] 98 1 °F (36 7 °C)  HR:  [69-83] 69  Resp:  [16] 16  BP: (127-140)/(45-48) 140/48  SpO2:  [97 %-98 %] 98 %  Body mass index is 21 87 kg/m²  Input and Output Summary (last 24 hours): Intake/Output Summary (Last 24 hours) at 2022 1208  Last data filed at 2022 1300  Gross per 24 hour   Intake 250 ml   Output 100 ml   Net 150 ml       Physical Exam:   Physical Exam  Vitals and nursing note reviewed  Constitutional:       General: She is not in acute distress  Appearance: Normal appearance  HENT:      Head: Normocephalic  Mouth/Throat:      Mouth: Mucous membranes are moist    Eyes:      General: No scleral icterus  Pupils: Pupils are equal, round, and reactive to light  Cardiovascular:      Rate and Rhythm: Normal rate and regular rhythm        Heart sounds: No murmur heard   Pulmonary:      Effort: Pulmonary effort is normal  No respiratory distress  Breath sounds: Normal breath sounds  No wheezing, rhonchi or rales  Abdominal:      General: Bowel sounds are normal  There is no distension  Palpations: Abdomen is soft  Tenderness: There is no abdominal tenderness  Musculoskeletal:         General: No swelling  Right lower leg: No edema  Left lower leg: No edema  Skin:     Capillary Refill: Capillary refill takes less than 2 seconds  Neurological:      General: No focal deficit present  Mental Status: She is alert and oriented to person, place, and time  Mental status is at baseline  Additional Data:     Labs:  Results from last 7 days   Lab Units 07/05/22  0531 07/02/22  0441 07/01/22  1508   WBC Thousand/uL 3 32*   < > 4 19*   HEMOGLOBIN g/dL 10 1*   < > 11 1*   HEMATOCRIT % 31 6*   < > 33 0*   PLATELETS Thousands/uL 277   < > 203   BANDS PCT %  --   --  10*   NEUTROS PCT % 40*   < >  --    LYMPHS PCT % 46*   < >  --    LYMPHO PCT %  --   --  6*   MONOS PCT % 12   < >  --    MONO PCT %  --   --  3*   EOS PCT % 1   < > 0    < > = values in this interval not displayed       Results from last 7 days   Lab Units 07/04/22  0517   SODIUM mmol/L 139   POTASSIUM mmol/L 3 6   CHLORIDE mmol/L 109*   CO2 mmol/L 25   BUN mg/dL 12   CREATININE mg/dL 0 70   ANION GAP mmol/L 5   CALCIUM mg/dL 8 4   ALBUMIN g/dL 3 1*   TOTAL BILIRUBIN mg/dL 0 73   ALK PHOS U/L 79   ALT U/L 23   AST U/L 27   GLUCOSE RANDOM mg/dL 94     Results from last 7 days   Lab Units 07/01/22  1508   INR  0 92     Results from last 7 days   Lab Units 07/05/22  1137 07/05/22  0724 07/04/22  2025 07/04/22  1541 07/04/22  1050 07/04/22  0756 07/03/22  2052 07/03/22  1620 07/02/22  2141 07/02/22  1617 06/29/22  2038   POC GLUCOSE mg/dl 121 101 138 131 110 106 136 107 130 127 107         Results from last 7 days   Lab Units 07/02/22  0441 07/01/22  1508 06/28/22  1930   LACTIC ACID mmol/L  --  1 1 1 0   PROCALCITONIN ng/ml 3 23* 5 48*  --        Lines/Drains:  Invasive Devices  Report    Peripheral Intravenous Line  Duration           Peripheral IV 07/04/22 Left;Ventral (anterior) Forearm <1 day                      Imaging: No pertinent imaging reviewed  Recent Cultures (last 7 days):   Results from last 7 days   Lab Units 07/01/22  1548 07/01/22  1508 06/28/22  1930   BLOOD CULTURE  No Growth at 72 hrs   --  No Growth After 5 Days  No Growth After 5 Days  GRAM STAIN RESULT   --  Gram negative rods*  --        Last 24 Hours Medication List:   Current Facility-Administered Medications   Medication Dose Route Frequency Provider Last Rate    acetaminophen  650 mg Oral Q6H PRN Virginie Champion, DO      albuterol  1 puff Inhalation Q6H PRN Virginie Champion, DO      amLODIPine  2 5 mg Oral Daily Virginie Champion, DO      aspirin  81 mg Oral Daily Virginie Champion, DO      cefepime  1,000 mg Intravenous Q12H Dia Falcon MD 1,000 mg (07/05/22 1035)    cilostazol  100 mg Oral BID Virginie Champion, DO      enoxaparin  30 mg Subcutaneous Daily Virginie Champion, DO      famotidine  10 mg Oral Daily Virginie Champion, DO      fluticasone  2 puff Inhalation BID Virginie Champion, DO      LORazepam  0 25 mg Oral BID PRN Vigrinie Champion, DO      methimazole  2 5 mg Oral Once per day on Mon Wed Fri Virginie Champion, DO      montelukast  10 mg Oral HS Virginie Champion, DO      multivitamin stress formula  1 tablet Oral Daily Virginie Champion, DO      OLANZapine  2 5 mg Oral Q8H PRN Virginie Champion, DO      oxyCODONE-acetaminophen  1 tablet Oral BID PRN Virginie Champion, DO          Today, Patient Was Seen By: Wai Holliday PA-C    **Please Note: This note may have been constructed using a voice recognition system  **

## 2022-07-05 NOTE — PROGRESS NOTES
Progress Note - Infectious Disease   Edd Zapata 80 y o  female MRN: [id number]  Unit/Bed#: S -01 Encounter: [id number]      Impression/Plan:  1  Fever-Appears to be secondary to gram-negative bacteremia from a UTI   No other clear source appreciated   The patient has now remained afebrile and is hemodynamically stable   -antibiotic plan as below  -recheck CBC with diff and CMP     2  Gram-negative bacteremia-unable to identify species on BCI although suspect it could be the E coli that was recently isolated in the urine  No acute pathology on CT abdomen/pelvis  -continue cefepime for now  -follow up final ID and sensitivity and adjust antibiotics as needed  As most recent E coli strain was quite resistant, would wait on final I+S prior to hospital discharge   -plan to complete 7 day course of antibiotic, through       3  E coli UTI-no radiographic evidence of a complication   Symptoms seemed to have resolved and the repeat urinalysis is negative    -antibiotics as above  -monitor symptomatology     4  Leukopenia-unclear etiology   Possibly all related to the acute infectious process    Not overtly neutropenic   White cell count continues to improve   -follow CBC  -additional workup as above as needed     5  Mild transaminitis-unclear etiology   Resolved  -recheck LFTs       6  Acute encephalopathy-in the setting of a reported fever   This is all in the setting of some mild cognitive impairment   Seems to be back to baseline  -monitor cognition  -additional workup as needed     Antibiotics:  Cefepime 5    I discussed above plan with patient, and with Ben Yoder from Internal Medicine Service  Subjective:  No reported acute overnight events  No fevers or new symptoms    Participated with PT     Objective:  Vitals:  Temp:  [98 1 °F (36 7 °C)-98 2 °F (36 8 °C)] 98 1 °F (36 7 °C)  HR:  [69-83] 69  Resp:  [16] 16  BP: (127-140)/(45-48) 140/48  SpO2:  [97 %-98 %] 98 %  Temp (24hrs), Av 2 °F (36 8 °C), Min:98 1 °F (36 7 °C), Max:98 2 °F (36 8 °C)  Current: Temperature: 98 1 °F (36 7 °C)    Physical Exam:   General:  No acute distress, nontoxic  HEENT:  Atraumatic normocephalic  Psychiatric:  Awake and alert  Pulmonary:  Normal respiratory excursion without accessory muscle use  Abdomen:  Soft, no visible distention  Extremities:  No visible edema  Skin:  No visible rashes  Neuro: Moves all extremities spontaneously    Lab Results:  I have personally reviewed pertinent labs  Results from last 7 days   Lab Units 07/04/22  0517 07/03/22  0411 07/02/22  0441 07/01/22  1508   POTASSIUM mmol/L 3 6 3 4* 3 8 3 4*   CHLORIDE mmol/L 109* 107 107 103   CO2 mmol/L 25 26 23 22   BUN mg/dL 12 13 15 16   CREATININE mg/dL 0 70 0 80 0 82 1 02   EGFR ml/min/1 73sq m 76 65 63 48   CALCIUM mg/dL 8 4 8 2* 8 1* 8 5   AST U/L 27 31  --  40*   ALT U/L 23 25  --  31   ALK PHOS U/L 79 89  --  97     Results from last 7 days   Lab Units 07/05/22  0531 07/04/22  0517 07/03/22  0411   WBC Thousand/uL 3 32* 3 22* 2 89*   HEMOGLOBIN g/dL 10 1* 10 4* 10 9*   PLATELETS Thousands/uL 277 241 201     Results from last 7 days   Lab Units 07/01/22  1548 07/01/22  1508 06/28/22  1930   BLOOD CULTURE  No Growth at 72 hrs   --  No Growth After 5 Days  No Growth After 5 Days  GRAM STAIN RESULT   --  Gram negative rods*  --        Imaging Studies:   I have personally reviewed pertinent imaging study reports and images in PACS  EKG, Pathology, and Other Studies:   I have personally reviewed pertinent reports

## 2022-07-05 NOTE — PLAN OF CARE
Problem: Potential for Falls  Goal: Patient will remain free of falls  Description: INTERVENTIONS:  - Educate patient/family on patient safety including physical limitations  - Instruct patient to call for assistance with activity   - Consult OT/PT to assist with strengthening/mobility   - Keep Call bell within reach  - Keep bed low and locked with side rails adjusted as appropriate  - Keep care items and personal belongings within reach  - Initiate and maintain comfort rounds  - Make Fall Risk Sign visible to staff  - Offer Toileting every  Hours, in advance of need  - Initiate/Maintain alarm  - Obtain necessary fall risk management equipment:   - Apply yellow socks and bracelet for high fall risk patients  - Consider moving patient to room near nurses station  Outcome: Progressing     Problem: Prexisting or High Potential for Compromised Skin Integrity  Goal: Skin integrity is maintained or improved  Description: INTERVENTIONS:  - Identify patients at risk for skin breakdown  - Assess and monitor skin integrity  - Assess and monitor nutrition and hydration status  - Monitor labs   - Assess for incontinence   - Turn and reposition patient  - Assist with mobility/ambulation  - Relieve pressure over bony prominences  - Avoid friction and shearing  - Provide appropriate hygiene as needed including keeping skin clean and dry  - Evaluate need for skin moisturizer/barrier cream  - Collaborate with interdisciplinary team   - Patient/family teaching  - Consider wound care consult   Outcome: Progressing     Problem: MOBILITY - ADULT  Goal: Maintain or return to baseline ADL function  Description: INTERVENTIONS:  -  Assess patient's ability to carry out ADLs; assess patient's baseline for ADL function and identify physical deficits which impact ability to perform ADLs (bathing, care of mouth/teeth, toileting, grooming, dressing, etc )  - Assess/evaluate cause of self-care deficits   - Assess range of motion  - Assess patient's mobility; develop plan if impaired  - Assess patient's need for assistive devices and provide as appropriate  - Encourage maximum independence but intervene and supervise when necessary  - Involve family in performance of ADLs  - Assess for home care needs following discharge   - Consider OT consult to assist with ADL evaluation and planning for discharge  - Provide patient education as appropriate  Outcome: Progressing  Goal: Maintains/Returns to pre admission functional level  Description: INTERVENTIONS:  - Perform BMAT or MOVE assessment daily    - Set and communicate daily mobility goal to care team and patient/family/caregiver  - Collaborate with rehabilitation services on mobility goals if consulted  - Perform Range of Motion  times a day  - Reposition patient every  hours    - Dangle patient  times a day  - Stand patient  times a day  - Ambulate patient  times a day  - Out of bed to chair  times a day   - Out of bed for meals  times a day  - Out of bed for toileting  - Record patient progress and toleration of activity level   Outcome: Progressing     Problem: INFECTION - ADULT  Goal: Absence or prevention of progression during hospitalization  Description: INTERVENTIONS:  - Assess and monitor for signs and symptoms of infection  - Monitor lab/diagnostic results  - Monitor all insertion sites, i e  indwelling lines, tubes, and drains  - Monitor endotracheal if appropriate and nasal secretions for changes in amount and color  - [place] appropriate cooling/warming therapies per order  - Administer medications as ordered  - Instruct and encourage patient and family to use good hand hygiene technique  - Identify and instruct in appropriate isolation precautions for identified infection/condition  Outcome: Progressing     Problem: DISCHARGE PLANNING  Goal: Discharge to home or other facility with appropriate resources  Description: INTERVENTIONS:  - Identify barriers to discharge w/patient and caregiver  - Arrange for needed discharge resources and transportation as appropriate  - Identify discharge learning needs (meds, wound care, etc )  - Arrange for interpretive services to assist at discharge as needed  - Refer to Case Management Department for coordinating discharge planning if the patient needs post-hospital services based on physician/advanced practitioner order or complex needs related to functional status, cognitive ability, or social support system  Outcome: Progressing     Problem: Knowledge Deficit  Goal: Patient/family/caregiver demonstrates understanding of disease process, treatment plan, medications, and discharge instructions  Description: Complete learning assessment and assess knowledge base    Interventions:  - Provide teaching at level of understanding  - Provide teaching via preferred learning methods  Outcome: Progressing

## 2022-07-06 LAB
BACTERIA BLD CULT: NORMAL
BASOPHILS # BLD AUTO: 0.01 THOUSANDS/ΜL (ref 0–0.1)
BASOPHILS NFR BLD AUTO: 0 % (ref 0–1)
EOSINOPHIL # BLD AUTO: 0.03 THOUSAND/ΜL (ref 0–0.61)
EOSINOPHIL NFR BLD AUTO: 1 % (ref 0–6)
ERYTHROCYTE [DISTWIDTH] IN BLOOD BY AUTOMATED COUNT: 13.6 % (ref 11.6–15.1)
GLUCOSE SERPL-MCNC: 103 MG/DL (ref 65–140)
GLUCOSE SERPL-MCNC: 118 MG/DL (ref 65–140)
GLUCOSE SERPL-MCNC: 119 MG/DL (ref 65–140)
GLUCOSE SERPL-MCNC: 124 MG/DL (ref 65–140)
HCT VFR BLD AUTO: 32.2 % (ref 34.8–46.1)
HGB BLD-MCNC: 10.4 G/DL (ref 11.5–15.4)
IMM GRANULOCYTES # BLD AUTO: 0.01 THOUSAND/UL (ref 0–0.2)
IMM GRANULOCYTES NFR BLD AUTO: 0 % (ref 0–2)
LYMPHOCYTES # BLD AUTO: 1.78 THOUSANDS/ΜL (ref 0.6–4.47)
LYMPHOCYTES NFR BLD AUTO: 51 % (ref 14–44)
MCH RBC QN AUTO: 28.3 PG (ref 26.8–34.3)
MCHC RBC AUTO-ENTMCNC: 32.3 G/DL (ref 31.4–37.4)
MCV RBC AUTO: 88 FL (ref 82–98)
MONOCYTES # BLD AUTO: 0.43 THOUSAND/ΜL (ref 0.17–1.22)
MONOCYTES NFR BLD AUTO: 12 % (ref 4–12)
NEUTROPHILS # BLD AUTO: 1.28 THOUSANDS/ΜL (ref 1.85–7.62)
NEUTS SEG NFR BLD AUTO: 36 % (ref 43–75)
NRBC BLD AUTO-RTO: 0 /100 WBCS
PLATELET # BLD AUTO: 315 THOUSANDS/UL (ref 149–390)
PMV BLD AUTO: 9.3 FL (ref 8.9–12.7)
RBC # BLD AUTO: 3.67 MILLION/UL (ref 3.81–5.12)
WBC # BLD AUTO: 3.54 THOUSAND/UL (ref 4.31–10.16)

## 2022-07-06 PROCEDURE — 99232 SBSQ HOSP IP/OBS MODERATE 35: CPT | Performed by: PHYSICIAN ASSISTANT

## 2022-07-06 PROCEDURE — 85025 COMPLETE CBC W/AUTO DIFF WBC: CPT | Performed by: PHYSICIAN ASSISTANT

## 2022-07-06 PROCEDURE — 82948 REAGENT STRIP/BLOOD GLUCOSE: CPT

## 2022-07-06 PROCEDURE — 99232 SBSQ HOSP IP/OBS MODERATE 35: CPT | Performed by: INTERNAL MEDICINE

## 2022-07-06 RX ADMIN — METHIMAZOLE 2.5 MG: 5 TABLET ORAL at 09:02

## 2022-07-06 RX ADMIN — CILOSTAZOL 100 MG: 100 TABLET ORAL at 09:02

## 2022-07-06 RX ADMIN — MONTELUKAST 10 MG: 10 TABLET, FILM COATED ORAL at 21:25

## 2022-07-06 RX ADMIN — FLUTICASONE PROPIONATE 2 PUFF: 110 AEROSOL, METERED RESPIRATORY (INHALATION) at 17:07

## 2022-07-06 RX ADMIN — ASPIRIN 81 MG CHEWABLE TABLET 81 MG: 81 TABLET CHEWABLE at 09:02

## 2022-07-06 RX ADMIN — CEFEPIME HYDROCHLORIDE 1000 MG: 2 INJECTION, POWDER, FOR SOLUTION INTRAVENOUS at 09:02

## 2022-07-06 RX ADMIN — B-COMPLEX W/ C & FOLIC ACID TAB 1 TABLET: TAB at 09:02

## 2022-07-06 RX ADMIN — ENOXAPARIN SODIUM 30 MG: 30 INJECTION SUBCUTANEOUS at 09:03

## 2022-07-06 RX ADMIN — CILOSTAZOL 100 MG: 100 TABLET ORAL at 17:06

## 2022-07-06 RX ADMIN — FAMOTIDINE 10 MG: 20 TABLET ORAL at 09:02

## 2022-07-06 RX ADMIN — AMLODIPINE BESYLATE 2.5 MG: 2.5 TABLET ORAL at 21:24

## 2022-07-06 RX ADMIN — CEFEPIME HYDROCHLORIDE 1000 MG: 2 INJECTION, POWDER, FOR SOLUTION INTRAVENOUS at 21:25

## 2022-07-06 NOTE — ASSESSMENT & PLAN NOTE
· 1/2 BC on admission for GNR  · Likely due to UTI  · ID following, continue Cefepime through 7/7  · Await final results

## 2022-07-06 NOTE — PROGRESS NOTES
[facility]  Progress Note - Ester Ordonez 7/7/1931, 80 y o  female MRN: [id number]  Unit/Bed#: S -Андрей Encounter: [id number]  Primary Care Provider: Lexie Jessica MD   Date and time admitted to hospital: 7/1/2022  2:10 PM    * UTI due to extended-spectrum beta lactamase (ESBL) producing Escherichia coli  Assessment & Plan  · Was hospitalized and subsequently discharged 06/30 secondary to UTI  Failed OP tx w/ Macrobid  She was brought back in with increased confusion, fever at home as well as chills  · Continue IV cefepime day 6/7, d/w ID continue IV cefepime through 7/7  Can be discharged once IV cefepime complete  · ID is following  · Treatment of bacteremia as below  Gram-negative bacteremia  Assessment & Plan  · 1/2 BC on admission for GNR  · Likely due to UTI  · ID following, continue Cefepime through 7/7  · Await final results  Leukopenia  Assessment & Plan  · Likely in the setting of acute infection/bacteremia  · No other SIRS criteria met  She did reported fever at home but none documented  · Trend CBC with differential   · CT abdomen pelvis with no acute findings  Atherosclerosis 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 (HCC)  Assessment & Plan  · ASA and Pletal    CKD (chronic kidney disease), stage III [facility])  Assessment & Plan  Lab Results   Component Value Date    EGFR 76 07/04/2022    EGFR 65 07/03/2022    EGFR 63 07/02/2022    CREATININE 0 70 07/04/2022    CREATININE 0 80 07/03/2022    CREATININE 0 82 07/02/2022   · Creatinine stable, monitor  Type 2 diabetes mellitus [facility])  Assessment & Plan  Lab Results   Component Value Date    HGBA1C 5 2 06/20/2022       Recent Labs     07/05/22  1623 07/05/22  2126 07/06/22  0812 07/06/22  1057   POCGLU 107 104 103 124       Blood Sugar Average: Last 72 hrs:  · (P) 898 [id number]   · Typically well controlled, not on any medications or insulin at home    · Continue Accu-Cheks  Essential hypertension  Assessment & Plan  · Continue amlodipine  · Monitor blood pressure  VTE Pharmacologic Prophylaxis: VTE Score: 4 Moderate Risk (Score 3-4) - Pharmacological DVT Prophylaxis Ordered: enoxaparin (Lovenox)  Patient Centered Rounds: I performed bedside rounds with nursing staff today  Discussions with Specialists or Other Care Team Provider: RN, CM, ID    Education and Discussions with Family / Patient: Patient declined call to   Time Spent for Care: 30 minutes  More than 50% of total time spent on counseling and coordination of care as described above  Current Length of Stay: 5 day(s)  Current Patient Status: Inpatient   Certification Statement: The patient will continue to require additional inpatient hospital stay due to IV abx  Discharge Plan: Anticipate discharge tomorrow to home  Code Status: Level 1 - Full Code    Subjective:   Pt has no acute complaints, states she feels well, tolerating antibiotic without difficulty     Objective:     Vitals:   Temp (24hrs), Av 3 °F (36 8 °C), Min:98 2 °F (36 8 °C), Max:98 4 °F (36 9 °C)    Temp:  [98 2 °F (36 8 °C)-98 4 °F (36 9 °C)] 98 2 °F (36 8 °C)  HR:  [68-74] 74  Resp:  [16-17] 17  BP: (135-155)/(50-59) 138/59  SpO2:  [94 %-96 %] 94 %  Body mass index is 21 87 kg/m²  Input and Output Summary (last 24 hours): Intake/Output Summary (Last 24 hours) at 2022 1447  Last data filed at 2022 1256  Gross per 24 hour   Intake 340 ml   Output --   Net 340 ml       Physical Exam:   Physical Exam  Vitals reviewed  Constitutional:       General: She is not in acute distress  Appearance: She is not toxic-appearing  HENT:      Head: Normocephalic and atraumatic  Eyes:      Extraocular Movements: Extraocular movements intact  Cardiovascular:      Rate and Rhythm: Normal rate and regular rhythm     Pulmonary:      Effort: Pulmonary effort is normal       Breath sounds: Normal breath sounds  Abdominal:      General: Bowel sounds are normal  There is no distension  Palpations: Abdomen is soft  Tenderness: There is no abdominal tenderness  Musculoskeletal:         General: Normal range of motion  Cervical back: Normal range of motion  Neurological:      General: No focal deficit present  Mental Status: She is alert and oriented to person, place, and time  Psychiatric:         Mood and Affect: Mood normal          Behavior: Behavior normal          Thought Content: Thought content normal           Additional Data:     Labs:  Results from last 7 days   Lab Units 07/06/22  0543 07/02/22  0441 07/01/22  1508   WBC Thousand/uL 3 54*   < > 4 19*   HEMOGLOBIN g/dL 10 4*   < > 11 1*   HEMATOCRIT % 32 2*   < > 33 0*   PLATELETS Thousands/uL 315   < > 203   BANDS PCT %  --   --  10*   NEUTROS PCT % 36*   < >  --    LYMPHS PCT % 51*   < >  --    LYMPHO PCT %  --   --  6*   MONOS PCT % 12   < >  --    MONO PCT %  --   --  3*   EOS PCT % 1   < > 0    < > = values in this interval not displayed       Results from last 7 days   Lab Units 07/04/22  0517   SODIUM mmol/L 139   POTASSIUM mmol/L 3 6   CHLORIDE mmol/L 109*   CO2 mmol/L 25   BUN mg/dL 12   CREATININE mg/dL 0 70   ANION GAP mmol/L 5   CALCIUM mg/dL 8 4   ALBUMIN g/dL 3 1*   TOTAL BILIRUBIN mg/dL 0 73   ALK PHOS U/L 79   ALT U/L 23   AST U/L 27   GLUCOSE RANDOM mg/dL 94     Results from last 7 days   Lab Units 07/01/22  1508   INR  0 92     Results from last 7 days   Lab Units 07/06/22  1057 07/06/22  0812 07/05/22  2126 07/05/22  1623 07/05/22  1137 07/05/22  0724 07/04/22  2025 07/04/22  1541 07/04/22  1050 07/04/22  0756 07/03/22  2052 07/03/22  1620   POC GLUCOSE mg/dl 124 103 104 107 121 101 138 131 110 106 136 107         Results from last 7 days   Lab Units 07/02/22  0441 07/01/22  1508   LACTIC ACID mmol/L  --  1 1   PROCALCITONIN ng/ml 3 23* 5 48*       Lines/Drains:  Invasive Devices  Report    Peripheral Intravenous Line  Duration           Peripheral IV 07/04/22 Left;Ventral (anterior) Forearm 1 day                  Imaging: No pertinent imaging reviewed  Recent Cultures (last 7 days):   Results from last 7 days   Lab Units 07/01/22  1548 07/01/22  1508   BLOOD CULTURE  No Growth After 4 Days  --    GRAM STAIN RESULT   --  Gram negative rods*       Last 24 Hours Medication List:   Current Facility-Administered Medications   Medication Dose Route Frequency Provider Last Rate    acetaminophen  650 mg Oral Q6H PRN Rendell Cedric, DO      albuterol  1 puff Inhalation Q6H PRN Rendell Cedric, DO      amLODIPine  2 5 mg Oral Daily Rendell Cedric, DO      aspirin  81 mg Oral Daily Rendell Cedric, DO      cefepime  1,000 mg Intravenous Q12H Valencia Bonilla MD 1,000 mg (07/06/22 0902)    cilostazol  100 mg Oral BID Rendell Cedric, DO      enoxaparin  30 mg Subcutaneous Daily Rendell Cedric, DO      famotidine  10 mg Oral Daily Rendell Cedric, DO      fluticasone  2 puff Inhalation BID Rendell Cedric, DO      LORazepam  0 25 mg Oral BID PRN Rendell Cedric, DO      methimazole  2 5 mg Oral Once per day on Mon Wed Fri Rendell Cedric, DO      montelukast  10 mg Oral HS Rendell Cedric, DO      multivitamin stress formula  1 tablet Oral Daily Rendell Cedric, DO      OLANZapine  2 5 mg Oral Q8H PRN Rendell Cedric, DO      oxyCODONE-acetaminophen  1 tablet Oral BID PRN Rendell Cedric, DO          Today, Patient Was Seen By: Brandy Hedrick PA-C    **Please Note: This note may have been constructed using a voice recognition system  **

## 2022-07-06 NOTE — PLAN OF CARE
Problem: Potential for Falls  Goal: Patient will remain free of falls  Description: INTERVENTIONS:  - Educate patient/family on patient safety including physical limitations  - Instruct patient to call for assistance with activity   - Consult OT/PT to assist with strengthening/mobility   - Keep Call bell within reach  - Keep bed low and locked with side rails adjusted as appropriate  - Keep care items and personal belongings within reach  - Initiate and maintain comfort rounds  - Make Fall Risk Sign visible to staff  - Offer Toileting every  Hours, in advance of need  - Initiate/Maintain alarm  - Obtain necessary fall risk management equipment:  Apply yellow socks and bracelet for high fall risk patients  - Consider moving patient to room near nurses station  Outcome: Progressing     Problem: Prexisting or High Potential for Compromised Skin Integrity  Goal: Skin integrity is maintained or improved  Description: INTERVENTIONS:  - Identify patients at risk for skin breakdown  - Assess and monitor skin integrity  - Assess and monitor nutrition and hydration status  - Monitor labs   - Assess for incontinence   - Turn and reposition patient  - Assist with mobility/ambulation  - Relieve pressure over bony prominences  - Avoid friction and shearing  - Provide appropriate hygiene as needed including keeping skin clean and dry  - Evaluate need for skin moisturizer/barrier cream  - Collaborate with interdisciplinary team   - Patient/family teaching  - Consider wound care consult   Outcome: Progressing     Problem: MOBILITY - ADULT  Goal: Maintain or return to baseline ADL function  Description: INTERVENTIONS:  -  Assess patient's ability to carry out ADLs; assess patient's baseline for ADL function and identify physical deficits which impact ability to perform ADLs (bathing, care of mouth/teeth, toileting, grooming, dressing, etc )  - Assess/evaluate cause of self-care deficits   - Assess range of motion  - Assess patient's mobility; develop plan if impaired  - Assess patient's need for assistive devices and provide as appropriate  - Encourage maximum independence but intervene and supervise when necessary  - Involve family in performance of ADLs  - Assess for home care needs following discharge   - Consider OT consult to assist with ADL evaluation and planning for discharge  - Provide patient education as appropriate  Outcome: Progressing  Goal: Maintains/Returns to pre admission functional level  Description: INTERVENTIONS:  - Perform BMAT or MOVE assessment daily    - Set and communicate daily mobility goal to care team and patient/family/caregiver  - Collaborate with rehabilitation services on mobility goals if consulted  - Perform Range of Motion  times a day  - Reposition patient every  hours    - Dangle patient  times a day  - Stand patient  times a day  - Ambulate patient  times a day  - Out of bed to chair  times a day   - Out of bed for meals  times a day  - Out of bed for toileting  - Record patient progress and toleration of activity level   Outcome: Progressing     Problem: INFECTION - ADULT  Goal: Absence or prevention of progression during hospitalization  Description: INTERVENTIONS:  - Assess and monitor for signs and symptoms of infection  - Monitor lab/diagnostic results  - Monitor all insertion sites, i e  indwelling lines, tubes, and drains  - Monitor endotracheal if appropriate and nasal secretions for changes in amount and color  - [place] appropriate cooling/warming therapies per order  - Administer medications as ordered  - Instruct and encourage patient and family to use good hand hygiene technique  - Identify and instruct in appropriate isolation precautions for identified infection/condition  Outcome: Progressing     Problem: DISCHARGE PLANNING  Goal: Discharge to home or other facility with appropriate resources  Description: INTERVENTIONS:  - Identify barriers to discharge w/patient and caregiver  - Arrange for needed discharge resources and transportation as appropriate  - Identify discharge learning needs (meds, wound care, etc )  - Arrange for interpretive services to assist at discharge as needed  - Refer to Case Management Department for coordinating discharge planning if the patient needs post-hospital services based on physician/advanced practitioner order or complex needs related to functional status, cognitive ability, or social support system  Outcome: Progressing     Problem: Knowledge Deficit  Goal: Patient/family/caregiver demonstrates understanding of disease process, treatment plan, medications, and discharge instructions  Description: Complete learning assessment and assess knowledge base    Interventions:  - Provide teaching at level of understanding  - Provide teaching via preferred learning methods  Outcome: Progressing

## 2022-07-06 NOTE — PROGRESS NOTES
Progress Note - Infectious Disease   Torey Zapata 80 y o  female MRN: [id number]  Unit/Bed#: S -01 Encounter: [id number]      Impression/Plan:  1  Fever-Appears to be secondary to gram-negative bacteremia from a UTI   No other clear source appreciated   The patient has now remained afebrile and is hemodynamically stable   -antibiotic plan as below     2  Gram-negative bacteremia-unable to identify species on BCI although suspect it could be the E coli that was recently isolated in the urine   No acute pathology on CT abdomen/pelvis  Patient is tolerating IV antibiotic without difficulty  -continue cefepime for now  -follow up final ID and sensitivity and adjust antibiotics as needed  As most recent E coli strain was quite resistant, would wait on final I+S prior to hospital discharge   -plan to complete 7 day course of antibiotic, through 7/7  Last day of antibiotic is tomorrow      3  E coli UTI-no radiographic evidence of a complication   Symptoms seemed to have resolved and the repeat urinalysis is negative    -antibiotics as above  -monitor symptomatology     4  Leukopenia-unclear etiology   Possibly all related to the acute infectious process    Not overtly neutropenic   White cell count continues to improve   -follow CBC  -additional workup as above as needed     5  Mild transaminitis-unclear etiology   Resolved  -recheck LFTs       6  Acute encephalopathy-in the setting of a reported fever   This is all in the setting of some mild cognitive impairment   Seems to be back to baseline  -monitor cognition  -additional workup as needed     Antibiotics:  Cefepime 6     I discussed above plan with patient, and with Marion from Internal Medicine Service  Okay for discharge after morning dose of cefepime on 07/07  Subjective:  Patient is feeling well with no new focal complaints  No fevers  No pain  Tolerating oral intake      Objective:  Vitals:  Temp:  [98 2 °F (36 8 °C)-98 4 °F (36 9 °C)] 98 2 °F (36 8 °C)  HR:  [68-74] 74  Resp:  [16-17] 17  BP: (135-155)/(50-59) 138/59  SpO2:  [94 %-96 %] 94 %  Temp (24hrs), Av 3 °F (36 8 °C), Min:98 2 °F (36 8 °C), Max:98 4 °F (36 9 °C)  Current: Temperature: 98 2 °F (36 8 °C)    Physical Exam:   General:  No acute distress  HEENT:  Atraumatic normocephalic  Psychiatric:  Awake and alert  Pulmonary:  Normal respiratory excursion without accessory muscle use  Abdomen:  Soft, nontender  Extremities:  No edema  Skin:  No rashes  Neuro: Moves all extremities spontaneously    Lab Results:  I have personally reviewed pertinent labs  Results from last 7 days   Lab Units 22  0517 22  0411 22  0441 22  1508   POTASSIUM mmol/L 3 6 3 4* 3 8 3 4*   CHLORIDE mmol/L 109* 107 107 103   CO2 mmol/L 25 26 23 22   BUN mg/dL 12 13 15 16   CREATININE mg/dL 0 70 0 80 0 82 1 02   EGFR ml/min/1 73sq m 76 65 63 48   CALCIUM mg/dL 8 4 8 2* 8 1* 8 5   AST U/L 27 31  --  40*   ALT U/L 23 25  --  31   ALK PHOS U/L 79 89  --  97     Results from last 7 days   Lab Units 22  0543 22  0531 22  0517   WBC Thousand/uL 3 54* 3 32* 3 22*   HEMOGLOBIN g/dL 10 4* 10 1* 10 4*   PLATELETS Thousands/uL 315 277 241     Results from last 7 days   Lab Units 22  1548 22  1508   BLOOD CULTURE  No Growth After 4 Days  --    GRAM STAIN RESULT   --  Gram negative rods*       Imaging Studies:   I have personally reviewed pertinent imaging study reports and images in PACS  EKG, Pathology, and Other Studies:   I have personally reviewed pertinent reports

## 2022-07-06 NOTE — CASE MANAGEMENT
Case Management Assessment & Discharge Planning Note    Patient name Gayle Jones S /S -81 MRN [id number]  : 1931 Date 2022       Current Admission Date: 2022  Current Admission Diagnosis:UTI due to extended-spectrum beta lactamase (ESBL) producing Escherichia coli   Patient Active Problem List    Diagnosis Date Noted    Gram-negative bacteremia 2022    Leukopenia 2022    UTI due to extended-spectrum beta lactamase (ESBL) producing Escherichia coli 2022    Blurry vision, bilateral 2022    Vitamin B12 deficiency 2022    Pulmonary nodules 2022    Other constipation 2021    Vertebral artery dissection ([facility] [address] ) 2020    Carotid stenosis, right 2020    Vertebral artery stenosis 2020    Vision abnormalities 2020    Embolism and thrombosis of arteries of the lower extremities ([address] ) 2020    Rotator cuff disorder, right 2020    Abnormal finding on imaging 2019    Thyroid nodule 2019    Mass of right submandibular region 2019    Atherosclerosis 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 ([address] ) 2019    PAD (peripheral artery disease) ([address] ) 2019    Memory loss 2019    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 ([address] ) 2019    Trigger finger of right hand 2018    CKD (chronic kidney disease), stage III ([address] ) 2018    Degeneration of intervertebral disc of lumbar region 06/10/2016    Spondylolisthesis, grade 1 06/10/2016    Hyperthyroidism 2014    Diverticulosis 2014    Irritable bowel syndrome 2014    Ovarian cyst 2014    Fatty liver 2013    History of TIA (transient ischemic attack) 2013    Anxiety 2012    Elevated serum alkaline phosphatase level 2012    Vitamin D deficiency 2012    Mild intermittent asthma without complication 69/91/2054    Esophageal reflux 09/20/2012    Hyperlipidemia 09/20/2012    Essential hypertension 09/20/2012    Insomnia 09/20/2012    Spinal stenosis 09/20/2012    Type 2 diabetes mellitus ([facility] [address] ) 09/20/2012      LOS (days): 5  Geometric Mean LOS (GMLOS) (days): 4 80  Days to GMLOS:-0 2     OBJECTIVE:  PATIENT READMITTED TO HOSPITAL  Risk of Unplanned Readmission Score: 22 15         Current admission status: Inpatient       Preferred Pharmacy:   [facility], [address] [place] [id number]  Phone: [phone] Fax: [phone]    Primary Care Provider: Delilah Hayes MD    Primary Insurance: MEDICARE  Secondary Insurance: BLUE CROSS    ASSESSMENT:  Jori 26 Proxies    There are no active Health Care Proxies on file  Readmission Root Cause  30 Day Readmission: No    Patient Information  Admitted from[de-identified] Home  Mental Status: Alert  During Assessment patient was accompanied by: Not accompanied during assessment  Assessment information provided by[de-identified] Patient  Primary Caregiver: Self  Support Systems: Self, Children, Family members  [place] of Residence: [address] [place] [address] do you live in?: [facility] entry access options  Select all that apply : Stairs  Number of steps to enter home  : 1  Do the steps have railings?: Yes  Type of Current Residence: [facility]  In the last 12 months, was there a time when you were not able to pay the mortgage or rent on time?: No  In the last 12 months, how many places have you lived?: 1  In the last 12 months, was there a time when you did not have a steady place to sleep or slept in a shelter (including now)?: No  Homeless/housing insecurity resource given?: N/A  Living Arrangements: Lives w/ Daughter  Is patient a ?: No    Activities of Daily Living Prior to Admission  Functional Status: Independent  Completes ADLs independently?: Yes  Ambulates independently?: Yes  Does patient use assisted devices?: No  Does patient currently own DME?: No  Does patient have a history of Outpatient Therapy (PT/OT)?: No  Does the patient have a history of Short-Term Rehab?: No  Does patient have a history of HHC?: No  Does patient currently have Magdy Tee?: No         Patient Information Continued  Income Source: Pension/FPC  Does patient have prescription coverage?: Yes  Within the past 12 months, you worried that your food would run out before you got the money to buy more : Never true  Within the past 12 months, the food you bought just didn't last and you didn't have money to get more : Never true  Food insecurity resource given?: N/A  Does patient receive dialysis treatments?: No  Does patient have a history of substance abuse?: No  Does patient have a history of Mental Health Diagnosis?: No    PHQ 2/9 Screening   Reviewed PHQ 2/9 Depression Screening Score?: No    Means of Transportation  Means of Transport to Appts[de-identified] Family transport  In the past 12 months, has lack of transportation kept you from medical appointments or from getting medications?: No  In the past 12 months, has lack of transportation kept you from meetings, work, or from getting things needed for daily living?: No  Was application for public transport provided?: N/A        DISCHARGE DETAILS:    Discharge planning discussed with[de-identified] Patient  Freedom of Choice: Yes  Comments - Freedom of Choice: CM spoke to patient and she feels she does not have any discharge needs  Patient is independent with care and ambulating without AD  CM will follow up if any needs are required at time of discharge    CM contacted family/caregiver?: Yes  Were Treatment Team discharge recommendations reviewed with patient/caregiver?: Yes  Did patient/caregiver verbalize understanding of patient care needs?: Yes  Were patient/caregiver advised of the risks associated with not following Treatment Team discharge recommendations?: Yes    Contacts  Patient Contacts: Self  Relationship to Patient[de-identified] Family  Contact Method: In Person  Reason/Outcome: Discharge Planning, Continuity of Care, Emergency [address]         Is the patient interested in [place] [address] at discharge?: No    DME Referral Provided  Referral made for DME?: No    Other Referral/Resources/Interventions Provided:  Referral Comments: CM spoke to patient and she feels she does not have any discharge needs  Patient is independent with care and ambulating without AD  CM will follow up if any needs are required at time of discharge           Treatment Team Recommendation: Home  Discharge Destination Plan[de-identified] Home

## 2022-07-06 NOTE — ASSESSMENT & PLAN NOTE
Lab Results   Component Value Date    HGBA1C 5 2 06/20/2022       Recent Labs     07/05/22  1623 07/05/22  2126 07/06/22  0812 07/06/22  1057   POCGLU 107 104 103 124       Blood Sugar Average: Last 72 hrs:  · (P) 173 [id number]   · Typically well controlled, not on any medications or insulin at home  · Continue Accu-Cheks

## 2022-07-06 NOTE — ASSESSMENT & PLAN NOTE
· Was hospitalized and subsequently discharged 06/30 secondary to UTI  Failed OP tx w/ Macrobid  She was brought back in with increased confusion, fever at home as well as chills  · Continue IV cefepime day 6/7, d/w ID continue IV cefepime through 7/7  Can be discharged once IV cefepime complete  · ID is following  · Treatment of bacteremia as below

## 2022-07-07 VITALS
TEMPERATURE: 98.4 F | RESPIRATION RATE: 16 BRPM | WEIGHT: 115.74 LBS | OXYGEN SATURATION: 96 % | HEART RATE: 71 BPM | BODY MASS INDEX: 21.87 KG/M2 | DIASTOLIC BLOOD PRESSURE: 47 MMHG | SYSTOLIC BLOOD PRESSURE: 132 MMHG

## 2022-07-07 LAB
ALL TARGETS: NOT DETECTED
ANION GAP SERPL CALCULATED.3IONS-SCNC: 6 MMOL/L (ref 4–13)
BACTERIA BLD CULT: ABNORMAL
BASOPHILS # BLD AUTO: 0.02 THOUSANDS/ΜL (ref 0–0.1)
BASOPHILS NFR BLD AUTO: 1 % (ref 0–1)
BUN SERPL-MCNC: 11 MG/DL (ref 5–25)
CALCIUM SERPL-MCNC: 9 MG/DL (ref 8.4–10.2)
CHLORIDE SERPL-SCNC: 109 MMOL/L (ref 96–108)
CO2 SERPL-SCNC: 25 MMOL/L (ref 21–32)
CREAT SERPL-MCNC: 0.6 MG/DL (ref 0.6–1.3)
EOSINOPHIL # BLD AUTO: 0.03 THOUSAND/ΜL (ref 0–0.61)
EOSINOPHIL NFR BLD AUTO: 1 % (ref 0–6)
ERYTHROCYTE [DISTWIDTH] IN BLOOD BY AUTOMATED COUNT: 13.8 % (ref 11.6–15.1)
GFR SERPL CREATININE-BSD FRML MDRD: 79 ML/MIN/1.73SQ M
GLUCOSE SERPL-MCNC: 103 MG/DL (ref 65–140)
GLUCOSE SERPL-MCNC: 128 MG/DL (ref 65–140)
GLUCOSE SERPL-MCNC: 94 MG/DL (ref 65–140)
GRAM STN SPEC: ABNORMAL
HCT VFR BLD AUTO: 32.8 % (ref 34.8–46.1)
HGB BLD-MCNC: 10.5 G/DL (ref 11.5–15.4)
IMM GRANULOCYTES # BLD AUTO: 0.03 THOUSAND/UL (ref 0–0.2)
IMM GRANULOCYTES NFR BLD AUTO: 1 % (ref 0–2)
LYMPHOCYTES # BLD AUTO: 1.71 THOUSANDS/ΜL (ref 0.6–4.47)
LYMPHOCYTES NFR BLD AUTO: 41 % (ref 14–44)
MCH RBC QN AUTO: 28.4 PG (ref 26.8–34.3)
MCHC RBC AUTO-ENTMCNC: 32 G/DL (ref 31.4–37.4)
MCV RBC AUTO: 89 FL (ref 82–98)
MONOCYTES # BLD AUTO: 0.6 THOUSAND/ΜL (ref 0.17–1.22)
MONOCYTES NFR BLD AUTO: 14 % (ref 4–12)
NEUTROPHILS # BLD AUTO: 1.82 THOUSANDS/ΜL (ref 1.85–7.62)
NEUTS SEG NFR BLD AUTO: 42 % (ref 43–75)
NRBC BLD AUTO-RTO: 0 /100 WBCS
PLATELET # BLD AUTO: 365 THOUSANDS/UL (ref 149–390)
PMV BLD AUTO: 9.3 FL (ref 8.9–12.7)
POTASSIUM SERPL-SCNC: 3.8 MMOL/L (ref 3.5–5.3)
RBC # BLD AUTO: 3.7 MILLION/UL (ref 3.81–5.12)
SODIUM SERPL-SCNC: 140 MMOL/L (ref 135–147)
WBC # BLD AUTO: 4.21 THOUSAND/UL (ref 4.31–10.16)

## 2022-07-07 PROCEDURE — 82948 REAGENT STRIP/BLOOD GLUCOSE: CPT

## 2022-07-07 PROCEDURE — 99239 HOSP IP/OBS DSCHRG MGMT >30: CPT | Performed by: NURSE PRACTITIONER

## 2022-07-07 PROCEDURE — 80048 BASIC METABOLIC PNL TOTAL CA: CPT | Performed by: PHYSICIAN ASSISTANT

## 2022-07-07 PROCEDURE — 85025 COMPLETE CBC W/AUTO DIFF WBC: CPT | Performed by: PHYSICIAN ASSISTANT

## 2022-07-07 PROCEDURE — 99232 SBSQ HOSP IP/OBS MODERATE 35: CPT | Performed by: INTERNAL MEDICINE

## 2022-07-07 RX ADMIN — CILOSTAZOL 100 MG: 100 TABLET ORAL at 09:22

## 2022-07-07 RX ADMIN — FAMOTIDINE 10 MG: 20 TABLET ORAL at 09:23

## 2022-07-07 RX ADMIN — CEFEPIME HYDROCHLORIDE 1000 MG: 2 INJECTION, POWDER, FOR SOLUTION INTRAVENOUS at 09:24

## 2022-07-07 RX ADMIN — ASPIRIN 81 MG CHEWABLE TABLET 81 MG: 81 TABLET CHEWABLE at 09:22

## 2022-07-07 RX ADMIN — CEFEPIME HYDROCHLORIDE 1000 MG: 1 INJECTION, POWDER, FOR SOLUTION INTRAMUSCULAR; INTRAVENOUS at 13:05

## 2022-07-07 RX ADMIN — B-COMPLEX W/ C & FOLIC ACID TAB 1 TABLET: TAB at 09:22

## 2022-07-07 RX ADMIN — ENOXAPARIN SODIUM 30 MG: 30 INJECTION SUBCUTANEOUS at 09:23

## 2022-07-07 NOTE — ASSESSMENT & PLAN NOTE
Per discussion with Dr. Molina, order placed per recommendation.   · Continue amlodipine  · Monitor blood pressure

## 2022-07-07 NOTE — PROGRESS NOTES
Progress Note - Infectious Disease   Rafa Zapata 80 y o  female MRN: [id number]  Unit/Bed#: S -01 Encounter: [id number]      Impression/Plan:  1  Fever-Appears to be secondary to gram-negative bacteremia from a UTI   No other clear source appreciated   The patient has now remained afebrile and is hemodynamically stable   -antibiotic plan as below     2  Gram-negative bacteremia-unable to identify species on BCI although suspect it could be the E coli that was recently isolated in the urine   No acute pathology on CT abdomen/pelvis  Final culture is unable to identify organism  Patient is tolerating IV antibiotic without difficulty   -complete 7 day course of cefepime, last day is today  No further antibiotics indicated thereafter  3  E coli UTI-no radiographic evidence of a complication   Symptoms seemed to have resolved and the repeat urinalysis is negative    -antibiotics as above  -monitor symptomatology     4  Leukopenia-unclear etiology   Possibly all related to the acute infectious process    Not overtly neutropenic   White cell count continues to improve   -follow CBC  -additional workup as above as needed     5  Mild transaminitis-unclear etiology   Resolved  -recheck LFTs       6  Acute encephalopathy-in the setting of a reported fever   This is all in the setting of some mild cognitive impairment   Seems to be back to baseline  -monitor cognition  -additional workup as needed     Antibiotics:  Cefepime 7     I discussed above plan with patient, and with Coralee Printers from Internal Medicine Service  Subjective:  Patient feels well and is eager to go home  No urinary symptoms  No fevers or chills  Tolerating  Oral intake    Objective:  Vitals:  Temp:  [97 7 °F (36 5 °C)-98 4 °F (36 9 °C)] 98 4 °F (36 9 °C)  HR:  [71-82] 71  Resp:  [16-19] 16  BP: (132-143)/(45-49) 132/47  SpO2:  [92 %-96 %] 96 %  Temp (24hrs), Av 2 °F (36 8 °C), Min:97 7 °F (36 5 °C), Max:98 4 °F (36 9 °C)  Current: Temperature: 98 4 °F (36 9 °C)    Physical Exam:   General:  Well-nourished, well-developed, in no acute distress  Eyes:  Conjunctive clear with no hemorrhages or effusions  Oropharynx:  No ulcers, no lesions  Neck:  Trachea midline  Lungs:  Nonlabored respirations  Abdomen:  Soft, non-tender, non-distented  Extremities:  No peripheral cyanosis, clubbing, or edema  Skin:  No rashes, no ulcers  Neurological:  Moves all four extremities spontaneously    Lab Results:  I have personally reviewed pertinent labs  Results from last 7 days   Lab Units 07/07/22  0538 07/04/22  0517 07/03/22  0411 07/02/22  0441 07/01/22  1508   POTASSIUM mmol/L 3 8 3 6 3 4*   < > 3 4*   CHLORIDE mmol/L 109* 109* 107   < > 103   CO2 mmol/L 25 25 26   < > 22   BUN mg/dL 11 12 13   < > 16   CREATININE mg/dL 0 60 0 70 0 80   < > 1 02   EGFR ml/min/1 73sq m 79 76 65   < > 48   CALCIUM mg/dL 9 0 8 4 8 2*   < > 8 5   AST U/L  --  27 31  --  40*   ALT U/L  --  23 25  --  31   ALK PHOS U/L  --  79 89  --  97    < > = values in this interval not displayed  Results from last 7 days   Lab Units 07/07/22  0538 07/06/22  0543 07/05/22  0531   WBC Thousand/uL 4 21* 3 54* 3 32*   HEMOGLOBIN g/dL 10 5* 10 4* 10 1*   PLATELETS Thousands/uL 365 315 277     Results from last 7 days   Lab Units 07/01/22  1548 07/01/22  1508   BLOOD CULTURE  No Growth After 5 Days  Gram Variable Rods*   GRAM STAIN RESULT   --  Gram negative rods*       Imaging Studies:   I have personally reviewed pertinent imaging study reports and images in PACS  EKG, Pathology, and Other Studies:   I have personally reviewed pertinent reports

## 2022-07-07 NOTE — ED PROVIDER NOTES
History  Chief Complaint   Patient presents with    Vomiting     Pt recently dx with UTI  Took her two prescribed abx today and had an episode of vomiting  Pt reports generalized weakness and fatigue  80 yr female currently being tx of uti over last 5 days with keflex--  Had outpt ua/uc - pos for ecoli which was resistant to keflex- chagne to macrobid yesterday  Since yesterday with fever/chill-s n/v x 1 today- still with urinary comps- no avd/ flank pain no other comps      History provided by:  Patient and relative   used: No    Vomiting  Severity:  Moderate  Duration:  1 day  Timing:  Intermittent  Associated symptoms: chills and fever    Associated symptoms: no abdominal pain and no diarrhea        Prior to Admission Medications   Prescriptions Last Dose Informant Patient Reported? Taking? Cholecalciferol (VITAMIN D PO)  Self Yes No   Sig: Take 1 tablet by mouth daily   FIBER PO  Self Yes No   Sig: Take by mouth   LORazepam (ATIVAN) 0 5 mg tablet   No No   Sig: Take 1/2 tablet to 1 tablet PO daily PRN   albuterol (PROVENTIL HFA,VENTOLIN HFA) 90 mcg/act inhaler   No No   Sig: Inhale 1 puff every 6 (six) hours as needed for wheezing   amLODIPine (NORVASC) 5 mg tablet   No No   Sig: Take 1 tablet (5 mg total) by mouth daily   aspirin 81 mg chewable tablet  Self No No   Sig: Chew 1 tablet (81 mg total) daily   b complex vitamins capsule  Self Yes No   Sig: Take 1 capsule by mouth daily   cilostazol (PLETAL) 100 mg tablet  Self No No   Sig: Take 1 tablet (100 mg total) by mouth 2 (two) times a day   famotidine (PEPCID) 40 MG tablet   No No   Sig: TAKE 1 TABLET BY MOUTH EVERY DAY   fluticasone (FLOVENT HFA) 110 MCG/ACT inhaler   No No   Sig: Inhale 2 puffs 2 (two) times a day Rinse mouth after use     meclizine (ANTIVERT) 25 mg tablet   No No   Sig: Take 1 tablet (25 mg total) by mouth every 12 (twelve) hours as needed for dizziness   methimazole (TAPAZOLE) 5 mg tablet  Self No No   Si/2 tab MWF   montelukast (SINGULAIR) 10 mg tablet   No No   Sig: Take 1 tablet (10 mg total) by mouth daily at bedtime   oxyCODONE-acetaminophen (PERCOCET) 5-325 mg per tablet  Self No No   Sig: Take 1 tablet by mouth 2 (two) times a day as needed for moderate pain Max Daily Amount: 2 tablets      Facility-Administered Medications: None       Past Medical History:   Diagnosis Date    Asthma     Chronic interstitial cystitis     Community acquired pneumonia     last assessed 10/4/13    Diabetes mellitus ([facility] [address] )     Disease of thyroid gland     Diverticulitis     Dizziness     GERD (gastroesophageal reflux disease)     Hyperlipidemia     Hypertension     Vertigo        Past Surgical History:   Procedure Laterality Date    ADENOIDECTOMY      CHOLECYSTECTOMY      COLON SURGERY      partial colectomy - sigmoid, diverticulitis    EYE SURGERY      HYSTERECTOMY      ovaries remain    TONSILLECTOMY      US GUIDED LYMPH NODE BIOPSY RIGHT  3/11/2020    US GUIDED THYROID BIOPSY  11/13/2019       Family History   Problem Relation Age of Onset    Coronary artery disease Mother     Other Father         MVA    Alcohol abuse Neg Hx     Depression Neg Hx     Drug abuse Neg Hx     Substance Abuse Neg Hx     Mental illness Neg Hx      I have reviewed and agree with the history as documented  E-Cigarette/Vaping    E-Cigarette Use Never User      E-Cigarette/Vaping Substances     Social History     Tobacco Use    Smoking status: Never Smoker    Smokeless tobacco: Never Used   Vaping Use    Vaping Use: Never used   Substance Use Topics    Alcohol use: Never    Drug use: No       Review of Systems   Constitutional: Positive for activity change, appetite change, chills, fatigue and fever  Negative for diaphoresis and unexpected weight change  HENT: Negative  Eyes: Negative  Respiratory: Negative  Cardiovascular: Negative  Gastrointestinal: Positive for nausea and vomiting   Negative for abdominal distention, abdominal pain, anal bleeding, blood in stool, constipation, diarrhea and rectal pain  Endocrine: Negative  Genitourinary: Positive for dysuria and frequency  Negative for decreased urine volume, difficulty urinating, dyspareunia, enuresis, flank pain, genital sores, hematuria, menstrual problem, pelvic pain, urgency, vaginal bleeding, vaginal discharge and vaginal pain  Musculoskeletal: Negative  Skin: Negative  Allergic/Immunologic: Negative  Neurological: Negative  Hematological: Negative  Psychiatric/Behavioral: Negative  Physical Exam  Physical Exam  Vitals and nursing note reviewed  Constitutional:       General: She is not in acute distress  Appearance: Normal appearance  She is not ill-appearing, toxic-appearing or diaphoretic  Comments: avvs- mild tchy -- pulse ox 97 % on ra- interpretation is normal- no intervention - in nad    HENT:      Head: Normocephalic and atraumatic  Nose: Nose normal       Mouth/Throat:      Mouth: Mucous membranes are moist    Eyes:      General: No scleral icterus  Right eye: No discharge  Left eye: No discharge  Extraocular Movements: Extraocular movements intact  Conjunctiva/sclera: Conjunctivae normal       Pupils: Pupils are equal, round, and reactive to light  Comments: Mm pink   Neck:      Vascular: No carotid bruit  Comments: No pmt c/t/l/s spine   Cardiovascular:      Rate and Rhythm: Regular rhythm  Tachycardia present  Pulses: Normal pulses  Heart sounds: Murmur heard  No friction rub  No gallop  Pulmonary:      Effort: Pulmonary effort is normal  No respiratory distress  Breath sounds: Normal breath sounds  No stridor  No wheezing, rhonchi or rales  Chest:      Chest wall: No tenderness  Abdominal:      General: Bowel sounds are normal  There is no distension  Palpations: Abdomen is soft  There is no mass  Tenderness:  There is no abdominal tenderness  There is no right CVA tenderness, left CVA tenderness, guarding or rebound  Hernia: No hernia is present  Comments: Soft nt/nd- no hsm- no cva tenderness- no ascites- no peritoneal signs- no pulsatile abd mass/bruit/ tenderness   Musculoskeletal:         General: No swelling, tenderness, deformity or signs of injury  Normal range of motion  Cervical back: Normal range of motion and neck supple  No rigidity or tenderness  Right lower leg: Edema present  Left lower leg: Edema present  Comments: Trace ble pretibial edema- nt- no asym/ erythema- equal bilateral radial/dp pulses   Lymphadenopathy:      Cervical: No cervical adenopathy  Skin:     General: Skin is warm  Capillary Refill: Capillary refill takes less than 2 seconds  Coloration: Skin is pale  Skin is not jaundiced  Findings: No bruising, erythema, lesion or rash  Neurological:      General: No focal deficit present  Mental Status: She is alert and oriented to person, place, and time  Mental status is at baseline  Cranial Nerves: No cranial nerve deficit  Sensory: No sensory deficit  Motor: No weakness  Coordination: Coordination normal       Gait: Gait normal       Comments: Normal non focal neuro exam   Psychiatric:         Mood and Affect: Mood normal          Behavior: Behavior normal          Thought Content:  Thought content normal          Judgment: Judgment normal          Vital Signs  ED Triage Vitals   Temperature Pulse Respirations Blood Pressure SpO2   06/28/22 1823 06/28/22 1823 06/28/22 1823 06/28/22 1826 06/28/22 1823   99 8 °F (37 7 °C) 105 18 138/60 97 %      Temp Source Heart Rate Source Patient Position - Orthostatic VS BP Location FiO2 (%)   06/28/22 1823 06/28/22 1823 -- -- --   Oral Monitor         Pain Score       06/28/22 2200       No Pain           Vitals:    06/29/22 1535 06/29/22 2225 06/30/22 0910 06/30/22 0910   BP: (!) 122/44 127/77 95/61 Pulse: 97 89  92         Visual Acuity      ED Medications  Medications   ondansetron (FOR EMS ONLY) (ZOFRAN) 4 mg/2 mL injection 4 mg (0 mg Does not apply Given to EMS 6/28/22 1856)   lactated ringers bolus 500 mL (0 mL Intravenous Stopped 6/28/22 2117)   cefepime (MAXIPIME) 2 g/50 mL dextrose IVPB (0 mg Intravenous Stopped 6/28/22 2117)   acetaminophen (TYLENOL) tablet 650 mg (650 mg Oral Given 6/28/22 2120)   potassium chloride (K-DUR,KLOR-CON) CR tablet 40 mEq (40 mEq Oral Given 6/28/22 2120)   sodium chloride 0 9 % bolus 500 mL (0 mL Intravenous Stopped 6/29/22 1048)       Diagnostic Studies  Results Reviewed     Procedure Component Value Units Date/Time    Blood culture #1 [[id number]] Collected: 06/28/22 1930    Lab Status: Final result Specimen: Blood from Arm, Right Updated: 07/04/22 0003     Blood Culture No Growth After 5 Days  Blood culture #2 [[id number]] Collected: 06/28/22 1930    Lab Status: Final result Specimen: Blood from Arm, Right Updated: 07/04/22 0003     Blood Culture No Growth After 5 Days  CBC and differential [[id number]]  (Abnormal) Collected: 06/28/22 1930    Lab Status: Final result Specimen: Blood from Arm, Right Updated: 06/28/22 2050     WBC 4 15 Thousand/uL      RBC 3 67 Million/uL      Hemoglobin 10 7 g/dL      Hematocrit 32 3 %      MCV 88 fL      MCH 29 2 pg      MCHC 33 1 g/dL      RDW 13 2 %      MPV 8 9 fL      Platelets 171 Thousands/uL     Narrative: This is an appended report  These results have been appended to a previously verified report      Manual Differential(PHLEBS Do Not Order) [[id number]]  (Abnormal) Collected: 06/28/22 1930    Lab Status: Final result Specimen: Blood from Arm, Right Updated: 06/28/22 2050     Segmented % 85 %      Bands % 14 %      Lymphocytes % 0 %      Monocytes % 1 %      Eosinophils, % 0 %      Basophils % 0 %      Absolute Neutrophils 4 11 Thousand/uL      Lymphocytes Absolute 0 00 Thousand/uL      Monocytes Absolute 0 04 Thousand/uL      Eosinophils Absolute 0 00 Thousand/uL      Basophils Absolute 0 00 Thousand/uL      Total Counted --     RBC Morphology Normal     Platelet Estimate Adequate     Large Platelet Present    Lactic acid, plasma [[id number]]  (Normal) Collected: 06/28/22 1930    Lab Status: Final result Specimen: Blood from Arm, Right Updated: 06/28/22 2022     LACTIC ACID 1 0 mmol/L     Narrative:      Result may be elevated if tourniquet was used during collection  Basic metabolic panel [[id number]]  (Abnormal) Collected: 06/28/22 1930    Lab Status: Final result Specimen: Blood from Line, Venous Updated: 06/28/22 2019     Sodium 137 mmol/L      Potassium 3 3 mmol/L      Chloride 106 mmol/L      CO2 23 mmol/L      ANION GAP 8 mmol/L      BUN 16 mg/dL      Creatinine 0 91 mg/dL      Glucose 115 mg/dL      Calcium 8 8 mg/dL      eGFR 55 ml/min/1 73sq m     Narrative:      [place] guidelines for Chronic Kidney Disease (CKD):     Stage 1 with normal or high GFR (GFR > 90 mL/min/1 73 square meters)    Stage 2 Mild CKD (GFR = 60-89 mL/min/1 73 square meters)    Stage 3A Moderate CKD (GFR = 45-59 mL/min/1 73 square meters)    Stage 3B Moderate CKD (GFR = 30-44 mL/min/1 73 square meters)    Stage 4 Severe CKD (GFR = 15-29 mL/min/1 73 square meters)    Stage 5 End Stage CKD (GFR <15 mL/min/1 73 square meters)  Note: GFR calculation is accurate only with a steady state creatinine                 No orders to display              Procedures  Procedures         ED Course  ED Course as of 07/07/22 1522   Tue Jun 28, 2022 1922 Natalie carrizales note- 6/20/22 labs/  and  6/24/22 ua/ uc from [facility] reviewed by natalie carrizales--    2106 Natalie carrizales note- pt re-evaluated resting quietly in nad-- pt and daughter aware of pending admit                            Initial Sepsis Screening     Row Name 06/28/22 2040                Is the patient's history suggestive of a new or worsening infection?  No  -MB        Suspected source of infection urinary tract infection  -MB        Are two or more of the following signs & symptoms of infection both present and new to the patient? No  -MB        Indicate SIRS criteria Tachycardia > 90 bpm  -MB        If the answer is yes to both questions, suspicion of sepsis is present --        If severe sepsis is present AND tissue hypoperfusion perists in the hour after fluid resuscitation or lactate > 4, the patient meets criteria for SEPTIC SHOCK --        Are any of the following organ dysfunction criteria present within 6 hours of suspected infection and SIRS criteria that are NOT considered to be chronic conditions?  No  -MB        Organ dysfunction --        Date of presentation of severe sepsis --        Time of presentation of severe sepsis --        Tissue hypoperfusion persists in the hour after crystalloid fluid administration, evidenced, by either: --        Was hypotension present within one hour of the conclusion of crystalloid fluid administration? --        Date of presentation of septic shock --        Time of presentation of septic shock --              User Key  (r) = Recorded By, (t) = Taken By, (c) = Cosigned By    [address] Name Provider Type    ABBI Castillo MD Physician              Default Flowsheet Data (last 720 hours)     Sepsis Reassess     Row Name 06/28/22 3696                   Repeat Volume Status and Tissue Perfusion Assessment Performed    Repeat Volume Status and Tissue Perfusion Assessment Performed --                  Volume Status and Tissue Perfusion Post Fluid Resuscitation * Must Document All *    Vital Signs Reviewed (HR, RR, BP, T) Yes  -MB        Shock Index Reviewed --        Arterial Oxygen Saturation Reviewed (POx, SaO2 or SpO2) Yes (comment %)  -MB        Cardio Regular rate and rhythm  -MB        Pulmonary Normal effort;Clear to auscultation  -MB        Capillary Refill --        Peripheral Pulses --        Skin Warm;Dry  -MB        Urine output assessed -- *OR*   Intensive Monitoring- Must Document One of the Following Four *:    Vital Signs Reviewed --        * Central Venous Pressure (CVP or RAP) --        * Central Venous Oxygen (SVO2, ScvO2 or Oxygen saturation via central catheter) --        * Bedside Cardiovascular US in IVC diameter and % collapse --        * Passive Leg Raise OR Crystalloid Challenge --              User Key  (r) = Recorded By, (t) = Taken By, (c) = Cosigned By    Initials Name Provider Type    ABBI Landaverde DO Resident                            MDM    Disposition  Final diagnoses:   UTI (urinary tract infection)   Fever     Time reflects when diagnosis was documented in both MDM as applicable and the Disposition within this note     Time User Action Codes Description Comment    6/28/2022  9:05 PM Amber JONES Add [N39 0] UTI (urinary tract infection)     6/28/2022  9:06 PM Amber JONES Add [R50 9] Fever     6/30/2022 11:38 AM Nasim George Add [I10] Essential hypertension       ED Disposition     ED Disposition   Admit    Condition   Stable    Date/Time   Tue Jun 28, 2022  9:05 PM    Comment   Case was discussed with DR VILLALBA and the patient's admission status was agreed to be Admission Status: observation status to the service of [address] [place] [address]              Follow-up Information     Follow up With Specialties Details Why Contact Info    Loretta Oconnell MD Internal Medicine Follow up  [address] [place] [address],[address]  [place] [address]  [phone]            Discharge Medication List as of 6/30/2022  1:50 PM      START taking these medications    Details   nitrofurantoin (MACROBID) 100 mg capsule Take 1 capsule (100 mg total) by mouth 2 (two) times a day for 9 doses, Starting u 6/30/2022, Until Tue 7/5/2022, Normal         CONTINUE these medications which have CHANGED    Details   amLODIPine (NORVASC) 2 5 mg tablet Take 1 tablet (2 5 mg total) by mouth daily, Starting Thu 6/30/2022, Until Sat 7/30/2022, Normal         CONTINUE these medications which have NOT CHANGED    Details   albuterol (PROVENTIL HFA,VENTOLIN HFA) 90 mcg/act inhaler Inhale 1 puff every 6 (six) hours as needed for wheezing, Starting Wed 6/8/2022, Normal      aspirin 81 mg chewable tablet Chew 1 tablet (81 mg total) daily, Starting Sun 9/6/2020, Normal      b complex vitamins capsule Take 1 capsule by mouth daily, Historical Med      cilostazol (PLETAL) 100 mg tablet Take 1 tablet (100 mg total) by mouth 2 (two) times a day, Starting Tue 9/28/2021, Normal      famotidine (PEPCID) 40 MG tablet TAKE 1 TABLET BY MOUTH EVERY DAY, Normal      FIBER PO Take by mouth, Historical Med      fluticasone (FLOVENT HFA) 110 MCG/ACT inhaler Inhale 2 puffs 2 (two) times a day Rinse mouth after use , Starting Fri 6/10/2022, Normal      LORazepam (ATIVAN) 0 5 mg tablet Take 1/2 tablet to 1 tablet PO daily PRN, Normal      methimazole (TAPAZOLE) 5 mg tablet 1/2 tab MWF, No Print      montelukast (SINGULAIR) 10 mg tablet Take 1 tablet (10 mg total) by mouth daily at bedtime, Starting Mon 5/2/2022, Normal      oxyCODONE-acetaminophen (PERCOCET) 5-325 mg per tablet Take 1 tablet by mouth 2 (two) times a day as needed for moderate pain Max Daily Amount: 2 tablets, Starting Tue 3/22/2022, Normal         STOP taking these medications       Cholecalciferol (VITAMIN D PO) Comments:   Reason for Stopping:         meclizine (ANTIVERT) 25 mg tablet Comments:   Reason for Stopping:               No discharge procedures on file      PDMP Review       Value Time User    PDMP Reviewed  Yes 5/25/2022  2:54 PM Stacey Solis MD          ED Provider  Electronically Signed by           Watson Perdue MD  07/07/22 5630

## 2022-07-07 NOTE — INCIDENTAL FINDINGS
The following findings require follow up:  Radiographic finding   Finding: Left adnexal cystic lesion measuring 3 8 cm is increased  from 2 5 cm in 2018  Suggest follow-up pelvic ultrasound     Follow up required: yes   Follow up should be done within 2-4 week(s)    Please notify the following clinician to assist with the follow up:   PCP

## 2022-07-07 NOTE — DISCHARGE SUMMARY
[facility]  Discharge- [facility] 7/7/1931, 80 y o  female MRN: [id number]  Unit/Bed#: S -01 Encounter: [id number]  Primary Care Provider: Lisa Rojas MD   Date and time admitted to hospital: 7/1/2022  2:10 PM    * UTI due to extended-spectrum beta lactamase (ESBL) producing Escherichia coli  Assessment & Plan  · Was hospitalized and subsequently discharged 06/30 secondary to UTI  Failed OP tx w/ Macrobid  She was brought back in with increased confusion, fever at home as well as chills  · Continue IV cefepime day 7/7, d/w ID continue IV cefepime through 7/7  Can be discharged once IV cefepime complete  · ID is following  · Treatment of bacteremia as below  Gram-negative bacteremia  Assessment & Plan  · 1/2 BC on admission for GNR  · Likely due to UTI  · ID following, continue Cefepime through 7/7  · Discussion with ID patient is cleared for discharge    Leukopenia  Assessment & Plan  · Likely in the setting of acute infection/bacteremia  · No other SIRS criteria met  She did reported fever at home but none documented  · Trend CBC with differential   · CT abdomen pelvis with no acute findings  Atherosclerosis 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 (HCC)  Assessment & Plan  · ASA and Pletal    CKD (chronic kidney disease), stage III [facility])  Assessment & Plan  Lab Results   Component Value Date    EGFR 79 07/07/2022    EGFR 76 07/04/2022    EGFR 65 07/03/2022    CREATININE 0 60 07/07/2022    CREATININE 0 70 07/04/2022    CREATININE 0 80 07/03/2022   · Creatinine stable, monitor      Type 2 diabetes mellitus [facility])  Assessment & Plan  Lab Results   Component Value Date    HGBA1C 5 2 06/20/2022       Recent Labs     07/06/22  1556 07/06/22  2121 07/07/22  0722 07/07/22  1130   POCGLU 119 118 103 128       Blood Sugar Average: Last 72 hrs:  · (P) 115 [id number]   · Typically well controlled, not on any medications or insulin at home  · Continue Accu-Cheks  Essential hypertension  Assessment & Plan  · Continue amlodipine  · Monitor blood pressure  Medical Problems             Resolved Problems  Date Reviewed: 7/7/2022          Resolved    Metabolic encephalopathy 6/4/9493     Resolved by  Reilly Serrano PA-C              Discharging Physician / Practitioner: SILKE Patten  PCP: Jaja Cardoza MD  Admission Date:   Admission Orders (From admission, onward)     Ordered        07/01/22 1852  Inpatient Admission  Once                      Discharge Date: 07/07/22    Consultations During Hospital Stay:  · Infectious Disease    Procedures Performed:   · None    Significant Findings / Test Results:   · UTI  · Gram-negative bacteremia bacteria could not be identified by culture  · Leukopenia suspected in setting of infection/bacteremia improving    Incidental Findings:   · Change in adnexal lesion from 2018 this was discussed with the daughter recommend outpatient pelvic ultrasound which she will discuss with PCP message sent to PCP  Test Results Pending at Discharge (will require follow up): · None     Outpatient Tests Requested:  · Pelvic ultrasound  · Further referral to GI ambulatory due to IBS type symptoms of diarrhea and constipation along with hemorrhoids    Complications:  None    Reason for Admission:  UTI bacteremia    Hospital Course:   Lalo Saha is a 80 y o  female patient who originally presented to the hospital on 7/1/2022 due to confusion subjective fevers and chills at home patient was brought to the ED for further evaluation UA microscopic suggestive UTI patient was started on ceftriaxone transition within 24 hours to cefepime 1 of 2 blood cultures came back positive for Gram-negative jayna id was consulted who recommended as 7 day course of IV cefepime which patient completed on 07/07/2022    Patient's mentation returned to baseline per the family overall patient felt better and was discharged home on day 5 and 2 6  Per discussion with daughter abdomen incidental finding was discussed about the adnexal lesion change in she will reach out to PCP regarding pelvic ultrasound  Daughter further report patient has been having IBS type symptoms of diarrhea constipation along with hemorrhoids and after much discussion agreeable with a referral to GI as outpatient  Please see above list of diagnoses and related plan for additional information  Condition at Discharge: good    Discharge Day Visit / Exam:   Subjective:  Patient has no complaints pleasant only wants to discharge home denies fevers chills urinary symptoms appears alert and oriented  Vitals: Blood Pressure: (!) 132/47 (07/07/22 0724)  Pulse: 71 (07/07/22 0724)  Temperature: 98 4 °F (36 9 °C) (07/07/22 0724)  Temp Source: Oral (07/07/22 0724)  Respirations: 16 (07/07/22 0724)  Weight - Scale: 52 5 kg (115 lb 11 9 oz) (07/01/22 1415)  SpO2: 96 % (07/07/22 0724)  Exam:   Physical Exam  Vitals and nursing note reviewed  Constitutional:       General: She is not in acute distress  Appearance: She is well-developed  HENT:      Head: Normocephalic and atraumatic  Eyes:      Conjunctiva/sclera: Conjunctivae normal    Cardiovascular:      Rate and Rhythm: Normal rate and regular rhythm  Heart sounds: No murmur heard  Pulmonary:      Effort: Pulmonary effort is normal  No respiratory distress  Breath sounds: Normal breath sounds  Abdominal:      Palpations: Abdomen is soft  Tenderness: There is no abdominal tenderness  Musculoskeletal:         General: Normal range of motion  Cervical back: Neck supple  Skin:     General: Skin is warm and dry  Neurological:      Mental Status: She is alert and oriented to person, place, and time  Mental status is at baseline     Psychiatric:         Mood and Affect: Mood normal          Behavior: Behavior normal           Discussion with Family: Updated contact person (daughter) via phone  Discharge instructions/Information to patient and family:   See after visit summary for information provided to patient and family  Provisions for Follow-Up Care:  See after visit summary for information related to follow-up care and any pertinent home health orders  Disposition:   Home    Planned Readmission:  None     Discharge Statement:  I spent 38 minutes discharging the patient  This time was spent on the day of discharge  I had direct contact with the patient on the day of discharge  Greater than 50% of the total time was spent examining patient, answering all patient questions, arranging and discussing plan of care with patient as well as directly providing post-discharge instructions  Additional time then spent on discharge activities  Discharge Medications:  See after visit summary for reconciled discharge medications provided to patient and/or family        **Please Note: This note may have been constructed using a voice recognition system**

## 2022-07-07 NOTE — PLAN OF CARE
Problem: Potential for Falls  Goal: Patient will remain free of falls  Description: INTERVENTIONS:  - Educate patient/family on patient safety including physical limitations  - Instruct patient to call for assistance with activity   - Consult OT/PT to assist with strengthening/mobility   - Keep Call bell within reach  - Keep bed low and locked with side rails adjusted as appropriate  - Keep care items and personal belongings within reach  - Initiate and maintain comfort rounds  - Make Fall Risk Sign visible to staff  - Offer Toileting every  Hours, in advance of need  - Initiate/Maintain alarm  - Obtain necessary fall risk management equipment: - Apply yellow socks and bracelet for high fall risk patients  - Consider moving patient to room near nurses station  Outcome: Progressing     Problem: Prexisting or High Potential for Compromised Skin Integrity  Goal: Skin integrity is maintained or improved  Description: INTERVENTIONS:  - Identify patients at risk for skin breakdown  - Assess and monitor skin integrity  - Assess and monitor nutrition and hydration status  - Monitor labs   - Assess for incontinence   - Turn and reposition patient  - Assist with mobility/ambulation  - Relieve pressure over bony prominences  - Avoid friction and shearing  - Provide appropriate hygiene as needed including keeping skin clean and dry  - Evaluate need for skin moisturizer/barrier cream  - Collaborate with interdisciplinary team   - Patient/family teaching  - Consider wound care consult   Outcome: Progressing     Problem: MOBILITY - ADULT  Goal: Maintain or return to baseline ADL function  Description: INTERVENTIONS:  -  Assess patient's ability to carry out ADLs; assess patient's baseline for ADL function and identify physical deficits which impact ability to perform ADLs (bathing, care of mouth/teeth, toileting, grooming, dressing, etc )  - Assess/evaluate cause of self-care deficits   - Assess range of motion  - Assess patient's mobility; develop plan if impaired  - Assess patient's need for assistive devices and provide as appropriate  - Encourage maximum independence but intervene and supervise when necessary  - Involve family in performance of ADLs  - Assess for home care needs following discharge   - Consider OT consult to assist with ADL evaluation and planning for discharge  - Provide patient education as appropriate  Outcome: Progressing  Goal: Maintains/Returns to pre admission functional level  Description: INTERVENTIONS:  - Perform BMAT or MOVE assessment daily    - Set and communicate daily mobility goal to care team and patient/family/caregiver  - Collaborate with rehabilitation services on mobility goals if consulted  - Perform Range of Motion  times a day  - Reposition patient every  hours    - Dangle patient  times a day  - Stand patient  times a day  - Ambulate patient  times a day  - Out of bed to chair  times a day   - Out of bed for meals  times a day  - Out of bed for toileting  - Record patient progress and toleration of activity level   Outcome: Progressing     Problem: INFECTION - ADULT  Goal: Absence or prevention of progression during hospitalization  Description: INTERVENTIONS:  - Assess and monitor for signs and symptoms of infection  - Monitor lab/diagnostic results  - Monitor all insertion sites, i e  indwelling lines, tubes, and drains  - Monitor endotracheal if appropriate and nasal secretions for changes in amount and color  - [place] appropriate cooling/warming therapies per order  - Administer medications as ordered  - Instruct and encourage patient and family to use good hand hygiene technique  - Identify and instruct in appropriate isolation precautions for identified infection/condition  Outcome: Progressing     Problem: DISCHARGE PLANNING  Goal: Discharge to home or other facility with appropriate resources  Description: INTERVENTIONS:  - Identify barriers to discharge w/patient and caregiver  - Arrange for needed discharge resources and transportation as appropriate  - Identify discharge learning needs (meds, wound care, etc )  - Arrange for interpretive services to assist at discharge as needed  - Refer to Case Management Department for coordinating discharge planning if the patient needs post-hospital services based on physician/advanced practitioner order or complex needs related to functional status, cognitive ability, or social support system  Outcome: Progressing     Problem: Knowledge Deficit  Goal: Patient/family/caregiver demonstrates understanding of disease process, treatment plan, medications, and discharge instructions  Description: Complete learning assessment and assess knowledge base    Interventions:  - Provide teaching at level of understanding  - Provide teaching via preferred learning methods  Outcome: Progressing

## 2022-07-07 NOTE — ASSESSMENT & PLAN NOTE
· 1/2 BC on admission for GNR  · Likely due to UTI  · ID following, continue Cefepime through 7/7    · Discussion with ID patient is cleared for discharge

## 2022-07-07 NOTE — ASSESSMENT & PLAN NOTE
· Was hospitalized and subsequently discharged 06/30 secondary to UTI  Failed OP tx w/ Macrobid  She was brought back in with increased confusion, fever at home as well as chills  · Continue IV cefepime day 7/7, d/w ID continue IV cefepime through 7/7  Can be discharged once IV cefepime complete  · ID is following  · Treatment of bacteremia as below

## 2022-07-07 NOTE — PLAN OF CARE
Problem: Potential for Falls  Goal: Patient will remain free of falls  Description: INTERVENTIONS:  - Educate patient/family on patient safety including physical limitations  - Instruct patient to call for assistance with activity   - Consult OT/PT to assist with strengthening/mobility   - Keep Call bell within reach  - Keep bed low and locked with side rails adjusted as appropriate  - Keep care items and personal belongings within reach  - Initiate and maintain comfort rounds  - Make Fall Risk Sign visible to staff  - Offer Toileting every 2 Hours, in advance of need  - Initiate/Maintain 2alarm  - Obtain necessary fall risk management equipment: 2  - Apply yellow socks and bracelet for high fall risk patients  - Consider moving patient to room near nurses station  Outcome: Progressing     Problem: Prexisting or High Potential for Compromised Skin Integrity  Goal: Skin integrity is maintained or improved  Description: INTERVENTIONS:  - Identify patients at risk for skin breakdown  - Assess and monitor skin integrity  - Assess and monitor nutrition and hydration status  - Monitor labs   - Assess for incontinence   - Turn and reposition patient  - Assist with mobility/ambulation  - Relieve pressure over bony prominences  - Avoid friction and shearing  - Provide appropriate hygiene as needed including keeping skin clean and dry  - Evaluate need for skin moisturizer/barrier cream  - Collaborate with interdisciplinary team   - Patient/family teaching  - Consider wound care consult   Outcome: Progressing     Problem: MOBILITY - ADULT  Goal: Maintain or return to baseline ADL function  Description: INTERVENTIONS:  -  Assess patient's ability to carry out ADLs; assess patient's baseline for ADL function and identify physical deficits which impact ability to perform ADLs (bathing, care of mouth/teeth, toileting, grooming, dressing, etc )  - Assess/evaluate cause of self-care deficits   - Assess range of motion  - Assess patient's mobility; develop plan if impaired  - Assess patient's need for assistive devices and provide as appropriate  - Encourage maximum independence but intervene and supervise when necessary  - Involve family in performance of ADLs  - Assess for home care needs following discharge   - Consider OT consult to assist with ADL evaluation and planning for discharge  - Provide patient education as appropriate  Outcome: Progressing  Goal: Maintains/Returns to pre admission functional level  Description: INTERVENTIONS:  - Perform BMAT or MOVE assessment daily    - Set and communicate daily mobility goal to care team and patient/family/caregiver  - Collaborate with rehabilitation services on mobility goals if consulted  - Perform Range of Motion 2 times a day  - Reposition patient every 2 hours    - Dangle patient 2 times a day  - Stand patient 2 times a day  - Ambulate patient 2 times a day  - Out of bed to chair 2 times a day   - Out of bed for meals 2 times a day  - Out of bed for toileting  - Record patient progress and toleration of activity level   Outcome: Progressing     Problem: INFECTION - ADULT  Goal: Absence or prevention of progression during hospitalization  Description: INTERVENTIONS:  - Assess and monitor for signs and symptoms of infection  - Monitor lab/diagnostic results  - Monitor all insertion sites, i e  indwelling lines, tubes, and drains  - Monitor endotracheal if appropriate and nasal secretions for changes in amount and color  - [place] appropriate cooling/warming therapies per order  - Administer medications as ordered  - Instruct and encourage patient and family to use good hand hygiene technique  - Identify and instruct in appropriate isolation precautions for identified infection/condition  Outcome: Progressing     Problem: DISCHARGE PLANNING  Goal: Discharge to home or other facility with appropriate resources  Description: INTERVENTIONS:  - Identify barriers to discharge w/patient and caregiver  - Arrange for needed discharge resources and transportation as appropriate  - Identify discharge learning needs (meds, wound care, etc )  - Arrange for interpretive services to assist at discharge as needed  - Refer to Case Management Department for coordinating discharge planning if the patient needs post-hospital services based on physician/advanced practitioner order or complex needs related to functional status, cognitive ability, or social support system  Outcome: Progressing     Problem: Knowledge Deficit  Goal: Patient/family/caregiver demonstrates understanding of disease process, treatment plan, medications, and discharge instructions  Description: Complete learning assessment and assess knowledge base    Interventions:  - Provide teaching at level of understanding  - Provide teaching via preferred learning methods  Outcome: Progressing

## 2022-07-07 NOTE — ASSESSMENT & PLAN NOTE
Lab Results   Component Value Date    EGFR 79 07/07/2022    EGFR 76 07/04/2022    EGFR 65 07/03/2022    CREATININE 0 60 07/07/2022    CREATININE 0 70 07/04/2022    CREATININE 0 80 07/03/2022   · Creatinine stable, monitor

## 2022-07-07 NOTE — ASSESSMENT & PLAN NOTE
Lab Results   Component Value Date    HGBA1C 5 2 06/20/2022       Recent Labs     07/06/22  1556 07/06/22  2121 07/07/22  0722 07/07/22  1130   POCGLU 119 118 103 128       Blood Sugar Average: Last 72 hrs:  · (P) 115 [id number]   · Typically well controlled, not on any medications or insulin at home  · Continue Accu-Cheks

## 2022-07-08 ENCOUNTER — TRANSITIONAL CARE MANAGEMENT (OUTPATIENT)
Dept: INTERNAL MEDICINE CLINIC | Facility: CLINIC | Age: 87
End: 2022-07-08

## 2022-07-08 NOTE — PHYSICIAN ADVISOR
Current patient class: Inpatient  The patient is currently on Hospital Day: 3 at [address] [facility]      The patient was admitted to the hospital at  2:03 PM on 6/29/22 for the following diagnosis:  UTI (urinary tract infection) [N39 0]  Fever [R50 9]       There is documentation in the medical record of an expected length of stay of at least 2 midnights  The patient is therefore expected to satisfy the 2 midnight benchmark and given the 2 midnight presumption is appropriate for INPATIENT ADMISSION  Given this expectation of a satisfying stay, CMS instructs us that the patient is most often appropriate for inpatient admission under part A provided medical necessity is documented in the chart  After review of the relevant documentation, labs, vital signs and test results, the patient is appropriate for INPATIENT ADMISSION  Admission to the hospital as an inpatient is a complex decision making process which requires the practitioner to consider the patients presenting complaint, history and physical examination and all relevant testing  With this in mind, in this case, the patient was deemed appropriate for INPATIENT ADMISSION  After review of the documentation and testing available at the time of the admission I concur with this clinical determination of medical necessity  Rationale is as follows: The patient is a 80 yrs old Female who presented to the ED at 6/28/2022  6:21 PM with a chief complaint of Vomiting (Pt recently dx with UTI  Took her two prescribed abx today and had an episode of vomiting  Pt reports generalized weakness and fatigue  ) Patient admitted with UTI and sepsis secondary to resistant e coli  She was treated with IV antibiotics  She also was noted to have delirium/confusion during her hospital course likely related to her illness and being in the hospital  Her blood pressure medications were also adjusted for hypotension   She was stable for discharge on hospital day 3  IP seems appropriate based on her hospital course  The patients vitals on arrival were   ED Triage Vitals   Temperature Pulse Respirations Blood Pressure SpO2   06/28/22 1823 06/28/22 1823 06/28/22 1823 06/28/22 1826 06/28/22 1823   99 8 °F (37 7 °C) 105 18 138/60 97 %      Temp Source Heart Rate Source Patient Position - Orthostatic VS BP Location FiO2 (%)   06/28/22 1823 06/28/22 1823 -- -- --   Oral Monitor         Pain Score       06/28/22 2200       No Pain           Past Medical History:   Diagnosis Date    Asthma     Chronic interstitial cystitis     Community acquired pneumonia     last assessed 10/4/13    Diabetes mellitus ([facility] [address] )     Disease of thyroid gland     Diverticulitis     Dizziness     GERD (gastroesophageal reflux disease)     Hyperlipidemia     Hypertension     Vertigo      Past Surgical History:   Procedure Laterality Date    ADENOIDECTOMY      CHOLECYSTECTOMY      COLON SURGERY      partial colectomy - sigmoid, diverticulitis    EYE SURGERY      HYSTERECTOMY      ovaries remain    TONSILLECTOMY      US GUIDED LYMPH NODE BIOPSY RIGHT  3/11/2020    US GUIDED THYROID BIOPSY  11/13/2019           Consults have been placed to:   None    Vitals:    06/29/22 1535 06/29/22 2225 06/30/22 0910 06/30/22 0910   BP: (!) 122/44 127/77 95/61    Pulse: 97 89  92   Resp: 18 15 17    Temp: 97 6 °F (36 4 °C)  98 2 °F (36 8 °C)    TempSrc:       SpO2: 95% (!) 89%  96%   Weight:       Height:           Most recent labs:    Recent Labs     07/07/22  0538   WBC 4 21*   HGB 10 5*   HCT 32 8*      K 3 8   CALCIUM 9 0   BUN 11   CREATININE 0 60       Scheduled Meds:  Continuous Infusions:No current facility-administered medications for this encounter  PRN Meds:      Surgical procedures (if appropriate):

## 2022-07-18 ENCOUNTER — OFFICE VISIT (OUTPATIENT)
Dept: INTERNAL MEDICINE CLINIC | Facility: CLINIC | Age: 87
End: 2022-07-18
Payer: MEDICARE

## 2022-07-18 VITALS
DIASTOLIC BLOOD PRESSURE: 50 MMHG | OXYGEN SATURATION: 98 % | WEIGHT: 106.2 LBS | SYSTOLIC BLOOD PRESSURE: 122 MMHG | HEART RATE: 88 BPM | HEIGHT: 61 IN | BODY MASS INDEX: 20.05 KG/M2 | TEMPERATURE: 97.5 F

## 2022-07-18 DIAGNOSIS — I10 ESSENTIAL HYPERTENSION: ICD-10-CM

## 2022-07-18 DIAGNOSIS — R78.81 GRAM-NEGATIVE BACTEREMIA: ICD-10-CM

## 2022-07-18 DIAGNOSIS — N39.0 UTI DUE TO EXTENDED-SPECTRUM BETA LACTAMASE (ESBL) PRODUCING ESCHERICHIA COLI: Primary | ICD-10-CM

## 2022-07-18 DIAGNOSIS — B96.29 UTI DUE TO EXTENDED-SPECTRUM BETA LACTAMASE (ESBL) PRODUCING ESCHERICHIA COLI: Primary | ICD-10-CM

## 2022-07-18 DIAGNOSIS — Z16.12 UTI DUE TO EXTENDED-SPECTRUM BETA LACTAMASE (ESBL) PRODUCING ESCHERICHIA COLI: Primary | ICD-10-CM

## 2022-07-18 DIAGNOSIS — N18.31 STAGE 3A CHRONIC KIDNEY DISEASE (HCC): ICD-10-CM

## 2022-07-18 DIAGNOSIS — N94.9 ADNEXAL CYST: ICD-10-CM

## 2022-07-18 PROCEDURE — 99495 TRANSJ CARE MGMT MOD F2F 14D: CPT | Performed by: NURSE PRACTITIONER

## 2022-07-18 RX ORDER — AMLODIPINE BESYLATE 2.5 MG/1
2.5 TABLET ORAL DAILY
Qty: 30 TABLET | Refills: 0 | Status: SHIPPED | OUTPATIENT
Start: 2022-07-18 | End: 2022-07-18 | Stop reason: SDUPTHER

## 2022-07-18 RX ORDER — AMLODIPINE BESYLATE 2.5 MG/1
2.5 TABLET ORAL DAILY
Qty: 90 TABLET | Refills: 0 | Status: SHIPPED | OUTPATIENT
Start: 2022-07-18 | End: 2022-10-24

## 2022-07-18 NOTE — ASSESSMENT & PLAN NOTE
Lab Results   Component Value Date    EGFR 79 07/07/2022    EGFR 76 07/04/2022    EGFR 65 07/03/2022    CREATININE 0 60 07/07/2022    CREATININE 0 70 07/04/2022    CREATININE 0 80 07/03/2022   stable at discharge

## 2022-07-18 NOTE — PROGRESS NOTES
Assessment/Plan:     Essential hypertension  Amlodipine dose decreased during hospital stay  BP stable  CKD (chronic kidney disease), stage III [facility])  Lab Results   Component Value Date    EGFR 79 07/07/2022    EGFR 76 07/04/2022    EGFR 65 07/03/2022    CREATININE 0 60 07/07/2022    CREATININE 0 70 07/04/2022    CREATININE 0 80 07/03/2022   stable at discharge  UTI due to extended-spectrum beta lactamase (ESBL) producing Escherichia coli  Finished IV cefepime  Symptoms have resolved  Mental status at baseline  Gram-negative bacteremia  Likely due to UTI  Finished cefepime  Adnexal cyst  Slightly increased in size on recent imaging  Check pelvic ultrasound  Diagnoses and all orders for this visit:    UTI due to extended-spectrum beta lactamase (ESBL) producing Escherichia coli    Adnexal cyst  -     US pelvis complete non OB; Future    Gram-negative bacteremia    Essential hypertension  -     Discontinue: amLODIPine (NORVASC) 2 5 mg tablet; Take 1 tablet (2 5 mg total) by mouth daily  -     amLODIPine (NORVASC) 2 5 mg tablet; Take 1 tablet (2 5 mg total) by mouth daily    Stage 3a chronic kidney disease ([address] )         Subjective:     Patient ID: Jade Ferreira is a 80 y o  female  Ildazahida Billings has had two recent hospitalizations  The first one from 6/28-6/30 was due to an UTI  The last admission was from 7/1-7/7 due to increased confusion, fever, and chills  She was diagnosed with urosepsis  She was treated with IV antibiotics  Her symptoms improved  CT shows an incidental finding of adnexal lesion change  She denies any abdominal pain or vaginal bleeding  She was referred to GI for diarrhea, constipation, and hemorrhoids  Since being home, she is feeling well  Her appetite is normal  She denies any urinary symptoms  Mental status is baseline  She does report her throat feeling dry  No other symptoms  She does want to see GI for diarrhea/constipation as this is not new for her        Review of Systems   Constitutional: Negative for activity change, appetite change, chills, fatigue and fever  Respiratory: Negative for shortness of breath  Cardiovascular: Negative for chest pain and leg swelling  Gastrointestinal: Negative for abdominal pain, constipation, diarrhea, nausea and vomiting  Genitourinary: Positive for vaginal bleeding  Negative for difficulty urinating, dysuria, frequency, hematuria and urgency  Neurological: Negative for dizziness, weakness, light-headedness and headaches  Psychiatric/Behavioral: Negative for confusion  Objective:     Physical Exam  Vitals reviewed  Constitutional:       Appearance: Normal appearance  HENT:      Head: Normocephalic and atraumatic  Eyes:      Conjunctiva/sclera: Conjunctivae normal    Cardiovascular:      Rate and Rhythm: Normal rate and regular rhythm  Heart sounds: Normal heart sounds  Pulmonary:      Effort: Pulmonary effort is normal       Breath sounds: Normal breath sounds  Abdominal:      General: Bowel sounds are normal       Palpations: Abdomen is soft  Tenderness: There is no abdominal tenderness  Musculoskeletal:      Right lower leg: No edema  Left lower leg: No edema  Skin:     General: Skin is warm and dry  Neurological:      Mental Status: She is alert and oriented to person, place, and time  Psychiatric:         Mood and Affect: Mood normal          Behavior: Behavior normal            Vitals:    07/18/22 1028   BP: 122/50   Pulse: 88   Temp: 97 5 °F (36 4 °C)   SpO2: 98%   Weight: 48 2 kg (106 lb 3 2 oz)   Height: 5' 1" (1 549 m)       Transitional Care Management Review:  Satish Lopez is a 80 y o  female here for TCM follow up       During the TCM phone call patient stated:    TCM Call (since 6/17/2022)     Date and time call was made  7/8/2022  9:48 AM    Hospital care reviewed  Records reviewed    Patient was hospitialized at  [address] [place] [address]    Date of Admission  07/01/22    Date of discharge  07/07/22    Diagnosis  UTI due to extended-spectrum beta lactamase (ESBL) producing Escherichia coli    Disposition  Home    Were the patients medications reviewed and updated  Yes    Current Symptoms  None      TCM Call (since 6/17/2022)     Post hospital issues  None    Should patient be enrolled in anticoag monitoring? No    Scheduled for follow up?   Yes    Did you obtain your prescribed medications  Yes    Do you need help managing your prescriptions or medications  No    Is transportation to your appointment needed  No    I have advised the patient to call PCP with any new or worsening symptoms  SILKE Partida

## 2022-07-24 ENCOUNTER — NURSE TRIAGE (OUTPATIENT)
Dept: OTHER | Facility: OTHER | Age: 87
End: 2022-07-24

## 2022-07-24 DIAGNOSIS — Z16.12 UTI DUE TO EXTENDED-SPECTRUM BETA LACTAMASE (ESBL) PRODUCING ESCHERICHIA COLI: Primary | ICD-10-CM

## 2022-07-24 DIAGNOSIS — N39.0 UTI DUE TO EXTENDED-SPECTRUM BETA LACTAMASE (ESBL) PRODUCING ESCHERICHIA COLI: Primary | ICD-10-CM

## 2022-07-24 DIAGNOSIS — B96.29 UTI DUE TO EXTENDED-SPECTRUM BETA LACTAMASE (ESBL) PRODUCING ESCHERICHIA COLI: Primary | ICD-10-CM

## 2022-07-24 RX ORDER — NITROFURANTOIN 25; 75 MG/1; MG/1
100 CAPSULE ORAL 2 TIMES DAILY
Qty: 10 CAPSULE | Refills: 0 | Status: SHIPPED | OUTPATIENT
Start: 2022-07-24 | End: 2022-07-24 | Stop reason: CLARIF

## 2022-07-24 NOTE — TELEPHONE ENCOUNTER
Reason for Disposition   [1] Painful urination AND [2] EITHER frequency or urgency AND [3] has on-call doctor    Answer Assessment - Initial Assessment Questions  1  SEVERITY: "How bad is the pain?"  (e g , Scale 1-10; mild, moderate, or severe)    - MILD (1-3): complains slightly about urination hurting    - MODERATE (4-7): interferes with normal activities      - SEVERE (8-10): excruciating, unwilling or unable to urinate because of the pain     Mild burning     2  FREQUENCY: "How many times have you had painful urination today?"       4 times    3  PATTERN: "Is pain present every time you urinate or just sometimes?"     Every time    4  ONSET: "When did the painful urination start?"       Last two days    5  FEVER: "Do you have a fever?" If Yes, ask: "What is your temperature, how was it measured, and when did it start?"     No    6  PAST UTI: "Have you had a urine infection before?" If Yes, ask: "When was the last time?" and "What happened that time?"    Yes was admitted to hospital- 7/1      7  CAUSE: "What do you think is causing the painful urination?"  (e g , UTI, scratch, Herpes sore)     UTI     8  OTHER SYMPTOMS: "Do you have any other symptoms?" (e g , flank pain, vaginal discharge, genital sores, urgency, blood in urine)    Denies    9  PREGNANCY: "Is there any chance you are pregnant?" "When was your last menstrual period?"  N/a    Protocols used: URINATION PAIN [facility]    Paged on call provider  Provider stated UA ordered  Please give sample since it needs to be cultured  Informed pt of this guidance and she acknowledged understanding

## 2022-07-24 NOTE — TELEPHONE ENCOUNTER
Regarding: UTI   ----- Message from Fabiana Doss sent at 7/24/2022 11:19 AM EDT -----  " I think Im getting a UTI again, im having burning while urinating "

## 2022-07-25 ENCOUNTER — TELEPHONE (OUTPATIENT)
Dept: INTERNAL MEDICINE CLINIC | Facility: CLINIC | Age: 87
End: 2022-07-25

## 2022-07-25 ENCOUNTER — APPOINTMENT (OUTPATIENT)
Dept: LAB | Facility: CLINIC | Age: 87
End: 2022-07-25
Payer: MEDICARE

## 2022-07-25 LAB
BACTERIA UR QL AUTO: NORMAL /HPF
BILIRUB UR QL STRIP: NEGATIVE
CLARITY UR: CLEAR
COLOR UR: COLORLESS
GLUCOSE UR STRIP-MCNC: NEGATIVE MG/DL
HGB UR QL STRIP.AUTO: NEGATIVE
KETONES UR STRIP-MCNC: NEGATIVE MG/DL
LEUKOCYTE ESTERASE UR QL STRIP: ABNORMAL
NITRITE UR QL STRIP: NEGATIVE
NON-SQ EPI CELLS URNS QL MICRO: NORMAL /HPF
PH UR STRIP.AUTO: 5.5 [PH]
PROT UR STRIP-MCNC: NEGATIVE MG/DL
RBC #/AREA URNS AUTO: NORMAL /HPF
SP GR UR STRIP.AUTO: 1 (ref 1–1.03)
UROBILINOGEN UR STRIP-ACNC: <2 MG/DL
WBC #/AREA URNS AUTO: NORMAL /HPF

## 2022-07-25 PROCEDURE — 81001 URINALYSIS AUTO W/SCOPE: CPT

## 2022-07-25 NOTE — TELEPHONE ENCOUNTER
Urine test is normal   Burning may be vaginal dryness or bladder irritation also  Limit acidic foods (tomatoes, onions, oranges, pineapple) and dark beverages including coffee and dark tea

## 2022-07-26 ENCOUNTER — HOSPITAL ENCOUNTER (OUTPATIENT)
Dept: ULTRASOUND IMAGING | Facility: HOSPITAL | Age: 87
Discharge: HOME | End: 2022-07-26
Payer: MEDICARE

## 2022-07-26 DIAGNOSIS — N94.9 ADNEXAL CYST: ICD-10-CM

## 2022-07-26 PROCEDURE — 76856 US EXAM PELVIC COMPLETE: CPT

## 2022-08-12 ENCOUNTER — OFFICE VISIT (OUTPATIENT)
Dept: INTERNAL MEDICINE CLINIC | Facility: CLINIC | Age: 87
End: 2022-08-12
Payer: MEDICARE

## 2022-08-12 VITALS
BODY MASS INDEX: 19.83 KG/M2 | SYSTOLIC BLOOD PRESSURE: 118 MMHG | WEIGHT: 105 LBS | HEART RATE: 79 BPM | DIASTOLIC BLOOD PRESSURE: 48 MMHG | TEMPERATURE: 97 F | OXYGEN SATURATION: 97 % | HEIGHT: 61 IN

## 2022-08-12 DIAGNOSIS — E53.8 VITAMIN B12 DEFICIENCY: ICD-10-CM

## 2022-08-12 DIAGNOSIS — K59.09 OTHER CONSTIPATION: ICD-10-CM

## 2022-08-12 DIAGNOSIS — E05.90 HYPERTHYROIDISM: ICD-10-CM

## 2022-08-12 DIAGNOSIS — M51.36 DEGENERATION OF INTERVERTEBRAL DISC OF LUMBAR REGION: ICD-10-CM

## 2022-08-12 DIAGNOSIS — J45.20 MILD INTERMITTENT ASTHMA WITHOUT COMPLICATION: ICD-10-CM

## 2022-08-12 DIAGNOSIS — E78.2 MIXED HYPERLIPIDEMIA: ICD-10-CM

## 2022-08-12 DIAGNOSIS — N18.31 STAGE 3A CHRONIC KIDNEY DISEASE (HCC): ICD-10-CM

## 2022-08-12 DIAGNOSIS — H53.8 BLURRY VISION, BILATERAL: Primary | ICD-10-CM

## 2022-08-12 DIAGNOSIS — F41.9 ANXIETY: ICD-10-CM

## 2022-08-12 DIAGNOSIS — R22.0 MASS OF RIGHT SUBMANDIBULAR REGION: ICD-10-CM

## 2022-08-12 DIAGNOSIS — R63.6 UNDERWEIGHT: ICD-10-CM

## 2022-08-12 DIAGNOSIS — R91.8 PULMONARY NODULES: ICD-10-CM

## 2022-08-12 DIAGNOSIS — N94.9 ADNEXAL CYST: ICD-10-CM

## 2022-08-12 DIAGNOSIS — I65.21 CAROTID STENOSIS, RIGHT: ICD-10-CM

## 2022-08-12 DIAGNOSIS — E11.22 TYPE 2 DIABETES MELLITUS WITH STAGE 3 CHRONIC KIDNEY DISEASE AND HYPERTENSION (HCC): ICD-10-CM

## 2022-08-12 DIAGNOSIS — I12.9 TYPE 2 DIABETES MELLITUS WITH STAGE 3 CHRONIC KIDNEY DISEASE AND HYPERTENSION (HCC): ICD-10-CM

## 2022-08-12 DIAGNOSIS — E04.1 THYROID NODULE: ICD-10-CM

## 2022-08-12 DIAGNOSIS — N18.30 TYPE 2 DIABETES MELLITUS WITH STAGE 3 CHRONIC KIDNEY DISEASE AND HYPERTENSION (HCC): ICD-10-CM

## 2022-08-12 PROBLEM — R78.81 GRAM-NEGATIVE BACTEREMIA: Status: RESOLVED | Noted: 2022-07-03 | Resolved: 2022-08-12

## 2022-08-12 PROCEDURE — 99214 OFFICE O/P EST MOD 30 MIN: CPT | Performed by: INTERNAL MEDICINE

## 2022-08-12 RX ORDER — METHIMAZOLE 5 MG/1
TABLET ORAL
Qty: 30 TABLET | Refills: 0
Start: 2022-08-12

## 2022-08-12 NOTE — PROGRESS NOTES
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 ([facility] [address] )    Lab Results   Component Value Date    HGBA1C 5 2 06/20/2022     A1c remains normal   Not on medication  Vertebral artery stenosis  On ASA, Pletal   Not on statin  Mass of right submandibular region  Hypertrophy  Stable, saw ENT recently  Esophageal reflux  Minimal symptoms, takes famotidine prn  Degeneration of intervertebral disc of lumbar region  Has not taken Percocet  PDMP reviewed  Anxiety  Has not taken lorazepam     Thyroid nodule  Has not done ultrasound  Vitamin B12 deficiency  Taking Metanx  CKD (chronic kidney disease), stage III [facility])  Lab Results   Component Value Date    EGFR 79 07/07/2022    EGFR 76 07/04/2022    EGFR 65 07/03/2022    CREATININE 0 60 07/07/2022    CREATININE 0 70 07/04/2022    CREATININE 0 80 07/03/2022     Improved  Blurry vision, bilateral  Ongoing symptoms, hx of macular degeneration  Has appointment with Ophtha  Hyperlipidemia  Lipids due  Other constipation  Recommend to take fiber supplement daily  Underweight  Lost 10 lbs since 5 months ago, lost most of it during recent hospitalization  Recommend protein shake prn  Pulmonary nodules  Stable, reviewed CT 7/22  Diagnoses and all orders for this visit:    Blurry vision, bilateral    Adnexal cyst  -     US pelvis complete non OB;  Future    Carotid stenosis, right    Stage 3a chronic kidney disease (HCC)    Degeneration of intervertebral disc of lumbar region    Mild intermittent asthma without complication    Thyroid nodule    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 ([facility] [address] )  -     Lipid panel    Vitamin B12 deficiency  -     Vitamin B12    Anxiety    Mass of right submandibular region    Hyperthyroidism  -     methimazole (TAPAZOLE) 5 mg tablet; 1/2 tab MWFS    Mixed hyperlipidemia    Other constipation    Underweight    Pulmonary nodules    Follow up in 4 months or as needed  Subjective:      Patient ID: Sarah Chaidez is a 80 y o  female here with her daughter, for a follow up  She is feeling well, no complaints  She had started using her albuterol inhaler once a day, usually in the morning  She is not sure when she started doing this, at least over a month ago  She does not want to start a steroid inhaler due to cost       She denies any abdominal pain or discomfort  She moves her bowels every few days  If she takes a laxative, she develops diarrhea  She reports her appetite comes and goes  Daughter thinks she lost most of the weight since her hospital stay  She continues to experience blurring of vision  She denies any flashes or loss of vision  She denies any dizziness  No chest pain, shortness of breath or palpitations  The following portions of the patient's history were reviewed and updated as appropriate: allergies, current medications, past medical history, past social history and problem list     Review of Systems   Constitutional: Negative for activity change, appetite change and fatigue  HENT: Negative for congestion, ear pain and postnasal drip  Eyes: Negative for visual disturbance  Respiratory: Negative for cough, shortness of breath and wheezing  Cardiovascular: Negative for chest pain and leg swelling  Gastrointestinal: Positive for constipation  Negative for abdominal pain and diarrhea  Genitourinary: Negative for dysuria, frequency and urgency  Musculoskeletal: Negative for arthralgias and myalgias  Skin: Negative for rash and wound     Neurological: Negative for dizziness, weakness, numbness and headaches  Hematological: Does not bruise/bleed easily  Psychiatric/Behavioral: Negative for confusion  The patient is not nervous/anxious  Objective:      BP (!) 118/48   Pulse 79   Temp (!) 97 °F (36 1 °C)   Ht 5' 1" (1 549 m)   Wt 47 6 kg (105 lb)   SpO2 97%   BMI 19 84 kg/m²          Physical Exam  Vitals and nursing note reviewed  Constitutional:       General: She is not in acute distress  Appearance: She is well-developed  HENT:      Head: Normocephalic and atraumatic  Mouth/Throat:      Mouth: Mucous membranes are moist    Eyes:      Pupils: Pupils are equal, round, and reactive to light  Cardiovascular:      Rate and Rhythm: Normal rate and regular rhythm  Heart sounds: Normal heart sounds  Pulmonary:      Effort: Pulmonary effort is normal       Breath sounds: Normal breath sounds  No wheezing  Abdominal:      General: Bowel sounds are normal       Palpations: Abdomen is soft  Musculoskeletal:         General: No swelling  Right lower leg: No edema  Left lower leg: No edema  Skin:     General: Skin is warm and dry  Findings: No rash  Neurological:      General: No focal deficit present  Mental Status: She is alert and oriented to person, place, and time  Psychiatric:         Mood and Affect: Mood and affect normal  Mood is not anxious or depressed  Behavior: Behavior normal            Labs & imaging results reviewed with patient

## 2022-08-12 NOTE — ASSESSMENT & PLAN NOTE
Lab Results   Component Value Date    EGFR 79 07/07/2022    EGFR 76 07/04/2022    EGFR 65 07/03/2022    CREATININE 0 60 07/07/2022    CREATININE 0 70 07/04/2022    CREATININE 0 80 07/03/2022     Improved

## 2022-08-12 NOTE — ASSESSMENT & PLAN NOTE
Lost 10 lbs since 5 months ago, lost most of it during recent hospitalization  Recommend protein shake prn

## 2022-08-12 NOTE — ASSESSMENT & PLAN NOTE
Lab Results   Component Value Date    HGBA1C 5 2 06/20/2022     A1c remains normal   Not on medication

## 2022-08-14 DIAGNOSIS — J45.20 MILD INTERMITTENT ASTHMA WITHOUT COMPLICATION: ICD-10-CM

## 2022-08-15 RX ORDER — ALBUTEROL SULFATE 90 UG/1
AEROSOL, METERED RESPIRATORY (INHALATION)
Qty: 8.5 G | Refills: 1 | Status: SHIPPED | OUTPATIENT
Start: 2022-08-15

## 2022-09-08 ENCOUNTER — TELEPHONE (OUTPATIENT)
Dept: NEUROLOGY | Facility: CLINIC | Age: 87
End: 2022-09-08

## 2022-09-08 NOTE — TELEPHONE ENCOUNTER
Attempted to call pt for reminder of appt with Refugio Rodriguez on 09/28/22, no answer  Left a vm to call back when available

## 2022-09-28 ENCOUNTER — OFFICE VISIT (OUTPATIENT)
Dept: NEUROLOGY | Facility: CLINIC | Age: 87
End: 2022-09-28

## 2022-09-28 VITALS
DIASTOLIC BLOOD PRESSURE: 60 MMHG | RESPIRATION RATE: 14 BRPM | SYSTOLIC BLOOD PRESSURE: 120 MMHG | WEIGHT: 105.1 LBS | HEART RATE: 82 BPM | TEMPERATURE: 97.5 F | BODY MASS INDEX: 19.86 KG/M2

## 2022-09-28 DIAGNOSIS — I77.74 VERTEBRAL ARTERY DISSECTION (HCC): ICD-10-CM

## 2022-09-28 DIAGNOSIS — Z86.73 HISTORY OF TIA (TRANSIENT ISCHEMIC ATTACK): Primary | ICD-10-CM

## 2022-09-28 DIAGNOSIS — I65.29 CAROTID STENOSIS: ICD-10-CM

## 2022-09-28 NOTE — PROGRESS NOTES
Patient ID: Dewey Johnson is a 80 y o  female  Assessment/Plan:    History of TIA (transient ischemic attack)   Pt denies any symptoms to suggest new stroke   Pt should continue Aspirin and Pletal for secondary stroke prevention  Pt did not tolerate statins   Pt was encouraged to get regular exercise and follow a Mediterranean diet   If pt has any new stroke-like symptoms including sudden painless loss of vision or double vision, difficulty speaking or swallowing, vertigo / room spinning that does not quickly resolve, or weakness / numbness affecting 1 side of the face or body he should proceed by ambulance to the nearest emergency room immediately   I have spent 30 minutes with Patient and family today in which greater than 50% of this time was spent in counseling/coordination of care regarding Diagnostic results, Prognosis, Risks and benefits of tx options, Intructions for management, Patient and family education, Importance of tx compliance, Risk factor reductions and Impressions   Pt will follow-up in 12 months  Carotid stenosis, right  · Followed by Vascular surgery  No change in treatment  · Has a repeat scheduled for this week  Vertebral artery dissection (HCC)  · See above  Problem List Items Addressed This Visit        Cardiovascular and Mediastinum    Vertebral artery dissection ([facility] [address] )     · See above  Other    History of TIA (transient ischemic attack) - Primary      Pt denies any symptoms to suggest new stroke   Pt should continue Aspirin and Pletal for secondary stroke prevention  Pt did not tolerate statins   Pt was encouraged to get regular exercise and follow a Mediterranean diet     If pt has any new stroke-like symptoms including sudden painless loss of vision or double vision, difficulty speaking or swallowing, vertigo / room spinning that does not quickly resolve, or weakness / numbness affecting 1 side of the face or body he should proceed by ambulance to the nearest emergency room immediately   I have spent 30 minutes with Patient and family today in which greater than 50% of this time was spent in counseling/coordination of care regarding Diagnostic results, Prognosis, Risks and benefits of tx options, Intructions for management, Patient and family education, Importance of tx compliance, Risk factor reductions and Impressions   Pt will follow-up in 12 months  Other Visit Diagnoses     Carotid stenosis                 Subjective:    HPI    Yumiko Perace is a 80y o  female, with HTN, anxiety, macular degeneration, hyperthyroididm, GERD and asthma, who follows in the office due to Lt vertebral artery dissection and history of TIA  She is taking ASA and Pletal  She denies any side effects of these medications  She was taking Crestor but stopped it  She reports that she was having what she thought were visual hallucinations, ie would see something across the street and think she saw people when she was actually looking at plants  She admits that she continues to see people when there are actually plants but she does not want to resume a statin  She had a doppler since her last appt which revealed progression of carotid disease  She was seen by Vascular surgery and no changes to her medications were recommended  A repeat carotid doppler is scheduled for Friday  She admits to eating whatever she wants  She states that at her age she has earned the right to do so  She does keep herself busy and exercises regularly  She occasionally feels lightheaded with quick position changes  She has not had any symptoms to suggest TIA or CVA      The following portions of the patient's history were reviewed and updated as appropriate: allergies, current medications, past family history, past medical history, past social history, past surgical history and problem list          Objective:    Blood pressure 120/60, pulse 82, temperature 97 5 °F (36 4 °C), temperature source Temporal, resp  rate 14, weight 47 7 kg (105 lb 1 6 oz)  Physical Exam  Vitals reviewed  Eyes:      General: Lids are normal       Extraocular Movements: Extraocular movements intact  Pupils: Pupils are equal, round, and reactive to light  Neurological:      Coordination: Coordination is intact  Deep Tendon Reflexes: Strength normal and reflexes are normal and symmetric  Psychiatric:         Speech: Speech normal          Neurological Exam  Mental Status   Oriented to person, place, time and situation  Recent and remote memory are intact  Speech is normal  Language is fluent with no aphasia  Attention and concentration are normal  Fund of knowledge is appropriate for level of education  Apraxia absent  Cranial Nerves  CN II: Visual acuity is normal  Visual fields full to confrontation  CN III, IV, VI: Extraocular movements intact bilaterally  Normal lids and orbits bilaterally  Pupils equal round and reactive to light bilaterally  CN V: Facial sensation is normal   CN VII: Full and symmetric facial movement  CN VIII: Hearing is normal   CN IX, X: Palate elevates symmetrically  Normal gag reflex  CN XI: Shoulder shrug strength is normal   CN XII: Tongue midline without atrophy or fasciculations  Motor   Strength is 5/5 throughout all four extremities  Sensory  Sensation is intact to light touch, pinprick, vibration and proprioception in all four extremities  Reflexes  Deep tendon reflexes are 2+ and symmetric in all four extremities  Coordination    Finger-to-nose, rapid alternating movements and heel-to-shin normal bilaterally without dysmetria  Gait    Narrow based gait  ROS:    Review of Systems   Constitutional: Negative  Negative for appetite change and fever  HENT: Negative  Negative for hearing loss, tinnitus, trouble swallowing and voice change  Eyes: Negative  Negative for photophobia and pain  Respiratory: Negative    Negative for shortness of breath  Cardiovascular: Negative  Negative for palpitations  Gastrointestinal: Negative  Negative for nausea and vomiting  Endocrine: Negative  Negative for cold intolerance  Genitourinary: Negative  Negative for dysuria, frequency and urgency  Musculoskeletal: Negative  Negative for myalgias and neck pain  Skin: Negative  Negative for rash  Neurological: Positive for light-headedness  Negative for dizziness, tremors, seizures, syncope, facial asymmetry, speech difficulty, weakness, numbness and headaches  Hematological: Negative  Does not bruise/bleed easily  Psychiatric/Behavioral: Negative  Negative for confusion, hallucinations and sleep disturbance  Review of systems personally reviewed

## 2022-09-28 NOTE — PATIENT INSTRUCTIONS
History of TIA (transient ischemic attack)  Pt denies any symptoms to suggest new stroke  Pt should continue Aspirin and Pletal for secondary stroke prevention  Pt did not tolerate statins  Pt was encouraged to get regular exercise and follow a Mediterranean diet  If pt has any new stroke-like symptoms including sudden painless loss of vision or double vision, difficulty speaking or swallowing, vertigo / room spinning that does not quickly resolve, or weakness / numbness affecting 1 side of the face or body he should proceed by ambulance to the nearest emergency room immediately  I have spent 30 minutes with Patient and family today in which greater than 50% of this time was spent in counseling/coordination of care regarding Diagnostic results, Prognosis, Risks and benefits of tx options, Intructions for management, Patient and family education, Importance of tx compliance, Risk factor reductions and Impressions  Pt will follow-up in 12 months  Carotid stenosis, right  Followed by Vascular surgery  No change in treatment  Has a repeat scheduled for this week  Vertebral artery dissection ([facility] [address] )  See above

## 2022-09-28 NOTE — ASSESSMENT & PLAN NOTE
 Pt denies any symptoms to suggest new stroke   Pt should continue Aspirin and Pletal for secondary stroke prevention  Pt did not tolerate statins   Pt was encouraged to get regular exercise and follow a Mediterranean diet   If pt has any new stroke-like symptoms including sudden painless loss of vision or double vision, difficulty speaking or swallowing, vertigo / room spinning that does not quickly resolve, or weakness / numbness affecting 1 side of the face or body he should proceed by ambulance to the nearest emergency room immediately   I have spent 30 minutes with Patient and family today in which greater than 50% of this time was spent in counseling/coordination of care regarding Diagnostic results, Prognosis, Risks and benefits of tx options, Intructions for management, Patient and family education, Importance of tx compliance, Risk factor reductions and Impressions   Pt will follow-up in 12 months

## 2022-09-30 ENCOUNTER — HOSPITAL ENCOUNTER (OUTPATIENT)
Dept: NON INVASIVE DIAGNOSTICS | Facility: CLINIC | Age: 87
Discharge: HOME | End: 2022-09-30
Payer: MEDICARE

## 2022-09-30 DIAGNOSIS — I65.21 STENOSIS OF RIGHT CAROTID ARTERY: ICD-10-CM

## 2022-09-30 PROCEDURE — 93880 EXTRACRANIAL BILAT STUDY: CPT

## 2022-10-02 PROCEDURE — 93880 EXTRACRANIAL BILAT STUDY: CPT | Performed by: SURGERY

## 2022-10-11 PROBLEM — N39.0 UTI DUE TO EXTENDED-SPECTRUM BETA LACTAMASE (ESBL) PRODUCING ESCHERICHIA COLI: Status: RESOLVED | Noted: 2022-06-28 | Resolved: 2022-10-11

## 2022-10-11 PROBLEM — Z16.12 UTI DUE TO EXTENDED-SPECTRUM BETA LACTAMASE (ESBL) PRODUCING ESCHERICHIA COLI: Status: RESOLVED | Noted: 2022-06-28 | Resolved: 2022-10-11

## 2022-10-11 PROBLEM — B96.29 UTI DUE TO EXTENDED-SPECTRUM BETA LACTAMASE (ESBL) PRODUCING ESCHERICHIA COLI: Status: RESOLVED | Noted: 2022-06-28 | Resolved: 2022-10-11

## 2022-10-22 DIAGNOSIS — J45.30 MILD PERSISTENT ASTHMA WITHOUT COMPLICATION: ICD-10-CM

## 2022-10-24 DIAGNOSIS — I10 ESSENTIAL HYPERTENSION: ICD-10-CM

## 2022-10-24 RX ORDER — MONTELUKAST SODIUM 10 MG/1
TABLET ORAL
Qty: 90 TABLET | Refills: 1 | Status: SHIPPED | OUTPATIENT
Start: 2022-10-24

## 2022-10-24 RX ORDER — AMLODIPINE BESYLATE 2.5 MG/1
TABLET ORAL
Qty: 90 TABLET | Refills: 0 | Status: SHIPPED | OUTPATIENT
Start: 2022-10-24

## 2022-11-15 DIAGNOSIS — J45.20 MILD INTERMITTENT ASTHMA WITHOUT COMPLICATION: ICD-10-CM

## 2022-11-15 RX ORDER — ALBUTEROL SULFATE 90 UG/1
AEROSOL, METERED RESPIRATORY (INHALATION)
Qty: 8.5 G | Refills: 1 | Status: SHIPPED | OUTPATIENT
Start: 2022-11-15

## 2022-11-25 ENCOUNTER — TELEPHONE (OUTPATIENT)
Dept: INTERNAL MEDICINE CLINIC | Facility: CLINIC | Age: 87
End: 2022-11-25

## 2022-11-25 DIAGNOSIS — R42 VERTIGO: ICD-10-CM

## 2022-11-25 RX ORDER — MECLIZINE HYDROCHLORIDE 25 MG/1
25 TABLET ORAL EVERY 8 HOURS PRN
Qty: 30 TABLET | Refills: 0 | Status: SHIPPED | OUTPATIENT
Start: 2022-11-25

## 2022-11-25 NOTE — TELEPHONE ENCOUNTER
You can take meclizine up to 4 times a day  Next medication option would be diazepam (Valium ) which I think is too strong for you  Avoid rapid head movement, if you remember the exercises, you can do it  If not, can pick it up at the office

## 2022-11-25 NOTE — TELEPHONE ENCOUNTER
Pt called and states she has been experiencing vertigo since last Saturday  She had a RX for Meclizine from the last time experienced this and it was helping  Yesterday sh states she was fine  Today she said is the worst day of the dizziness  She reports no fainting, no nausea, no vomiting, or headache  She did mention that her stomach is warm to the touch and feels on fire  Daughter looked at her abdomen for me and states there is no redness or swelling but is hot  She is asking for advice since it is the weekend

## 2022-11-25 NOTE — TELEPHONE ENCOUNTER
Pt called back to clarify that she does not feel the meclizine is working  She's been taking it since last Saturday and she felt pretty good up until yesterday when the dizziness came back  She would like something other then meclizine to help with her dizziness

## 2022-11-26 DIAGNOSIS — I65.21 STENOSIS OF RIGHT CAROTID ARTERY: ICD-10-CM

## 2022-11-26 DIAGNOSIS — I65.09 VERTEBRAL ARTERY STENOSIS: ICD-10-CM

## 2022-11-27 RX ORDER — CILOSTAZOL 100 MG/1
TABLET ORAL
Qty: 180 TABLET | Refills: 3 | Status: SHIPPED | OUTPATIENT
Start: 2022-11-27 | End: 2022-12-01 | Stop reason: SDUPTHER

## 2022-11-28 NOTE — TELEPHONE ENCOUNTER
[de-identified], daughter, called w/ update  Pt  Is still dizzy  She says that she feels like her whole body is on fire sometimes  Morenita said in the past she was only taking Meclizine twice daily and she was fine  When she started taking it more frequently she started feeling more dizzy  Also, pt  thought she was supposed to take it q 4hrs so she was waking up during the night to take it  Morenita told her not to do that  Yesterday she took two Meclizine, maybe three  Last does around 8:30pm  Took one this morning because she was dizzy  Call Morenita back at [phone] or [phone]

## 2022-11-28 NOTE — TELEPHONE ENCOUNTER
She can take it 3 times a day, every 8 hours  No need to wake up to take  Offer appt this week, with me or Daisy Cordova

## 2022-12-01 ENCOUNTER — OFFICE VISIT (OUTPATIENT)
Dept: INTERNAL MEDICINE CLINIC | Facility: CLINIC | Age: 87
End: 2022-12-01

## 2022-12-01 VITALS
BODY MASS INDEX: 18.73 KG/M2 | HEIGHT: 61 IN | HEART RATE: 88 BPM | SYSTOLIC BLOOD PRESSURE: 122 MMHG | OXYGEN SATURATION: 98 % | WEIGHT: 99.2 LBS | DIASTOLIC BLOOD PRESSURE: 44 MMHG | TEMPERATURE: 97.5 F

## 2022-12-01 DIAGNOSIS — E05.90 HYPERTHYROIDISM: ICD-10-CM

## 2022-12-01 DIAGNOSIS — K21.9 GASTROESOPHAGEAL REFLUX DISEASE, UNSPECIFIED WHETHER ESOPHAGITIS PRESENT: ICD-10-CM

## 2022-12-01 DIAGNOSIS — N18.30 TYPE 2 DIABETES MELLITUS WITH STAGE 3 CHRONIC KIDNEY DISEASE AND HYPERTENSION (HCC): ICD-10-CM

## 2022-12-01 DIAGNOSIS — J45.20 MILD INTERMITTENT ASTHMA WITHOUT COMPLICATION: ICD-10-CM

## 2022-12-01 DIAGNOSIS — F41.9 ANXIETY: ICD-10-CM

## 2022-12-01 DIAGNOSIS — R63.6 UNDERWEIGHT: ICD-10-CM

## 2022-12-01 DIAGNOSIS — I10 ESSENTIAL HYPERTENSION: ICD-10-CM

## 2022-12-01 DIAGNOSIS — I65.21 STENOSIS OF RIGHT CAROTID ARTERY: ICD-10-CM

## 2022-12-01 DIAGNOSIS — I65.09 VERTEBRAL ARTERY STENOSIS: ICD-10-CM

## 2022-12-01 DIAGNOSIS — H81.12 BPPV (BENIGN PAROXYSMAL POSITIONAL VERTIGO), LEFT: Primary | ICD-10-CM

## 2022-12-01 DIAGNOSIS — E11.22 TYPE 2 DIABETES MELLITUS WITH STAGE 3 CHRONIC KIDNEY DISEASE AND HYPERTENSION (HCC): ICD-10-CM

## 2022-12-01 DIAGNOSIS — K59.09 OTHER CONSTIPATION: ICD-10-CM

## 2022-12-01 DIAGNOSIS — I12.9 TYPE 2 DIABETES MELLITUS WITH STAGE 3 CHRONIC KIDNEY DISEASE AND HYPERTENSION (HCC): ICD-10-CM

## 2022-12-01 DIAGNOSIS — N18.31 STAGE 3A CHRONIC KIDNEY DISEASE (HCC): ICD-10-CM

## 2022-12-01 PROBLEM — H53.9 VISION ABNORMALITIES: Status: RESOLVED | Noted: 2020-09-04 | Resolved: 2022-12-01

## 2022-12-01 PROBLEM — I74.3 EMBOLISM AND THROMBOSIS OF ARTERIES OF THE LOWER EXTREMITIES (HCC): Status: RESOLVED | Noted: 2020-09-01 | Resolved: 2022-12-01

## 2022-12-01 PROBLEM — H53.8 BLURRY VISION, BILATERAL: Status: RESOLVED | Noted: 2022-06-28 | Resolved: 2022-12-01

## 2022-12-01 RX ORDER — CILOSTAZOL 100 MG/1
100 TABLET ORAL 2 TIMES DAILY
Qty: 180 TABLET | Refills: 1 | Status: SHIPPED | OUTPATIENT
Start: 2022-12-01

## 2022-12-01 NOTE — ASSESSMENT & PLAN NOTE
Lab Results   Component Value Date    EGFR 79 07/07/2022    EGFR 76 07/04/2022    EGFR 65 07/03/2022    CREATININE 0 60 07/07/2022    CREATININE 0 70 07/04/2022    CREATININE 0 80 07/03/2022     Repeat labs

## 2022-12-01 NOTE — ASSESSMENT & PLAN NOTE
Declined vestibular therapy  Recommend against diazepam, had side effects with lorazepam   Meclizine has not helped

## 2022-12-01 NOTE — PROGRESS NOTES
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 ([facility] [address] )    Lab Results   Component Value Date    HGBA1C 5 2 06/20/2022     A1c normal     CKD (chronic kidney disease), stage III [facility])  Lab Results   Component Value Date    EGFR 79 07/07/2022    EGFR 76 07/04/2022    EGFR 65 07/03/2022    CREATININE 0 60 07/07/2022    CREATININE 0 70 07/04/2022    CREATININE 0 80 07/03/2022     Repeat labs  Degeneration of intervertebral disc of lumbar region  No recent symptoms  Vitamin B12 deficiency  On Metanx  Underweight  Weight loss 6 lbs since last visit  Encouraged small frequent meals  Hyperlipidemia  Lipids due  Not on medication  BPPV (benign paroxysmal positional vertigo), left  Declined vestibular therapy  Recommend against diazepam, had side effects with lorazepam   Meclizine has not helped  Esophageal reflux  Discussed low acid diet  Taking famotidine daily  Blurry vision, bilateral  Resolved  Anxiety  Slightly worse recently  Adnexal cyst  Ultrasound due next year  No symptoms  Other constipation  Discussed high fiber diet  Takes laxative prn  Diagnoses and all orders for this visit:    BPPV (benign paroxysmal positional vertigo), left    Vertebral artery stenosis  -     cilostazol (PLETAL) 100 mg tablet; Take 1 tablet (100 mg total) by mouth 2 (two) times a day    Stenosis of right carotid artery  -     cilostazol (PLETAL) 100 mg tablet; Take 1 tablet (100 mg total) by mouth 2 (two) times a day    Hyperthyroidism    Stage 3a chronic kidney disease ([facility] )  -     Comprehensive metabolic panel;  Future  -     CBC and differential; Future    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 (HCC)    Underweight    Essential hypertension    Gastroesophageal reflux disease, unspecified whether esophagitis present    Anxiety    Other constipation    Mild intermittent asthma without complication    Follow up in 3 months or as needed  Subjective:      Patient ID: Bassem Moore is a 80 y o  female is here with her daughter, c/o dizziness  Dizziness started about 10 days ago  She cannot recall any trigger  She feels very dizzy, worse when turning or looking to her left side  No symptoms on her right side  She does not look up  She feels her head is full  Denies any nausea or vomiting  She had stopped taking meclizine for several months ago  She had started taking this again up to 3 times a day with no relief  She recalls going to physical therapy but did not do well since she vomited because of increased dizziness  She did go to ENT once in the maneuver was done in she did okay  She feels her head is always full, does not feel well  She reports no neck pain, no headaches  Denies any chest pain or shortness of breath  She moves her bowels about once a week, recently took a laxative and has been going regularly left past few days  Daughter thinks she has not been eating too much, does not drink a lot of fluids on a regular basis  She also complains of burning around her abdomen  She does take famotidine every morning  She does think dark tea and pineapple juice regularly  The following portions of the patient's history were reviewed and updated as appropriate: allergies, current medications, past medical history, past social history and problem list     Review of Systems   Constitutional: Positive for appetite change  Negative for fatigue  HENT: Negative for congestion, ear pain and postnasal drip  Eyes: Negative for visual disturbance     Respiratory: Negative for cough and shortness of breath  Cardiovascular: Negative for chest pain and leg swelling  Gastrointestinal: Positive for constipation  Negative for abdominal pain and diarrhea  Genitourinary: Negative for dysuria, frequency and urgency  Musculoskeletal: Negative for arthralgias, gait problem and myalgias  Skin: Negative for rash and wound  Neurological: Positive for dizziness and light-headedness  Negative for numbness and headaches  Hematological: Does not bruise/bleed easily  Psychiatric/Behavioral: Negative for confusion  The patient is nervous/anxious  Objective:      BP (!) 122/44   Pulse 88   Temp 97 5 °F (36 4 °C)   Ht 5' 1" (1 549 m)   Wt 45 kg (99 lb 3 2 oz)   SpO2 98%   BMI 18 74 kg/m²          Physical Exam  Vitals and nursing note reviewed  Constitutional:       General: She is not in acute distress  Appearance: She is well-developed  HENT:      Head: Normocephalic and atraumatic  Right Ear: Tympanic membrane, ear canal and external ear normal       Left Ear: Tympanic membrane, ear canal and external ear normal    Eyes:      Pupils: Pupils are equal, round, and reactive to light  Cardiovascular:      Rate and Rhythm: Normal rate and regular rhythm  Heart sounds: Normal heart sounds  Pulmonary:      Effort: Pulmonary effort is normal       Breath sounds: Normal breath sounds  No wheezing  Abdominal:      General: Bowel sounds are normal       Palpations: Abdomen is soft  Musculoskeletal:         General: No swelling  Right lower leg: No edema  Left lower leg: No edema  Skin:     General: Skin is warm  Findings: No rash  Neurological:      General: No focal deficit present  Mental Status: She is alert and oriented to person, place, and time  Comments: (+) nystagmus left, symptomatic   Psychiatric:         Mood and Affect: Mood and affect normal  Mood is not anxious or depressed           Behavior: Behavior normal            Labs & imaging results reviewed with patient

## 2022-12-16 ENCOUNTER — TELEPHONE (OUTPATIENT)
Dept: NEUROLOGY | Facility: CLINIC | Age: 87
End: 2022-12-16

## 2022-12-16 NOTE — TELEPHONE ENCOUNTER
received -Hi, my name is Jason Gonzalez, so I am calling for my mother Chaya Bynum, her birthday [birth year]  She has been having problems with dizziness  We thought was possibly vertigo, but we went to the ENT and he doesn't think so  He suggested due to her artery blockages that she see you or possibly have them looked at again  I called on Wednesday, but I hadn't heard anything back  I'd appreciate it If somebody could give me a call back and tell us what we should do next  Maybe they need to be checked again  I don't feel that She should go to ER at this point  Especially if all the sickness  it seems like it's coming and going  She was taking meclizine which we stopped because it didn't seem to be doing anything according to the ENT  So now he suggested that we check with you about the artery  If you could call me back, I'd really appreciate it  Thank you    [phone]

## 2022-12-20 NOTE — TELEPHONE ENCOUNTER
patient of Patel King, last visit 9/28    Daughter said patient saw ENT 12/14, note documents:Explained bilateral HF SNHL  The patient's history and symptoms are not consistent with BPPV  As the patient was already recently seen by Dr Ottoniel Cherry, I recommend Neurology evaluation  ENT note also documents: At the patient's daughter's request, an order for PT evaluation was placed at the conclusion of the patient's office visit today, in the event Neuro evaluation is unrevealing  Given her history of TIA, etc , the patient is not a good candidate for VNG    PCP ordered CTA head/neck, daughter said meclizine not effective    Daughter asked if other imaging should be ordered or if office visit should be scheduled to assess  Carotid study was done 9/30/2022  Please provide recommendation, thank you

## 2022-12-20 NOTE — TELEPHONE ENCOUNTER
Daughter aware of recommendation; she will call back when CTA resulted so Leopold Herring can review; will then schedule visit accordingly

## 2022-12-20 NOTE — TELEPHONE ENCOUNTER
Let's see what the CTA shows and then decide if any other imaging is needed  We can also put her on a cancellation list for a sooner appt  Thanks

## 2022-12-26 ENCOUNTER — APPOINTMENT (OUTPATIENT)
Dept: LAB | Facility: CLINIC | Age: 87
End: 2022-12-26

## 2022-12-26 DIAGNOSIS — N18.31 STAGE 3A CHRONIC KIDNEY DISEASE (HCC): ICD-10-CM

## 2022-12-26 LAB
ALBUMIN SERPL BCP-MCNC: 3.7 G/DL (ref 3.5–5)
ALP SERPL-CCNC: 86 U/L (ref 34–104)
ALT SERPL W P-5'-P-CCNC: 14 U/L (ref 7–52)
ANION GAP SERPL CALCULATED.3IONS-SCNC: 5 MMOL/L (ref 4–13)
AST SERPL W P-5'-P-CCNC: 20 U/L (ref 13–39)
BASOPHILS # BLD AUTO: 0 THOUSANDS/ÂΜL (ref 0–0.1)
BASOPHILS NFR BLD AUTO: 0 % (ref 0–1)
BILIRUB SERPL-MCNC: 0.74 MG/DL (ref 0.2–1)
BUN SERPL-MCNC: 13 MG/DL (ref 5–25)
CALCIUM SERPL-MCNC: 9.2 MG/DL (ref 8.4–10.2)
CHLORIDE SERPL-SCNC: 108 MMOL/L (ref 96–108)
CHOLEST SERPL-MCNC: 116 MG/DL
CO2 SERPL-SCNC: 27 MMOL/L (ref 21–32)
CREAT SERPL-MCNC: 0.81 MG/DL (ref 0.6–1.3)
EOSINOPHIL # BLD AUTO: 0.01 THOUSAND/ÂΜL (ref 0–0.61)
EOSINOPHIL NFR BLD AUTO: 0 % (ref 0–6)
ERYTHROCYTE [DISTWIDTH] IN BLOOD BY AUTOMATED COUNT: 13.2 % (ref 11.6–15.1)
GFR SERPL CREATININE-BSD FRML MDRD: 63 ML/MIN/1.73SQ M
GLUCOSE P FAST SERPL-MCNC: 127 MG/DL (ref 65–99)
HCT VFR BLD AUTO: 32.8 % (ref 34.8–46.1)
HDLC SERPL-MCNC: 50 MG/DL
HGB BLD-MCNC: 10.5 G/DL (ref 11.5–15.4)
IMM GRANULOCYTES # BLD AUTO: 0 THOUSAND/UL (ref 0–0.2)
IMM GRANULOCYTES NFR BLD AUTO: 0 % (ref 0–2)
LDLC SERPL CALC-MCNC: 53 MG/DL (ref 0–100)
LYMPHOCYTES # BLD AUTO: 1.24 THOUSANDS/ÂΜL (ref 0.6–4.47)
LYMPHOCYTES NFR BLD AUTO: 44 % (ref 14–44)
MCH RBC QN AUTO: 28.2 PG (ref 26.8–34.3)
MCHC RBC AUTO-ENTMCNC: 32 G/DL (ref 31.4–37.4)
MCV RBC AUTO: 88 FL (ref 82–98)
MONOCYTES # BLD AUTO: 0.34 THOUSAND/ÂΜL (ref 0.17–1.22)
MONOCYTES NFR BLD AUTO: 12 % (ref 4–12)
NEUTROPHILS # BLD AUTO: 1.23 THOUSANDS/ÂΜL (ref 1.85–7.62)
NEUTS SEG NFR BLD AUTO: 44 % (ref 43–75)
NONHDLC SERPL-MCNC: 66 MG/DL
NRBC BLD AUTO-RTO: 0 /100 WBCS
PLATELET # BLD AUTO: 220 THOUSANDS/UL (ref 149–390)
PMV BLD AUTO: 9.6 FL (ref 8.9–12.7)
POTASSIUM SERPL-SCNC: 3.7 MMOL/L (ref 3.5–5.3)
PROT SERPL-MCNC: 6.1 G/DL (ref 6.4–8.4)
RBC # BLD AUTO: 3.73 MILLION/UL (ref 3.81–5.12)
SODIUM SERPL-SCNC: 140 MMOL/L (ref 135–147)
TRIGL SERPL-MCNC: 64 MG/DL
VIT B12 SERPL-MCNC: 1703 PG/ML (ref 100–900)
WBC # BLD AUTO: 2.82 THOUSAND/UL (ref 4.31–10.16)

## 2022-12-28 ENCOUNTER — HOSPITAL ENCOUNTER (OUTPATIENT)
Dept: CT IMAGING | Facility: HOSPITAL | Age: 87
Discharge: HOME | End: 2022-12-28

## 2022-12-28 DIAGNOSIS — I65.21 CAROTID STENOSIS, RIGHT: ICD-10-CM

## 2022-12-28 DIAGNOSIS — R42 VERTIGO: ICD-10-CM

## 2022-12-28 DIAGNOSIS — I65.09 VERTEBRAL ARTERY STENOSIS, UNSPECIFIED LATERALITY: ICD-10-CM

## 2022-12-28 RX ADMIN — IOHEXOL 85 ML: 350 INJECTION, SOLUTION INTRAVENOUS at 15:12

## 2023-01-01 ENCOUNTER — APPOINTMENT (OUTPATIENT)
Dept: RADIOLOGY | Facility: HOSPITAL | Age: 88
End: 2023-01-01

## 2023-01-01 ENCOUNTER — HOSPITAL ENCOUNTER (INPATIENT)
Facility: HOSPITAL | Age: 88
LOS: 1 days | End: 2023-04-28
Attending: EMERGENCY MEDICINE | Admitting: EMERGENCY MEDICINE

## 2023-01-01 ENCOUNTER — TELEPHONE (OUTPATIENT)
Dept: INTERNAL MEDICINE CLINIC | Facility: CLINIC | Age: 88
End: 2023-01-01

## 2023-01-01 ENCOUNTER — APPOINTMENT (OUTPATIENT)
Dept: LAB | Facility: CLINIC | Age: 88
End: 2023-01-01

## 2023-01-01 ENCOUNTER — APPOINTMENT (INPATIENT)
Dept: RADIOLOGY | Facility: HOSPITAL | Age: 88
End: 2023-01-01

## 2023-01-01 VITALS
HEART RATE: 114 BPM | SYSTOLIC BLOOD PRESSURE: 169 MMHG | RESPIRATION RATE: 17 BRPM | WEIGHT: 101.41 LBS | HEIGHT: 61 IN | DIASTOLIC BLOOD PRESSURE: 58 MMHG | TEMPERATURE: 101.5 F | OXYGEN SATURATION: 100 % | BODY MASS INDEX: 19.15 KG/M2

## 2023-01-01 DIAGNOSIS — E05.90 PRETIBIAL MYXEDEMA: ICD-10-CM

## 2023-01-01 DIAGNOSIS — E05.00 BASEDOW'S DISEASE: ICD-10-CM

## 2023-01-01 DIAGNOSIS — N18.31 STAGE 3A CHRONIC KIDNEY DISEASE (HCC): Primary | ICD-10-CM

## 2023-01-01 DIAGNOSIS — D72.819 LEUKOPENIA, UNSPECIFIED TYPE: ICD-10-CM

## 2023-01-01 DIAGNOSIS — N39.0 URINARY TRACT INFECTION WITHOUT HEMATURIA, SITE UNSPECIFIED: Primary | ICD-10-CM

## 2023-01-01 DIAGNOSIS — I63.9 STROKE (HCC): ICD-10-CM

## 2023-01-01 DIAGNOSIS — I61.4 ACUTE CEREBELLAR HEMORRHAGE (HCC): Primary | ICD-10-CM

## 2023-01-01 DIAGNOSIS — E04.1 NONTOXIC UNINODULAR GOITER: ICD-10-CM

## 2023-01-01 DIAGNOSIS — E11.69 TYPE 2 DIABETES MELLITUS WITH OTHER SPECIFIED COMPLICATION, UNSPECIFIED WHETHER LONG TERM INSULIN USE (HCC): ICD-10-CM

## 2023-01-01 LAB
2HR DELTA HS TROPONIN: 42 NG/L
4HR DELTA HS TROPONIN: 85 NG/L
ALBUMIN SERPL BCP-MCNC: 4.1 G/DL (ref 3.5–5)
ALP SERPL-CCNC: 112 U/L (ref 34–104)
ALT SERPL W P-5'-P-CCNC: 14 U/L (ref 7–52)
ANION GAP SERPL CALCULATED.3IONS-SCNC: 3 MMOL/L (ref 4–13)
ANION GAP SERPL CALCULATED.3IONS-SCNC: 4 MMOL/L (ref 4–13)
ANION GAP SERPL CALCULATED.3IONS-SCNC: 6 MMOL/L (ref 4–13)
ANION GAP SERPL CALCULATED.3IONS-SCNC: 7 MMOL/L (ref 4–13)
APTT PPP: 27 SECONDS (ref 23–37)
AST SERPL W P-5'-P-CCNC: 20 U/L (ref 13–39)
ATRIAL RATE: 84 BPM
BASOPHILS # BLD AUTO: 0 THOUSANDS/ΜL (ref 0–0.1)
BASOPHILS NFR BLD AUTO: 0 % (ref 0–1)
BILIRUB SERPL-MCNC: 0.65 MG/DL (ref 0.2–1)
BUN SERPL-MCNC: 12 MG/DL (ref 5–25)
BUN SERPL-MCNC: 15 MG/DL (ref 5–25)
BUN SERPL-MCNC: 16 MG/DL (ref 5–25)
BUN SERPL-MCNC: 17 MG/DL (ref 5–25)
CALCIUM SERPL-MCNC: 8.3 MG/DL (ref 8.3–10.1)
CALCIUM SERPL-MCNC: 8.9 MG/DL (ref 8.3–10.1)
CALCIUM SERPL-MCNC: 9.1 MG/DL (ref 8.3–10.1)
CALCIUM SERPL-MCNC: 9.6 MG/DL (ref 8.4–10.2)
CARDIAC TROPONIN I PNL SERPL HS: 48 NG/L
CARDIAC TROPONIN I PNL SERPL HS: 6 NG/L
CARDIAC TROPONIN I PNL SERPL HS: 91 NG/L
CHLORIDE SERPL-SCNC: 108 MMOL/L (ref 96–108)
CHLORIDE SERPL-SCNC: 109 MMOL/L (ref 96–108)
CHLORIDE SERPL-SCNC: 117 MMOL/L (ref 96–108)
CHLORIDE SERPL-SCNC: 127 MMOL/L (ref 96–108)
CO2 SERPL-SCNC: 20 MMOL/L (ref 21–32)
CO2 SERPL-SCNC: 22 MMOL/L (ref 21–32)
CO2 SERPL-SCNC: 22 MMOL/L (ref 21–32)
CO2 SERPL-SCNC: 26 MMOL/L (ref 21–32)
CREAT SERPL-MCNC: 0.73 MG/DL (ref 0.6–1.3)
CREAT SERPL-MCNC: 0.73 MG/DL (ref 0.6–1.3)
CREAT SERPL-MCNC: 0.76 MG/DL (ref 0.6–1.3)
CREAT SERPL-MCNC: 0.96 MG/DL (ref 0.6–1.3)
EOSINOPHIL # BLD AUTO: 0 THOUSAND/ΜL (ref 0–0.61)
EOSINOPHIL NFR BLD AUTO: 0 % (ref 0–6)
ERYTHROCYTE [DISTWIDTH] IN BLOOD BY AUTOMATED COUNT: 14.2 % (ref 11.6–15.1)
ERYTHROCYTE [DISTWIDTH] IN BLOOD BY AUTOMATED COUNT: 14.3 % (ref 11.6–15.1)
ERYTHROCYTE [DISTWIDTH] IN BLOOD BY AUTOMATED COUNT: 14.8 % (ref 11.6–15.1)
EST. AVERAGE GLUCOSE BLD GHB EST-MCNC: 94 MG/DL
FLUAV RNA RESP QL NAA+PROBE: NEGATIVE
FLUBV RNA RESP QL NAA+PROBE: NEGATIVE
GFR SERPL CREATININE-BSD FRML MDRD: 51 ML/MIN/1.73SQ M
GFR SERPL CREATININE-BSD FRML MDRD: 68 ML/MIN/1.73SQ M
GFR SERPL CREATININE-BSD FRML MDRD: 72 ML/MIN/1.73SQ M
GFR SERPL CREATININE-BSD FRML MDRD: 72 ML/MIN/1.73SQ M
GLUCOSE P FAST SERPL-MCNC: 106 MG/DL (ref 65–99)
GLUCOSE SERPL-MCNC: 113 MG/DL (ref 65–140)
GLUCOSE SERPL-MCNC: 137 MG/DL (ref 65–140)
GLUCOSE SERPL-MCNC: 140 MG/DL (ref 65–140)
GLUCOSE SERPL-MCNC: 178 MG/DL (ref 65–140)
HBA1C MFR BLD: 4.9 %
HCT VFR BLD AUTO: 31.8 % (ref 34.8–46.1)
HCT VFR BLD AUTO: 35.3 % (ref 34.8–46.1)
HCT VFR BLD AUTO: 36.8 % (ref 34.8–46.1)
HGB BLD-MCNC: 10.4 G/DL (ref 11.5–15.4)
HGB BLD-MCNC: 11.7 G/DL (ref 11.5–15.4)
HGB BLD-MCNC: 11.8 G/DL (ref 11.5–15.4)
IMM GRANULOCYTES # BLD AUTO: 0.01 THOUSAND/UL (ref 0–0.2)
IMM GRANULOCYTES NFR BLD AUTO: 0 % (ref 0–2)
INR PPP: 0.97 (ref 0.84–1.19)
LYMPHOCYTES # BLD AUTO: 1.37 THOUSANDS/ΜL (ref 0.6–4.47)
LYMPHOCYTES NFR BLD AUTO: 46 % (ref 14–44)
MAGNESIUM SERPL-MCNC: 1.9 MG/DL (ref 1.6–2.6)
MAGNESIUM SERPL-MCNC: 2 MG/DL (ref 1.9–2.7)
MCH RBC QN AUTO: 28.5 PG (ref 26.8–34.3)
MCH RBC QN AUTO: 29 PG (ref 26.8–34.3)
MCH RBC QN AUTO: 29.4 PG (ref 26.8–34.3)
MCHC RBC AUTO-ENTMCNC: 32.1 G/DL (ref 31.4–37.4)
MCHC RBC AUTO-ENTMCNC: 32.7 G/DL (ref 31.4–37.4)
MCHC RBC AUTO-ENTMCNC: 33.1 G/DL (ref 31.4–37.4)
MCV RBC AUTO: 87 FL (ref 82–98)
MCV RBC AUTO: 89 FL (ref 82–98)
MCV RBC AUTO: 90 FL (ref 82–98)
MONOCYTES # BLD AUTO: 0.31 THOUSAND/ΜL (ref 0.17–1.22)
MONOCYTES NFR BLD AUTO: 10 % (ref 4–12)
NEUTROPHILS # BLD AUTO: 1.34 THOUSANDS/ΜL (ref 1.85–7.62)
NEUTS SEG NFR BLD AUTO: 44 % (ref 43–75)
NRBC BLD AUTO-RTO: 0 /100 WBCS
OSMOLALITY UR/SERPL-RTO: 309 MMOL/KG (ref 282–298)
P AXIS: 80 DEGREES
PHOSPHATE SERPL-MCNC: 3.2 MG/DL (ref 2.3–4.1)
PHOSPHATE SERPL-MCNC: 3.2 MG/DL (ref 2.3–4.1)
PLATELET # BLD AUTO: 195 THOUSANDS/UL (ref 149–390)
PLATELET # BLD AUTO: 232 THOUSANDS/UL (ref 149–390)
PLATELET # BLD AUTO: 252 THOUSANDS/UL (ref 149–390)
PMV BLD AUTO: 10.3 FL (ref 8.9–12.7)
PMV BLD AUTO: 9.7 FL (ref 8.9–12.7)
PMV BLD AUTO: 9.9 FL (ref 8.9–12.7)
POTASSIUM SERPL-SCNC: 3.1 MMOL/L (ref 3.5–5.3)
POTASSIUM SERPL-SCNC: 4 MMOL/L (ref 3.5–5.3)
POTASSIUM SERPL-SCNC: 4.1 MMOL/L (ref 3.5–5.3)
POTASSIUM SERPL-SCNC: 4.6 MMOL/L (ref 3.5–5.3)
PR INTERVAL: 152 MS
PROT SERPL-MCNC: 6.9 G/DL (ref 6.4–8.4)
PROTHROMBIN TIME: 13.1 SECONDS (ref 11.6–14.5)
QRS AXIS: 52 DEGREES
QRSD INTERVAL: 80 MS
QT INTERVAL: 358 MS
QTC INTERVAL: 437 MS
RBC # BLD AUTO: 3.54 MILLION/UL (ref 3.81–5.12)
RBC # BLD AUTO: 4.04 MILLION/UL (ref 3.81–5.12)
RBC # BLD AUTO: 4.14 MILLION/UL (ref 3.81–5.12)
RSV RNA RESP QL NAA+PROBE: NEGATIVE
SARS-COV-2 RNA RESP QL NAA+PROBE: NEGATIVE
SODIUM SERPL-SCNC: 138 MMOL/L (ref 135–147)
SODIUM SERPL-SCNC: 140 MMOL/L (ref 135–147)
SODIUM SERPL-SCNC: 143 MMOL/L (ref 135–147)
SODIUM SERPL-SCNC: 150 MMOL/L (ref 135–147)
T WAVE AXIS: 56 DEGREES
T3FREE SERPL-MCNC: 4.38 PG/ML (ref 2.3–4.2)
T4 FREE SERPL-MCNC: 1.59 NG/DL (ref 0.76–1.46)
TSH SERPL DL<=0.05 MIU/L-ACNC: <0.01 UIU/ML (ref 0.45–4.5)
VENTRICULAR RATE: 90 BPM
WBC # BLD AUTO: 3.03 THOUSAND/UL (ref 4.31–10.16)
WBC # BLD AUTO: 5.19 THOUSAND/UL (ref 4.31–10.16)
WBC # BLD AUTO: 6.18 THOUSAND/UL (ref 4.31–10.16)

## 2023-01-01 PROCEDURE — 5A1945Z RESPIRATORY VENTILATION, 24-96 CONSECUTIVE HOURS: ICD-10-PCS | Performed by: EMERGENCY MEDICINE

## 2023-01-01 PROCEDURE — 0BH17EZ INSERTION OF ENDOTRACHEAL AIRWAY INTO TRACHEA, VIA NATURAL OR ARTIFICIAL OPENING: ICD-10-PCS | Performed by: EMERGENCY MEDICINE

## 2023-01-01 RX ORDER — LABETALOL HYDROCHLORIDE 5 MG/ML
10 INJECTION, SOLUTION INTRAVENOUS ONCE
Status: COMPLETED | OUTPATIENT
Start: 2023-01-01 | End: 2023-01-01

## 2023-01-01 RX ORDER — FENTANYL CITRATE 50 UG/ML
25 INJECTION, SOLUTION INTRAMUSCULAR; INTRAVENOUS
Status: DISCONTINUED | OUTPATIENT
Start: 2023-01-01 | End: 2023-01-01 | Stop reason: HOSPADM

## 2023-01-01 RX ORDER — PROPOFOL 10 MG/ML
5-50 INJECTION, EMULSION INTRAVENOUS
Status: DISCONTINUED | OUTPATIENT
Start: 2023-01-01 | End: 2023-01-01

## 2023-01-01 RX ORDER — FENTANYL CITRATE 50 UG/ML
50 INJECTION, SOLUTION INTRAMUSCULAR; INTRAVENOUS ONCE
Status: COMPLETED | OUTPATIENT
Start: 2023-01-01 | End: 2023-01-01

## 2023-01-01 RX ORDER — SODIUM CHLORIDE 3 G/100ML
250 INJECTION, SOLUTION INTRAVENOUS ONCE
Status: COMPLETED | OUTPATIENT
Start: 2023-01-01 | End: 2023-01-01

## 2023-01-01 RX ORDER — ETOMIDATE 2 MG/ML
20 INJECTION INTRAVENOUS ONCE
Status: COMPLETED | OUTPATIENT
Start: 2023-01-01 | End: 2023-01-01

## 2023-01-01 RX ORDER — LORAZEPAM 2 MG/ML
1 INJECTION INTRAMUSCULAR
Status: DISCONTINUED | OUTPATIENT
Start: 2023-01-01 | End: 2023-01-01 | Stop reason: HOSPADM

## 2023-01-01 RX ORDER — FENTANYL CITRATE 50 UG/ML
25 INJECTION, SOLUTION INTRAMUSCULAR; INTRAVENOUS
Status: DISCONTINUED | OUTPATIENT
Start: 2023-01-01 | End: 2023-01-01

## 2023-01-01 RX ORDER — POTASSIUM CHLORIDE 14.9 MG/ML
20 INJECTION INTRAVENOUS
Status: COMPLETED | OUTPATIENT
Start: 2023-01-01 | End: 2023-01-01

## 2023-01-01 RX ORDER — PANTOPRAZOLE SODIUM 40 MG/10ML
40 INJECTION, POWDER, LYOPHILIZED, FOR SOLUTION INTRAVENOUS
Status: DISCONTINUED | OUTPATIENT
Start: 2023-01-01 | End: 2023-01-01

## 2023-01-01 RX ORDER — SUCCINYLCHOLINE/SOD CL,ISO/PF 100 MG/5ML
80 SYRINGE (ML) INTRAVENOUS ONCE
Status: COMPLETED | OUTPATIENT
Start: 2023-01-01 | End: 2023-01-01

## 2023-01-01 RX ORDER — GLYCOPYRROLATE 0.2 MG/ML
0.2 INJECTION INTRAMUSCULAR; INTRAVENOUS EVERY 4 HOURS PRN
Status: DISCONTINUED | OUTPATIENT
Start: 2023-01-01 | End: 2023-01-01 | Stop reason: HOSPADM

## 2023-01-01 RX ORDER — ACETAMINOPHEN 160 MG/5ML
650 SUSPENSION, ORAL (FINAL DOSE FORM) ORAL EVERY 4 HOURS PRN
Status: DISCONTINUED | OUTPATIENT
Start: 2023-01-01 | End: 2023-01-01

## 2023-01-01 RX ORDER — 3% SODIUM CHLORIDE 3 G/100ML
50 INJECTION, SOLUTION INTRAVENOUS CONTINUOUS
Status: DISCONTINUED | OUTPATIENT
Start: 2023-01-01 | End: 2023-01-01

## 2023-01-01 RX ORDER — CEFPODOXIME PROXETIL 100 MG/1
100 TABLET, FILM COATED ORAL 2 TIMES DAILY
Qty: 10 TABLET | Refills: 0 | Status: SHIPPED | OUTPATIENT
Start: 2023-01-01 | End: 2023-01-01

## 2023-01-01 RX ADMIN — PANTOPRAZOLE SODIUM 40 MG: 40 INJECTION, POWDER, FOR SOLUTION INTRAVENOUS at 08:00

## 2023-01-01 RX ADMIN — LABETALOL HYDROCHLORIDE 10 MG: 5 INJECTION, SOLUTION INTRAVENOUS at 15:11

## 2023-01-01 RX ADMIN — DESMOPRESSIN ACETATE 18.8 MCG: 4 SOLUTION INTRAVENOUS at 15:53

## 2023-01-01 RX ADMIN — Medication 80 MG: at 15:39

## 2023-01-01 RX ADMIN — SODIUM CHLORIDE 250 ML: 3 INJECTION, SOLUTION INTRAVENOUS at 16:29

## 2023-01-01 RX ADMIN — FENTANYL CITRATE 50 MCG: 50 INJECTION INTRAMUSCULAR; INTRAVENOUS at 18:14

## 2023-01-01 RX ADMIN — ETOMIDATE 20 MG: 2 INJECTION, SOLUTION INTRAVENOUS at 15:38

## 2023-01-01 RX ADMIN — POTASSIUM CHLORIDE 20 MEQ: 14.9 INJECTION, SOLUTION INTRAVENOUS at 20:30

## 2023-01-01 RX ADMIN — ACETAMINOPHEN 650 MG: 650 SUSPENSION ORAL at 01:45

## 2023-01-01 RX ADMIN — LORAZEPAM 1 MG: 2 INJECTION INTRAMUSCULAR; INTRAVENOUS at 18:14

## 2023-01-01 RX ADMIN — NICARDIPINE HYDROCHLORIDE 5 MG/HR: 2.5 INJECTION, SOLUTION INTRAVENOUS at 16:04

## 2023-01-01 RX ADMIN — SODIUM CHLORIDE 50 ML/HR: 3 INJECTION, SOLUTION INTRAVENOUS at 07:58

## 2023-01-01 RX ADMIN — PROPOFOL 15 MCG/KG/MIN: 10 INJECTION, EMULSION INTRAVENOUS at 15:49

## 2023-01-01 RX ADMIN — GLYCOPYRROLATE 0.2 MG: 0.2 INJECTION, SOLUTION INTRAMUSCULAR; INTRAVENOUS at 18:14

## 2023-01-01 RX ADMIN — SODIUM CHLORIDE 500 ML: 0.9 INJECTION, SOLUTION INTRAVENOUS at 04:24

## 2023-01-01 RX ADMIN — SODIUM CHLORIDE 250 ML: 3 INJECTION, SOLUTION INTRAVENOUS at 04:25

## 2023-01-01 RX ADMIN — SODIUM CHLORIDE 30 ML/HR: 3 INJECTION, SOLUTION INTRAVENOUS at 16:48

## 2023-01-01 RX ADMIN — IOHEXOL 98 ML: 350 INJECTION, SOLUTION INTRAVENOUS at 14:58

## 2023-01-01 RX ADMIN — POTASSIUM CHLORIDE 20 MEQ: 14.9 INJECTION, SOLUTION INTRAVENOUS at 18:22

## 2023-01-01 NOTE — TELEPHONE ENCOUNTER
CTA results:    Right carotid artery stenosis slightly worse than previous  Stable moderate to severe stenosis of vertebral arteries  Stable stenosis the rest of the arteries  Continue aspirin  Again, recommend to take cholesterol medication due to stenosis  Keep appointment with vascular  You can follow up with neurology if further imaging needed  Lab results: white count lower than previous, anemia stable  Will recheck this in a month, if white count still low, I will refer your to hematology  Ordered  Kidney function stable  Cholesterol improved  you can decrease vitamin B12 or B supplement to 3 days a week

## 2023-01-09 ENCOUNTER — TELEPHONE (OUTPATIENT)
Dept: INTERNAL MEDICINE CLINIC | Facility: CLINIC | Age: 88
End: 2023-01-09

## 2023-01-09 ENCOUNTER — APPOINTMENT (OUTPATIENT)
Dept: LAB | Facility: CLINIC | Age: 88
End: 2023-01-09

## 2023-01-09 DIAGNOSIS — R39.9 URINARY SYMPTOM OR SIGN: Primary | ICD-10-CM

## 2023-01-09 LAB
BILIRUB UR QL STRIP: NEGATIVE
CLARITY UR: CLEAR
COLOR UR: COLORLESS
GLUCOSE UR STRIP-MCNC: NEGATIVE MG/DL
HGB UR QL STRIP.AUTO: NEGATIVE
KETONES UR STRIP-MCNC: NEGATIVE MG/DL
LEUKOCYTE ESTERASE UR QL STRIP: NEGATIVE
NITRITE UR QL STRIP: NEGATIVE
PH UR STRIP.AUTO: 6 [PH]
PROT UR STRIP-MCNC: NEGATIVE MG/DL
SP GR UR STRIP.AUTO: 1 (ref 1–1.03)
UROBILINOGEN UR STRIP-ACNC: <2 MG/DL

## 2023-01-09 NOTE — TELEPHONE ENCOUNTER
Urine test was normal, no infection  Limit black coffee/tea or carbonated drinks  Limit spicy or acidic foods such as tomatoes, pineapple, oranges

## 2023-01-09 NOTE — TELEPHONE ENCOUNTER
Pt  has burning w/ urination and increase urgency to urinate  Is drinking a lot of water  No abd  pain, no fever

## 2023-01-20 DIAGNOSIS — I10 ESSENTIAL HYPERTENSION: ICD-10-CM

## 2023-01-20 RX ORDER — AMLODIPINE BESYLATE 2.5 MG/1
2.5 TABLET ORAL DAILY
Qty: 90 TABLET | Refills: 0 | Status: SHIPPED | OUTPATIENT
Start: 2023-01-20

## 2023-01-30 DIAGNOSIS — K21.9 GASTROESOPHAGEAL REFLUX DISEASE: ICD-10-CM

## 2023-01-30 RX ORDER — FAMOTIDINE 40 MG/1
TABLET, FILM COATED ORAL
Qty: 90 TABLET | Refills: 1 | Status: SHIPPED | OUTPATIENT
Start: 2023-01-30

## 2023-02-24 ENCOUNTER — TELEPHONE (OUTPATIENT)
Dept: INTERNAL MEDICINE CLINIC | Facility: CLINIC | Age: 88
End: 2023-02-24

## 2023-02-24 NOTE — TELEPHONE ENCOUNTER
Pt has nasal congestion, sore throat and cough without mucus  Breathing is rough at night due to the cough  Fever 101  No chest pain  Symptoms started two days ago  Taking Delsym for the cough  Pt will do home COVID test and call with results  Negative COVID test   Is there something stronger she can take for her cough?

## 2023-02-24 NOTE — TELEPHONE ENCOUNTER
Start using albuterol inhaler for you cough and breathing  If with nasal congestion, use saline nasal spray  If not better by tomorrow and still with fevers,  recommend to go to Urgent care to be tested for the flu

## 2023-02-25 ENCOUNTER — NURSE TRIAGE (OUTPATIENT)
Dept: OTHER | Facility: OTHER | Age: 88
End: 2023-02-25

## 2023-02-25 ENCOUNTER — TELEMEDICINE (OUTPATIENT)
Dept: INTERNAL MEDICINE CLINIC | Facility: CLINIC | Age: 88
End: 2023-02-25

## 2023-02-25 DIAGNOSIS — U07.1 COVID-19 VIRUS INFECTION: Primary | ICD-10-CM

## 2023-02-25 RX ORDER — MOLNUPIRAVIR 200 MG/1
800 CAPSULE ORAL EVERY 12 HOURS
Qty: 40 CAPSULE | Refills: 0 | Status: SHIPPED | OUTPATIENT
Start: 2023-02-25 | End: 2023-03-02

## 2023-02-25 NOTE — PROGRESS NOTES
COVID-19 Outpatient Progress Note    Assessment/Plan:    Problem List Items Addressed This Visit    None  Visit Diagnoses     COVID-19 virus infection    -  Primary    Relevant Medications    molnupiravir (Lagevrio) 200 mg capsule         Disposition:     Patient has asymptomatic or mild COVID-19 infection  Based off CDC guidelines, they were recommended to isolate for 5 days  If they are asymptomatic or symptoms are improving with no fevers in the past 24 hours, isolation may be ended followed by 5 days of wearing a mask when around othes to minimize risk of infecting others  If still have a fever or other symptoms have not improved, continue to isolate until they improve  Regardless of when they end isolation, avoid being around people who are more likely to get very sick from COVID-19 until at least day 11  Discussed symptom directed medication options with patient  Jignesh Toscano is able to manage her symptoms at home  She was prescribed molnupiravir to treat COVID-19  We discussed the risk/benefits and common adverse effects of the medication that are pertinent to Jignesh Toscano  She gets occ dizziness and takes meclizine which helps  She was advised dizziness is an uncommon AE of molnupiravir  She was advised to stay well hydrated  All questions answered/addressed  Patient meets criteria for Molnupiravir and they have been counseled according to EUA documentation provided by the FDA  After discussion, patient agrees to treatment  Peggyann Boop is an investigational medicine used to treat mild-to-moderate COVID-19 in adults with positive results of direct SARS-CoV-2 viral testing, and who are at high risk for progressing to severe COVID-19 including hospitalization or death, and for whom other COVID-19 treatment options authorized by the FDA are not accessible or clinically appropriate      The FDA has authorized the emergency use of molnupiravir for the treatment of mild-tomoderate COVID-19 in adults under an EUA  Dean Grumet is not authorized:  - for use in people less than 25years of age   - for prevention of COVID-19   - for people needing hospitalization for COVID-19   - for use for longer than 5 consecutive days    What is the most important information I should know about molnupiravir? [place] may cause serious side effects, including:    [place] may cause harm to your unborn baby  It is not known if molnupiravir will harm your baby if you take molnupiravir during pregnancy  - Dean Grumet is not recommended for use in pregnancy  - Dean Grumet has not been studied in pregnancy  [place] Gr[place] was studied in pregnant animals only  When molnupiravir was given to pregnant animals, molnupiravir caused harm to their unborn babies   - You and your healthcare provider may decide that you should take molnupiravir duringpregnancy if there are no other COVID-19 treatment options authorized by the FDA that are accessible or clinically appropriate for you  - If you and your healthcare provider decide that you should take molnupiravir during pregnancy, you and your healthcare provider should discuss the known and potential benefits and the potential risks of taking molnupiravir during pregnancy  For individuals who are able to become pregnant:  - You should use a reliable method of birth control (contraception) consistently and correctly during treatment with molnupiravir and for 4 days after the last dose of molnupiravir  Talk to your healthcare provider about reliable birth control methods  - Before starting treatment with molnupiravir your healthcare provider may do a pregnancy test to see if you are pregnant before starting treatment with molnupiravir  - Tell your healthcare provider right away if you become pregnant or think you may be pregnant during treatment with molnupiravir      Pregnancy Surveillance Program:  - There is a pregnancy surveillance program for individuals who take molnupiravir during pregnancy  The purpose of this program is to collect information about the health of you and your baby  Talk to your healthcare provider about how to take part in this program   - If you take molnupiravir during pregnancy and you agree to participate in the pregnancy surveillance program and allow your healthcare provider to share your information with Jonathan Carvalho , then your healthcare provider will report your use of molnupiravir during pregnancy to [address] [place] [address],[address]  by calling [phone] or pregnancyreporting msd com  For individuals who are sexually active with partners who are able to become pregnant:  - It is not known if molnupiravir can affect sperm  While the risk is regarded as low, animal studies to fully assess the potential for molnupiravir to affect the babies of males treated with molnupiravir have not been completed  A reliable method of birth control (contraception) should be used consistently and correctly during treatment with molnupiravir and for at least 3 months after the last dose  The risk to sperm beyond 3 months is not known  Studies to understand the risk to sperm beyond 3 months are ongoing  Talk to your healthcare provider about reliable birth control methods  Talk to your healthcare provider if you have questions or concerns about how molnupiravir may affect sperm  How do I take molnupiravir? - Take molnupiravir exactly as your healthcare provider tells you to take it  - Take 4 capsules of molnupiravir every 12 hours (for example, at 8 am and at 8 pm)  - Take molnupiravir for 5 days  It is important that you complete the full 5 days of treatment with molnupiravir  Do not stop taking molnupiravir before you complete the full 5 days of treatment, even if you feel better  - Take molnupiravir with or without food  - You should stay in isolation for as long as your healthcare provider tells you to   Talk to your healthcare provider if you are not sure about how to properly isolate while you have COVID-19   - Swallow molnupiravir capsules whole  Do not open, break, or crush the capsules  If you cannot swallow capsules whole, tell your healthcare provider  What to do if you miss a dose:  - If it has been less than 10 hours since the missed dose, take it as soon as you remember  - If it has been more than 10 hours since the missed dose, skip the missed dose and take your dose at the next scheduled time  - Do not double the dose of molnupiravir to make up for a missed dose  What are the important possible side effects of molnupiravir? Possible side effects of molnupiravir are:  - See, Eldon Fuenteset is the most important information I should know about molnupiravir?”  - Diarrhea  - Nausea  - Dizziness    These are not all the possible side effects of molnupiravir  Not many people have taken molnupiravir  Serious and unexpected side effects may happen  This medicine is still being studied, so it is possible that all of the risks are not known at this time  What other treatment choices are there? Like Kailee Razo may allow for the emergency use of other medicines to treat people with COVID-19  Go to [facility] for more information  It is your choice to be treated or not to be treated with molnupiravir  Should you decide not to take it, it will not change your standard medical care  What if I am breastfeeding? Breastfeeding is not recommended during treatment with molnupiravir and for 4 days after the last dose of molnupiravir  If you are breastfeeding or plan to breastfeed, talk to your healthcare provider about your options and specific situation before taking molnupiravir  How do I report side effects with molnupiravir? Contact your healthcare provider if you have any side effects that bother you or do not go away   Report side effects to FDA MedWatch at www fda gov/medwatch or call 5-606-ZMG-9864 (1-797.756.4562)  How should I store Jerel Yoon? - Store molnupiravir capsules at room temperature between 68°F to 77°F (20°C to 25°C)  - Keep molnupiravir and all medicines out of the reach of children and pets  Full fact sheet for patients, parents, and caregivers can be found at: Terrancep[employer]uJennifer silveira    I have spent 15 minutes directly with the patient  Greater than 50% of this time was spent in counseling/coordination of care regarding: risks and benefits of treatment options, instructions for management, patient and family education and impressions  Encounter provider: Althea Ly DO     Provider located at: [address] [place] [address]  [address]ia [address]  [phone]     Recent Visits  Date Type Provider Dept   02/24/23 Telephone MD [facility]   Showing recent visits within past 7 days and meeting all other requirements  Today's Visits  Date Type Provider Dept   02/25/23 Telemedicine Althea Ly, [facility]   Showing today's visits and meeting all other requirements  Future Appointments  No visits were found meeting these conditions  Showing future appointments within next 150 days and meeting all other requirements     This virtual check-in was done via telephone and she agrees to proceed  Patient agrees to participate in a virtual check in via telephone or video visit instead of presenting to the office to address urgent/immediate medical needs  Patient is aware this is a billable service  She acknowledged consent and understanding of privacy and security of the video platform  The patient has agreed to participate and understands they can discontinue the visit at any time      After connecting through Telephone, the patient was identified by name and date of birth  Joey Howell was informed that this was a telemedicine visit and that the exam was being conducted confidentially over secure lines  My office door was closed  No one else was in the room  Joey Howell acknowledged consent and understanding of privacy and security of the telemedicine visit  I informed the patient that I have reviewed her record in Epic and presented the opportunity for her to ask any questions regarding the visit today  The patient agreed to participate  Verification of patient location:  Patient is located in the following state in which I hold an active license: [place]    Subjective:   Joey Howell is a 80 y o  female who has been screened for COVID-19  Symptom change since last report: unchanged  Patient's symptoms include fever (but resolved since 24+ hours), malaise, rhinorrhea, sore throat, cough, nausea and diarrhea  - Date of symptom onset: 2/23/2023  - Date of positive COVID-19 test: 2/25/2023  Type of test: Home antigen  Home antigen result verified by provider  Will get picture of test uploaded/scanned into patient's chart  COVID-19 vaccination status: Not vaccinated    Elizabeth Zelaya has been staying home and has isolated themselves in her home  She is taking care to not share personal items and is cleaning all surfaces that are touched often, like counters, tabletops, and doorknobs using household cleaning sprays or wipes  She is wearing a mask when she leaves her room  Elizabeth Zelaya has had 2 days of symptoms and tested positive today  She is having cough, sore throat, diarrhea and feeling unwell  She is using albuterol inhaler with some relief of her symptoms  She is able to manage her symptoms at home  She is interested in taking anti-viral medication for COVID-19  She would rather avoid paxlovid as it can cause diarrhea and may interfere with her pletal medication  ROS Otherwise negative, no other complaints      Lab Results   Component Value Date    SARSCOV2 Negative 07/01/2022       Review of Systems   Constitutional: Positive for fever (but resolved since 24+ hours)  HENT: Positive for rhinorrhea and sore throat  Respiratory: Positive for cough  Gastrointestinal: Positive for diarrhea and nausea  Allergic/Immunologic: Negative for immunocompromised state  Current Outpatient Medications on File Prior to Visit   Medication Sig   • albuterol (PROVENTIL HFA,VENTOLIN HFA) 90 mcg/act inhaler INHALE 1 PUFF BY MOUTH EVERY 6 HOURS AS NEEDED FOR WHEEZE   • amLODIPine (NORVASC) 2 5 mg tablet Take 1 tablet (2 5 mg total) by mouth daily   • aspirin 81 mg chewable tablet Chew 1 tablet (81 mg total) daily   • b complex vitamins capsule Take 1 capsule by mouth daily   • cilostazol (PLETAL) 100 mg tablet Take 1 tablet (100 mg total) by mouth 2 (two) times a day   • famotidine (PEPCID) 40 MG tablet TAKE 1 TABLET BY MOUTH EVERY DAY   • FIBER PO Take by mouth   • meclizine (ANTIVERT) 25 mg tablet Take 1 tablet (25 mg total) by mouth every 8 (eight) hours as needed for dizziness   • methimazole (TAPAZOLE) 5 mg tablet 1/2 tab MWFS   • montelukast (SINGULAIR) 10 mg tablet TAKE 1 TABLET BY MOUTH DAILY AT BEDTIME   • [DISCONTINUED] potassium chloride (K-DUR,KLOR-CON) 10 mEq tablet Take 1 tablet (10 mEq total) by mouth daily (Patient not taking: No sig reported)   • [DISCONTINUED] tiZANidine (ZANAFLEX) 2 mg tablet TAKE 1 TABLET (2 MG TOTAL) BY MOUTH 2 (TWO) TIMES A DAY AS NEEDED FOR MUSCLE SPASMS       Objective: There were no vitals taken for this visit         Ivon Gomez DO

## 2023-02-25 NOTE — TELEPHONE ENCOUNTER
Reason for Disposition  • HIGH RISK for severe COVID complications (e g , weak immune system, age > 59 years, obesity with BMI > 22, pregnant, chronic lung disease or other chronic medical condition)  (Exception: Already seen by PCP and no new or worsening symptoms )    Answer Assessment - Initial Assessment Questions  1  COVID-19 DIAGNOSIS: "Who made your COVID-19 diagnosis?" "Was it confirmed by a positive lab test or self-test?" If not diagnosed by a doctor (or NP/PA), ask "Are there lots of cases (community spread) where you live?" Note: See public health department website, if unsure  2/25 today that was definitely today; possibly yesterday  2  COVID-19 EXPOSURE: "Was there any known exposure to COVID before the symptoms began?" CDC Definition of close contact: within 6 feet (2 meters) for a total of 15 minutes or more over a 24-hour period  Unknown  3  ONSET: "When did the COVID-19 symptoms start?"       Wednesday 2/22  4  WORST SYMPTOM: "What is your worst symptom?" (e g , cough, fever, shortness of breath, muscle aches)      Runny nose, sore throat, coughing  5  COUGH: "Do you have a cough?" If Yes, ask: "How bad is the cough?"        Moderate, comes and goes  6  FEVER: "Do you have a fever?" If Yes, ask: "What is your temperature, how was it measured, and when did it start?"      Yes, unsure if still has it now  7  RESPIRATORY STATUS: "Describe your breathing?" (e g , shortness of breath, wheezing, unable to speak)       Denies SOB or chest pain outside of coughing  8  BETTER-SAME-WORSE: "Are you getting better, staying the same or getting worse compared to yesterday?"  If getting worse, ask, "In what way?"      Unsure  9  HIGH RISK DISEASE: "Do you have any chronic medical problems?" (e g , asthma, heart or lung disease, weak immune system, obesity, etc )      Age, DM  10   VACCINE: "Have you had the COVID-19 vaccine?" If Yes, ask: "Which one, how many shots, when did you get it?" Denies  11  BOOSTER: "Have you received your COVID-19 booster?" If Yes, ask: "Which one and when did you get it?"        Denies  12  PREGNANCY: "Is there any chance you are pregnant?" "When was your last menstrual period?"        N/A  13  OTHER SYMPTOMS: "Do you have any other symptoms?"  (e g , chills, fatigue, headache, loss of smell or taste, muscle pain, sore throat)        See above    Infusion for previous Covid dx; a few years ago around [place]      Patient is getting rosuvastatin 3x a week per daughter, not on med list     Protocols used: CORONAVIRUS (COVID-19) DIAGNOSED OR SUSPECTED-ADULT-

## 2023-02-27 NOTE — TELEPHONE ENCOUNTER
Patient is hanging in there  Coughing, taking cough medication  Medication is helping  Patient has been having trouble sleeping, even before Covid  She will discuss at appointment on Friday

## 2023-03-03 ENCOUNTER — OFFICE VISIT (OUTPATIENT)
Dept: INTERNAL MEDICINE CLINIC | Facility: CLINIC | Age: 88
End: 2023-03-03

## 2023-03-03 VITALS
WEIGHT: 98 LBS | BODY MASS INDEX: 18.5 KG/M2 | HEIGHT: 61 IN | SYSTOLIC BLOOD PRESSURE: 138 MMHG | DIASTOLIC BLOOD PRESSURE: 50 MMHG | OXYGEN SATURATION: 98 % | HEART RATE: 87 BPM

## 2023-03-03 DIAGNOSIS — I10 ESSENTIAL HYPERTENSION: ICD-10-CM

## 2023-03-03 DIAGNOSIS — E53.8 VITAMIN B12 DEFICIENCY: ICD-10-CM

## 2023-03-03 DIAGNOSIS — E05.90 HYPERTHYROIDISM: ICD-10-CM

## 2023-03-03 DIAGNOSIS — R63.6 UNDERWEIGHT: ICD-10-CM

## 2023-03-03 DIAGNOSIS — U07.1 COVID-19 VIRUS INFECTION: Primary | ICD-10-CM

## 2023-03-03 DIAGNOSIS — J45.20 MILD INTERMITTENT ASTHMA WITHOUT COMPLICATION: ICD-10-CM

## 2023-03-03 DIAGNOSIS — I65.21 CAROTID STENOSIS, RIGHT: ICD-10-CM

## 2023-03-03 DIAGNOSIS — I77.74 VERTEBRAL ARTERY DISSECTION (HCC): ICD-10-CM

## 2023-03-03 DIAGNOSIS — K21.9 GASTROESOPHAGEAL REFLUX DISEASE, UNSPECIFIED WHETHER ESOPHAGITIS PRESENT: ICD-10-CM

## 2023-03-03 DIAGNOSIS — F41.9 ANXIETY: ICD-10-CM

## 2023-03-03 DIAGNOSIS — N18.31 STAGE 3A CHRONIC KIDNEY DISEASE (HCC): ICD-10-CM

## 2023-03-03 DIAGNOSIS — K59.09 OTHER CONSTIPATION: ICD-10-CM

## 2023-03-03 DIAGNOSIS — E78.2 MIXED HYPERLIPIDEMIA: ICD-10-CM

## 2023-03-03 DIAGNOSIS — I65.09 VERTEBRAL ARTERY STENOSIS, UNSPECIFIED LATERALITY: ICD-10-CM

## 2023-03-03 DIAGNOSIS — I12.9 TYPE 2 DIABETES MELLITUS WITH STAGE 3 CHRONIC KIDNEY DISEASE AND HYPERTENSION (HCC): ICD-10-CM

## 2023-03-03 DIAGNOSIS — N18.30 TYPE 2 DIABETES MELLITUS WITH STAGE 3 CHRONIC KIDNEY DISEASE AND HYPERTENSION (HCC): ICD-10-CM

## 2023-03-03 DIAGNOSIS — E11.22 TYPE 2 DIABETES MELLITUS WITH STAGE 3 CHRONIC KIDNEY DISEASE AND HYPERTENSION (HCC): ICD-10-CM

## 2023-03-03 PROBLEM — H81.12 BPPV (BENIGN PAROXYSMAL POSITIONAL VERTIGO), LEFT: Status: RESOLVED | Noted: 2022-12-01 | Resolved: 2023-03-03

## 2023-03-03 RX ORDER — ROSUVASTATIN CALCIUM 5 MG/1
5 TABLET, COATED ORAL DAILY
Qty: 90 TABLET | Refills: 1
Start: 2023-03-03

## 2023-03-03 NOTE — ASSESSMENT & PLAN NOTE
Lab Results   Component Value Date    EGFR 63 12/26/2022    EGFR 79 07/07/2022    EGFR 76 07/04/2022    CREATININE 0 81 12/26/2022    CREATININE 0 60 07/07/2022    CREATININE 0 70 07/04/2022     Stable

## 2023-03-03 NOTE — PROGRESS NOTES
Assessment and Plan:     Problem List Items Addressed This Visit        Digestive    Esophageal reflux     Discussed diet, has occasional symptoms  On famotidine  Endocrine    Hyperthyroidism     On methimazole  Per Endo  RESOLVED: 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 ([facility] [address] )       Lab Results   Component Value Date    HGBA1C 5 2 06/20/2022               Respiratory    Mild intermittent asthma without complication     Not in exacerbation  Using albuterol inhaler prn  On Singulair  Cardiovascular and Mediastinum    Carotid stenosis, right     Ultrasound updated  Relevant Medications    rosuvastatin (CRESTOR) 5 mg tablet    Essential hypertension     BP stable, on amlodipine  Relevant Orders    Lipid panel    TSH, 3rd generation with Free T4 reflex    Vertebral artery dissection (HCC)    Vertebral artery stenosis     Intermittent symptoms  Not on statin  On ASA, Pletal          Relevant Medications    rosuvastatin (CRESTOR) 5 mg tablet       Genitourinary    CKD (chronic kidney disease), stage III [facility])     Lab Results   Component Value Date    EGFR 63 12/26/2022    EGFR 79 07/07/2022    EGFR 76 07/04/2022    CREATININE 0 81 12/26/2022    CREATININE 0 60 07/07/2022    CREATININE 0 70 07/04/2022     Stable  Relevant Orders    Vitamin D 25 hydroxy       Other    Anxiety     Stable  COVID-19 virus infection - Primary     Improving  Still with occasional evening symptoms  Instructed to use albuterol inhaler qHS  Hyperlipidemia     Restart rosuvastatin, can take a few days a week  Relevant Medications    rosuvastatin (CRESTOR) 5 mg tablet    RESOLVED: Other constipation     Resolved  Underweight     Weight stable  Recommend protein supplement daily  Vitamin B12 deficiency     On Metanx              Depression Screening and Follow-up Plan: Patient was screened for depression during today's encounter  They screened negative with a PHQ-2 score of 0  Urinary Incontinence Plan of Care: counseling topics discussed: limiting fluid intake 3-4 hours before bed  Preventive health issues were discussed with patient, and age appropriate screening tests were ordered as noted in patient's After Visit Summary  Personalized health advice and appropriate referrals for health education or preventive services given if needed, as noted in patient's After Visit Summary  History of Present Illness:     Patient presents for a Medicare Wellness Visit and follow up visit  She is feeling better, recovering from [place]  She reports cough has resolved  She still feel out of breath sometimes at night  She does have the albuterol inhaler to use as needed  No chest pain, wheezing  She has had intermittent dizziness, especially if she looks too high or bends down  No syncope  She feels it had worsened since getting COVID  She had loose stools while on medication for COVID, this has subsided  She eats one good a meal a day  She eats toast for breakfast, sometimes skips lunch (half a sandwich)  She is drinking more  No chest pain, palpitations  Patient Care Team:  Quincy Jean MD as PCP - Bynum Skeens, MD Roxy Oman, DO Raul Bernheim, MD Adan Gail, MD     Review of Systems:     Review of Systems   Constitutional: Positive for activity change and appetite change  Negative for fatigue  HENT: Negative for congestion, ear pain and postnasal drip  Eyes: Negative for visual disturbance  Respiratory: Negative for cough, shortness of breath and wheezing  Cardiovascular: Negative for chest pain and palpitations  Gastrointestinal: Negative for abdominal pain, constipation and diarrhea  Genitourinary: Negative for dysuria and frequency  Musculoskeletal: Negative for arthralgias and myalgias  Skin: Negative for rash and wound     Neurological: Positive for dizziness and light-headedness  Negative for syncope, numbness and headaches  Psychiatric/Behavioral: Negative for confusion  The patient is not nervous/anxious           Problem List:     Patient Active Problem List   Diagnosis   • Anxiety   • Mild intermittent asthma without complication   • Degeneration of intervertebral disc of lumbar region   • Diverticulosis   • Elevated serum alkaline phosphatase level   • Esophageal reflux   • Fatty liver   • Hyperlipidemia   • Essential hypertension   • Hyperthyroidism   • Insomnia   • Irritable bowel syndrome   • Spinal stenosis   • Spondylolisthesis, grade 1   • History of TIA (transient ischemic attack)   • Vitamin D deficiency   • Adnexal cyst   • CKD (chronic kidney disease), stage III ([facility])   • Trigger finger of right hand   • PAD (peripheral artery disease) ([facility])   • Memory loss   • Atherosclerosis 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 ([facility])   • Thyroid nodule   • Mass of right submandibular region   • Abnormal finding on imaging   • Rotator cuff disorder, right   • Carotid stenosis, right   • Vertebral artery stenosis   • Vertebral artery dissection ([facility])   • COVID-19 virus infection   • Pulmonary nodules   • Vitamin B12 deficiency   • Leukopenia   • Underweight      Past Medical and Surgical History:     Past Medical History:   Diagnosis Date   • Asthma    • Chronic interstitial cystitis    • Community acquired pneumonia     last assessed 10/4/13   • [facility]    • Disease of thyroid gland    • Diverticulitis    • Dizziness    • GERD (gastroesophageal reflux disease)    • Hyperlipidemia    • Hypertension    • Vertigo      Past Surgical History:   Procedure Laterality Date   • ADENOIDECTOMY     • CHOLECYSTECTOMY     • COLON SURGERY      partial colectomy - sigmoid, diverticulitis   • EYE SURGERY     • HYSTERECTOMY      ovaries remain   • TONSILLECTOMY     • US GUIDED LYMPH NODE BIOPSY RIGHT  3/11/2020   • US GUIDED THYROID BIOPSY  11/13/2019      Family History:     Family History   Problem Relation Age of Onset   • Coronary artery disease Mother    • Other Father         MVA   • Alcohol abuse Neg Hx    • Depression Neg Hx    • Drug abuse Neg Hx    • Substance Abuse Neg Hx    • Mental illness Neg Hx       Social History:     Social History     Socioeconomic History   • Marital status:      Spouse name: Not on file   • Number of children: 4   • Years of education: Not on file   • Highest education level: Not on file   Occupational History     Comment: retired   Tobacco Use   • Smoking status: Never   • Smokeless tobacco: Never   Vaping Use   • Vaping Use: Never used   Substance and Sexual Activity   • Alcohol use: Never   • Drug use: No   • Sexual activity: Not on file   Other Topics Concern   • Not on file   Social History Narrative    Lives alone    Daughter in the area     Social Determinants of Health     Financial Resource Strain: Low Risk    • Difficulty of Paying Living Expenses: Not very hard   Food Insecurity: No Food Insecurity   • Worried About Running Out of Food in the Last Year: Never true   • Ran Out of Food in the Last Year: Never true   Transportation Needs: No Transportation Needs   • Lack of Transportation (Medical): No   • Lack of Transportation (Non-Medical):  No   Physical Activity: Not on file   Stress: Not on file   Social Connections: Not on file   Intimate Partner Violence: Not on file   Housing Stability: Low Risk    • Unable to Pay for Housing in the Last Year: No   • Number of Places Lived in the Last Year: 1   • Unstable Housing in the Last Year: No      Medications and Allergies:     Current Outpatient Medications   Medication Sig Dispense Refill   • rosuvastatin (CRESTOR) 5 mg tablet Take 1 tablet (5 mg total) by mouth daily 90 tablet 1   • albuterol (PROVENTIL HFA,VENTOLIN HFA) 90 mcg/act inhaler INHALE 1 PUFF BY MOUTH EVERY 6 HOURS AS NEEDED FOR WHEEZE 8 5 g 1   • amLODIPine (NORVASC) 2 5 mg tablet Take 1 tablet (2 5 mg total) by mouth daily 90 tablet 0   • aspirin 81 mg chewable tablet Chew 1 tablet (81 mg total) daily 30 tablet 0   • b complex vitamins capsule Take 1 capsule by mouth daily     • cilostazol (PLETAL) 100 mg tablet Take 1 tablet (100 mg total) by mouth 2 (two) times a day 180 tablet 1   • famotidine (PEPCID) 40 MG tablet TAKE 1 TABLET BY MOUTH EVERY DAY 90 tablet 1   • FIBER PO Take by mouth     • meclizine (ANTIVERT) 25 mg tablet Take 1 tablet (25 mg total) by mouth every 8 (eight) hours as needed for dizziness 30 tablet 0   • methimazole (TAPAZOLE) 5 mg tablet 1/2 tab MWFS 30 tablet 0   • montelukast (SINGULAIR) 10 mg tablet TAKE 1 TABLET BY MOUTH DAILY AT BEDTIME 90 tablet 1     No current facility-administered medications for this visit  Allergies   Allergen Reactions   • Haloperidol      Confusion /AMS  1 ep on 09/04/2020  AGGRESSIVENESS  AGITATION   • Triazolam Other (See Comments)     Confusion, and aggressivenss as well  • Bactrim [Sulfamethoxazole-Trimethoprim]    • Ezetimibe    • Gabapentin    • Lisinopril    • Naproxen Nausea Only   • Statins       Immunizations:     Immunization History   Administered Date(s) Administered   • H1N1, All Formulations 10/18/2002, 12/07/2006   • Influenza Quadrivalent Preservative Free 3 years and older IM 07/26/2016   • Pneumococcal Conjugate 13-Valent 03/16/2015   • Pneumococcal Polysaccharide PPV23 02/22/1999, 09/23/2011   • Tdap 09/05/2019, 07/01/2022      Health Maintenance: There are no preventive care reminders to display for this patient  There are no preventive care reminders to display for this patient  Medicare Screening Tests and Risk Assessments:     Vitaliy Vega is here for her Subsequent Wellness visit  Last Medicare Wellness visit information reviewed, patient interviewed and updates made to the record today  Health Risk Assessment:   Patient rates overall health as fair   Patient feels that their physical health rating is same  Patient is satisfied with their life  Eyesight was rated as same  Hearing was rated as slightly worse  Patient feels that their emotional and mental health rating is same  Patients states they are never, rarely angry  Patient states they are never, rarely unusually tired/fatigued  Pain experienced in the last 7 days has been none  Patient states that she has experienced no weight loss or gain in last 6 months  Depression Screening:   PHQ-2 Score: 0      Fall Risk Screening: In the past year, patient has experienced: history of falling in past year    Number of falls: 1  Injured during fall?: No    Feels unsteady when standing or walking?: Yes    Worried about falling?: Yes      Urinary Incontinence Screening:   Patient has leaked urine accidently in the last six months  Home Safety:  Patient does not have trouble with stairs inside or outside of their home  Patient has working smoke alarms and has working carbon monoxide detector  Home safety hazards include: none  Nutrition:   Current diet is Regular  Medications:   Patient is currently taking over-the-counter supplements  OTC medications include: see medication list  Patient is able to manage medications  Activities of Daily Living (ADLs)/Instrumental Activities of Daily Living (IADLs):   Walk and transfer into and out of bed and chair?: Yes  Dress and groom yourself?: Yes    Bathe or shower yourself?: Yes    Feed yourself?  Yes  Do your laundry/housekeeping?: Yes  Manage your money, pay your bills and track your expenses?: Yes  Make your own meals?: Yes    Do your own shopping?: Yes    Previous Hospitalizations:   Any hospitalizations or ED visits within the last 12 months?: Yes    How many hospitalizations have you had in the last year?: 1-2    Advance Care Planning:   Living will: No    Durable POA for healthcare: Yes    End of Life Decisions reviewed with patient: No      Cognitive Screening:   Provider or family/friend/caregiver concerned regarding cognition?: No    PREVENTIVE SCREENINGS      Cardiovascular Screening:    General: History Lipid Disorder and Screening Current      Diabetes Screening:     General: History Diabetes and Screening Current      Colorectal Cancer Screening:     General: Screening Not Indicated      Breast Cancer Screening:     General: Patient Declines      Cervical Cancer Screening:    General: Screening Not Indicated      Osteoporosis Screening:    General: Patient Declines      Abdominal Aortic Aneurysm (AAA) Screening:        General: Screening Not Indicated      Lung Cancer Screening:     General: Screening Not Indicated      Hepatitis C Screening:    General: Screening Not Indicated    Screening, Brief Intervention, and Referral to Treatment (SBIRT)    Screening  Typical number of drinks in a day: 0  Typical number of drinks in a week: 0  Interpretation: Low risk drinking behavior  Single Item Drug Screening:  How often have you used an illegal drug (including marijuana) or a prescription medication for non-medical reasons in the past year? never    Single Item Drug Screen Score: 0  Interpretation: Negative screen for possible drug use disorder    Other Counseling Topics:   Regular weightbearing exercise  No results found  Physical Exam:     /50   Pulse 87   Ht 5' 1" (1 549 m)   Wt 44 5 kg (98 lb)   SpO2 98%   BMI 18 52 kg/m²     Physical Exam  Vitals and nursing note reviewed  Constitutional:       General: She is not in acute distress  Appearance: She is well-developed  HENT:      Head: Normocephalic and atraumatic  Eyes:      Conjunctiva/sclera: Conjunctivae normal    Cardiovascular:      Rate and Rhythm: Normal rate and regular rhythm  Heart sounds: No murmur heard  Pulmonary:      Effort: Pulmonary effort is normal  No respiratory distress  Breath sounds: Normal breath sounds  No decreased breath sounds or wheezing     Abdominal: Palpations: Abdomen is soft  Tenderness: There is no abdominal tenderness  Musculoskeletal:         General: No swelling  Cervical back: Neck supple  Skin:     General: Skin is warm and dry  Neurological:      Mental Status: She is alert  Psychiatric:         Mood and Affect: Mood normal           Alvaro Negron MD     Lab results reviewed with patient

## 2023-03-20 ENCOUNTER — TELEPHONE (OUTPATIENT)
Dept: INTERNAL MEDICINE CLINIC | Facility: CLINIC | Age: 88
End: 2023-03-20

## 2023-03-20 DIAGNOSIS — R39.9 URINARY SYMPTOM OR SIGN: Primary | ICD-10-CM

## 2023-03-20 NOTE — TELEPHONE ENCOUNTER
Pt is having burning with urination  No pain  No blood in urine  No abdominal pain  Burning started 2 days ago, and she is requesting to have U/A done

## 2023-03-21 ENCOUNTER — APPOINTMENT (OUTPATIENT)
Dept: LAB | Facility: CLINIC | Age: 88
End: 2023-03-21

## 2023-03-21 DIAGNOSIS — E11.69 TYPE 2 DIABETES MELLITUS WITH OTHER SPECIFIED COMPLICATION, UNSPECIFIED WHETHER LONG TERM INSULIN USE (HCC): ICD-10-CM

## 2023-03-21 DIAGNOSIS — E05.00 BASEDOW'S DISEASE: ICD-10-CM

## 2023-03-21 DIAGNOSIS — E05.90 PRETIBIAL MYXEDEMA: ICD-10-CM

## 2023-03-21 LAB
ALBUMIN SERPL BCP-MCNC: 3.5 G/DL (ref 3.5–5)
ALP SERPL-CCNC: 98 U/L (ref 34–104)
ALT SERPL W P-5'-P-CCNC: 15 U/L (ref 7–52)
ANION GAP SERPL CALCULATED.3IONS-SCNC: 4 MMOL/L (ref 4–13)
AST SERPL W P-5'-P-CCNC: 19 U/L (ref 13–39)
BACTERIA UR QL AUTO: ABNORMAL /HPF
BASOPHILS # BLD AUTO: 0 THOUSANDS/ÂΜL (ref 0–0.1)
BASOPHILS NFR BLD AUTO: 0 % (ref 0–1)
BILIRUB SERPL-MCNC: 0.86 MG/DL (ref 0.2–1)
BILIRUB UR QL STRIP: NEGATIVE
BUN SERPL-MCNC: 11 MG/DL (ref 5–25)
CALCIUM SERPL-MCNC: 9.3 MG/DL (ref 8.4–10.2)
CHLORIDE SERPL-SCNC: 108 MMOL/L (ref 96–108)
CLARITY UR: CLEAR
CO2 SERPL-SCNC: 28 MMOL/L (ref 21–32)
COLOR UR: ABNORMAL
CREAT SERPL-MCNC: 0.65 MG/DL (ref 0.6–1.3)
EOSINOPHIL # BLD AUTO: 0.01 THOUSAND/ÂΜL (ref 0–0.61)
EOSINOPHIL NFR BLD AUTO: 0 % (ref 0–6)
ERYTHROCYTE [DISTWIDTH] IN BLOOD BY AUTOMATED COUNT: 14.3 % (ref 11.6–15.1)
GFR SERPL CREATININE-BSD FRML MDRD: 77 ML/MIN/1.73SQ M
GLUCOSE P FAST SERPL-MCNC: 101 MG/DL (ref 65–99)
GLUCOSE UR STRIP-MCNC: NEGATIVE MG/DL
HCT VFR BLD AUTO: 31.2 % (ref 34.8–46.1)
HGB BLD-MCNC: 10 G/DL (ref 11.5–15.4)
HGB UR QL STRIP.AUTO: NEGATIVE
IMM GRANULOCYTES # BLD AUTO: 0.01 THOUSAND/UL (ref 0–0.2)
IMM GRANULOCYTES NFR BLD AUTO: 0 % (ref 0–2)
KETONES UR STRIP-MCNC: NEGATIVE MG/DL
LEUKOCYTE ESTERASE UR QL STRIP: ABNORMAL
LYMPHOCYTES # BLD AUTO: 1.48 THOUSANDS/ÂΜL (ref 0.6–4.47)
LYMPHOCYTES NFR BLD AUTO: 50 % (ref 14–44)
MCH RBC QN AUTO: 28.2 PG (ref 26.8–34.3)
MCHC RBC AUTO-ENTMCNC: 32.1 G/DL (ref 31.4–37.4)
MCV RBC AUTO: 88 FL (ref 82–98)
MONOCYTES # BLD AUTO: 0.33 THOUSAND/ÂΜL (ref 0.17–1.22)
MONOCYTES NFR BLD AUTO: 11 % (ref 4–12)
NEUTROPHILS # BLD AUTO: 1.17 THOUSANDS/ÂΜL (ref 1.85–7.62)
NEUTS SEG NFR BLD AUTO: 39 % (ref 43–75)
NITRITE UR QL STRIP: POSITIVE
NON-SQ EPI CELLS URNS QL MICRO: ABNORMAL /HPF
NRBC BLD AUTO-RTO: 0 /100 WBCS
PH UR STRIP.AUTO: 5.5 [PH]
PLATELET # BLD AUTO: 201 THOUSANDS/UL (ref 149–390)
PMV BLD AUTO: 9.4 FL (ref 8.9–12.7)
POTASSIUM SERPL-SCNC: 3.5 MMOL/L (ref 3.5–5.3)
PROT SERPL-MCNC: 6.3 G/DL (ref 6.4–8.4)
PROT UR STRIP-MCNC: NEGATIVE MG/DL
RBC # BLD AUTO: 3.55 MILLION/UL (ref 3.81–5.12)
RBC #/AREA URNS AUTO: ABNORMAL /HPF
SODIUM SERPL-SCNC: 140 MMOL/L (ref 135–147)
SP GR UR STRIP.AUTO: 1 (ref 1–1.03)
T4 FREE SERPL-MCNC: 2.29 NG/DL (ref 0.76–1.46)
TSH SERPL DL<=0.05 MIU/L-ACNC: <0.01 UIU/ML (ref 0.45–4.5)
UROBILINOGEN UR STRIP-ACNC: <2 MG/DL
WBC # BLD AUTO: 3 THOUSAND/UL (ref 4.31–10.16)
WBC #/AREA URNS AUTO: ABNORMAL /HPF

## 2023-03-21 RX ORDER — CEPHALEXIN 500 MG/1
500 CAPSULE ORAL EVERY 12 HOURS SCHEDULED
Qty: 10 CAPSULE | Refills: 0 | Status: SHIPPED | OUTPATIENT
Start: 2023-03-21 | End: 2023-03-26

## 2023-03-22 LAB
EST. AVERAGE GLUCOSE BLD GHB EST-MCNC: 97 MG/DL
HBA1C MFR BLD: 5 %

## 2023-04-04 ENCOUNTER — APPOINTMENT (OUTPATIENT)
Dept: LAB | Facility: CLINIC | Age: 88
End: 2023-04-04

## 2023-04-04 LAB
BACTERIA UR QL AUTO: ABNORMAL /HPF
BILIRUB UR QL STRIP: NEGATIVE
CLARITY UR: CLEAR
COLOR UR: ABNORMAL
GLUCOSE UR STRIP-MCNC: NEGATIVE MG/DL
HGB UR QL STRIP.AUTO: NEGATIVE
KETONES UR STRIP-MCNC: NEGATIVE MG/DL
LEUKOCYTE ESTERASE UR QL STRIP: ABNORMAL
NITRITE UR QL STRIP: NEGATIVE
NON-SQ EPI CELLS URNS QL MICRO: ABNORMAL /HPF
PH UR STRIP.AUTO: 5.5 [PH]
PROT UR STRIP-MCNC: NEGATIVE MG/DL
RBC #/AREA URNS AUTO: ABNORMAL /HPF
SP GR UR STRIP.AUTO: 1.01 (ref 1–1.03)
UROBILINOGEN UR STRIP-ACNC: <2 MG/DL
WBC #/AREA URNS AUTO: ABNORMAL /HPF

## 2023-04-07 LAB — BACTERIA UR CULT: ABNORMAL

## 2023-04-27 PROBLEM — I61.4 ACUTE CEREBELLAR HEMORRHAGE (HCC): Status: ACTIVE | Noted: 2023-01-01

## 2023-04-27 NOTE — RESPIRATORY THERAPY NOTE
RT Ventilator Management Note  Krista Toledo 80 y o  female MRN: [id number]  Unit/Bed#: ED 07 Encounter: [id number]       04/27/23 1517   Respiratory Assessment   Assessment Type During-treatment   General Appearance Drowsy   Respiratory Pattern Assisted   Chest Assessment Chest expansion symmetrical   Bilateral Breath Sounds Clear   Location Specific No   Cough None   Suction ET Tube;Oral   Resp Comments Assisted with intubation of this 79 y/o  Glidescope utilized  BS equal and bilateral   CO2 colormetric indicator quickly changed to yellow upon 1st exhalation  Vent settings provided by Dr Viviane Sanchez  Pt apparently a GI bleed  Family here and wished for pt to be intubated  [place] G5 vent utilized  Vent alarms on and functional   Will continue to monitor and await request to assist in transport  Vent orders and intubation orders written as pending     O2 Device Vent   Vent Information   Vent type [place] G5   [place] C3/G5 Vent Mode (S)CMV   $ Vent Charge-INITIAL Yes   Ventilator Start Yes   $ Pulse Oximetry Spot Check Charge Completed   SpO2 94 %   (S)CMV Settings   Resp Rate (BPM) 12 BPM   VT (mL) 400 mL   FIO2 (%) 60 %   PEEP (cmH2O) 6 cmH2O   I:E Ratio 1:4   Insp Time (%) 1 %   Humidification Heater   Heater Temperature (Set) 87 8 °F (31 °C)   (S)CMV Actuals   Resp Rate (BPM) 13 BPM   VT (mL) 419   MV 5 7   MAP (cmH2O) 8 5 cmH2O   Peak Pressure (cmH2O) 21 cmH2O   I:E Ratio (Obs) 1:4   Insp Resistance 17   Heater Temperature (Obs) 87 8 °F (31 °C)   Static Compliance (mL/cmH20) 82 7 mL/cmH2O   (S)CMV Alarms   High Peak Pressure (cmH2O) 40   Low Pressure (cmH2O) 5 cm H2O   High Resp Rate (BPM) 23 BPM   Low Resp Rate (BPM) 8 BPM   High MV (L/min) 10 L/min   Low MV (L/min) 4 L/min   High VT (mL) 750 mL   Low VT (mL) 250 mL   Apnea Time (s) 20 S   Maintenance   Alarm (pink) cable attached No   Resuscitation bag with peep valve at bedside Yes   Water bag changed No   Circuit changed No   ETT  7 mm Placement Date/Time: 04/27/23 1543   Technique: Video laryngoscopy  Tube Size: 7 mm  Location: Oral  Secured at (cm): 23  Placed By: Physician  Placed by (Name): Veroadelfo Carty osei   Secured at (cm) 23   Measured from [facility] by Commercial tube cortez   Site Condition Dry         Daily Screen    No data found in the last 10 encounters  Physical Exam:   Assessment Type: During-treatment  General Appearance: Drowsy  Respiratory Pattern: Assisted  Chest Assessment: Chest expansion symmetrical  Bilateral Breath Sounds: Clear  Location Specific: No  Cough: None  Suction: ET Tube, Oral  O2 Device: Vent      Resp Comments: Assisted with intubation of this 79 y/o  Glidescope utilized  BS equal and bilateral   CO2 colormetric indicator quickly changed to yellow upon 1st exhalation  Vent settings provided by Dr Tyra Guzman  Pt apparently a GI bleed  Family here and wished for pt to be intubated  [place] G5 vent utilized  Vent alarms on and functional   Will continue to monitor and await request to assist in transport  Vent orders and intubation orders written as pending

## 2023-04-27 NOTE — ED PROVIDER NOTES
History  Chief Complaint   Patient presents with   • STROKE Alert     See stroke narrator       75-year-old female with a past medical history of anxiety, asthma, GERD, hypertension, hyperlipidemia, hypothyroidism, IBS, CKD, peripheral artery disease, and prior vertebral dissection presents as a prehospital stroke alert  EMS was called to the house and obtained history from patient's son  Patient had been sitting on the porch with family and friends  All of a sudden she stated that she had a sharp headache  Shortly after, patient stopped speaking and became less responsive  When EMS arrived, they stated that patient was not speaking and she was only localizing pain  At this time, they called for a prehospital stroke alert  Patient's last known well was about an hour prior to arrival           Prior to Admission Medications   Prescriptions Last Dose Informant Patient Reported? Taking?    FIBER PO   Yes No   Sig: Take by mouth   albuterol (PROVENTIL HFA,VENTOLIN HFA) 90 mcg/act inhaler   No No   Sig: INHALE 1 PUFF BY MOUTH EVERY 6 HOURS AS NEEDED FOR WHEEZE   amLODIPine (NORVASC) 2 5 mg tablet   No No   Sig: TAKE 1 TABLET BY MOUTH EVERY DAY   aspirin 81 mg chewable tablet   No No   Sig: Chew 1 tablet (81 mg total) daily   b complex vitamins capsule   Yes No   Sig: Take 1 capsule by mouth daily   cilostazol (PLETAL) 100 mg tablet   No No   Sig: Take 1 tablet (100 mg total) by mouth 2 (two) times a day   famotidine (PEPCID) 40 MG tablet   No No   Sig: TAKE 1 TABLET BY MOUTH EVERY DAY   meclizine (ANTIVERT) 25 mg tablet   No No   Sig: Take 1 tablet (25 mg total) by mouth every 8 (eight) hours as needed for dizziness   methimazole (TAPAZOLE) 5 mg tablet   No No   Si/2 tab MWFS   montelukast (SINGULAIR) 10 mg tablet   No No   Sig: TAKE 1 TABLET BY MOUTH EVERYDAY AT BEDTIME   rosuvastatin (CRESTOR) 5 mg tablet   No No   Sig: Take 1 tablet (5 mg total) by mouth daily      Facility-Administered Medications: None Past Medical History:   Diagnosis Date   • Asthma    • Chronic interstitial cystitis    • Community acquired pneumonia     last assessed 10/4/13   • Diabetes mellitus ([facility] [address] )    • Disease of thyroid gland    • Diverticulitis    • Dizziness    • GERD (gastroesophageal reflux disease)    • Hyperlipidemia    • Hypertension    • Vertigo        Past Surgical History:   Procedure Laterality Date   • ADENOIDECTOMY     • CHOLECYSTECTOMY     • COLON SURGERY      partial colectomy - sigmoid, diverticulitis   • EYE SURGERY     • HYSTERECTOMY      ovaries remain   • TONSILLECTOMY     • US GUIDED LYMPH NODE BIOPSY RIGHT  3/11/2020   • US GUIDED THYROID BIOPSY  11/13/2019       Family History   Problem Relation Age of Onset   • Coronary artery disease Mother    • Other Father         MVA   • Alcohol abuse Neg Hx    • Depression Neg Hx    • Drug abuse Neg Hx    • Substance Abuse Neg Hx    • Mental illness Neg Hx      I have reviewed and agree with the history as documented      E-Cigarette/Vaping   • E-Cigarette Use Never User      E-Cigarette/Vaping Substances     Social History     Tobacco Use   • Smoking status: Never   • Smokeless tobacco: Never   Vaping Use   • Vaping Use: Never used   Substance Use Topics   • Alcohol use: Never   • Drug use: No        Review of Systems   Unable to perform ROS: Patient unresponsive       Physical Exam  ED Triage Vitals   Temperature Pulse Respirations Blood Pressure SpO2   04/27/23 1505 04/27/23 1405 04/27/23 1445 04/27/23 1405 04/27/23 1405   (!) 97 3 °F (36 3 °C) 82 14 110/52 100 %      Temp Source Heart Rate Source Patient Position - Orthostatic VS BP Location FiO2 (%)   04/27/23 1505 04/27/23 1405 04/27/23 1545 04/27/23 1451 04/27/23 1545   Rectal Monitor Lying Left arm 60      Pain Score       --                    Orthostatic Vital Signs  Vitals:    04/27/23 1800 04/27/23 1815 04/27/23 1825 04/27/23 1850   BP: 161/68 122/65 (!) 98/47 125/55   Pulse: 88 85 73 75   Patient Position - Orthostatic VS: Lying Lying Lying        Physical Exam  Constitutional:       Appearance: She is ill-appearing  HENT:      Head: Normocephalic and atraumatic  Nose: Nose normal       Mouth/Throat:      Mouth: Mucous membranes are moist    Eyes:      Conjunctiva/sclera: Conjunctivae normal    Cardiovascular:      Rate and Rhythm: Normal rate and regular rhythm  Pulmonary:      Effort: Pulmonary effort is normal  No respiratory distress  Abdominal:      General: Abdomen is flat  Palpations: Abdomen is soft  Musculoskeletal:         General: Normal range of motion  Cervical back: No rigidity  Skin:     General: Skin is warm and dry  Neurological:      Mental Status: She is unresponsive  GCS: GCS eye subscore is 1  GCS verbal subscore is 1  GCS motor subscore is 5           ED Medications  Medications   niCARdipine (CARDENE) 25 mg (STANDARD CONCENTRATION) in sodium chloride 0 9% 250 mL (2 5 mg/hr Intravenous Restarted 4/27/23 1816)   propofol (DIPRIVAN) 1000 mg in 100 mL infusion (premix) (10 mcg/kg/min × 46 7 kg Intravenous Rate/Dose Change 4/27/23 1724)   sodium chloride (HYPERTONIC) 3 % infusion (30 mL/hr Intravenous New Bag 4/27/23 1648)   potassium chloride 20 mEq IVPB (premix) (20 mEq Intravenous New Bag 4/27/23 1822)   iohexol (OMNIPAQUE) 350 MG/ML injection (MULTI-DOSE) 100 mL (98 mL Intravenous Given 4/27/23 1458)   desmopressin (DDAVP) 18 8 mcg in sodium chloride 0 9 % 50 mL IVPB (0 mcg Intravenous Stopped 4/27/23 1605)   labetalol (NORMODYNE) injection 10 mg (10 mg Intravenous Given 4/27/23 1511)   etomidate (AMIDATE) 2 mg/mL injection 20 mg (20 mg Intravenous Given 4/27/23 1538)   Succinylcholine Chloride 100 mg/5 mL syringe 80 mg (80 mg Intravenous Given 4/27/23 1539)   sodium chloride (HYPERTONIC) 3 % bolus 250 mL (0 mL Intravenous Stopped 4/27/23 1656)       Diagnostic Studies  Results Reviewed     Procedure Component Value Units Date/Time    HS Troponin I 2hr [[id number]] (Abnormal) Collected: 04/27/23 1716    Lab Status: Final result Specimen: Blood from Arm, Left Updated: 04/27/23 1810     hs TnI 2hr 48 ng/L      Delta 2hr hsTnI 42 ng/L     FLU/RSV/COVID - if FLU/RSV clinically relevant [[id number]]  (Normal) Collected: 04/27/23 1502    Lab Status: Final result Specimen: Nares from Nose Updated: 04/27/23 1635     SARS-CoV-2 Negative     INFLUENZA A PCR Negative     INFLUENZA B PCR Negative     RSV PCR Negative    Narrative:      FOR PEDIATRIC PATIENTS - copy/paste COVID Guidelines URL to browser: [email or web address]    SARS-CoV-2 assay is a Nucleic Acid Amplification assay intended for the  qualitative detection of nucleic acid from SARS-CoV-2 in nasopharyngeal  swabs  Results are for the presumptive identification of SARS-CoV-2 RNA  Positive results are indicative of infection with SARS-CoV-2, the virus  causing COVID-19, but do not rule out bacterial infection or co-infection  with other viruses  Laboratories within the [place] and its  territories are required to report all positive results to the appropriate  public health authorities  Negative results do not preclude SARS-CoV-2  infection and should not be used as the sole basis for treatment or other  patient management decisions  Negative results must be combined with  clinical observations, patient history, and epidemiological information  This test has not been FDA cleared or approved  This test has been authorized by FDA under an Emergency Use Authorization  (EUA)  This test is only authorized for the duration of time the  declaration that circumstances exist justifying the authorization of the  emergency use of an in vitro diagnostic tests for detection of SARS-CoV-2  virus and/or diagnosis of COVID-19 infection under section 564(b)(1) of  the Act, 21 U  S C  972CKV-0(G)(2), unless the authorization is terminated  or revoked sooner   The test has been validated but independent review by FDA  and CLIA is pending  Test performed using [employer] GeneXpert: This RT-PCR assay targets N2,  a region unique to SARS-CoV-2  A conserved region in the E-gene was chosen  for pan-Sarbecovirus detection which includes SARS-CoV-2  According to CMS-2020-01-R, this platform meets the definition of high-throughput technology      HS Troponin I 4hr [[id number]]     Lab Status: No result Specimen: Blood     HS Troponin 0hr (reflex protocol) [[id number]]  (Normal) Collected: 04/27/23 1502    Lab Status: Final result Specimen: Blood from Arm, Left Updated: 04/27/23 1542     hs TnI 0hr 6 ng/L     Protime-INR [[id number]]  (Normal) Collected: 04/27/23 1502    Lab Status: Final result Specimen: Blood from Arm, Left Updated: 04/27/23 1536     Protime 13 1 seconds      INR 0 97    APTT [[id number]]  (Normal) Collected: 04/27/23 1502    Lab Status: Final result Specimen: Blood from Arm, Left Updated: 04/27/23 1536     PTT 27 seconds     Basic metabolic panel [[id number]]  (Abnormal) Collected: 04/27/23 1502    Lab Status: Final result Specimen: Blood from Arm, Left Updated: 04/27/23 1531     Sodium 138 mmol/L      Potassium 3 1 mmol/L      Chloride 109 mmol/L      CO2 22 mmol/L      ANION GAP 7 mmol/L      BUN 15 mg/dL      Creatinine 0 76 mg/dL      Glucose 140 mg/dL      Calcium 9 1 mg/dL      eGFR 68 ml/min/1 73sq m     Narrative:      [place] guidelines for Chronic Kidney Disease (CKD):   •  Stage 1 with normal or high GFR (GFR > 90 mL/min/1 73 square meters)  •  Stage 2 Mild CKD (GFR = 60-89 mL/min/1 73 square meters)  •  Stage 3A Moderate CKD (GFR = 45-59 mL/min/1 73 square meters)  •  Stage 3B Moderate CKD (GFR = 30-44 mL/min/1 73 square meters)  •  Stage 4 Severe CKD (GFR = 15-29 mL/min/1 73 square meters)  •  Stage 5 End Stage CKD (GFR <15 mL/min/1 73 square meters)  Note: GFR calculation is accurate only with a steady state creatinine    CBC and Platelet [[id number]]  (Normal) Collected: 04/27/23 1502    Lab Status: Final result Specimen: Blood from Arm, Left Updated: 04/27/23 1514     WBC 5 19 Thousand/uL      RBC 4 04 Million/uL      Hemoglobin 11 7 g/dL      Hematocrit 35 3 %      MCV 87 fL      MCH 29 0 pg      MCHC 33 1 g/dL      RDW 14 3 %      Platelets 492 Thousands/uL      MPV 9 9 fL     Fingerstick Glucose (POCT) [[id number]]  (Normal) Collected: 04/27/23 1452    Lab Status: Final result Updated: 04/27/23 1453     POC Glucose 137 mg/dl                  XR chest 1 view portable   ED Interpretation by Cosme Harada, DO (04/27 1806)   ETT tube in proper place      CTA stroke alert (head/neck)   Final Result by Sailaja Dyson MD (04/27 6587)      Negative CTA head and neck for active contrast extravasation, large vessel occlusion, dissection, aneurysm, or vascular malformation  Severe stenosis in left vertebral artery proximal V4 segment due to calcified atherosclerotic disease  Severe stenosis (approximately 75% narrowing) in right ICA proximal cervical segment due to atherosclerotic disease  4 1 cm lesion posterior to left thyroid lobe extending into the left upper mediastinal region, could represent pedunculated thyroid nodule or large parathyroid adenoma  Right nasopharyngeal airway tip in right vallecular region  Additional chronic/incidental findings as detailed above  Findings were directly discussed with Nyasia Moreno at 3:04 PM                         Workstation performed: [id number]         CT stroke alert brain   Final Result by Sailaja Dyson MD (04/27 1156)      Acute diffuse parenchymal hemorrhages and subarachnoid hemorrhage in bilateral cerebellar folia (left worse than right) which may be due to hemorrhagic conversion of acute/subacute infarcts in bilateral cerebellum   There is upward and downward cerebellar    herniation crowding the quadrigeminal cistern, posterior foramen magnum, and compression of fourth ventricle  Stable mild ventriculomegaly  Findings were directly discussed with Shayy Gabriel at 2:57 PM and 3:04 PM       Workstation performed: [id number]               Procedures  Intubation    Date/Time: 4/27/2023 5:39 PM  Performed by: Pavan Jordan DO  Authorized by: Pavan Jordan DO     Patient location:  ED  Consent:     Consent obtained:  Verbal    Consent given by: reg  Risks discussed:  Hypoxia, death and aspiration    Alternatives discussed:  No treatment, delayed treatment and alternative treatment  Universal protocol:     Procedure explained and questions answered to patient or proxy's satisfaction: yes      Relevant documents present and verified: yes      Test results available and properly labeled: yes      Radiology Images displayed and confirmed  If images not available, report reviewed: yes      Required blood products, implants, devices, and special equipment available: yes      Site/side marked: yes      Immediately prior to procedure, a time out was called: yes      Patient identity confirmed:  Arm band  Pre-procedure details:     Patient status:  Unresponsive    Mallampati score:  2    Pretreatment medications:  Etomidate    Paralytics:  Succinylcholine  Indications:     Indications for intubation: airway protection    Procedure details:     Preoxygenation:  Bag valve mask    CPR in progress: no      Intubation method:  Oral    Oral intubation technique:  Direct and glidescope    Laryngoscope blade:   Mac 3    Tube size (mm):  7 0    Tube type:  Cuffed    Number of attempts:  3 or more    Ventilation between attempts: yes      Cricoid pressure: yes      Tube visualized through cords: yes    Placement assessment:     ETT to lip:  23    Tube secured with:  ETT cortez    Breath sounds:  Equal    Placement verification: chest rise, condensation, CXR verification, direct visualization, ETCO2 detector and capnography      CXR findings:  ETT in proper "place  Post-procedure details:     Patient tolerance of procedure: Tolerated well, no immediate complications          ED Course  ED Course as of 04/27/23 1908   Thu Apr 27, 2023   1734 ECG 12 lead  This ECG was interpreted by me  The heart rate is 90, which is normal  The rhythm is irregular  The axis is normal  The P waves are normal and the NH interval is normal  The QRS height is normal and width is normal  The ST segments are not elevated or depressed  The T waves are normal  The QT segment is normal  This ecg shows sinus rhythm  Stroke Assessment     Row Name 04/27/23 1735             NIH Stroke Scale    Interval --      Level of Consciousness (1a ) 2      LOC Questions (1b ) 2      LOC Commands (1c ) 2      Best Gaze (2 ) 1      Visual (3 ) 0      Facial Palsy (4 ) 0      Motor Arm, Left (5a ) 4      Motor Arm, Right (5b ) 4      Motor Leg, Left (6a ) 4      Motor Leg, Right (6b ) 4      Limb Ataxia (7 ) 2      Sensory (8 ) 2      Best Language (9 ) 3      Dysarthria (10 ) 2      Extinction and Inattention (11 ) (Formerly Neglect) 2      Total 34              Flowsheet Row Most Recent Value   Thrombolytic Decision Options    Thrombolytic Decision Patient not a candidate  Medical Decision Making  41-year-old female presents as a prehospital stroke alert  Patient has a high NIH   GCS is 7  Neurology was at bedside for stroke alert  Initial imaging showed \"Acute diffuse parenchymal hemorrhages and subarachnoid hemorrhage in bilateral cerebellar folia (left worse than right) which may be due to hemorrhagic conversion of acute/subacute infarcts in bilateral cerebellum  There is upward and downward cerebellar   herniation crowding the quadrigeminal cistern, posterior foramen magnum, and compression of fourth ventricle  \"  Initially, patient was maintaining her airway  Discussed case with family, 3 sisters and a brother  They are aware of all findings    They " state that patient had expressed to them that she would want everything done if she were ever in a situation like this  However, she also stated that she would not want to live in a nursing home  Family understands the state that the patient is in and they understand that the prognosis is likely poor  They stated that they would like everything done for the patient at this time so they can further discuss with family and make a final decision about what they would like  I was called to the bedside because patient lost her gag reflex  At this time, we decided to intubate the patient  Family was agreeable  Patient was successfully intubated and this was confirmed with chest x-ray  Patient was started on propofol for sedation and a Cardene drip for blood pressure control  Neurology recommended the Cardene drip  Shortly after this, patient became bradycardic to the 40s  The Cardene was turned off and the propofol was turned down  This resolved on its own  Neurology also recommended starting patient on hypertonic saline  This order was then placed and started to try to further prevent swelling  Head of the bed is at 30 degrees  Patient remained stable while in the ED  Case was discussed with critical care  Patient was admitted for further management  Stroke [facility]): complicated acute illness or injury with systemic symptoms that poses a threat to life or bodily functions  Amount and/or Complexity of Data Reviewed  Independent Historian: EMS     Details: Sons and daughters provided history as well  External Data Reviewed:      Details: Reviewed past medical history and medications  Labs: ordered  Radiology: ordered and independent interpretation performed  ECG/medicine tests: ordered and independent interpretation performed  Decision-making details documented in ED Course  Risk  Prescription drug management  Decision regarding hospitalization      Risk Details: Patient is at increased risk due to baseline comorbidities  She is at increased risk because she is likely a poor surgical candidate  Disposition  Final diagnoses:   Stroke [facility])     Time reflects when diagnosis was documented in both MDM as applicable and the Disposition within this note     Time User Action Codes Description Comment    4/27/2023  2:46 PM Ji Cordon Add [I63 9] Stroke [facility])     4/27/2023  5:23 PM Heidi Zapata Add [I61 4] Acute cerebellar hemorrhage [facility])       ED Disposition     ED Disposition   Admit    Condition   Stable    Date/Time   Thu Apr 27, 2023  4:09 PM    Comment   Case was discussed with Dr Rosemary Weathers and the patient's admission status was agreed to be Admission Status: inpatient status to the service of Dr Rosemary Weathers   Follow-up Information    None         Current Discharge Medication List      CONTINUE these medications which have NOT CHANGED    Details   albuterol (PROVENTIL HFA,VENTOLIN HFA) 90 mcg/act inhaler INHALE 1 PUFF BY MOUTH EVERY 6 HOURS AS NEEDED FOR WHEEZE  Qty: 8 5 g, Refills: 1    Associated Diagnoses: Mild intermittent asthma without complication      amLODIPine (NORVASC) 2 5 mg tablet TAKE 1 TABLET BY MOUTH EVERY DAY  Qty: 90 tablet, Refills: 0    Associated Diagnoses: Essential hypertension      aspirin 81 mg chewable tablet Chew 1 tablet (81 mg total) daily  Qty: 30 tablet, Refills: 0    Associated Diagnoses: Vertebral artery stenosis      b complex vitamins capsule Take 1 capsule by mouth daily      cilostazol (PLETAL) 100 mg tablet Take 1 tablet (100 mg total) by mouth 2 (two) times a day  Qty: 180 tablet, Refills: 1    Associated Diagnoses: Vertebral artery stenosis; Stenosis of right carotid artery      famotidine (PEPCID) 40 MG tablet TAKE 1 TABLET BY MOUTH EVERY DAY  Qty: 90 tablet, Refills: 1    Comments: DX Code Needed      Associated Diagnoses: Gastroesophageal reflux disease      FIBER PO Take by mouth      meclizine (ANTIVERT) 25 mg tablet Take 1 tablet (25 mg total) by mouth every 8 (eight) hours as needed for dizziness  Qty: 30 tablet, Refills: 0    Comments: DX Code Needed    Associated Diagnoses: Vertigo      methimazole (TAPAZOLE) 5 mg tablet 1/2 tab MWFS  Qty: 30 tablet, Refills: 0    Associated Diagnoses: Hyperthyroidism      montelukast (SINGULAIR) 10 mg tablet TAKE 1 TABLET BY MOUTH EVERYDAY AT BEDTIME  Qty: 90 tablet, Refills: 1    Associated Diagnoses: Mild persistent asthma without complication      rosuvastatin (CRESTOR) 5 mg tablet Take 1 tablet (5 mg total) by mouth daily  Qty: 90 tablet, Refills: 1    Associated Diagnoses: Vertebral artery stenosis, unspecified laterality; Carotid stenosis, right           No discharge procedures on file  PDMP Review       Value Time User    PDMP Reviewed  Yes 8/12/2022 12:25 PM Maria Alejandra Cortes MD           ED Provider  Attending physically available and evaluated Marcelino Whitt  JB managed the patient along with the ED Attending      Electronically Signed by         Wisam Gaviria DO  04/27/23 4022

## 2023-04-27 NOTE — H&P
H&P Exam - [address] [facility] [address] [place] 80 y o  female MRN: [id number]  Unit/Bed#: ED 07 Encounter: [id number]      -------------------------------------------------------------------------------------------------------------  Chief Complaint: Headache and AMS    History of Present Illness   HX and PE limited by: Altered mental status    Shun Velasco is an 80-year-old female with past medical history of left vertebral dissection, hypertension, hyperlipidemia, past TIA  Patient was apparently well until today afternoon when she was with her son and complain of sudden severe headache and eventually became unresponsive to the point that EMS was called  When EMS arrived patient was barely responding to pain  In the ED patient was unresponsive except to pain and was not appropriately following commands or answering any questions  NIHSS was 29 and last known well was 1:30 PM  CT head showed bilateral hemorrhages in the bilateral cerebellum  CTA was negative for active contrast extravasation, large vessel occlusion, dissection, aneurysm, vascular malformation   vitals showed systolic blood pressure in 180s and 190s  Patient was intubated for airway protection and admitted to medical critical care service for further evaluation and management  History obtained from chart review  -------------------------------------------------------------------------------------------------------------  Assessment and Plan:    Neuro:   • Diagnosis: Acute cerebellar hemorrhage    CT stroke alert (4/27) : Acute diffuse parenchymal hemorrhages and subarachnoid hemorrhage in bilateral cerebellar folia (left worse than right) which may be due to hemorrhagic conversion of acute/subacute infarcts in bilateral cerebellum  There is upward and downward cerebellar herniation crowding the quadrigeminal cistern, posterior foramen magnum, and compression of fourth ventricle   Stable mild ventriculomegaly    o Plan  o Initiate stroke pathway  o Blood pressure control with goal SBP less than 140  o Nicardipine drip for BP goal  o Propofol for sedation  o HTS @ 30 ml/hr to reduce cerebral edema  o Goal Na 145-155; Goal osm 310-320  o Lipid profile, HbA1c  o Statin therapy  o Normothermia, euglycemia  o No anticoagulation or antiplatelet agents; reversal done with DDAVP  o TTE  o MRI brain  o Telemetry      CV:   • Diagnosis: Hypertension                           On amlodipine 2 5 mg at home  o Plan  o Hold oral medications in setting of intubation  o As needed labetalol or nicardipine drip for SBP goal less than 140    • Diagnosis: Hyperlipidemia                            On rosuvastatin 5 mg daily  o Plan  o Hold in setting of intubation      Pulm:  • Diagnosis: 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    o Plan  o AC/VC, rate 12, PEEP 6, tidal volume 400, FiO2 60%  o Daily SBT  o Airway clearance protocol      GI:   • Diagnosis: No active issues    o Plan   o IV Protonix for GI ulcer prophylaxis  o Bowel regimen      :   • Diagnosis: No active issues    o Plan: Monitor I/Os      F/E/N:   • Fluids : Maintenance fluids  • Electrolytes : Keep K more than 4, Phos more than 3, mag more than 2  • Nutrition : N p o  for now; NG tube placement with tube feeding eventually      Heme/Onc:   • Diagnosis: No active issues      Endo:   • Diagnosis: No active issues      ID:   • Diagnosis: No active issues      MSK/Skin:   • Diagnosis: No active issues    o Plan  o Frequent turning to prevent skin breakdown      Disposition: Continue Critical Care   Code Status: Prior  --------------------------------------------------------------------------------------------------------------    Review of systems was unable to be performed secondary to Intubated    Physical Exam  Vitals and nursing note reviewed  Constitutional:       Interventions: She is sedated and intubated  HENT:      Head: Normocephalic and atraumatic        Mouth/Throat: Mouth: Mucous membranes are moist       Pharynx: Oropharynx is clear  Eyes:      Conjunctiva/sclera: Conjunctivae normal       Pupils: Pupils are equal, round, and reactive to light  Cardiovascular:      Rate and Rhythm: Normal rate and regular rhythm  Pulses: Normal pulses  Heart sounds: Murmur (systolic murmur) heard  Pulmonary:      Effort: Pulmonary effort is normal  No respiratory distress  She is intubated  Breath sounds: Normal breath sounds  No wheezing or rales  Abdominal:      General: Bowel sounds are normal       Palpations: Abdomen is soft  Musculoskeletal:      Right lower leg: No edema  Left lower leg: No edema  Skin:     General: Skin is warm  Capillary Refill: Capillary refill takes less than 2 seconds  Coloration: Skin is not jaundiced  Findings: No rash  Neurological:      Mental Status: She is unresponsive  GCS: GCS eye subscore is 1  GCS verbal subscore is 1  Comments: Triple flexion in B/L LE         --------------------------------------------------------------------------------------------------------------  Vitals:   Vitals:    04/27/23 1550 04/27/23 1600 04/27/23 1615 04/27/23 1620   BP: (!) 174/75 (!) 174/75 110/52 (!) 100/48   BP Location:  Right arm Right arm Right arm   Pulse: 75 82 58 78   Resp:  12 12 12   Temp:       TempSrc:       SpO2:  100% 100% 100%   Weight:         Temp  Min: 97 3 °F (36 3 °C)  Max: 97 3 °F (36 3 °C)        Body mass index is 19 43 kg/m²    N/A    Laboratory and Diagnostics:  Results from last 7 days   Lab Units 04/27/23  1502   WBC Thousand/uL 5 19   HEMOGLOBIN g/dL 11 7   HEMATOCRIT % 35 3   PLATELETS Thousands/uL 252     Results from last 7 days   Lab Units 04/27/23  1502   SODIUM mmol/L 138   POTASSIUM mmol/L 3 1*   CHLORIDE mmol/L 109*   CO2 mmol/L 22   ANION GAP mmol/L 7   BUN mg/dL 15   CREATININE mg/dL 0 76   CALCIUM mg/dL 9 1   GLUCOSE RANDOM mg/dL 140          Results from last 7 days   Lab Units 04/27/23  1502   INR  0 97   PTT seconds 27       Imaging: I have personally reviewed pertinent reports  Historical Information   Past Medical History:   Diagnosis Date   • Asthma    • Chronic interstitial cystitis    • Community acquired pneumonia     last assessed 10/4/13   • Diabetes mellitus ([facility] [address] )    • Disease of thyroid gland    • Diverticulitis    • Dizziness    • GERD (gastroesophageal reflux disease)    • Hyperlipidemia    • Hypertension    • Vertigo      Past Surgical History:   Procedure Laterality Date   • ADENOIDECTOMY     • CHOLECYSTECTOMY     • COLON SURGERY      partial colectomy - sigmoid, diverticulitis   • EYE SURGERY     • HYSTERECTOMY      ovaries remain   • TONSILLECTOMY     • US GUIDED LYMPH NODE BIOPSY RIGHT  3/11/2020   • US GUIDED THYROID BIOPSY  11/13/2019     Social History   Social History     Substance and Sexual Activity   Alcohol Use Never     Social History     Substance and Sexual Activity   Drug Use No     Social History     Tobacco Use   Smoking Status Never   Smokeless Tobacco Never     Family History:   Family History   Problem Relation Age of Onset   • Coronary artery disease Mother    • Other Father         MVA   • Alcohol abuse Neg Hx    • Depression Neg Hx    • Drug abuse Neg Hx    • Substance Abuse Neg Hx    • Mental illness Neg Hx      I have reviewed this patient's family history and commented on sigificant items within the HPI      Medications:  Current Facility-Administered Medications   Medication Dose Route Frequency   • niCARdipine (CARDENE) 25 mg (STANDARD CONCENTRATION) in sodium chloride 0 9% 250 mL  1-15 mg/hr Intravenous Titrated   • propofol (DIPRIVAN) 1000 mg in 100 mL infusion (premix)  5-50 mcg/kg/min Intravenous Titrated   • sodium chloride (HYPERTONIC) 3 % infusion  30 mL/hr Intravenous Continuous     Home medications:  Prior to Admission Medications   Prescriptions Last Dose Informant Patient Reported? Taking?    FIBER PO   Yes No   Sig: Take by "mouth   albuterol (PROVENTIL HFA,VENTOLIN HFA) 90 mcg/act inhaler   No No   Sig: INHALE 1 PUFF BY MOUTH EVERY 6 HOURS AS NEEDED FOR WHEEZE   amLODIPine (NORVASC) 2 5 mg tablet   No No   Sig: TAKE 1 TABLET BY MOUTH EVERY DAY   aspirin 81 mg chewable tablet   No No   Sig: Chew 1 tablet (81 mg total) daily   b complex vitamins capsule   Yes No   Sig: Take 1 capsule by mouth daily   cilostazol (PLETAL) 100 mg tablet   No No   Sig: Take 1 tablet (100 mg total) by mouth 2 (two) times a day   famotidine (PEPCID) 40 MG tablet   No No   Sig: TAKE 1 TABLET BY MOUTH EVERY DAY   meclizine (ANTIVERT) 25 mg tablet   No No   Sig: Take 1 tablet (25 mg total) by mouth every 8 (eight) hours as needed for dizziness   methimazole (TAPAZOLE) 5 mg tablet   No No   Si/2 tab MWFS   montelukast (SINGULAIR) 10 mg tablet   No No   Sig: TAKE 1 TABLET BY MOUTH EVERYDAY AT BEDTIME   rosuvastatin (CRESTOR) 5 mg tablet   No No   Sig: Take 1 tablet (5 mg total) by mouth daily      Facility-Administered Medications: None     Allergies: Allergies   Allergen Reactions   • Haloperidol      Confusion /AMS  1 ep on 2020  AGGRESSIVENESS  AGITATION   • Triazolam Other (See Comments)     Confusion, and aggressivenss as well  • Bactrim [Sulfamethoxazole-Trimethoprim]    • Ezetimibe    • Gabapentin    • Lisinopril    • Naproxen Nausea Only   • Statins      ------------------------------------------------------------------------------------------------------------  Advance Directive and Living Will:      Power of :    POLST:    ------------------------------------------------------------------------------------------------------------  Anticipated Length of Stay is > 2 midnights    Care Time Delivered: 30 minutes      Jc Baldwin MD  PGY-1, Internal Medicine  [address]          Portions of the record may have been created with voice recognition software    Occasional wrong word or \"sound a like\" substitutions " may have occurred due to the inherent limitations of voice recognition software    Read the chart carefully and recognize, using context, where substitutions have occurred

## 2023-04-27 NOTE — CONSULTS
Consultation - Stroke   Sandrita Arnett 80 y o  female MRN: [id number]  Unit/Bed#: ICU 05 Encounter: [id number]      Assessment/Plan     * Acute cerebellar hemorrhage [facility])  Assessment & Plan  81 yo F w/ a PMH of HTN, HLD, L vert dissection and past TIA coming in as a Stroke alert on 4/27/2023  2:53 PM with initial NIHSS of 29 and LKW 1:30pm, initial Blood Pressure: (!) 196/81  Initial presenting deficits were unresponsiveness and weakness in all extremeties   Patient was at her baseline and with her son when all of a sudden she complained of a sudden severe headache and slowly became less responsive and EMS was called and patient was becoming unresponsive and minimally responsive only to pain  As a result of bilateral cerebellar bleed pt was determined to not be a candidate for thrombolysis (TNK)  • Exam on 04/27/23: Unresponsive to commands and can answer questions, did open her eyes to her voice but all extremities were 2 out of 5 but was able to respond to pain in all 4 extremities, roving eye movements, absent blink to threat in bilateral eyes  • Current Blood Pressure: 159/70, BP over 24 hours: BP  Min: 154/70  Max: 196/81   • Vascular risk factors: carotid stenosis, past L shayy dissection, hypertension, hyperlipidemia, diabetes  • Home meds: ASA and pletal     Workup:  Lab Results   Component Value Date    HGBA1C 4 9 04/20/2023    CHOLESTEROL 116 12/26/2022    LDLCALC 53 12/26/2022    TRIG 64 12/26/2022    INR 0 92 07/01/2022      - CTH: Acute diffuse parenchymal hemorrhages and subarachnoid hemorrhage in bilateral cerebellar folia (left worse than right) which may be due to hemorrhagic conversion of acute/subacute infarcts in bilateral cerebellum  There is upward and downward cerebellar   herniation crowding the quadrigeminal cistern, posterior foramen magnum, and compression of fourth ventricle  Stable mild ventriculomegaly    - CTA: Negative CTA head and neck for active contrast extravasation, large vessel occlusion, dissection, aneurysm, or vascular malformation  Severe stenosis in left vertebral artery proximal V4 segment due to calcified atherosclerotic disease  Severe stenosis (approximately 75% narrowing) in right ICA proximal cervical segment due to atherosclerotic disease  4 1 cm lesion posterior to left thyroid lobe extending into the left upper mediastinal region, could represent pedunculated thyroid nodule or large parathyroid adenoma  Right nasopharyngeal airway tip in right vallecular region  • MRI: pending   • Echocardiogram: pending   • Telemetry: pending     Pertinent scores:  - NIHSS: 29  Stroke Modified [place] Score: 1 (No significant disability  Able to carry out all usual activities, despite some symptoms)  ICH score: 3    Impression: Possible ischemic stroke in bilateral cerebelli with associated hemorrhage conversion versus subarachnoid hemorrhage    Plan:  - Stroke pathway  - Discussed plan with neurology attending, Dr Farshad Fisher  - BP: BP as close to but <140 SBP due to [address]   - given IV labetelol and cardene gtt to keep BP control   - would also recommend HTS 3% to decrease cerebellar edema  - recommend neurosurgery to evaluate to weigh in given noted cerebellar bleed as well as if intervention is needed   - Obtain lipids and A1c  - atorvastatin 40mg qhs  - Maintain glucose <180, SSI for coverage if indicated  - Medical management as per primary team appreciated  - Antiplatelet agents: no AC/AP, reversed ASA and pletal usage w/ DDAVP  - TTE  - MRI brain pending given possible concern for stroke w/ hemorrhagic conversion  - DVT ppx and SCDs  - Monitor on telemetry  - PT/OT/Speech/PM&R input appreciated  - Stroke education        Thrombolytic Decision: Patient not a candidate  Bleeding risk  Recommendations for outpatient neurological follow up have yet to be determined      History of Present Illness     Reason for Consult / Principal Problem: Unresponsiveness/altered mental status  Hx and PE limited by: Altered mental status  Patient last known well: 1:30 PM 04/27  Stroke alert called: 2:30 PM 04/27  Neurology time of arrival: 2:31 PM 04/27  HPI: Erica Wells is a 80 y o   female w/ a PMH of HTN, HLD, L vert dissection and past TIA coming in as a Stroke alert  Patient was at her normal state of health when she was with her son and then all of a sudden around 1:30 PM, patient complained of sudden severe headache and soon after became more unresponsive to the point that EMS was called and she was only barely responding to pain at that point  Patient was on baseline aspirin and Pletal given patient's previous left vertebral dissection as well as past TIA in the past   When patient presented to the ED, patient was unresponsive except to pain and was not appropriately following commands or answering any questions  CT head showed bilateral hemorrhages in bilateral cerebelli  There was concern whether it is just pure hypertensive hemorrhage versus possible stroke bilaterally with hemorrhagic conversion  Patient's blood pressure was in the 180s in the field and was 190s when patient was brought into the ED  Blood glucose was 137  When evaluated, patient was noted to be biting her tongue and there was concern that patient likely was unable to protect her airway  ED staff was discussing with family in regards to goals of care and how to proceed forward  Inpatient consult to Neurology  Consult performed by: Marline Villalpando DO  Consult ordered by:  Alexandra Dutton MD          Review of Systems   Unable to perform ROS: Acuity of condition       Historical Information   Past Medical History:   Diagnosis Date   • Asthma    • Chronic interstitial cystitis    • Community acquired pneumonia     last assessed 10/4/13   • Diabetes mellitus ([facility] [address] )    • Disease of thyroid gland    • Diverticulitis    • Dizziness    • GERD (gastroesophageal reflux disease)    • Hyperlipidemia    • Hypertension    • Vertigo      Past Surgical History:   Procedure Laterality Date   • ADENOIDECTOMY     • CHOLECYSTECTOMY     • COLON SURGERY      partial colectomy - sigmoid, diverticulitis   • EYE SURGERY     • HYSTERECTOMY      ovaries remain   • TONSILLECTOMY     • US GUIDED LYMPH NODE BIOPSY RIGHT  3/11/2020   • US GUIDED THYROID BIOPSY  11/13/2019     Social History   Social History     Substance and Sexual Activity   Alcohol Use Never     Social History     Substance and Sexual Activity   Drug Use No     E-Cigarette/Vaping   • E-Cigarette Use Never User      E-Cigarette/Vaping Substances     Social History     Tobacco Use   Smoking Status Never   Smokeless Tobacco Never     Family History:   Family History   Problem Relation Age of Onset   • Coronary artery disease Mother    • Other Father         MVA   • Alcohol abuse Neg Hx    • Depression Neg Hx    • Drug abuse Neg Hx    • Substance Abuse Neg Hx    • Mental illness Neg Hx        Review of previous medical records was  completed  Meds/Allergies   all current active meds have been reviewed, current meds:   Current Facility-Administered Medications   Medication Dose Route Frequency   • acetaminophen (TYLENOL) oral suspension 650 mg  650 mg Per NG Tube Q4H PRN   • niCARdipine (CARDENE) 25 mg (STANDARD CONCENTRATION) in sodium chloride 0 9% 250 mL  1-15 mg/hr Intravenous Titrated   • pantoprazole (PROTONIX) injection 40 mg  40 mg Intravenous Q24H PORTILLO   • propofol (DIPRIVAN) 1000 mg in 100 mL infusion (premix)  5-50 mcg/kg/min Intravenous Titrated   • sodium chloride (HYPERTONIC) 3 % infusion  50 mL/hr Intravenous Continuous    and PTA meds:   Prior to Admission Medications   Prescriptions Last Dose Informant Patient Reported? Taking?    FIBER PO   Yes No   Sig: Take by mouth   albuterol (PROVENTIL HFA,VENTOLIN HFA) 90 mcg/act inhaler   No No   Sig: INHALE 1 PUFF BY MOUTH EVERY 6 HOURS AS NEEDED FOR WHEEZE   amLODIPine (NORVASC) 2 5 mg tablet   No No   Sig: TAKE 1 TABLET BY "MOUTH EVERY DAY   aspirin 81 mg chewable tablet   No No   Sig: Chew 1 tablet (81 mg total) daily   b complex vitamins capsule   Yes No   Sig: Take 1 capsule by mouth daily   cilostazol (PLETAL) 100 mg tablet   No No   Sig: Take 1 tablet (100 mg total) by mouth 2 (two) times a day   famotidine (PEPCID) 40 MG tablet   No No   Sig: TAKE 1 TABLET BY MOUTH EVERY DAY   meclizine (ANTIVERT) 25 mg tablet   No No   Sig: Take 1 tablet (25 mg total) by mouth every 8 (eight) hours as needed for dizziness   methimazole (TAPAZOLE) 5 mg tablet   No No   Si/2 tab MWFS   montelukast (SINGULAIR) 10 mg tablet   No No   Sig: TAKE 1 TABLET BY MOUTH EVERYDAY AT BEDTIME   rosuvastatin (CRESTOR) 5 mg tablet   No No   Sig: Take 1 tablet (5 mg total) by mouth daily      Facility-Administered Medications: None       Allergies   Allergen Reactions   • Haloperidol      Confusion /AMS  1 ep on 2020  AGGRESSIVENESS  AGITATION   • Triazolam Other (See Comments)     Confusion, and aggressivenss as well  • Bactrim [Sulfamethoxazole-Trimethoprim]    • Ezetimibe    • Gabapentin    • Lisinopril    • Naproxen Nausea Only   • Statins        Objective   Vitals:Blood pressure 169/56, pulse 62, temperature (!) 100 8 °F (38 2 °C), resp  rate 12, height 5' 1\" (1 549 m), weight 46 kg (101 lb 6 6 oz), SpO2 100 %  ,Body mass index is 19 16 kg/m²  Intake/Output Summary (Last 24 hours) at 2023 0810  Last data filed at 2023 0600  Gross per 24 hour   Intake 525 13 ml   Output 810 ml   Net -284 87 ml       Invasive Devices: Invasive Devices     Peripheral Intravenous Line  Duration           Peripheral IV 22 Right Antecubital 120 days    Peripheral IV 23 Dorsal (posterior); Proximal;Right Forearm 1 day    Peripheral IV 23 Left Antecubital 1 day    Peripheral IV 23 Dorsal (posterior); Right Hand <1 day          Drain  Duration           NG/OG/Enteral Tube Orogastric 18 Fr Left mouth <1 day    Urethral Catheter " Temperature probe 16 Fr  <1 day          Airway  Duration           ETT  7 mm <1 day                Physical Exam  Eyes:      Pupils: Pupils are equal, round, and reactive to light  Comments: Roving eye movements     Cardiovascular:      Rate and Rhythm: Normal rate  Pulmonary:      Comments: Unresponsive  Was biting her tongue  Neurological:      Deep Tendon Reflexes:      Reflex Scores:       Tricep reflexes are 1+ on the right side and 1+ on the left side  Bicep reflexes are 1+ on the right side and 1+ on the left side  Brachioradialis reflexes are 1+ on the right side and 1+ on the left side  Patellar reflexes are 1+ on the right side and 1+ on the left side  Achilles reflexes are 1+ on the right side and 1+ on the left side  Neurologic Exam     Mental Status   Level of consciousness: arousable by verbal stimuli    Opened eyes to voice initially  Unresponsive to commands or questions      Cranial Nerves     CN III, IV, VI   Pupils are equal, round, and reactive to light  CN VII   Facial expression full, symmetric  Roving eye movements  Blink to threat absent bilaterally  Enlarged tongue and she was biting      Motor Exam     All 4 extremities fell to the ground when lifted up in the air so at least 2 out of 5 as patient was able to withdraw to pain in all 4 extremities      Sensory Exam       Withdraws to pain in all 4 extremities     Gait, Coordination, and Reflexes     Reflexes   Right brachioradialis: 1+  Left brachioradialis: 1+  Right biceps: 1+  Left biceps: 1+  Right triceps: 1+  Left triceps: 1+  Right patellar: 1+  Left patellar: 1+  Right achilles: 1+  Left achilles: 1+  Right : 1+  Left : 1+  Right plantar: upgoing  Left plantar: upgoing  Right ankle clonus: absent  Left ankle clonus: absent    Unresponsive to questions or commands       NIHSS:  1a Level of Consciousness: 2 = Not alert, requires repeated stimulation to attend   1b   LOC Questions: 2 = Answers neither correctly   1c  LOC Commands: 2 = Obeys neither correctly   2  Best Gaze: 1 = Partial Gaze Palsy   3  Visual: 3 = Bilateral hemianopia   4  Facial Palsy: 0=Normal symmetric movement   5a  Motor Right Arm: 3=No effort against gravity, limb falls   5b  Motor Left Arm: 3=No effort against gravity, limb falls   6a  Motor Right Leg: 3=No effort against gravity, limb falls   6b  Motor Left Leg: 3=No effort against gravity, limb falls   7  Limb Ataxia:  0=Absent   8  Sensory: 2=Severe to total sensory loss; patient is not aware of being touched in face, arm, leg   9  Best Language:  3=Mute, global aphasia; no usable speech or auditory comprehension   10  Dysarthria: 2=Severe; patient speech is so slurred as to be unintelligible in the absence of or our of proportion to any dysphagia, or is mute/anarthric   11  Extinction and Inattention (formerly Neglect): 0=No abnormality   Total Score: 29     Time NIHSS was completed: 1:40pm 04/27/23    Modified [place] Score:  1 (No significant disability  Able to carry out all usual activities, despite some symptoms)    Lab Results:   I have personally reviewed pertinent reports    , CBC:   Results from last 7 days   Lab Units 04/28/23  0556 04/27/23  1502   WBC Thousand/uL 6 18 5 19   RBC Million/uL 3 54* 4 04   HEMOGLOBIN g/dL 10 4* 11 7   HEMATOCRIT % 31 8* 35 3   MCV fL 90 87   PLATELETS Thousands/uL 195 252   , BMP/CMP:   Results from last 7 days   Lab Units 04/28/23  0556 04/28/23  0050 04/27/23  1502   SODIUM mmol/L 150* 143 138   POTASSIUM mmol/L 4 1 4 6 3 1*   CHLORIDE mmol/L 127* 117* 109*   CO2 mmol/L 20* 22 22   BUN mg/dL 16 17 15   CREATININE mg/dL 0 73 0 96 0 76   CALCIUM mg/dL 8 3 8 9 9 1   EGFR ml/min/1 73sq m 72 51 68   , Vitamin B12:   , HgBA1C:   , TSH:   , Coagulation:   Results from last 7 days   Lab Units 04/27/23  1502   INR  0 97   , Lipid Profile:   , Ammonia:   , Urinalysis:       Invalid input(s): URIBILINOGEN, Drug Screen:   , Medication Drug Levels:       Invalid input(s): CARBAMAZEPINE,  PHENOBARB, LACOSAMIDE, OXCARBAZEPINE  Imaging Studies: I have personally reviewed pertinent reports  and I have personally reviewed pertinent films in PACS  EKG, Pathology, and Other Studies: I have personally reviewed pertinent reports  and I have personally reviewed pertinent films in PACS  VTE Prophylaxis: Sequential compression device (Venodyne)     Code Status: Level 2 - DNAR: but accepts endotracheal intubation  Advance Directive and Living Will:      Power of :    POLST:      Counseling / Coordination of Care  Total time spent today 40 minutes  Greater than 50% of total time was spent with the patient and / or family counseling and / or coordination of care   A description of the counseling / coordination of care: yes

## 2023-04-27 NOTE — PROGRESS NOTES
Responded to stroke alert  Patient taken to the CT Scan for further exam  Family arrived hospital  Provided hospitality for family and escorted to patient's room  04/27/23 1500   Clinical Encounter Type   Visited With Patient not available;Family; Health care provider   Routine Visit Introduction   Crisis Visit Code;ED  (stroke alert)   Referral From Nurse

## 2023-04-27 NOTE — ASSESSMENT & PLAN NOTE
81 yo F w/ a PMH of HTN, HLD, L vert dissection and past TIA coming in as a Stroke alert on 4/27/2023  2:53 PM with initial NIHSS of 29 and LKW 1:30pm, initial Blood Pressure: (!) 196/81  Initial presenting deficits were unresponsiveness and weakness in all extremeties   Patient was at her baseline and with her son when all of a sudden she complained of a sudden severe headache and slowly became less responsive and EMS was called and patient was becoming unresponsive and minimally responsive only to pain  As a result of bilateral cerebellar bleed pt was determined to not be a candidate for thrombolysis (TNK)  • Exam on 04/27/23: Unresponsive to commands and can answer questions, did open her eyes to her voice but all extremities were 2 out of 5 but was able to respond to pain in all 4 extremities, roving eye movements, absent blink to threat in bilateral eyes  • Current Blood Pressure: 159/70, BP over 24 hours: BP  Min: 154/70  Max: 196/81   • Vascular risk factors: carotid stenosis, past L shayy dissection, hypertension, hyperlipidemia, diabetes  • Home meds: ASA and pletal     Workup:  Lab Results   Component Value Date    HGBA1C 4 9 04/20/2023    CHOLESTEROL 116 12/26/2022    LDLCALC 53 12/26/2022    TRIG 64 12/26/2022    INR 0 92 07/01/2022      - CTH: Acute diffuse parenchymal hemorrhages and subarachnoid hemorrhage in bilateral cerebellar folia (left worse than right) which may be due to hemorrhagic conversion of acute/subacute infarcts in bilateral cerebellum  There is upward and downward cerebellar   herniation crowding the quadrigeminal cistern, posterior foramen magnum, and compression of fourth ventricle  Stable mild ventriculomegaly  - CTA: Negative CTA head and neck for active contrast extravasation, large vessel occlusion, dissection, aneurysm, or vascular malformation  Severe stenosis in left vertebral artery proximal V4 segment due to calcified atherosclerotic disease   Severe stenosis (approximately 75% narrowing) in right ICA proximal cervical segment due to atherosclerotic disease  4 1 cm lesion posterior to left thyroid lobe extending into the left upper mediastinal region, could represent pedunculated thyroid nodule or large parathyroid adenoma  Right nasopharyngeal airway tip in right vallecular region  • MRI: pending     Pertinent scores:  - NIHSS: 29  Stroke Modified [place] Score: 1 (No significant disability   Able to carry out all usual activities, despite some symptoms)  ICH score: 3    Impression: Possible ischemic stroke in bilateral cerebelli with associated hemorrhage conversion versus subarachnoid hemorrhage    Plan:  - Stroke pathway  - Discussed plan with neurology attending, Dr Julita Bennett  - at this time pending [address] conversation with family w/ ICU in regards to further prognosis    - currently on Code Level 3  Pending GOC, if continued supportive care w/o invasive measure would recommend below  - BP: BP as close to but <140 SBP due to [address]   - continue HTS 3% to decrease cerebellar edema  - family does not want invasive procedures such as craniectomies   - pending lipids and A1c  - atorvastatin 40mg qhs  - Maintain glucose <180, SSI for coverage if indicated  - Medical management as per primary team appreciated  - Antiplatelet agents: no AC/AP, s/p reversed ASA and pletal usage w/ DDAVP  - TTE  - MRI brain pending given possible concern for stroke w/ hemorrhagic conversion and GOC conversation  - Monitor on telemetry  - PT/OT/Speech/PM&R input appreciated  - Stroke education

## 2023-04-27 NOTE — PROGRESS NOTES
Pastoral Care Progress Note    2023  Patient: Mihai Alvarez : 1931  Admission Date & Time: 2023 1453  MRN: [id number] [facility]: [id number]                     Chaplaincy Interventions Utilized:   Empowerment: Facilitated group experience, Normalized experience of patient/family, Provided anticipatory guidance, Provided anxiety containment, Provided chaplaincy education, and Reframed experience of patient/family    Exploration: Explored emotional needs & resources and Facilitated story telling    Collaboration: Advocated for patient/family    Relationship Building: Facilitated reconciliation with the transcendent, Hospitality, and Provided silent and supportive presence    Ritual: Provided prayer    Chaplaincy Outcomes Achieved:  Expressed gratitude, Expressed humor, Tearfully processed emotions, and Verbally processed emotions    Spiritual Coping Strategies Utilized:   Spiritual comfort and Spiritual gratitude       23 1800   Clinical Encounter Type   Visited With Family; Patient   Routine Visit Introduction   Continue Visiting Yes   Referral From Nurse   Referral To    Consult Patient care   Patient Spiritual Encounters   Spiritual Encounter Notes Referred to patient and family (3 daughters, grandchildren, friend) by  Renee Vogel) and then by nurse Jade Lopes)  Katelyn Sanchez was a stroke alert earlier today  Intubated at about 1600 hrs  Family is very distressed  Prayed with family  While visiting, I noticed that Katelyn Sanchez seemed to be sponteneously moving her right foot  I informed the nurse Jade Lopes) who then informed the physician  I also spoke with the physician  Provided PC education for the family     Family Spiritual Encounters   Family Coping Accepting   Family Normalization 5   Family Participation in Care 4   Family Support During Treatment 5   Caregiver-Patient Relationship 5

## 2023-04-28 NOTE — DEATH NOTE
INPATIENT DEATH NOTE  Cornelio Zapata 80 y o  female MRN: [id number]  Unit/Bed#: ICU 05 Encounter: [id number]    Date, Time and Cause of Death    Date of Death: 23  Time of Death:  6:38 PM  Preliminary Cause of Death: Stroke [facility])  Entered by: SILKE Oneill[LB1 1]     Attribution     LB1 1 SILKE Ramírez 23 19:25           Patient's Information  Pronounced by: Meena Gary NP  Did the patient's death occur in the ED?: No  Did the patient's death occur in the OR?: No  Did the patient's death occur less than 10 days post-op?: No  Did the patient's death occur within 24 hours of admission?: No  Was code status DNR at the time of death?: Yes    PHYSICAL EXAM:  Unresponsive to noxious stimuli, Spontaneous respirations absent, Breath sounds absent, Carotid pulse absent, Heart sounds absent, Pupillary light reflex absent and Corneal blink reflex absent    Medical Examiner notification criteria:  NONE APPLICABLE   Medical Examiner's office notified?:  No, does not meet ME notification criteria       Family Notification  Was the family notified?: Yes  Date Notified: 23  Time Notified:   Notified by: Meena Gary NP  Name of Family Notified of Death: All at bedside   Relationship to Patient: Daughter  Family Notification Route:  At bedside  Was the family told to contact a  home?: Yes  Name of [facility][de-identified] [address]    Autopsy Options:  Post-mortem examination declined by next of kin    Primary Service Attending Physician notified?:  yes - Attending:  Dr Oj Lewis   Physician/Resident responsible for completing Discharge Summary:  SILKE Oneill

## 2023-04-28 NOTE — PROGRESS NOTES
NEUROLOGY RESIDENCY PROGRESS NOTE     Name: Roxy Dobbs   Age & Sex: 80 y o  female   MRN: [id number]  Unit/Bed#: ICU 05   Encounter: [id number]    Recommendations for outpatient neurological follow up have yet to be determined  ASSESSMENT & PLAN     * Acute cerebellar hemorrhage [facility])  Assessment & Plan  81 yo F w/ a PMH of HTN, HLD, L vert dissection and past TIA coming in as a Stroke alert on 4/27/2023  2:53 PM with initial NIHSS of 29 and LKW 1:30pm, initial Blood Pressure: (!) 196/81  Initial presenting deficits were unresponsiveness and weakness in all extremeties   Patient was at her baseline and with her son when all of a sudden she complained of a sudden severe headache and slowly became less responsive and EMS was called and patient was becoming unresponsive and minimally responsive only to pain  As a result of bilateral cerebellar bleed pt was determined to not be a candidate for thrombolysis (TNK)  • Exam on 04/27/23: Unresponsive to commands and can answer questions, did open her eyes to her voice but all extremities were 2 out of 5 but was able to respond to pain in all 4 extremities, roving eye movements, absent blink to threat in bilateral eyes  • Current Blood Pressure: 159/70, BP over 24 hours: BP  Min: 154/70  Max: 196/81   • Vascular risk factors: carotid stenosis, past L shayy dissection, hypertension, hyperlipidemia, diabetes  • Home meds: ASA and pletal     Workup:  Lab Results   Component Value Date    HGBA1C 4 9 04/20/2023    CHOLESTEROL 116 12/26/2022    LDLCALC 53 12/26/2022    TRIG 64 12/26/2022    INR 0 92 07/01/2022      - CTH: Acute diffuse parenchymal hemorrhages and subarachnoid hemorrhage in bilateral cerebellar folia (left worse than right) which may be due to hemorrhagic conversion of acute/subacute infarcts in bilateral cerebellum   There is upward and downward cerebellar   herniation crowding the quadrigeminal cistern, posterior foramen magnum, and compression of fourth ventricle  Stable mild ventriculomegaly  - CTA: Negative CTA head and neck for active contrast extravasation, large vessel occlusion, dissection, aneurysm, or vascular malformation  Severe stenosis in left vertebral artery proximal V4 segment due to calcified atherosclerotic disease  Severe stenosis (approximately 75% narrowing) in right ICA proximal cervical segment due to atherosclerotic disease  4 1 cm lesion posterior to left thyroid lobe extending into the left upper mediastinal region, could represent pedunculated thyroid nodule or large parathyroid adenoma  Right nasopharyngeal airway tip in right vallecular region  • MRI: pending     Pertinent scores:  - NIHSS: 29  Stroke Modified Gamal Score: 1 (No significant disability  Able to carry out all usual activities, despite some symptoms)  ICH score: 3    Impression: Possible ischemic stroke in bilateral cerebelli with associated hemorrhage conversion versus subarachnoid hemorrhage    Plan:  - Stroke pathway  - Discussed plan with neurology attending, Dr Jin Lewis  - at this time pending [address] conversation with family w/ ICU in regards to further prognosis    - currently on Code Level 3  Pending GOC, if continued supportive care w/o invasive measure would recommend below  - BP: BP as close to but <140 SBP due to [address]   - continue HTS 3% to decrease cerebellar edema  - family does not want invasive procedures such as craniectomies   - pending lipids and A1c  - atorvastatin 40mg qhs  - Maintain glucose <180, SSI for coverage if indicated  - Medical management as per primary team appreciated  - Antiplatelet agents: no AC/AP, s/p reversed ASA and pletal usage w/ DDAVP  - TTE  - MRI brain pending given possible concern for stroke w/ hemorrhagic conversion and GOC conversation  - Monitor on telemetry  - PT/OT/Speech/PM&R input appreciated  - Stroke education              SUBJECTIVE     Patient was seen and examined       Overnight: non-reactive pupils- on exam B/L pupils 3 nonreactive, weak corneal- rest of exam unchanged from previous- withdrawals BLE, RUE extension, no response LUE  Started w/ HTS bolus and increase gtt  Has noted fevers up to 101 5 overnight, on propofol, cardene gtt, HTS 3%, still on vent  Continue with current supportive measures  No surgical interventions  Per conversation w/ Dr  Oralia Sprain   Likely transition to comfort measures  Unresponsive and was not having many spontaneous movements or withdrawal to pain   In all extremities  There were nonreactive pupils and unable to answer any questions or follow commands  Review of Systems   Unable to perform ROS: Patient unresponsive       OBJECTIVE     Patient ID: Shruthi Means is a 80 y o  female  Vitals:    23 0500 23 0530 23 0600 23 0835   BP: (!) 195/52 147/51 169/56    Pulse: 64 60 62    Resp: 12 12 12    Temp: (!) 100 8 °F (38 2 °C) (!) 100 8 °F (38 2 °C) (!) 100 8 °F (38 2 °C)    TempSrc:       SpO2: 100% 100% 100% 100%   Weight:       Height:          Temperature:   Temp (24hrs), Av 5 °F (38 1 °C), Min:97 3 °F (36 3 °C), Max:101 5 °F (38 6 °C)    Temperature: (!) 100 8 °F (38 2 °C)      Physical Exam     Neurologic Exam     Eyes:      Pupils: Pupils are equal, round, and reactive to light  Comments: Right gaze deviation and cannot cross midline with oculocephalic maneuver     Cardiovascular:      Rate and Rhythm: Normal rate  Pulmonary:      Comments: intubated  Neurological:      Deep Tendon Reflexes:      Reflex Scores:       Tricep reflexes are 1+ on the right side and 1+ on the left side  Bicep reflexes are 1+ on the right side and 1+ on the left side  Brachioradialis reflexes are 1+ on the right side and 1+ on the left side  Patellar reflexes are 1+ on the right side and 1+ on the left side         Achilles reflexes are 1+ on the right side and 1+ on the left side       Neurologic Exam      Mental Status   Level of consciousness: Unresponsive to commands or questions    Patient intubated and did not withdraw any extremities to pain    Cranial Nerves      CN III, IV, VI   Pupils are equal, round, and reactive to light      CN VII   Facial expression full, symmetric  Fixed right gaze preference that did not oculocephalic maneuver   corneals absent in the left, present and right corneal  Pupils were nonreactive to light    Motor Exam      all 4 extremities were all flaccid and did not have any withdrawal to pain     Sensory Exam        all 4 extremities were all flaccid and did not have any withdrawal to pain     Gait, Coordination, and Reflexes      Reflexes   Right brachioradialis: 1+  Left brachioradialis: 1+  Right biceps: 1+  Left biceps: 1+  Right triceps: 1+  Left triceps: 1+  Right patellar: 1+  Left patellar: 1+  Right achilles: 1+  Left achilles: 1+  Right : 1+  Left : 1+  Right plantar: upgoing  Left plantar: upgoing  Right ankle clonus: absent  Left ankle clonus: absent    Unresponsive to questions or commands          LABORATORY DATA     Labs: I have personally reviewed pertinent reports     and I have personally reviewed pertinent films in PACS  Results from last 7 days   Lab Units 04/28/23  0556 04/27/23  1502   WBC Thousand/uL 6 18 5 19   HEMOGLOBIN g/dL 10 4* 11 7   HEMATOCRIT % 31 8* 35 3   PLATELETS Thousands/uL 195 252      Results from last 7 days   Lab Units 04/28/23  0556 04/28/23  0050 04/27/23  1502   SODIUM mmol/L 150* 143 138   POTASSIUM mmol/L 4 1 4 6 3 1*   CHLORIDE mmol/L 127* 117* 109*   CO2 mmol/L 20* 22 22   BUN mg/dL 16 17 15   CREATININE mg/dL 0 73 0 96 0 76   CALCIUM mg/dL 8 3 8 9 9 1     Results from last 7 days   Lab Units 04/28/23  0556   MAGNESIUM mg/dL 1 9     Results from last 7 days   Lab Units 04/28/23  0556   PHOSPHORUS mg/dL 3 2      Results from last 7 days   Lab Units 04/27/23  1502   INR  0 97   PTT seconds 27               IMAGING & DIAGNOSTIC TESTING     Radiology Results: I have personally reviewed pertinent reports  and I have personally reviewed pertinent films in PACS    XR chest 1 view portable   ED Interpretation by Riana Lopes DO (04/27 3666)   ETT tube in proper place      Final Result by Becky Elder MD (04/28 9446)      No acute cardiopulmonary disease  Workstation performed: [id number]         CTA stroke alert (head/neck)   Final Result by Kenn Live MD (04/27 2786)      Negative CTA head and neck for active contrast extravasation, large vessel occlusion, dissection, aneurysm, or vascular malformation  Severe stenosis in left vertebral artery proximal V4 segment due to calcified atherosclerotic disease  Severe stenosis (approximately 75% narrowing) in right ICA proximal cervical segment due to atherosclerotic disease  4 1 cm lesion posterior to left thyroid lobe extending into the left upper mediastinal region, could represent pedunculated thyroid nodule or large parathyroid adenoma  Right nasopharyngeal airway tip in right vallecular region  Additional chronic/incidental findings as detailed above  Findings were directly discussed with Nicole Alas at 3:04 PM                         Workstation performed: [id number]         CT stroke alert brain   Final Result by Kenn Live MD (04/27 9908)      Acute diffuse parenchymal hemorrhages and subarachnoid hemorrhage in bilateral cerebellar folia (left worse than right) which may be due to hemorrhagic conversion of acute/subacute infarcts in bilateral cerebellum  There is upward and downward cerebellar    herniation crowding the quadrigeminal cistern, posterior foramen magnum, and compression of fourth ventricle  Stable mild ventriculomegaly        Findings were directly discussed with Nicole Alas at 2:57 PM and 3:04 PM       Workstation performed: [id number]             Other Diagnostic Testing: I have personally reviewed pertinent reports  and I have personally reviewed pertinent films in PACS    ACTIVE MEDICATIONS     Current Facility-Administered Medications   Medication Dose Route Frequency   • acetaminophen (TYLENOL) oral suspension 650 mg  650 mg Per NG Tube Q4H PRN   • niCARdipine (CARDENE) 25 mg (STANDARD CONCENTRATION) in sodium chloride 0 9% 250 mL  1-15 mg/hr Intravenous Titrated   • pantoprazole (PROTONIX) injection 40 mg  40 mg Intravenous Q24H PORTILLO   • propofol (DIPRIVAN) 1000 mg in 100 mL infusion (premix)  5-50 mcg/kg/min Intravenous Titrated   • sodium chloride (HYPERTONIC) 3 % infusion  50 mL/hr Intravenous Continuous       Prior to Admission medications    Medication Sig Start Date End Date Taking?  Authorizing Provider   albuterol (PROVENTIL HFA,VENTOLIN HFA) 90 mcg/act inhaler INHALE 1 PUFF BY MOUTH EVERY 6 HOURS AS NEEDED FOR WHEEZE 3/1/23   Betty Otoole MD   amLODIPine (NORVASC) 2 5 mg tablet TAKE 1 TABLET BY MOUTH EVERY DAY 4/21/23   Betty Otoole MD   aspirin 81 mg chewable tablet Chew 1 tablet (81 mg total) daily 9/6/20   Edwin Garner MD   b complex vitamins capsule Take 1 capsule by mouth daily    Historical Provider, MD   cilostazol (PLETAL) 100 mg tablet Take 1 tablet (100 mg total) by mouth 2 (two) times a day 12/1/22   Betty Otoole MD   famotidine (PEPCID) 40 MG tablet TAKE 1 TABLET BY MOUTH EVERY DAY 1/30/23   Betty Otoole MD   FIBER PO Take by mouth    Historical Provider, MD   meclizine (ANTIVERT) 25 mg tablet Take 1 tablet (25 mg total) by mouth every 8 (eight) hours as needed for dizziness 11/25/22   Betty Otoole MD   methimazole (TAPAZOLE) 5 mg tablet 1/2 tab MWFS 8/12/22   Betty Otoole MD   montelukast (SINGULAIR) 10 mg tablet TAKE 1 TABLET BY MOUTH EVERYDAY AT BEDTIME 4/20/23   Betty Otoole MD   rosuvastatin (CRESTOR) 5 mg tablet Take 1 tablet (5 mg total) by mouth daily 3/3/23   Betty Otoole MD   potassium chloride (K-DUR,KLOR-CON) 10 mEq tablet Take 1 tablet (10 mEq total) by mouth daily  Patient not taking: No sig reported 1/19/22 6/28/22  SILKE Porter   tiZANidine (ZANAFLEX) 2 mg tablet TAKE 1 TABLET (2 MG TOTAL) BY MOUTH 2 (TWO) TIMES A DAY AS NEEDED FOR MUSCLE SPASMS 6/20/22 6/28/22  Daya Romero MD         VTE Pharmacologic Prophylaxis: NO AC/AP given noted bilat cerebellar   VTE Mechanical Prophylaxis: sequential compression device    ==  Jacobo Romano 28  Neurology Residency, PGY-2

## 2023-04-28 NOTE — PHYSICAL THERAPY NOTE
Physical Therapy Cancellation Note    PT orders received chart review completed  Pt is currently intubated/sedated and not appropriate to participate in skilled PT at this time  PT will follow and eval as medically appropriate  04/28/23 1000   PT Last Visit   PT Visit Date 04/28/23   Note Type   Note type Evaluation; Cancelled Session   Cancel Reasons Intubated/sedated       Elle Stephenson, PT

## 2023-04-28 NOTE — PROGRESS NOTES
Pastoral Care Progress Note    2023  Patient: Kathleen Piedra : 1931  Admission Date & Time: 2023 1453  MRN: [id number] [facility]: [id number]                     Chaplaincy Interventions Utilized:   Empowerment: Clarified, confirmed, or reviewed information from treatment team , Encouraged self-care, Facilitated group experience, Provided anticipatory guidance, and Reframed experience of patient/family    Exploration: Explored hope, Facilitated expression of regret, and Facilitated story telling    Relationship Building: Cultivated a relationship of care and support, Listened empathically, and Provided silent and supportive presence    Ritual: Provided prayer    Chaplaincy Outcomes Achieved:  Expressed ultimate hope and Identified meaningful connections    Spiritual Coping Strategies Utilized:   Connectedness, Spiritual comfort, and Positive spiritual reframing    Pt was placed on comfort care and this  was called to bedside  A large contingent of loving family was present  Pt passed just prior to my entering the room  Her 3 daughters were present, along with her sister, who took the loss hard, stating that she wished she had waited for her  Lots of loving extended family present  Pt had just held a large family Easter dinner at her house  Did life review  Prayer at bedside

## 2023-04-28 NOTE — CASE MANAGEMENT
Case Management Assessment & Discharge Planning Note    Patient name Adal Combs  Location ICU 05/ICU 05 MRN [id number]  : 1931 Date 2023       Current Admission Date: 2023  Current Admission Diagnosis:Acute cerebellar hemorrhage [facility])   Patient Active Problem List    Diagnosis Date Noted   • Acute cerebellar hemorrhage ([facility] ) 2023   • Underweight 2022   • Leukopenia 2022   • Vitamin B12 deficiency 2022   • Pulmonary nodules 2022   • COVID-19 virus infection 2022   • Vertebral artery dissection ([facility] ) 2020   • Carotid stenosis, right 2020   • Vertebral artery stenosis 2020   • Rotator cuff disorder, right 2020   • Abnormal finding on imaging 2019   • Thyroid nodule 2019   • Mass of right submandibular region 2019   • Atherosclerosis 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 ([facility] ) 2019   • PAD (peripheral artery disease) ([facility] ) 2019   • Memory loss 2019   • Trigger finger of right hand 2018   • CKD (chronic kidney disease), stage III ([facility] ) 2018   • Degeneration of intervertebral disc of lumbar region 06/10/2016   • Spondylolisthesis, grade 1 06/10/2016   • Hyperthyroidism 2014   • Diverticulosis 2014   • Irritable bowel syndrome 2014   • Adnexal cyst 2014   • Fatty liver 2013   • History of TIA (transient ischemic attack) 2013   • Anxiety 2012   • Elevated serum alkaline phosphatase level 2012   • Vitamin D deficiency 2012   • Mild intermittent asthma without complication    • Esophageal reflux 2012   • Hyperlipidemia 2012   • Essential hypertension 2012   • Insomnia 2012   • Spinal stenosis 2012      LOS (days): 1  Geometric Mean LOS (GMLOS) (days): 4 50  Days to GMLOS:3 7     OBJECTIVE:    Risk of Unplanned Readmission Score: 12 17         Current admission status: Inpatient       Preferred Pharmacy:   [facility] [address]   [address] [place] [address]  Phone: [phone] Fax: [phone]    Primary Care Provider: Betty Otoole MD    Primary Insurance: MEDICARE  Secondary Insurance: BLUE CROSS    ASSESSMENT:  Jori Patrick Proxies    There are no active Health Care Proxies on file  Advance Directives  Primary Contact: Norman Nixon (Daughter) [phone] (H)  [phone]    Readmission Root Cause  30 Day Readmission: No    Patient Information  Admitted from[de-identified] Home  Mental Status: Sedated, Intubated  During Assessment patient was accompanied by: Daughter  Primary Caregiver: Self  Support Systems: Self, Family members  [place] of Residence: [address] [place] [address] do you live in?: [place]  Type of Current Residence: [facility]  In the last 12 months, was there a time when you were not able to pay the mortgage or rent on time?: No  In the last 12 months, how many places have you lived?: 1  In the last 12 months, was there a time when you did not have a steady place to sleep or slept in a shelter (including now)?: No  Homeless/housing insecurity resource given?: N/A  Living Arrangements: Lives w/ Daughter  Is patient a ?: No    Patient Information Continued  Within the past 12 months, you worried that your food would run out before you got the money to buy more : Never true  Within the past 12 months, the food you bought just didn't last and you didn't have money to get more : Never true    DISCHARGE DETAILS:       Freedom of Choice: Yes    Contacts  Patient Contacts: Norman Nixon (Daughter) [phone] (H)  [phone]  Relationship to Patient[de-identified] Family  Contact Method: Phone  Phone Number: [phone] Nghia Valencia  Reason/Outcome: Emergency Contact, Continuity of Care, Discharge Planning      Pt remains intubated and sedated  Noted there are [address] discussions being held with family     CM will follow on the periphery for the time being  Cm will monitor pt's progress and family's decision      CM reviewed d/c planning process including the following: identifying help at home, patient preference for d/c planning needs, Discharge Lounge, [facility] Meds to Bed program, availability of treatment team to discuss questions or concerns patient and/or family may have regarding understanding medications and recognizing signs and symptoms once discharged  CM also encouraged patient to follow up with all recommended appointments after discharge  Patient advised of importance for patient and family to participate in managing patient’s medical well being

## 2023-04-28 NOTE — SEPSIS NOTE
Sepsis Note   Marcelino Whitt 80 y o  female MRN: [id number]  Unit/Bed#: ICU 05 Encounter: [id number]       Initial Sepsis Screening     Row Name 04/28/23 0256                Is the patient's history suggestive of a new or worsening infection? No  -LB              User Key  (r) = Recorded By, (t) = Taken By, (c) = Cosigned By    [address] Name Provider Type    LB SILKE Fernandez Nurse Practitioner                    Body mass index is 19 43 kg/m²    Wt Readings from Last 1 Encounters:   04/27/23 46 6 kg (102 lb 13 5 oz)        Ideal body weight: 47 8 kg (105 lb 6 1 oz)     Being treated for large cerebellar hemorrhage- no signs of infection

## 2023-04-28 NOTE — DISCHARGE SUMMARY
"Discharge Summary - Todd Morgan 80 y o  female MRN: [id number]    Unit/Bed#: ICU 05 Encounter: [id number] PCP: Valery Escobar MD    Admission Date:   Admission Orders (From admission, onward)     Ordered        23 1609  INPATIENT ADMISSION  Once                        Admitting Diagnosis: Stroke [facility]) [I63 9]  Acute cerebellar hemorrhage ([address] ) [I61 4]  [place] (NIH) Stroke Scale level of consciousness score 0, alert; keenly responsive [Z78 9]    HPI: H&P per Dr Akilah Wilburn, \"80year-old female with past medical history of left vertebral dissection, hypertension, hyperlipidemia, past TIA  Patient was apparently well until today afternoon when she was with her son and complain of sudden severe headache and eventually became unresponsive to the point that EMS was called  When EMS arrived patient was barely responding to pain  In the ED patient was unresponsive except to pain and was not appropriately following commands or answering any questions  NIHSS was 29 and last known well was 1:30 PM  CT head showed bilateral hemorrhages in the bilateral cerebellum  CTA was negative for active contrast extravasation, large vessel occlusion, dissection, aneurysm, vascular malformation   vitals showed systolic blood pressure in 180s and 190s  Patient was intubated for airway protection and admitted to medical critical care service for further evaluation and management  \"     Procedures Performed:   Orders Placed This Encounter   Procedures   • Intubation       Summary of Hospital Course: Admitted with bilateral cerebellar hemorrhage, neuro status continue to worsening, transition to comfort care today      Significant Findings, Care, Treatment and Services Provided: As above    Complications: As above    Disposition:      Final Diagnosis: Stroke    Medical Problems     Resolved Problems  Date Reviewed: 2023   None         Condition at Time of Death: Comfort care    Date, Time and " Cause of Death    Date of Death: 23  Time of Death:  6:38 PM  Preliminary Cause of Death: Stroke [facility])  Entered by: Ulysses Major, CRNP[LB1 1]     Attribution     LB1 1 Neri Ly Leny Celis  23 19:25          Death Note:    INPATIENT DEATH NOTE  Rafa Zaptaa 80 y o  female MRN: [id number]  Unit/Bed#: ICU 05 Encounter: [id number]    Date, Time and Cause of Death    Date of Death: 23  Time of Death:  6:38 PM  Preliminary Cause of Death: Stroke [facility])  Entered by: Ulysses Major, CRNP[LB1 1]     Attribution     LB1 1 SILKE Hooker 23 19:25           Patient's Information  Pronounced by: Ulysses Major, NP  Did the patient's death occur in the ED?: No  Did the patient's death occur in the OR?: No  Did the patient's death occur less than 10 days post-op?: No  Did the patient's death occur within 24 hours of admission?: No  Was code status DNR at the time of death?: Yes    PHYSICAL EXAM:  Unresponsive to noxious stimuli, Spontaneous respirations absent, Breath sounds absent, Carotid pulse absent, Heart sounds absent, Pupillary light reflex absent and Corneal blink reflex absent    Medical Examiner notification criteria:  NONE APPLICABLE   Medical Examiner's office notified?:  No, does not meet ME notification criteria       Family Notification  Was the family notified?: Yes  Date Notified: 23  Time Notified: 8738  Notified by: Ulysses Major, NP  Name of Family Notified of Death: All at bedside   Relationship to Patient: Daughter  Family Notification Route:  At bedside  Was the family told to contact a  home?: Yes  Name of [facility][de-identified] [address]    Autopsy Options:  Post-mortem examination declined by next of kin    Primary Service Attending Physician notified?:  yes - Attending:  Dr Shahana Vasquez   Physician/Resident responsible for completing Discharge Summary:  Ulysses Major, CRNP         Attestation signed by Niharika Chaudhari Devin Moreira DO at 4/28/2023  7:33 PM:  I was the supervising/collaborating physician on 4/28/23  I agree with the Advanced Practitioner’s documentation        Geraldine Amanda DO

## 2023-04-28 NOTE — PROGRESS NOTES
0300 notified by RN nonreactive pupils- on exam B/L pupils 3 nonreactive, weak corneal- rest of exam unchanged from previous- withdrawals BLE, RUE extension, no response LUGRACIELA Todd aware plan for HTS bolus and increase gtt

## 2023-04-28 NOTE — OCCUPATIONAL THERAPY NOTE
Occupational Therapy Cancel Note     04/28/23 1425   OT Last Visit   OT Visit Date 04/28/23   Note Type   Note type Cancelled Session   Cancel Reasons Intubated/sedated           Juliette Doe, OT

## 2023-04-28 NOTE — PLAN OF CARE
Problem: Potential for Falls  Goal: Patient will remain free of falls  Description: INTERVENTIONS:  - Educate patient/family on patient safety including physical limitations  - Instruct patient to call for assistance with activity   - Consult OT/PT to assist with strengthening/mobility   - Keep Call bell within reach  - Keep bed low and locked with side rails adjusted as appropriate  - Keep care items and personal belongings within reach  - Initiate and maintain comfort rounds  - Make Fall Risk Sign visible to staff  - Offer Toileting every  Hours, in advance of need  - Initiate/Maintain alarm  - Obtain necessary fall risk management equipment:   - Apply yellow socks and bracelet for high fall risk patients  - Consider moving patient to room near nurses station  4/28/2023 0403 by Jaden Simons RN  Outcome: Progressing  4/28/2023 0403 by Jaden Simons RN  Outcome: Progressing     Problem: MOBILITY - ADULT  Goal: Maintain or return to baseline ADL function  Description: INTERVENTIONS:  -  Assess patient's ability to carry out ADLs; assess patient's baseline for ADL function and identify physical deficits which impact ability to perform ADLs (bathing, care of mouth/teeth, toileting, grooming, dressing, etc )  - Assess/evaluate cause of self-care deficits   - Assess range of motion  - Assess patient's mobility; develop plan if impaired  - Assess patient's need for assistive devices and provide as appropriate  - Encourage maximum independence but intervene and supervise when necessary  - Involve family in performance of ADLs  - Assess for home care needs following discharge   - Consider OT consult to assist with ADL evaluation and planning for discharge  - Provide patient education as appropriate  4/28/2023 0403 by Jaden Simons RN  Outcome: Progressing  4/28/2023 0403 by Jaden Simons RN  Outcome: Progressing  Goal: Maintains/Returns to pre admission functional level  Description: INTERVENTIONS:  - Perform BMAT or MOVE assessment daily    - Set and communicate daily mobility goal to care team and patient/family/caregiver  - Collaborate with rehabilitation services on mobility goals if consulted  - Perform Range of Motion  times a day  - Reposition patient every  hours    - Dangle patient  times a day  - Stand patient  times a day  - Ambulate patient  times a day  - Out of bed to chair  times a day   - Out of bed for meals  times a day  - Out of bed for toileting  - Record patient progress and toleration of activity level   4/28/2023 0403 by Parag Wallace RN  Outcome: Progressing  4/28/2023 0403 by Parag Wallace RN  Outcome: Progressing     Problem: Prexisting or High Potential for Compromised Skin Integrity  Goal: Skin integrity is maintained or improved  Description: INTERVENTIONS:  - Identify patients at risk for skin breakdown  - Assess and monitor skin integrity  - Assess and monitor nutrition and hydration status  - Monitor labs   - Assess for incontinence   - Turn and reposition patient  - Assist with mobility/ambulation  - Relieve pressure over bony prominences  - Avoid friction and shearing  - Provide appropriate hygiene as needed including keeping skin clean and dry  - Evaluate need for skin moisturizer/barrier cream  - Collaborate with interdisciplinary team   - Patient/family teaching  - Consider wound care consult   4/28/2023 0403 by Parag Wallace RN  Outcome: Progressing  4/28/2023 0403 by Parag Wallace RN  Outcome: Progressing     Problem: PAIN - ADULT  Goal: Verbalizes/displays adequate comfort level or baseline comfort level  Description: Interventions:  - Encourage patient to monitor pain and request assistance  - Assess pain using appropriate pain scale  - Administer analgesics based on type and severity of pain and evaluate response  - Implement non-pharmacological measures as appropriate and evaluate response  - Consider cultural and social influences on pain and pain management  - Notify physician/advanced practitioner if interventions unsuccessful or patient reports new pain  Outcome: Progressing     Problem: INFECTION - ADULT  Goal: Absence or prevention of progression during hospitalization  Description: INTERVENTIONS:  - Assess and monitor for signs and symptoms of infection  - Monitor lab/diagnostic results  - Monitor all insertion sites, i e  indwelling lines, tubes, and drains  - Monitor endotracheal if appropriate and nasal secretions for changes in amount and color  - [place] appropriate cooling/warming therapies per order  - Administer medications as ordered  - Instruct and encourage patient and family to use good hand hygiene technique  - Identify and instruct in appropriate isolation precautions for identified infection/condition  Outcome: Progressing  Goal: Absence of fever/infection during neutropenic period  Description: INTERVENTIONS:  - Monitor WBC    Outcome: Progressing     Problem: SAFETY ADULT  Goal: Patient will remain free of falls  Description: INTERVENTIONS:  - Educate patient/family on patient safety including physical limitations  - Instruct patient to call for assistance with activity   - Consult OT/PT to assist with strengthening/mobility   - Keep Call bell within reach  - Keep bed low and locked with side rails adjusted as appropriate  - Keep care items and personal belongings within reach  - Initiate and maintain comfort rounds  - Make Fall Risk Sign visible to staff  - Offer Toileting every  Hours, in advance of need  - Initiate/Maintain alarm  - Obtain necessary fall risk management equipment:   - Apply yellow socks and bracelet for high fall risk patients  - Consider moving patient to room near nurses station  4/28/2023 0403 by Apolonia Russell RN  Outcome: Progressing  4/28/2023 0403 by Apolonia Russell RN  Outcome: Progressing  Goal: Maintain or return to baseline ADL function  Description: INTERVENTIONS:  -  Assess patient's ability to carry out ADLs; assess patient's baseline for ADL function and identify physical deficits which impact ability to perform ADLs (bathing, care of mouth/teeth, toileting, grooming, dressing, etc )  - Assess/evaluate cause of self-care deficits   - Assess range of motion  - Assess patient's mobility; develop plan if impaired  - Assess patient's need for assistive devices and provide as appropriate  - Encourage maximum independence but intervene and supervise when necessary  - Involve family in performance of ADLs  - Assess for home care needs following discharge   - Consider OT consult to assist with ADL evaluation and planning for discharge  - Provide patient education as appropriate  4/28/2023 0403 by Parag Wallace RN  Outcome: Progressing  4/28/2023 0403 by Parag Wallace RN  Outcome: Progressing  Goal: Maintains/Returns to pre admission functional level  Description: INTERVENTIONS:  - Perform BMAT or MOVE assessment daily    - Set and communicate daily mobility goal to care team and patient/family/caregiver  - Collaborate with rehabilitation services on mobility goals if consulted  - Perform Range of Motion  times a day  - Reposition patient every  hours    - Dangle patient  times a day  - Stand patient  times a day  - Ambulate patient  times a day  - Out of bed to chair  times a day   - Out of bed for meal times a day  - Out of bed for toileting  - Record patient progress and toleration of activity level   4/28/2023 0403 by Parag Wallace RN  Outcome: Progressing  4/28/2023 0403 by Parag Wallace RN  Outcome: Progressing     Problem: DISCHARGE PLANNING  Goal: Discharge to home or other facility with appropriate resources  Description: INTERVENTIONS:  - Identify barriers to discharge w/patient and caregiver  - Arrange for needed discharge resources and transportation as appropriate  - Identify discharge learning needs (meds, wound care, etc )  - Arrange for interpretive services to assist at discharge as needed  - Refer to Case Management Department for coordinating discharge planning if the patient needs post-hospital services based on physician/advanced practitioner order or complex needs related to functional status, cognitive ability, or social support system  Outcome: Progressing     Problem: Knowledge Deficit  Goal: Patient/family/caregiver demonstrates understanding of disease process, treatment plan, medications, and discharge instructions  Description: Complete learning assessment and assess knowledge base    Interventions:  - Provide teaching at level of understanding  - Provide teaching via preferred learning methods  Outcome: Progressing

## 2023-04-28 NOTE — PROGRESS NOTES
Daily Progress Note - [address] [facility] [address] [place] 80 y o  female MRN: [id number]  Unit/Bed#: ICU 05 Encounter: [id number]        ----------------------------------------------------------------------------------------  HPI/24hr events: Leobardo Jeff is an 51-year-old female with past medical history of left vertebral dissection, hypertension, hyperlipidemia, past TIA  Patient was apparently well until today afternoon when she was with her son and complain of sudden severe headache and eventually became unresponsive to the point that EMS was called  When EMS arrived patient was barely responding to pain  In the ED patient was unresponsive except to pain and was not appropriately following commands or answering any questions  NIHSS was 29 and last known well was 1:30 PM  CT head showed bilateral hemorrhages in the bilateral cerebellum  CTA was negative for active contrast extravasation, large vessel occlusion, dissection, aneurysm, vascular malformation   vitals showed systolic blood pressure in 180s and 190s  Patient was intubated for airway protection and admitted to medical critical care service for further evaluation and management  Goals of care discussion was held with multiple members of the family yesterday evening and everyone was in agreement of moving forward with level 3 DNR for now and eventually about compassionate extubation and comfort measures  · Overnight pupils non-reactive and was started on hypertonic saline    ---------------------------------------------------------------------------------------  SUBJECTIVE  Patient seen and examined at bedside today morning  Patient unresponsive  Review of systems was unable to be performed secondary to Altered mental status  ---------------------------------------------------------------------------------------  Assessment and Plan:    Neuro:   · Diagnosis: Acute cerebellar hemorrhage     CT stroke alert (4/27) :  Acute diffuse parenchymal hemorrhages and subarachnoid hemorrhage in bilateral cerebellar folia (left worse than right) which may be due to hemorrhagic conversion of acute/subacute infarcts in bilateral cerebellum  There is upward and downward cerebellar herniation crowding the quadrigeminal cistern, posterior foramen magnum, and compression of fourth ventricle  Stable mild ventriculomegaly     ? Plan  ? Initiate stroke pathway  ? Blood pressure control with goal SBP less than 140  ? Nicardipine drip for BP goal  ? Propofol for sedation  ? HTS @ 30 ml/hr to reduce cerebral edema  ? Goal Na 145-155; Goal osm 310-320  ? Lipid profile, HbA1c  ? Statin therapy  ? Normothermia, euglycemia  ? No anticoagulation or antiplatelet agents; reversal done with DDAVP  ? TTE  ? MRI brain  ? Telemetry  ? Ongoing [facility] discussion        CV:   · Diagnosis: Hypertension                           On amlodipine 2 5 mg at home  ? Plan  ? Hold oral medications in setting of intubation  ? As needed nicardipine drip for SBP goal less than 140     · Diagnosis: Hyperlipidemia                            On rosuvastatin 5 mg daily  ? Plan  ? Hold in setting of intubation        Pulm:  · Diagnosis: 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     ? Plan  ? AC/VC, rate 12, PEEP 6, tidal volume 400, FiO2 60%  ? Daily SBT  ? Airway clearance protocol        GI:   · Diagnosis: No active issues     ? Plan   ? IV Protonix for GI ulcer prophylaxis  ? Bowel regimen        :   · Diagnosis: No active issues     ? Plan: Monitor I/Os        F/E/N:   · Fluids : Maintenance fluids  · Electrolytes : Keep K more than 4, Phos more than 3, mag more than 2  · Nutrition : N p o  for now; NG tube placement with tube feeding eventually depending on families goals        Heme/Onc:   · Diagnosis: No active issues        Endo:   · Diagnosis: No active issues        ID:   · Diagnosis: No active issues        MSK/Skin:   · Diagnosis: No active issues     ? Plan  ?  Frequent turning to "prevent skin breakdown    Patient appropriate for transfer out of the ICU today?: No  Disposition: Continue Critical Care   Code Status: Level 2 - DNAR: but accepts endotracheal intubation  ---------------------------------------------------------------------------------------  ICU CORE MEASURES    Prophylaxis   VTE Pharmacologic Prophylaxis: Pharmacologic VTE Prophylaxis contraindicated due to Intracranial bleed  VTE Mechanical Prophylaxis: sequential compression device  Stress Ulcer Prophylaxis: Pantoprazole IV     ABCDE Protocol (if indicated)  Plan to perform spontaneous awakening trial today? Yes  Plan to perform spontaneous breathing trial today? No  Obvious barriers to extubation? Yes  CAM-ICU: Positive    Invasive Devices Review  Invasive Devices     Peripheral Intravenous Line  Duration           Peripheral IV 22 Right Antecubital 120 days    Peripheral IV 23 Dorsal (posterior); Proximal;Right Forearm 1 day    Peripheral IV 23 Left Antecubital 1 day    Peripheral IV 23 Dorsal (posterior); Right Hand <1 day          Drain  Duration           NG/OG/Enteral Tube Orogastric 18 Fr Left mouth <1 day    Urethral Catheter Temperature probe 16 Fr  <1 day          Airway  Duration           ETT  7 mm <1 day              Can any invasive devices be discontinued today?  Not applicable  ---------------------------------------------------------------------------------------  OBJECTIVE    Vitals   Vitals:    23 0400 23 0500 23 0530 23 0600   BP: (!) 126/38 (!) 195/52 147/51 169/56   Pulse: 60 64 60 62   Resp: 12 12 12 12   Temp: (!) 101 1 °F (38 4 °C) (!) 100 8 °F (38 2 °C) (!) 100 8 °F (38 2 °C) (!) 100 8 °F (38 2 °C)   TempSrc: Bladder      SpO2: 100% 100% 100% 100%   Weight: 46 kg (101 lb 6 6 oz)      Height: 5' 1\" (1 549 m)        Temp (24hrs), Av 5 °F (38 1 °C), Min:97 3 °F (36 3 °C), Max:101 5 °F (38 6 °C)  Current: Temperature: (!) 100 8 °F (38 2 °C)  HR: 60s  BP: " 120-195/38-51  RR: 12  SpO2: 98%    Respiratory:  SpO2: SpO2: 100 %       Invasive/non-invasive ventilation settings   Respiratory    Lab Data (Last 4 hours)    None         O2/Vent Data (Last 4 hours)    None                Physical Exam  Vitals and nursing note reviewed  Constitutional:       Interventions: She is sedated and intubated  HENT:      Head: Normocephalic and atraumatic  Mouth/Throat:      Mouth: Mucous membranes are moist       Pharynx: Oropharynx is clear  Eyes:      Pupils:      Right eye: Pupil is not reactive  Pupil is round  Left eye: Pupil is not reactive  Pupil is round  Cardiovascular:      Rate and Rhythm: Normal rate and regular rhythm  Pulses: Normal pulses  Heart sounds: Murmur (systolic murmur) heard  Pulmonary:      Effort: Pulmonary effort is normal  No respiratory distress  She is intubated  Breath sounds: Normal breath sounds  No wheezing or rales  Abdominal:      General: Bowel sounds are normal  There is no distension  Palpations: Abdomen is soft  There is no mass  Musculoskeletal:      Right lower leg: No edema  Left lower leg: No edema  Skin:     General: Skin is warm  Capillary Refill: Capillary refill takes less than 2 seconds  Coloration: Skin is not jaundiced  Findings: No rash  Neurological:      Mental Status: She is unresponsive  GCS: GCS eye subscore is 1  GCS verbal subscore is 1  GCS motor subscore is 3        Comments: No motor response on B/L upper extremities  Triple flexion to pain on B/L lower extremities             Laboratory and Diagnostics:  Results from last 7 days   Lab Units 04/28/23  0556 04/27/23  1502   WBC Thousand/uL 6 18 5 19   HEMOGLOBIN g/dL 10 4* 11 7   HEMATOCRIT % 31 8* 35 3   PLATELETS Thousands/uL 195 252     Results from last 7 days   Lab Units 04/28/23  0556 04/28/23  0050 04/27/23  1502   SODIUM mmol/L 150* 143 138   POTASSIUM mmol/L 4 1 4 6 3 1*   CHLORIDE mmol/L 127* "117* 109*   CO2 mmol/L 20* 22 22   ANION GAP mmol/L 3* 4 7   BUN mg/dL 16 17 15   CREATININE mg/dL 0 73 0 96 0 76   CALCIUM mg/dL 8 3 8 9 9 1   GLUCOSE RANDOM mg/dL 113 178* 140     Results from last 7 days   Lab Units 04/28/23  0556   MAGNESIUM mg/dL 1 9   PHOSPHORUS mg/dL 3 2      Results from last 7 days   Lab Units 04/27/23  1502   INR  0 97   PTT seconds 27     Imaging:  I have personally reviewed pertinent reports  Intake and Output  I/O       04/26 0701 04/27 0700 04/27 0701 04/28 0700    I V  (mL/kg)  525 1 (11 4)    Total Intake(mL/kg)  525 1 (11 4)    Urine (mL/kg/hr)  810    Total Output  810    Net  -284 9              UOP: 50 ml/hr     Height and Weights   Height: 5' 1\" (154 9 cm)  IBW (Ideal Body Weight): 47 8 kg  Body mass index is 19 16 kg/m²  Weight (last 2 days)     Date/Time Weight    04/28/23 0400 46 (101 41)    04/27/23 1450 46 6 (102 84)            Nutrition  NPO      Active Medications  Scheduled Meds:  Current Facility-Administered Medications   Medication Dose Route Frequency Provider Last Rate   • acetaminophen  650 mg Per NG Tube Q4H PRN SILKE Mejia     • niCARdipine  1-15 mg/hr Intravenous Titrated Darin Ivey MD 2 5 mg/hr (04/28/23 0600)   • propofol  5-50 mcg/kg/min Intravenous Titrated Trey Suh MD 10 mcg/kg/min (04/27/23 1724)   • sodium chloride  50 mL/hr Intravenous Continuous SILKE Caraballo 50 mL/hr (04/28/23 0343)     Continuous Infusions:  niCARdipine, 1-15 mg/hr, Last Rate: 2 5 mg/hr (04/28/23 0600)  propofol, 5-50 mcg/kg/min, Last Rate: 10 mcg/kg/min (04/27/23 1724)  sodium chloride, 50 mL/hr, Last Rate: 50 mL/hr (04/28/23 0343)      PRN Meds:   acetaminophen, 650 mg, Q4H PRN        Allergies   Allergies   Allergen Reactions   • Haloperidol      Confusion /AMS  1 ep on 09/04/2020  AGGRESSIVENESS  AGITATION   • Triazolam Other (See Comments)     Confusion, and aggressivenss as well     • Bactrim [Sulfamethoxazole-Trimethoprim]    • " "Ezetimibe    • Gabapentin    • Lisinopril    • Naproxen Nausea Only   • Statins      ---------------------------------------------------------------------------------------  Advance Directive and Living Will:      Power of :    POLST:    ---------------------------------------------------------------------------------------  Care Time Delivered:   No Critical Care time spent     Rakel Akhtar MD  PGY-1, Internal Medicine  [address] [place] [address]        Portions of the record may have been created with voice recognition software  Occasional wrong word or \"sound a like\" substitutions may have occurred due to the inherent limitations of voice recognition software    Read the chart carefully and recognize, using context, where substitutions have occurred    "

## 2023-04-28 NOTE — RESPIRATORY THERAPY NOTE
RT Ventilator Management Note  Sydnie Mejia 80 y o  female MRN: [id number]  Unit/Bed#: ICU 05 Encounter: [id number]      Daily Screen    No data found in the last 10 encounters  Physical Exam:   Assessment Type: Assess only  General Appearance: Sedated  Respiratory Pattern: Assisted  Chest Assessment: Chest expansion symmetrical  Bilateral Breath Sounds: Clear  Location Specific: No  Cough: None  Suction: ET Tube  O2 Device: Ventilator      Resp Comments: Pt  remains on CMV/VC mode  No changes at this time  Will continue to monitor pt per protocol

## 2023-04-28 NOTE — NURSING NOTE
Patient transitioned to comfort care, family at bedside  Patient extubated, given meds for comfort  Comfort care cart ordered and pastoral care contacted   Patient appears comfortable for now

## 2023-05-01 NOTE — CLINICAL RISK MANAGEMENT
Progress Note - Death in Restraints   Prabhu Simon 80 y o  female MRN: [id number]  Unit/Bed#: ICU 05 Encounter: [id number]      Patient  within 24 hours of restraint  with Soft restraint right wrist and Soft restraint left wrist  Death unrelated to use of restraints  This situation was tracked internally  CMS and PA-DAKOTA  notification not required

## 2024-02-08 NOTE — ED NOTES
Patient ambulated to bathroom without difficulty  Denies dizziness when standing/ambulating       Nia Cote RN  06/26/18 8712 Patient's COPD is with exacerbation noted by continued dyspnea, use of accessory muscles for breathing, and worsening of baseline hypoxia currently.  Patient is currently off COPD Pathway. Continue scheduled inhalers Steroids, Antibiotics, and Supplemental oxygen and monitor respiratory status closely.
